# Patient Record
Sex: MALE | Race: WHITE | Employment: OTHER
[De-identification: names, ages, dates, MRNs, and addresses within clinical notes are randomized per-mention and may not be internally consistent; named-entity substitution may affect disease eponyms.]

---

## 2017-02-01 ENCOUNTER — OFFICE VISIT (OUTPATIENT)
Dept: ORTHOPEDIC SURGERY | Facility: CLINIC | Age: 77
End: 2017-02-01

## 2017-02-01 DIAGNOSIS — G89.29 CHRONIC PAIN OF BOTH KNEES: Primary | ICD-10-CM

## 2017-02-01 DIAGNOSIS — M25.562 CHRONIC PAIN OF BOTH KNEES: Primary | ICD-10-CM

## 2017-02-01 DIAGNOSIS — M25.561 CHRONIC PAIN OF BOTH KNEES: Primary | ICD-10-CM

## 2017-02-01 PROCEDURE — 99024 POSTOP FOLLOW-UP VISIT: CPT | Performed by: ORTHOPAEDIC SURGERY

## 2017-02-01 PROCEDURE — 20610 DRAIN/INJ JOINT/BURSA W/O US: CPT | Performed by: ORTHOPAEDIC SURGERY

## 2017-02-01 RX ORDER — BUPIVACAINE HYDROCHLORIDE 5 MG/ML
2 INJECTION, SOLUTION PERINEURAL ONCE
Status: COMPLETED | OUTPATIENT
Start: 2017-02-01 | End: 2017-02-01

## 2017-02-01 RX ORDER — LIDOCAINE HYDROCHLORIDE 10 MG/ML
2 INJECTION, SOLUTION EPIDURAL; INFILTRATION; INTRACAUDAL; PERINEURAL ONCE
Status: COMPLETED | OUTPATIENT
Start: 2017-02-01 | End: 2017-02-01

## 2017-02-01 RX ADMIN — BUPIVACAINE HYDROCHLORIDE 10 MG: 5 INJECTION, SOLUTION PERINEURAL at 11:53

## 2017-02-01 RX ADMIN — LIDOCAINE HYDROCHLORIDE 2 ML: 10 INJECTION, SOLUTION EPIDURAL; INFILTRATION; INTRACAUDAL; PERINEURAL at 11:54

## 2017-02-07 DIAGNOSIS — M17.12 ARTHRITIS OF LEFT KNEE: Primary | ICD-10-CM

## 2017-02-07 RX ORDER — OXYCODONE HYDROCHLORIDE AND ACETAMINOPHEN 5; 325 MG/1; MG/1
1-2 TABLET ORAL EVERY 4 HOURS PRN
Qty: 40 TABLET | Refills: 0 | Status: SHIPPED | OUTPATIENT
Start: 2017-02-07 | End: 2017-03-15 | Stop reason: SDUPTHER

## 2017-02-24 ENCOUNTER — OFFICE VISIT (OUTPATIENT)
Dept: FAMILY MEDICINE CLINIC | Facility: CLINIC | Age: 77
End: 2017-02-24

## 2017-02-24 VITALS
DIASTOLIC BLOOD PRESSURE: 70 MMHG | BODY MASS INDEX: 27.95 KG/M2 | SYSTOLIC BLOOD PRESSURE: 110 MMHG | HEART RATE: 60 BPM | WEIGHT: 194.8 LBS | RESPIRATION RATE: 18 BRPM

## 2017-02-24 DIAGNOSIS — M19.90 ARTHRITIS: ICD-10-CM

## 2017-02-24 DIAGNOSIS — I10 ESSENTIAL HYPERTENSION: Primary | ICD-10-CM

## 2017-02-24 DIAGNOSIS — E78.2 MIXED HYPERLIPIDEMIA: ICD-10-CM

## 2017-02-24 DIAGNOSIS — J44.9 CHRONIC OBSTRUCTIVE PULMONARY DISEASE, UNSPECIFIED COPD TYPE (HCC): ICD-10-CM

## 2017-02-24 DIAGNOSIS — R73.01 IMPAIRED FASTING GLUCOSE: ICD-10-CM

## 2017-02-24 PROCEDURE — G8427 DOCREV CUR MEDS BY ELIG CLIN: HCPCS | Performed by: FAMILY MEDICINE

## 2017-02-24 PROCEDURE — G8598 ASA/ANTIPLAT THER USED: HCPCS | Performed by: FAMILY MEDICINE

## 2017-02-24 PROCEDURE — 1123F ACP DISCUSS/DSCN MKR DOCD: CPT | Performed by: FAMILY MEDICINE

## 2017-02-24 PROCEDURE — G8484 FLU IMMUNIZE NO ADMIN: HCPCS | Performed by: FAMILY MEDICINE

## 2017-02-24 PROCEDURE — 3023F SPIROM DOC REV: CPT | Performed by: FAMILY MEDICINE

## 2017-02-24 PROCEDURE — 3017F COLORECTAL CA SCREEN DOC REV: CPT | Performed by: FAMILY MEDICINE

## 2017-02-24 PROCEDURE — 99214 OFFICE O/P EST MOD 30 MIN: CPT | Performed by: FAMILY MEDICINE

## 2017-02-24 PROCEDURE — 1036F TOBACCO NON-USER: CPT | Performed by: FAMILY MEDICINE

## 2017-02-24 PROCEDURE — G8926 SPIRO NO PERF OR DOC: HCPCS | Performed by: FAMILY MEDICINE

## 2017-02-24 PROCEDURE — 4040F PNEUMOC VAC/ADMIN/RCVD: CPT | Performed by: FAMILY MEDICINE

## 2017-02-24 PROCEDURE — G8420 CALC BMI NORM PARAMETERS: HCPCS | Performed by: FAMILY MEDICINE

## 2017-02-24 RX ORDER — NITROGLYCERIN 0.4 MG/1
0.4 TABLET SUBLINGUAL EVERY 5 MIN PRN
Qty: 25 TABLET | Refills: 1 | Status: SHIPPED | OUTPATIENT
Start: 2017-02-24 | End: 2018-01-26 | Stop reason: SDUPTHER

## 2017-02-24 ASSESSMENT — ENCOUNTER SYMPTOMS
BLOOD IN STOOL: 0
ABDOMINAL PAIN: 0
CHEST TIGHTNESS: 0
SHORTNESS OF BREATH: 0

## 2017-03-15 DIAGNOSIS — M17.12 PRIMARY OSTEOARTHRITIS OF LEFT KNEE: Primary | ICD-10-CM

## 2017-03-15 DIAGNOSIS — M17.12 ARTHRITIS OF LEFT KNEE: ICD-10-CM

## 2017-03-15 RX ORDER — OXYCODONE HYDROCHLORIDE AND ACETAMINOPHEN 5; 325 MG/1; MG/1
1-2 TABLET ORAL EVERY 4 HOURS PRN
Qty: 40 TABLET | Refills: 0 | Status: SHIPPED | OUTPATIENT
Start: 2017-03-15 | End: 2017-05-19

## 2017-03-15 ASSESSMENT — KOOS JR
STANDING UPRIGHT: 2
HOW SEVERE IS YOUR KNEE STIFFNESS AFTER FIRST WAKING IN MORNING: 2
BENDING TO THE FLOOR TO PICK UP OBJECT: 3
GOING UP OR DOWN STAIRS: 3
STRAIGHTENING KNEE FULLY: 3
TWISING OR PIVOTING ON KNEE: 2
RISING FROM SITTING: 3

## 2017-03-15 ASSESSMENT — PROMIS GLOBAL HEALTH SCALE
IN GENERAL, HOW WOULD YOU RATE YOUR SATISFACTION WITH YOUR SOCIAL ACTIVITIES AND RELATIONSHIPS [ON A SCALE OF 1 (POOR) TO 5 (EXCELLENT)]?: 4
IN GENERAL, WOULD YOU SAY YOUR HEALTH IS...[ON A SCALE OF 1 (POOR) TO 5 (EXCELLENT)]: 3
IN THE PAST 7 DAYS, HOW OFTEN HAVE YOU BEEN BOTHERED BY EMOTIONAL PROBLEMS, SUCH AS FEELING ANXIOUS, DEPRESSED, OR IRRITABLE [ON A SCALE FROM 1 (NEVER) TO 5 (ALWAYS)]?: 3
TO WHAT EXTENT ARE YOU ABLE TO CARRY OUT YOUR EVERYDAY PHYSICAL ACTIVITIES SUCH AS WALKING, CLIMBING STAIRS, CARRYING GROCERIES, OR MOVING A CHAIR [ON A SCALE OF 1 (NOT AT ALL) TO 5 (COMPLETELY)]?: 2
IN GENERAL, HOW WOULD YOU RATE YOUR MENTAL HEALTH, INCLUDING YOUR MOOD AND YOUR ABILITY TO THINK [ON A SCALE OF 1 (POOR) TO 5 (EXCELLENT)]?: 3
IN GENERAL, WOULD YOU SAY YOUR QUALITY OF LIFE IS...[ON A SCALE OF 1 (POOR) TO 5 (EXCELLENT)]: 3
SUM OF RESPONSES TO QUESTIONS 3, 6, 7, & 8: 15
IN THE PAST 7 DAYS, HOW WOULD YOU RATE YOUR FATIGUE ON AVERAGE [ON A SCALE FROM 1 (NONE) TO 5 (VERY SEVERE)]?: 3
WHO IS THE PERSON COMPLETING THE PROMIS V1.1 SURVEY?: 0
IN GENERAL, HOW WOULD YOU RATE YOUR PHYSICAL HEALTH [ON A SCALE OF 1 (POOR) TO 5 (EXCELLENT)]?: 3
SUM OF RESPONSES TO QUESTIONS 2, 4, 5, & 10: 13
HOW IS THE PROMIS V1.1 BEING ADMINISTERED?: 0
IN THE PAST 7 DAYS, HOW WOULD YOU RATE YOUR PAIN ON AVERAGE [ON A SCALE FROM 0 (NO PAIN) TO 10 (WORST IMAGINABLE PAIN)]?: 7
IN GENERAL, PLEASE RATE HOW WELL YOU CARRY OUT YOUR USUAL SOCIAL ACTIVITIES (INCLUDES ACTIVITIES AT HOME, AT WORK, AND IN YOUR COMMUNITY, AND RESPONSIBILITIES AS A PARENT, CHILD, SPOUSE, EMPLOYEE, FRIEND, ETC) [ON A SCALE OF 1 (POOR) TO 5 (EXCELLENT)]?: 4

## 2017-03-21 ENCOUNTER — HOSPITAL ENCOUNTER (OUTPATIENT)
Dept: PREADMISSION TESTING | Age: 77
Discharge: HOME | End: 2017-03-21
Payer: MEDICARE

## 2017-03-21 VITALS
WEIGHT: 194.2 LBS | HEIGHT: 70 IN | OXYGEN SATURATION: 96 % | SYSTOLIC BLOOD PRESSURE: 110 MMHG | HEART RATE: 66 BPM | TEMPERATURE: 97.2 F | DIASTOLIC BLOOD PRESSURE: 66 MMHG | BODY MASS INDEX: 27.8 KG/M2 | RESPIRATION RATE: 18 BRPM

## 2017-03-21 LAB
ABSOLUTE EOS #: 0.3 K/UL (ref 0–0.4)
ABSOLUTE LYMPH #: 2.2 K/UL (ref 1–4.8)
ABSOLUTE MONO #: 0.9 K/UL (ref 0.1–1.3)
ANION GAP SERPL CALCULATED.3IONS-SCNC: 13 MMOL/L (ref 9–17)
BASOPHILS # BLD: 0 % (ref 0–2)
BASOPHILS ABSOLUTE: 0 K/UL (ref 0–0.2)
BILIRUBIN URINE: NEGATIVE
BUN BLDV-MCNC: 18 MG/DL (ref 8–23)
BUN/CREAT BLD: ABNORMAL (ref 9–20)
CALCIUM SERPL-MCNC: 9.8 MG/DL (ref 8.6–10.4)
CHLORIDE BLD-SCNC: 103 MMOL/L (ref 98–107)
CO2: 27 MMOL/L (ref 20–31)
COLOR: YELLOW
COMMENT UA: NORMAL
CREAT SERPL-MCNC: 1.59 MG/DL (ref 0.7–1.2)
DIFFERENTIAL TYPE: ABNORMAL
DIRECT EXAM: NORMAL
EOSINOPHILS RELATIVE PERCENT: 3 % (ref 0–4)
GFR AFRICAN AMERICAN: 52 ML/MIN
GFR NON-AFRICAN AMERICAN: 43 ML/MIN
GFR SERPL CREATININE-BSD FRML MDRD: ABNORMAL ML/MIN/{1.73_M2}
GFR SERPL CREATININE-BSD FRML MDRD: ABNORMAL ML/MIN/{1.73_M2}
GLUCOSE BLD-MCNC: 116 MG/DL (ref 70–99)
GLUCOSE URINE: NEGATIVE
HCT VFR BLD CALC: 42.9 % (ref 41–53)
HEMOGLOBIN: 14.2 G/DL (ref 13.5–17.5)
KETONES, URINE: NEGATIVE
LEUKOCYTE ESTERASE, URINE: NEGATIVE
LYMPHOCYTES # BLD: 23 % (ref 24–44)
Lab: NORMAL
MCH RBC QN AUTO: 30.9 PG (ref 26–34)
MCHC RBC AUTO-ENTMCNC: 33.1 G/DL (ref 31–37)
MCV RBC AUTO: 93.5 FL (ref 80–100)
MONOCYTES # BLD: 9 % (ref 1–7)
NITRITE, URINE: NEGATIVE
PDW BLD-RTO: 14 % (ref 11.5–14.9)
PH UA: 5 (ref 5–8)
PLATELET # BLD: 197 K/UL (ref 150–450)
PLATELET ESTIMATE: ABNORMAL
PMV BLD AUTO: 11 FL (ref 6–12)
POTASSIUM SERPL-SCNC: 4.6 MMOL/L (ref 3.7–5.3)
PROTEIN UA: NEGATIVE
RBC # BLD: 4.59 M/UL (ref 4.5–5.9)
RBC # BLD: ABNORMAL 10*6/UL
SEG NEUTROPHILS: 65 % (ref 36–66)
SEGMENTED NEUTROPHILS ABSOLUTE COUNT: 6.4 K/UL (ref 1.3–9.1)
SODIUM BLD-SCNC: 143 MMOL/L (ref 135–144)
SPECIFIC GRAVITY UA: 1.02 (ref 1–1.03)
SPECIMEN DESCRIPTION: NORMAL
SPECIMEN DESCRIPTION: NORMAL
STATUS: NORMAL
TURBIDITY: CLEAR
URINE HGB: NEGATIVE
UROBILINOGEN, URINE: NORMAL
WBC # BLD: 9.9 K/UL (ref 3.5–11)
WBC # BLD: ABNORMAL 10*3/UL

## 2017-03-21 PROCEDURE — 85025 COMPLETE CBC W/AUTO DIFF WBC: CPT

## 2017-03-21 PROCEDURE — 80048 BASIC METABOLIC PNL TOTAL CA: CPT

## 2017-03-21 PROCEDURE — 81003 URINALYSIS AUTO W/O SCOPE: CPT

## 2017-03-21 PROCEDURE — 93005 ELECTROCARDIOGRAM TRACING: CPT

## 2017-03-21 PROCEDURE — 87641 MR-STAPH DNA AMP PROBE: CPT

## 2017-03-21 PROCEDURE — 36415 COLL VENOUS BLD VENIPUNCTURE: CPT

## 2017-03-22 ENCOUNTER — ANESTHESIA EVENT (OUTPATIENT)
Dept: OPERATING ROOM | Age: 77
DRG: 470 | End: 2017-03-22
Payer: MEDICARE

## 2017-03-22 RX ORDER — SODIUM CHLORIDE 0.9 % (FLUSH) 0.9 %
10 SYRINGE (ML) INJECTION PRN
Status: CANCELLED | OUTPATIENT
Start: 2017-03-22

## 2017-03-22 RX ORDER — LIDOCAINE HYDROCHLORIDE 10 MG/ML
1 INJECTION, SOLUTION EPIDURAL; INFILTRATION; INTRACAUDAL; PERINEURAL
Status: CANCELLED | OUTPATIENT
Start: 2017-03-22 | End: 2017-03-22

## 2017-03-22 RX ORDER — SODIUM CHLORIDE 0.9 % (FLUSH) 0.9 %
10 SYRINGE (ML) INJECTION EVERY 12 HOURS SCHEDULED
Status: CANCELLED | OUTPATIENT
Start: 2017-03-22

## 2017-03-22 RX ORDER — SODIUM CHLORIDE, SODIUM LACTATE, POTASSIUM CHLORIDE, CALCIUM CHLORIDE 600; 310; 30; 20 MG/100ML; MG/100ML; MG/100ML; MG/100ML
INJECTION, SOLUTION INTRAVENOUS CONTINUOUS
Status: CANCELLED | OUTPATIENT
Start: 2017-03-22

## 2017-03-26 LAB
EKG ATRIAL RATE: 59 BPM
EKG P AXIS: 44 DEGREES
EKG P-R INTERVAL: 220 MS
EKG Q-T INTERVAL: 412 MS
EKG QRS DURATION: 88 MS
EKG QTC CALCULATION (BAZETT): 407 MS
EKG R AXIS: -10 DEGREES
EKG T AXIS: 47 DEGREES
EKG VENTRICULAR RATE: 59 BPM

## 2017-04-04 ENCOUNTER — ANESTHESIA (OUTPATIENT)
Dept: OPERATING ROOM | Age: 77
DRG: 470 | End: 2017-04-04
Payer: MEDICARE

## 2017-04-04 ENCOUNTER — APPOINTMENT (OUTPATIENT)
Dept: GENERAL RADIOLOGY | Age: 77
DRG: 470 | End: 2017-04-04
Attending: ORTHOPAEDIC SURGERY
Payer: MEDICARE

## 2017-04-04 ENCOUNTER — HOSPITAL ENCOUNTER (INPATIENT)
Age: 77
LOS: 2 days | Discharge: HOME | DRG: 470 | End: 2017-04-06
Attending: ORTHOPAEDIC SURGERY | Admitting: ORTHOPAEDIC SURGERY
Payer: MEDICARE

## 2017-04-04 VITALS — TEMPERATURE: 96.8 F | OXYGEN SATURATION: 98 % | SYSTOLIC BLOOD PRESSURE: 102 MMHG | DIASTOLIC BLOOD PRESSURE: 58 MMHG

## 2017-04-04 PROCEDURE — 2580000003 HC RX 258: Performed by: ANESTHESIOLOGY

## 2017-04-04 PROCEDURE — 2500000003 HC RX 250 WO HCPCS: Performed by: ORTHOPAEDIC SURGERY

## 2017-04-04 PROCEDURE — 1200000000 HC SEMI PRIVATE

## 2017-04-04 PROCEDURE — A6197 ALGINATE DRSG >16 <=48 SQ IN: HCPCS | Performed by: ORTHOPAEDIC SURGERY

## 2017-04-04 PROCEDURE — 64448 NJX AA&/STRD FEM NRV NFS IMG: CPT | Performed by: ANESTHESIOLOGY

## 2017-04-04 PROCEDURE — C1713 ANCHOR/SCREW BN/BN,TIS/BN: HCPCS | Performed by: ORTHOPAEDIC SURGERY

## 2017-04-04 PROCEDURE — 2500000003 HC RX 250 WO HCPCS: Performed by: NURSE ANESTHETIST, CERTIFIED REGISTERED

## 2017-04-04 PROCEDURE — 6360000002 HC RX W HCPCS: Performed by: NURSE ANESTHETIST, CERTIFIED REGISTERED

## 2017-04-04 PROCEDURE — 7100000001 HC PACU RECOVERY - ADDTL 15 MIN: Performed by: ORTHOPAEDIC SURGERY

## 2017-04-04 PROCEDURE — C1776 JOINT DEVICE (IMPLANTABLE): HCPCS | Performed by: ORTHOPAEDIC SURGERY

## 2017-04-04 PROCEDURE — 0SRD0J9 REPLACEMENT OF LEFT KNEE JOINT WITH SYNTHETIC SUBSTITUTE, CEMENTED, OPEN APPROACH: ICD-10-PCS | Performed by: ORTHOPAEDIC SURGERY

## 2017-04-04 PROCEDURE — 2580000003 HC RX 258: Performed by: ORTHOPAEDIC SURGERY

## 2017-04-04 PROCEDURE — 2500000003 HC RX 250 WO HCPCS: Performed by: ANESTHESIOLOGY

## 2017-04-04 PROCEDURE — 3700000001 HC ADD 15 MINUTES (ANESTHESIA): Performed by: ORTHOPAEDIC SURGERY

## 2017-04-04 PROCEDURE — 6360000002 HC RX W HCPCS: Performed by: ORTHOPAEDIC SURGERY

## 2017-04-04 PROCEDURE — 2720000010 HC SURG SUPPLY STERILE: Performed by: ORTHOPAEDIC SURGERY

## 2017-04-04 PROCEDURE — 7100000000 HC PACU RECOVERY - FIRST 15 MIN: Performed by: ORTHOPAEDIC SURGERY

## 2017-04-04 PROCEDURE — 3600000015 HC SURGERY LEVEL 5 ADDTL 15MIN: Performed by: ORTHOPAEDIC SURGERY

## 2017-04-04 PROCEDURE — 6370000000 HC RX 637 (ALT 250 FOR IP): Performed by: ORTHOPAEDIC SURGERY

## 2017-04-04 PROCEDURE — 3700000000 HC ANESTHESIA ATTENDED CARE: Performed by: ORTHOPAEDIC SURGERY

## 2017-04-04 PROCEDURE — 2780000010 HC IMPLANT OTHER: Performed by: ORTHOPAEDIC SURGERY

## 2017-04-04 PROCEDURE — 73560 X-RAY EXAM OF KNEE 1 OR 2: CPT

## 2017-04-04 PROCEDURE — 3600000005 HC SURGERY LEVEL 5 BASE: Performed by: ORTHOPAEDIC SURGERY

## 2017-04-04 DEVICE — IMPLANTABLE DEVICE
Type: IMPLANTABLE DEVICE | Site: KNEE | Status: FUNCTIONAL
Brand: VANGUARD KNEE SYSTEM

## 2017-04-04 DEVICE — TRAY TIB L79MM KNEE CO CHROM FIN MOD INTLOK VANGUARD: Type: IMPLANTABLE DEVICE | Site: KNEE | Status: FUNCTIONAL

## 2017-04-04 DEVICE — IMPLANTABLE DEVICE: Type: IMPLANTABLE DEVICE | Site: KNEE | Status: FUNCTIONAL

## 2017-04-04 DEVICE — BEARING TIB L79MM THK12MM KNEE ARCM ANT STBL MOD COMPLT: Type: IMPLANTABLE DEVICE | Site: KNEE | Status: FUNCTIONAL

## 2017-04-04 DEVICE — DISCONTINUED USE 416978 CEMENT PALACOS R SING DOSE 40GR: Type: IMPLANTABLE DEVICE | Site: KNEE | Status: FUNCTIONAL

## 2017-04-04 RX ORDER — SODIUM CHLORIDE 0.9 % (FLUSH) 0.9 %
10 SYRINGE (ML) INJECTION PRN
Status: DISCONTINUED | OUTPATIENT
Start: 2017-04-04 | End: 2017-04-04 | Stop reason: HOSPADM

## 2017-04-04 RX ORDER — MIDAZOLAM HYDROCHLORIDE 1 MG/ML
INJECTION INTRAMUSCULAR; INTRAVENOUS
Status: DISPENSED
Start: 2017-04-04 | End: 2017-04-05

## 2017-04-04 RX ORDER — SODIUM CHLORIDE, SODIUM LACTATE, POTASSIUM CHLORIDE, CALCIUM CHLORIDE 600; 310; 30; 20 MG/100ML; MG/100ML; MG/100ML; MG/100ML
INJECTION, SOLUTION INTRAVENOUS CONTINUOUS
Status: DISCONTINUED | OUTPATIENT
Start: 2017-04-04 | End: 2017-04-04 | Stop reason: SDUPTHER

## 2017-04-04 RX ORDER — OXYCODONE HYDROCHLORIDE 5 MG/1
5 TABLET ORAL EVERY 4 HOURS PRN
Status: DISCONTINUED | OUTPATIENT
Start: 2017-04-04 | End: 2017-04-06 | Stop reason: HOSPADM

## 2017-04-04 RX ORDER — SODIUM CHLORIDE 0.9 % (FLUSH) 0.9 %
10 SYRINGE (ML) INJECTION PRN
Status: DISCONTINUED | OUTPATIENT
Start: 2017-04-04 | End: 2017-04-06 | Stop reason: HOSPADM

## 2017-04-04 RX ORDER — SODIUM CHLORIDE 0.9 % (FLUSH) 0.9 %
10 SYRINGE (ML) INJECTION EVERY 12 HOURS SCHEDULED
Status: DISCONTINUED | OUTPATIENT
Start: 2017-04-04 | End: 2017-04-06 | Stop reason: HOSPADM

## 2017-04-04 RX ORDER — LIDOCAINE HYDROCHLORIDE 10 MG/ML
1 INJECTION, SOLUTION EPIDURAL; INFILTRATION; INTRACAUDAL; PERINEURAL
Status: DISCONTINUED | OUTPATIENT
Start: 2017-04-04 | End: 2017-04-04 | Stop reason: HOSPADM

## 2017-04-04 RX ORDER — MORPHINE SULFATE 1 MG/ML
INJECTION, SOLUTION EPIDURAL; INTRATHECAL; INTRAVENOUS PRN
Status: DISCONTINUED | OUTPATIENT
Start: 2017-04-04 | End: 2017-04-04 | Stop reason: SDUPTHER

## 2017-04-04 RX ORDER — ONDANSETRON 2 MG/ML
4 INJECTION INTRAMUSCULAR; INTRAVENOUS EVERY 6 HOURS PRN
Status: DISCONTINUED | OUTPATIENT
Start: 2017-04-04 | End: 2017-04-06 | Stop reason: HOSPADM

## 2017-04-04 RX ORDER — ONDANSETRON 2 MG/ML
4 INJECTION INTRAMUSCULAR; INTRAVENOUS EVERY 6 HOURS PRN
Status: DISCONTINUED | OUTPATIENT
Start: 2017-04-04 | End: 2017-04-04 | Stop reason: SDUPTHER

## 2017-04-04 RX ORDER — NALOXONE HYDROCHLORIDE 0.4 MG/ML
0.4 INJECTION, SOLUTION INTRAMUSCULAR; INTRAVENOUS; SUBCUTANEOUS PRN
Status: DISCONTINUED | OUTPATIENT
Start: 2017-04-04 | End: 2017-04-06 | Stop reason: HOSPADM

## 2017-04-04 RX ORDER — ROPIVACAINE HYDROCHLORIDE 2 MG/ML
INJECTION, SOLUTION EPIDURAL; INFILTRATION; PERINEURAL PRN
Status: DISCONTINUED | OUTPATIENT
Start: 2017-04-04 | End: 2017-04-04 | Stop reason: HOSPADM

## 2017-04-04 RX ORDER — KETOROLAC TROMETHAMINE 30 MG/ML
15 INJECTION, SOLUTION INTRAMUSCULAR; INTRAVENOUS EVERY 8 HOURS SCHEDULED
Status: DISPENSED | OUTPATIENT
Start: 2017-04-04 | End: 2017-04-06

## 2017-04-04 RX ORDER — OXYCODONE HYDROCHLORIDE 5 MG/1
10 TABLET ORAL EVERY 4 HOURS PRN
Status: DISCONTINUED | OUTPATIENT
Start: 2017-04-04 | End: 2017-04-06 | Stop reason: HOSPADM

## 2017-04-04 RX ORDER — TRANEXAMIC ACID 100 MG/ML
INJECTION, SOLUTION INTRAVENOUS PRN
Status: DISCONTINUED | OUTPATIENT
Start: 2017-04-04 | End: 2017-04-04 | Stop reason: SDUPTHER

## 2017-04-04 RX ORDER — BUPIVACAINE HYDROCHLORIDE 7.5 MG/ML
INJECTION, SOLUTION INTRASPINAL PRN
Status: DISCONTINUED | OUTPATIENT
Start: 2017-04-04 | End: 2017-04-04 | Stop reason: SDUPTHER

## 2017-04-04 RX ORDER — KETOROLAC TROMETHAMINE 30 MG/ML
INJECTION, SOLUTION INTRAMUSCULAR; INTRAVENOUS PRN
Status: DISCONTINUED | OUTPATIENT
Start: 2017-04-04 | End: 2017-04-04 | Stop reason: HOSPADM

## 2017-04-04 RX ORDER — ACETAMINOPHEN 500 MG
1000 TABLET ORAL EVERY 8 HOURS SCHEDULED
Status: DISPENSED | OUTPATIENT
Start: 2017-04-04 | End: 2017-04-06

## 2017-04-04 RX ORDER — MORPHINE SULFATE 10 MG/ML
INJECTION, SOLUTION INTRAMUSCULAR; INTRAVENOUS PRN
Status: DISCONTINUED | OUTPATIENT
Start: 2017-04-04 | End: 2017-04-04 | Stop reason: HOSPADM

## 2017-04-04 RX ORDER — SODIUM CHLORIDE, SODIUM LACTATE, POTASSIUM CHLORIDE, CALCIUM CHLORIDE 600; 310; 30; 20 MG/100ML; MG/100ML; MG/100ML; MG/100ML
INJECTION, SOLUTION INTRAVENOUS CONTINUOUS
Status: DISCONTINUED | OUTPATIENT
Start: 2017-04-04 | End: 2017-04-06 | Stop reason: HOSPADM

## 2017-04-04 RX ORDER — DOCUSATE SODIUM 100 MG/1
100 CAPSULE, LIQUID FILLED ORAL 2 TIMES DAILY
Status: DISCONTINUED | OUTPATIENT
Start: 2017-04-04 | End: 2017-04-06 | Stop reason: HOSPADM

## 2017-04-04 RX ORDER — 0.9 % SODIUM CHLORIDE 0.9 %
VIAL (ML) INJECTION PRN
Status: DISCONTINUED | OUTPATIENT
Start: 2017-04-04 | End: 2017-04-04 | Stop reason: HOSPADM

## 2017-04-04 RX ORDER — SODIUM CHLORIDE 0.9 % (FLUSH) 0.9 %
10 SYRINGE (ML) INJECTION EVERY 12 HOURS SCHEDULED
Status: DISCONTINUED | OUTPATIENT
Start: 2017-04-04 | End: 2017-04-04 | Stop reason: HOSPADM

## 2017-04-04 RX ORDER — PROPOFOL 10 MG/ML
INJECTION, EMULSION INTRAVENOUS CONTINUOUS PRN
Status: DISCONTINUED | OUTPATIENT
Start: 2017-04-04 | End: 2017-04-04 | Stop reason: SDUPTHER

## 2017-04-04 RX ADMIN — SODIUM CHLORIDE, POTASSIUM CHLORIDE, SODIUM LACTATE AND CALCIUM CHLORIDE: 600; 310; 30; 20 INJECTION, SOLUTION INTRAVENOUS at 13:01

## 2017-04-04 RX ADMIN — DOCUSATE SODIUM 100 MG: 100 CAPSULE, LIQUID FILLED ORAL at 20:45

## 2017-04-04 RX ADMIN — TRANEXAMIC ACID 1000 MG: 100 INJECTION, SOLUTION INTRAVENOUS at 14:39

## 2017-04-04 RX ADMIN — ACETAMINOPHEN 1000 MG: 500 TABLET, FILM COATED ORAL at 20:45

## 2017-04-04 RX ADMIN — KETOROLAC TROMETHAMINE 15 MG: 30 INJECTION, SOLUTION INTRAMUSCULAR at 22:20

## 2017-04-04 RX ADMIN — DEXTROSE MONOHYDRATE 900 MG: 50 INJECTION, SOLUTION INTRAVENOUS at 20:54

## 2017-04-04 RX ADMIN — BUPIVACAINE HYDROCHLORIDE IN DEXTROSE 2 ML: 7.5 INJECTION, SOLUTION SUBARACHNOID at 13:09

## 2017-04-04 RX ADMIN — TRANEXAMIC ACID 1000 MG: 100 INJECTION, SOLUTION INTRAVENOUS at 13:17

## 2017-04-04 RX ADMIN — MORPHINE SULFATE 0.2 MG: 1 INJECTION, SOLUTION EPIDURAL; INTRATHECAL; INTRAVENOUS at 13:09

## 2017-04-04 RX ADMIN — SODIUM CHLORIDE, POTASSIUM CHLORIDE, SODIUM LACTATE AND CALCIUM CHLORIDE: 600; 310; 30; 20 INJECTION, SOLUTION INTRAVENOUS at 16:46

## 2017-04-04 RX ADMIN — PROPOFOL 100 MCG/KG/MIN: 10 INJECTION, EMULSION INTRAVENOUS at 13:15

## 2017-04-04 RX ADMIN — ASPIRIN 325 MG: 325 TABLET, DELAYED RELEASE ORAL at 20:45

## 2017-04-04 RX ADMIN — DEXTROSE 900 MG: 50 INJECTION, SOLUTION INTRAVENOUS at 13:01

## 2017-04-04 RX ADMIN — Medication 750 ML: at 15:10

## 2017-04-04 RX ADMIN — SODIUM CHLORIDE, POTASSIUM CHLORIDE, SODIUM LACTATE AND CALCIUM CHLORIDE: 600; 310; 30; 20 INJECTION, SOLUTION INTRAVENOUS at 10:42

## 2017-04-04 ASSESSMENT — PAIN SCALES - GENERAL
PAINLEVEL_OUTOF10: 2
PAINLEVEL_OUTOF10: 0
PAINLEVEL_OUTOF10: 2
PAINLEVEL_OUTOF10: 0

## 2017-04-04 ASSESSMENT — PAIN - FUNCTIONAL ASSESSMENT: PAIN_FUNCTIONAL_ASSESSMENT: 0-10

## 2017-04-04 ASSESSMENT — PAIN DESCRIPTION - DESCRIPTORS: DESCRIPTORS: ACHING

## 2017-04-04 ASSESSMENT — PAIN DESCRIPTION - LOCATION: LOCATION: KNEE

## 2017-04-04 ASSESSMENT — COPD QUESTIONNAIRES: CAT_SEVERITY: NO INTERVAL CHANGE

## 2017-04-04 ASSESSMENT — PAIN DESCRIPTION - PAIN TYPE: TYPE: SURGICAL PAIN

## 2017-04-04 ASSESSMENT — PAIN DESCRIPTION - ORIENTATION: ORIENTATION: LEFT

## 2017-04-05 LAB
ANION GAP SERPL CALCULATED.3IONS-SCNC: 13 MMOL/L (ref 9–17)
BUN BLDV-MCNC: 19 MG/DL (ref 8–23)
BUN/CREAT BLD: ABNORMAL (ref 9–20)
CALCIUM SERPL-MCNC: 8.5 MG/DL (ref 8.6–10.4)
CHLORIDE BLD-SCNC: 104 MMOL/L (ref 98–107)
CO2: 24 MMOL/L (ref 20–31)
CREAT SERPL-MCNC: 1.38 MG/DL (ref 0.7–1.2)
GFR AFRICAN AMERICAN: >60 ML/MIN
GFR NON-AFRICAN AMERICAN: 50 ML/MIN
GFR SERPL CREATININE-BSD FRML MDRD: ABNORMAL ML/MIN/{1.73_M2}
GFR SERPL CREATININE-BSD FRML MDRD: ABNORMAL ML/MIN/{1.73_M2}
GLUCOSE BLD-MCNC: 108 MG/DL (ref 70–99)
HCT VFR BLD CALC: 34.7 % (ref 41–53)
HEMOGLOBIN: 11.4 G/DL (ref 13.5–17.5)
POTASSIUM SERPL-SCNC: 4.3 MMOL/L (ref 3.7–5.3)
SODIUM BLD-SCNC: 141 MMOL/L (ref 135–144)

## 2017-04-05 PROCEDURE — 97162 PT EVAL MOD COMPLEX 30 MIN: CPT

## 2017-04-05 PROCEDURE — 2500000003 HC RX 250 WO HCPCS: Performed by: ORTHOPAEDIC SURGERY

## 2017-04-05 PROCEDURE — 94664 DEMO&/EVAL PT USE INHALER: CPT

## 2017-04-05 PROCEDURE — 97535 SELF CARE MNGMENT TRAINING: CPT

## 2017-04-05 PROCEDURE — 6370000000 HC RX 637 (ALT 250 FOR IP): Performed by: ORTHOPAEDIC SURGERY

## 2017-04-05 PROCEDURE — 90732 PPSV23 VACC 2 YRS+ SUBQ/IM: CPT | Performed by: ORTHOPAEDIC SURGERY

## 2017-04-05 PROCEDURE — 97530 THERAPEUTIC ACTIVITIES: CPT

## 2017-04-05 PROCEDURE — G8978 MOBILITY CURRENT STATUS: HCPCS

## 2017-04-05 PROCEDURE — 1200000000 HC SEMI PRIVATE

## 2017-04-05 PROCEDURE — 6370000000 HC RX 637 (ALT 250 FOR IP): Performed by: FAMILY MEDICINE

## 2017-04-05 PROCEDURE — 85014 HEMATOCRIT: CPT

## 2017-04-05 PROCEDURE — 97150 GROUP THERAPEUTIC PROCEDURES: CPT

## 2017-04-05 PROCEDURE — 97110 THERAPEUTIC EXERCISES: CPT

## 2017-04-05 PROCEDURE — 97116 GAIT TRAINING THERAPY: CPT

## 2017-04-05 PROCEDURE — 97165 OT EVAL LOW COMPLEX 30 MIN: CPT

## 2017-04-05 PROCEDURE — 80048 BASIC METABOLIC PNL TOTAL CA: CPT

## 2017-04-05 PROCEDURE — G8979 MOBILITY GOAL STATUS: HCPCS

## 2017-04-05 PROCEDURE — G0009 ADMIN PNEUMOCOCCAL VACCINE: HCPCS | Performed by: ORTHOPAEDIC SURGERY

## 2017-04-05 PROCEDURE — 85018 HEMOGLOBIN: CPT

## 2017-04-05 PROCEDURE — 36415 COLL VENOUS BLD VENIPUNCTURE: CPT

## 2017-04-05 PROCEDURE — 6360000002 HC RX W HCPCS: Performed by: ORTHOPAEDIC SURGERY

## 2017-04-05 PROCEDURE — 2580000003 HC RX 258: Performed by: ORTHOPAEDIC SURGERY

## 2017-04-05 RX ORDER — ATORVASTATIN CALCIUM 40 MG/1
40 TABLET, FILM COATED ORAL NIGHTLY
Status: DISCONTINUED | OUTPATIENT
Start: 2017-04-05 | End: 2017-04-06 | Stop reason: HOSPADM

## 2017-04-05 RX ORDER — M-VIT,TX,IRON,MINS/CALC/FOLIC 27MG-0.4MG
1 TABLET ORAL DAILY
Status: DISCONTINUED | OUTPATIENT
Start: 2017-04-05 | End: 2017-04-06 | Stop reason: HOSPADM

## 2017-04-05 RX ADMIN — Medication 2 PUFF: at 20:40

## 2017-04-05 RX ADMIN — DOCUSATE SODIUM 100 MG: 100 CAPSULE, LIQUID FILLED ORAL at 08:17

## 2017-04-05 RX ADMIN — Medication 10 ML: at 08:19

## 2017-04-05 RX ADMIN — OXYCODONE HYDROCHLORIDE 5 MG: 5 TABLET ORAL at 08:17

## 2017-04-05 RX ADMIN — DEXTROSE MONOHYDRATE 900 MG: 50 INJECTION, SOLUTION INTRAVENOUS at 05:26

## 2017-04-05 RX ADMIN — ASPIRIN 325 MG: 325 TABLET, DELAYED RELEASE ORAL at 20:40

## 2017-04-05 RX ADMIN — OXYCODONE HYDROCHLORIDE 10 MG: 5 TABLET ORAL at 13:39

## 2017-04-05 RX ADMIN — KETOROLAC TROMETHAMINE 15 MG: 30 INJECTION, SOLUTION INTRAMUSCULAR at 23:20

## 2017-04-05 RX ADMIN — ASPIRIN 325 MG: 325 TABLET, DELAYED RELEASE ORAL at 08:17

## 2017-04-05 RX ADMIN — ACETAMINOPHEN 1000 MG: 500 TABLET, FILM COATED ORAL at 20:40

## 2017-04-05 RX ADMIN — ACETAMINOPHEN 1000 MG: 500 TABLET, FILM COATED ORAL at 06:05

## 2017-04-05 RX ADMIN — MULTIPLE VITAMINS W/ MINERALS TAB 1 TABLET: TAB at 11:20

## 2017-04-05 RX ADMIN — Medication 2 PUFF: at 10:30

## 2017-04-05 RX ADMIN — ATORVASTATIN CALCIUM 40 MG: 40 TABLET, FILM COATED ORAL at 20:40

## 2017-04-05 RX ADMIN — Medication 10 ML: at 23:23

## 2017-04-05 RX ADMIN — KETOROLAC TROMETHAMINE 15 MG: 30 INJECTION, SOLUTION INTRAMUSCULAR at 15:53

## 2017-04-05 RX ADMIN — PNEUMOCOCCAL VACCINE POLYVALENT 0.5 ML
25; 25; 25; 25; 25; 25; 25; 25; 25; 25; 25; 25; 25; 25; 25; 25; 25; 25; 25; 25; 25; 25; 25 INJECTION, SOLUTION INTRAMUSCULAR; SUBCUTANEOUS at 11:20

## 2017-04-05 RX ADMIN — KETOROLAC TROMETHAMINE 15 MG: 30 INJECTION, SOLUTION INTRAMUSCULAR at 06:04

## 2017-04-05 RX ADMIN — OXYCODONE HYDROCHLORIDE 5 MG: 5 TABLET ORAL at 20:42

## 2017-04-05 RX ADMIN — ACETAMINOPHEN 1000 MG: 500 TABLET, FILM COATED ORAL at 15:53

## 2017-04-05 RX ADMIN — DOCUSATE SODIUM 100 MG: 100 CAPSULE, LIQUID FILLED ORAL at 20:40

## 2017-04-05 ASSESSMENT — PAIN SCALES - GENERAL
PAINLEVEL_OUTOF10: 6
PAINLEVEL_OUTOF10: 1
PAINLEVEL_OUTOF10: 1
PAINLEVEL_OUTOF10: 2
PAINLEVEL_OUTOF10: 5
PAINLEVEL_OUTOF10: 4
PAINLEVEL_OUTOF10: 6
PAINLEVEL_OUTOF10: 2
PAINLEVEL_OUTOF10: 6
PAINLEVEL_OUTOF10: 3
PAINLEVEL_OUTOF10: 8
PAINLEVEL_OUTOF10: 4
PAINLEVEL_OUTOF10: 5
PAINLEVEL_OUTOF10: 0
PAINLEVEL_OUTOF10: 4
PAINLEVEL_OUTOF10: 3

## 2017-04-05 ASSESSMENT — PAIN DESCRIPTION - LOCATION
LOCATION: KNEE

## 2017-04-05 ASSESSMENT — PAIN DESCRIPTION - DESCRIPTORS
DESCRIPTORS: ACHING
DESCRIPTORS: ACHING

## 2017-04-05 ASSESSMENT — PAIN DESCRIPTION - PAIN TYPE
TYPE: SURGICAL PAIN
TYPE: SURGICAL PAIN
TYPE: ACUTE PAIN;SURGICAL PAIN
TYPE: SURGICAL PAIN

## 2017-04-05 ASSESSMENT — PAIN DESCRIPTION - ORIENTATION
ORIENTATION: LEFT

## 2017-04-05 ASSESSMENT — PAIN DESCRIPTION - FREQUENCY
FREQUENCY: CONTINUOUS
FREQUENCY: CONTINUOUS

## 2017-04-05 ASSESSMENT — PAIN DESCRIPTION - PROGRESSION
CLINICAL_PROGRESSION: GRADUALLY IMPROVING

## 2017-04-05 ASSESSMENT — PAIN DESCRIPTION - ONSET: ONSET: ON-GOING

## 2017-04-06 ENCOUNTER — CARE COORDINATOR VISIT (OUTPATIENT)
Dept: CASE MANAGEMENT | Age: 77
End: 2017-04-06

## 2017-04-06 VITALS
BODY MASS INDEX: 31.28 KG/M2 | DIASTOLIC BLOOD PRESSURE: 81 MMHG | RESPIRATION RATE: 18 BRPM | TEMPERATURE: 97.2 F | HEIGHT: 70 IN | WEIGHT: 218.48 LBS | SYSTOLIC BLOOD PRESSURE: 133 MMHG | OXYGEN SATURATION: 96 % | HEART RATE: 91 BPM

## 2017-04-06 PROCEDURE — 97110 THERAPEUTIC EXERCISES: CPT

## 2017-04-06 PROCEDURE — 97150 GROUP THERAPEUTIC PROCEDURES: CPT

## 2017-04-06 PROCEDURE — 6370000000 HC RX 637 (ALT 250 FOR IP): Performed by: FAMILY MEDICINE

## 2017-04-06 PROCEDURE — 6370000000 HC RX 637 (ALT 250 FOR IP): Performed by: ORTHOPAEDIC SURGERY

## 2017-04-06 PROCEDURE — 97530 THERAPEUTIC ACTIVITIES: CPT

## 2017-04-06 PROCEDURE — 2580000003 HC RX 258: Performed by: ORTHOPAEDIC SURGERY

## 2017-04-06 PROCEDURE — 97116 GAIT TRAINING THERAPY: CPT

## 2017-04-06 PROCEDURE — 97535 SELF CARE MNGMENT TRAINING: CPT

## 2017-04-06 PROCEDURE — 6360000002 HC RX W HCPCS: Performed by: ORTHOPAEDIC SURGERY

## 2017-04-06 RX ORDER — OXYCODONE HYDROCHLORIDE 10 MG/1
10 TABLET ORAL EVERY 4 HOURS PRN
Qty: 40 TABLET | Refills: 0 | Status: SHIPPED | OUTPATIENT
Start: 2017-04-06 | End: 2017-04-13

## 2017-04-06 RX ADMIN — OXYCODONE HYDROCHLORIDE 10 MG: 5 TABLET ORAL at 14:23

## 2017-04-06 RX ADMIN — OXYCODONE HYDROCHLORIDE 10 MG: 5 TABLET ORAL at 10:00

## 2017-04-06 RX ADMIN — Medication 10 ML: at 08:47

## 2017-04-06 RX ADMIN — KETOROLAC TROMETHAMINE 15 MG: 30 INJECTION, SOLUTION INTRAMUSCULAR at 06:35

## 2017-04-06 RX ADMIN — MULTIPLE VITAMINS W/ MINERALS TAB 1 TABLET: TAB at 08:46

## 2017-04-06 RX ADMIN — DOCUSATE SODIUM 100 MG: 100 CAPSULE, LIQUID FILLED ORAL at 08:46

## 2017-04-06 RX ADMIN — Medication 2 PUFF: at 08:46

## 2017-04-06 RX ADMIN — OXYCODONE HYDROCHLORIDE 5 MG: 5 TABLET ORAL at 06:29

## 2017-04-06 RX ADMIN — ASPIRIN 325 MG: 325 TABLET, DELAYED RELEASE ORAL at 08:45

## 2017-04-06 ASSESSMENT — PAIN DESCRIPTION - ORIENTATION
ORIENTATION: LEFT

## 2017-04-06 ASSESSMENT — PAIN SCALES - GENERAL
PAINLEVEL_OUTOF10: 4
PAINLEVEL_OUTOF10: 5
PAINLEVEL_OUTOF10: 5
PAINLEVEL_OUTOF10: 3
PAINLEVEL_OUTOF10: 4
PAINLEVEL_OUTOF10: 2
PAINLEVEL_OUTOF10: 3
PAINLEVEL_OUTOF10: 5

## 2017-04-06 ASSESSMENT — PAIN DESCRIPTION - PROGRESSION
CLINICAL_PROGRESSION: GRADUALLY IMPROVING
CLINICAL_PROGRESSION: GRADUALLY IMPROVING

## 2017-04-06 ASSESSMENT — PAIN DESCRIPTION - LOCATION
LOCATION: KNEE

## 2017-04-06 ASSESSMENT — PAIN DESCRIPTION - DESCRIPTORS: DESCRIPTORS: ACHING

## 2017-04-06 ASSESSMENT — PAIN DESCRIPTION - PAIN TYPE
TYPE: SURGICAL PAIN
TYPE: SURGICAL PAIN
TYPE: SURGICAL PAIN;ACUTE PAIN

## 2017-04-07 ENCOUNTER — CARE COORDINATION (OUTPATIENT)
Dept: CASE MANAGEMENT | Age: 77
End: 2017-04-07

## 2017-04-14 ENCOUNTER — CARE COORDINATION (OUTPATIENT)
Dept: CASE MANAGEMENT | Age: 77
End: 2017-04-14

## 2017-04-19 ENCOUNTER — OFFICE VISIT (OUTPATIENT)
Dept: ORTHOPEDIC SURGERY | Age: 77
End: 2017-04-19

## 2017-04-19 DIAGNOSIS — Z96.652 STATUS POST TOTAL LEFT KNEE REPLACEMENT: Primary | ICD-10-CM

## 2017-04-19 PROCEDURE — 99024 POSTOP FOLLOW-UP VISIT: CPT | Performed by: ORTHOPAEDIC SURGERY

## 2017-04-19 RX ORDER — OXYCODONE HYDROCHLORIDE AND ACETAMINOPHEN 5; 325 MG/1; MG/1
1-2 TABLET ORAL EVERY 4 HOURS PRN
Qty: 40 TABLET | Refills: 0 | Status: SHIPPED | OUTPATIENT
Start: 2017-04-19 | End: 2017-05-01 | Stop reason: SDUPTHER

## 2017-04-20 ENCOUNTER — HOSPITAL ENCOUNTER (OUTPATIENT)
Dept: PHYSICAL THERAPY | Age: 77
Setting detail: THERAPIES SERIES
Discharge: HOME | End: 2017-04-20
Payer: MEDICARE

## 2017-04-20 PROCEDURE — G8978 MOBILITY CURRENT STATUS: HCPCS

## 2017-04-20 PROCEDURE — G8979 MOBILITY GOAL STATUS: HCPCS

## 2017-04-20 PROCEDURE — 97162 PT EVAL MOD COMPLEX 30 MIN: CPT

## 2017-04-20 PROCEDURE — 97110 THERAPEUTIC EXERCISES: CPT

## 2017-04-20 ASSESSMENT — PAIN DESCRIPTION - LOCATION: LOCATION: KNEE

## 2017-04-20 ASSESSMENT — PAIN DESCRIPTION - ORIENTATION: ORIENTATION: LEFT

## 2017-04-20 ASSESSMENT — PAIN DESCRIPTION - PAIN TYPE: TYPE: SURGICAL PAIN

## 2017-04-20 ASSESSMENT — PAIN DESCRIPTION - FREQUENCY: FREQUENCY: CONTINUOUS

## 2017-04-20 ASSESSMENT — PAIN SCALES - GENERAL: PAINLEVEL_OUTOF10: 5

## 2017-04-20 ASSESSMENT — PAIN DESCRIPTION - PROGRESSION: CLINICAL_PROGRESSION: GRADUALLY IMPROVING

## 2017-04-20 ASSESSMENT — PAIN DESCRIPTION - DESCRIPTORS: DESCRIPTORS: CONSTANT;ACHING;STABBING

## 2017-04-21 ENCOUNTER — CARE COORDINATION (OUTPATIENT)
Dept: CASE MANAGEMENT | Age: 77
End: 2017-04-21

## 2017-04-24 ENCOUNTER — TELEPHONE (OUTPATIENT)
Dept: FAMILY MEDICINE CLINIC | Age: 77
End: 2017-04-24

## 2017-04-24 DIAGNOSIS — I10 ESSENTIAL HYPERTENSION: Primary | ICD-10-CM

## 2017-04-24 DIAGNOSIS — R73.01 IMPAIRED FASTING GLUCOSE: ICD-10-CM

## 2017-04-24 DIAGNOSIS — M1A.09X0 IDIOPATHIC CHRONIC GOUT OF MULTIPLE SITES WITHOUT TOPHUS: ICD-10-CM

## 2017-04-24 DIAGNOSIS — E78.2 MIXED HYPERLIPIDEMIA: ICD-10-CM

## 2017-04-25 ENCOUNTER — HOSPITAL ENCOUNTER (OUTPATIENT)
Age: 77
Discharge: HOME | End: 2017-04-25
Payer: MEDICARE

## 2017-04-25 DIAGNOSIS — M1A.09X0 IDIOPATHIC CHRONIC GOUT OF MULTIPLE SITES WITHOUT TOPHUS: ICD-10-CM

## 2017-04-25 DIAGNOSIS — E78.2 MIXED HYPERLIPIDEMIA: ICD-10-CM

## 2017-04-25 DIAGNOSIS — I10 ESSENTIAL HYPERTENSION: ICD-10-CM

## 2017-04-25 DIAGNOSIS — R73.01 IMPAIRED FASTING GLUCOSE: ICD-10-CM

## 2017-04-25 LAB
ALT SERPL-CCNC: 20 U/L (ref 5–41)
ANION GAP SERPL CALCULATED.3IONS-SCNC: 13 MMOL/L (ref 9–17)
AST SERPL-CCNC: 25 U/L
BUN BLDV-MCNC: 20 MG/DL (ref 8–23)
BUN/CREAT BLD: ABNORMAL (ref 9–20)
CALCIUM SERPL-MCNC: 10 MG/DL (ref 8.6–10.4)
CHLORIDE BLD-SCNC: 104 MMOL/L (ref 98–107)
CHOLESTEROL/HDL RATIO: 3.4
CHOLESTEROL: 122 MG/DL
CO2: 25 MMOL/L (ref 20–31)
CREAT SERPL-MCNC: 1.43 MG/DL (ref 0.7–1.2)
GFR AFRICAN AMERICAN: 58 ML/MIN
GFR NON-AFRICAN AMERICAN: 48 ML/MIN
GFR SERPL CREATININE-BSD FRML MDRD: ABNORMAL ML/MIN/{1.73_M2}
GFR SERPL CREATININE-BSD FRML MDRD: ABNORMAL ML/MIN/{1.73_M2}
GLUCOSE BLD-MCNC: 100 MG/DL (ref 70–99)
HDLC SERPL-MCNC: 36 MG/DL
LDL CHOLESTEROL: 56 MG/DL (ref 0–130)
MAGNESIUM: 2.1 MG/DL (ref 1.6–2.6)
POTASSIUM SERPL-SCNC: 4.7 MMOL/L (ref 3.7–5.3)
SODIUM BLD-SCNC: 142 MMOL/L (ref 135–144)
TRIGL SERPL-MCNC: 149 MG/DL
URIC ACID: 6.8 MG/DL (ref 3.4–7)
VLDLC SERPL CALC-MCNC: ABNORMAL MG/DL (ref 1–30)

## 2017-04-25 PROCEDURE — 80061 LIPID PANEL: CPT

## 2017-04-25 PROCEDURE — 84450 TRANSFERASE (AST) (SGOT): CPT

## 2017-04-25 PROCEDURE — 36415 COLL VENOUS BLD VENIPUNCTURE: CPT

## 2017-04-25 PROCEDURE — 83735 ASSAY OF MAGNESIUM: CPT

## 2017-04-25 PROCEDURE — 84460 ALANINE AMINO (ALT) (SGPT): CPT

## 2017-04-25 PROCEDURE — 84550 ASSAY OF BLOOD/URIC ACID: CPT

## 2017-04-25 PROCEDURE — 80048 BASIC METABOLIC PNL TOTAL CA: CPT

## 2017-04-28 ENCOUNTER — HOSPITAL ENCOUNTER (OUTPATIENT)
Dept: PHYSICAL THERAPY | Age: 77
Setting detail: THERAPIES SERIES
Discharge: HOME | End: 2017-04-28
Payer: MEDICARE

## 2017-04-28 PROCEDURE — 97110 THERAPEUTIC EXERCISES: CPT

## 2017-04-28 ASSESSMENT — PAIN DESCRIPTION - PAIN TYPE: TYPE: SURGICAL PAIN

## 2017-04-28 ASSESSMENT — PAIN SCALES - GENERAL: PAINLEVEL_OUTOF10: 5

## 2017-04-28 ASSESSMENT — PAIN DESCRIPTION - ORIENTATION: ORIENTATION: LEFT

## 2017-04-28 ASSESSMENT — PAIN DESCRIPTION - LOCATION: LOCATION: KNEE

## 2017-05-01 ENCOUNTER — HOSPITAL ENCOUNTER (OUTPATIENT)
Dept: PHYSICAL THERAPY | Age: 77
Setting detail: THERAPIES SERIES
Discharge: HOME | End: 2017-05-01
Payer: MEDICARE

## 2017-05-01 DIAGNOSIS — Z96.652 STATUS POST TOTAL LEFT KNEE REPLACEMENT: ICD-10-CM

## 2017-05-01 PROCEDURE — 97110 THERAPEUTIC EXERCISES: CPT

## 2017-05-01 RX ORDER — OXYCODONE HYDROCHLORIDE AND ACETAMINOPHEN 5; 325 MG/1; MG/1
1-2 TABLET ORAL EVERY 4 HOURS PRN
Qty: 40 TABLET | Refills: 0 | Status: SHIPPED | OUTPATIENT
Start: 2017-05-01 | End: 2017-05-19

## 2017-05-01 ASSESSMENT — PAIN DESCRIPTION - PAIN TYPE: TYPE: SURGICAL PAIN

## 2017-05-01 ASSESSMENT — PAIN DESCRIPTION - ORIENTATION: ORIENTATION: LEFT

## 2017-05-01 ASSESSMENT — PAIN DESCRIPTION - LOCATION: LOCATION: KNEE

## 2017-05-01 ASSESSMENT — PAIN DESCRIPTION - PROGRESSION: CLINICAL_PROGRESSION: GRADUALLY IMPROVING

## 2017-05-01 ASSESSMENT — PAIN SCALES - GENERAL: PAINLEVEL_OUTOF10: 5

## 2017-05-03 ENCOUNTER — HOSPITAL ENCOUNTER (OUTPATIENT)
Dept: PHYSICAL THERAPY | Age: 77
Setting detail: THERAPIES SERIES
Discharge: HOME | End: 2017-05-03
Payer: MEDICARE

## 2017-05-03 PROCEDURE — 97110 THERAPEUTIC EXERCISES: CPT

## 2017-05-03 ASSESSMENT — PAIN SCALES - GENERAL: PAINLEVEL_OUTOF10: 5

## 2017-05-03 ASSESSMENT — PAIN DESCRIPTION - LOCATION: LOCATION: KNEE

## 2017-05-03 ASSESSMENT — PAIN DESCRIPTION - ORIENTATION: ORIENTATION: LEFT

## 2017-05-03 ASSESSMENT — PAIN DESCRIPTION - PROGRESSION: CLINICAL_PROGRESSION: GRADUALLY IMPROVING

## 2017-05-04 ENCOUNTER — CARE COORDINATION (OUTPATIENT)
Dept: CASE MANAGEMENT | Age: 77
End: 2017-05-04

## 2017-05-05 ENCOUNTER — HOSPITAL ENCOUNTER (OUTPATIENT)
Dept: PHYSICAL THERAPY | Age: 77
Setting detail: THERAPIES SERIES
Discharge: HOME | End: 2017-05-05
Payer: MEDICARE

## 2017-05-05 PROCEDURE — G8978 MOBILITY CURRENT STATUS: HCPCS

## 2017-05-05 PROCEDURE — 97164 PT RE-EVAL EST PLAN CARE: CPT

## 2017-05-05 PROCEDURE — G8979 MOBILITY GOAL STATUS: HCPCS

## 2017-05-05 PROCEDURE — 97110 THERAPEUTIC EXERCISES: CPT

## 2017-05-05 ASSESSMENT — PAIN DESCRIPTION - ORIENTATION: ORIENTATION: LEFT

## 2017-05-05 ASSESSMENT — PAIN DESCRIPTION - PROGRESSION: CLINICAL_PROGRESSION: GRADUALLY IMPROVING

## 2017-05-05 ASSESSMENT — PAIN SCALES - GENERAL: PAINLEVEL_OUTOF10: 4

## 2017-05-05 ASSESSMENT — PAIN DESCRIPTION - LOCATION: LOCATION: KNEE

## 2017-05-10 ENCOUNTER — OFFICE VISIT (OUTPATIENT)
Dept: ORTHOPEDIC SURGERY | Age: 77
End: 2017-05-10
Payer: MEDICARE

## 2017-05-10 DIAGNOSIS — Z96.641 H/O TOTAL HIP ARTHROPLASTY, RIGHT: Primary | ICD-10-CM

## 2017-05-10 PROCEDURE — 4040F PNEUMOC VAC/ADMIN/RCVD: CPT | Performed by: ORTHOPAEDIC SURGERY

## 2017-05-10 PROCEDURE — G8420 CALC BMI NORM PARAMETERS: HCPCS | Performed by: ORTHOPAEDIC SURGERY

## 2017-05-10 PROCEDURE — 1036F TOBACCO NON-USER: CPT | Performed by: ORTHOPAEDIC SURGERY

## 2017-05-10 PROCEDURE — G8427 DOCREV CUR MEDS BY ELIG CLIN: HCPCS | Performed by: ORTHOPAEDIC SURGERY

## 2017-05-10 PROCEDURE — 1123F ACP DISCUSS/DSCN MKR DOCD: CPT | Performed by: ORTHOPAEDIC SURGERY

## 2017-05-10 PROCEDURE — G8598 ASA/ANTIPLAT THER USED: HCPCS | Performed by: ORTHOPAEDIC SURGERY

## 2017-05-10 PROCEDURE — 99213 OFFICE O/P EST LOW 20 MIN: CPT | Performed by: ORTHOPAEDIC SURGERY

## 2017-05-11 ENCOUNTER — HOSPITAL ENCOUNTER (OUTPATIENT)
Dept: PHYSICAL THERAPY | Age: 77
Setting detail: THERAPIES SERIES
Discharge: HOME | End: 2017-05-11
Payer: MEDICARE

## 2017-05-11 PROCEDURE — 97110 THERAPEUTIC EXERCISES: CPT

## 2017-05-11 ASSESSMENT — PAIN DESCRIPTION - ORIENTATION: ORIENTATION: LEFT

## 2017-05-11 ASSESSMENT — PAIN DESCRIPTION - LOCATION: LOCATION: KNEE

## 2017-05-11 ASSESSMENT — PAIN DESCRIPTION - PROGRESSION: CLINICAL_PROGRESSION: GRADUALLY IMPROVING

## 2017-05-11 ASSESSMENT — PAIN SCALES - GENERAL: PAINLEVEL_OUTOF10: 4

## 2017-05-12 ENCOUNTER — CARE COORDINATION (OUTPATIENT)
Dept: CASE MANAGEMENT | Age: 77
End: 2017-05-12

## 2017-05-12 ENCOUNTER — HOSPITAL ENCOUNTER (OUTPATIENT)
Dept: PHYSICAL THERAPY | Age: 77
Setting detail: THERAPIES SERIES
Discharge: HOME | End: 2017-05-12
Payer: MEDICARE

## 2017-05-12 PROCEDURE — 97110 THERAPEUTIC EXERCISES: CPT

## 2017-05-12 ASSESSMENT — PAIN DESCRIPTION - LOCATION: LOCATION: KNEE

## 2017-05-12 ASSESSMENT — PAIN SCALES - GENERAL: PAINLEVEL_OUTOF10: 5

## 2017-05-12 ASSESSMENT — PAIN DESCRIPTION - ORIENTATION: ORIENTATION: LEFT

## 2017-05-12 ASSESSMENT — PAIN DESCRIPTION - PROGRESSION: CLINICAL_PROGRESSION: GRADUALLY IMPROVING

## 2017-05-15 ENCOUNTER — HOSPITAL ENCOUNTER (OUTPATIENT)
Dept: PHYSICAL THERAPY | Age: 77
Setting detail: THERAPIES SERIES
Discharge: HOME | End: 2017-05-15
Payer: MEDICARE

## 2017-05-15 PROCEDURE — 97110 THERAPEUTIC EXERCISES: CPT

## 2017-05-15 ASSESSMENT — PAIN DESCRIPTION - PROGRESSION: CLINICAL_PROGRESSION: GRADUALLY IMPROVING

## 2017-05-15 ASSESSMENT — PAIN SCALES - GENERAL: PAINLEVEL_OUTOF10: 4

## 2017-05-19 ENCOUNTER — HOSPITAL ENCOUNTER (OUTPATIENT)
Dept: GENERAL RADIOLOGY | Age: 77
Discharge: HOME | End: 2017-05-19
Payer: MEDICARE

## 2017-05-19 ENCOUNTER — OFFICE VISIT (OUTPATIENT)
Dept: FAMILY MEDICINE CLINIC | Age: 77
End: 2017-05-19
Payer: MEDICARE

## 2017-05-19 ENCOUNTER — HOSPITAL ENCOUNTER (OUTPATIENT)
Age: 77
Discharge: HOME | End: 2017-05-19
Payer: MEDICARE

## 2017-05-19 VITALS
WEIGHT: 186 LBS | SYSTOLIC BLOOD PRESSURE: 120 MMHG | BODY MASS INDEX: 26.69 KG/M2 | DIASTOLIC BLOOD PRESSURE: 80 MMHG | RESPIRATION RATE: 17 BRPM | HEART RATE: 64 BPM

## 2017-05-19 DIAGNOSIS — I10 ESSENTIAL HYPERTENSION: Primary | ICD-10-CM

## 2017-05-19 DIAGNOSIS — D64.9 ANEMIA, UNSPECIFIED TYPE: ICD-10-CM

## 2017-05-19 DIAGNOSIS — R06.09 DYSPNEA ON EXERTION: ICD-10-CM

## 2017-05-19 DIAGNOSIS — R63.4 WEIGHT LOSS: ICD-10-CM

## 2017-05-19 DIAGNOSIS — J44.9 CHRONIC OBSTRUCTIVE PULMONARY DISEASE, UNSPECIFIED COPD TYPE (HCC): ICD-10-CM

## 2017-05-19 DIAGNOSIS — E78.2 MIXED HYPERLIPIDEMIA: ICD-10-CM

## 2017-05-19 DIAGNOSIS — R73.01 IMPAIRED FASTING GLUCOSE: ICD-10-CM

## 2017-05-19 DIAGNOSIS — I10 ESSENTIAL HYPERTENSION: ICD-10-CM

## 2017-05-19 LAB
ABSOLUTE EOS #: 0.3 K/UL (ref 0–0.4)
ABSOLUTE LYMPH #: 2.1 K/UL (ref 1–4.8)
ABSOLUTE MONO #: 0.8 K/UL (ref 0.1–1.3)
BASOPHILS # BLD: 0 %
BASOPHILS ABSOLUTE: 0 K/UL (ref 0–0.2)
DIFFERENTIAL TYPE: ABNORMAL
EOSINOPHILS RELATIVE PERCENT: 2 %
HCT VFR BLD CALC: 37.7 % (ref 41–53)
HEMOGLOBIN: 12.2 G/DL (ref 13.5–17.5)
LYMPHOCYTES # BLD: 19 %
MCH RBC QN AUTO: 30.1 PG (ref 26–34)
MCHC RBC AUTO-ENTMCNC: 32.4 G/DL (ref 31–37)
MCV RBC AUTO: 93 FL (ref 80–100)
MONOCYTES # BLD: 7 %
PDW BLD-RTO: 13.5 % (ref 11.5–14.9)
PLATELET # BLD: 209 K/UL (ref 150–450)
PLATELET ESTIMATE: ABNORMAL
PMV BLD AUTO: 10.9 FL (ref 6–12)
RBC # BLD: 4.05 M/UL (ref 4.5–5.9)
RBC # BLD: ABNORMAL 10*6/UL
SEG NEUTROPHILS: 72 %
SEGMENTED NEUTROPHILS ABSOLUTE COUNT: 8.1 K/UL (ref 1.3–9.1)
TSH SERPL DL<=0.05 MIU/L-ACNC: 1.3 MIU/L (ref 0.3–5)
WBC # BLD: 11.3 K/UL (ref 3.5–11)
WBC # BLD: ABNORMAL 10*3/UL

## 2017-05-19 PROCEDURE — G8427 DOCREV CUR MEDS BY ELIG CLIN: HCPCS | Performed by: FAMILY MEDICINE

## 2017-05-19 PROCEDURE — 3023F SPIROM DOC REV: CPT | Performed by: FAMILY MEDICINE

## 2017-05-19 PROCEDURE — 1036F TOBACCO NON-USER: CPT | Performed by: FAMILY MEDICINE

## 2017-05-19 PROCEDURE — 36415 COLL VENOUS BLD VENIPUNCTURE: CPT

## 2017-05-19 PROCEDURE — 4040F PNEUMOC VAC/ADMIN/RCVD: CPT | Performed by: FAMILY MEDICINE

## 2017-05-19 PROCEDURE — G8598 ASA/ANTIPLAT THER USED: HCPCS | Performed by: FAMILY MEDICINE

## 2017-05-19 PROCEDURE — 1123F ACP DISCUSS/DSCN MKR DOCD: CPT | Performed by: FAMILY MEDICINE

## 2017-05-19 PROCEDURE — G8420 CALC BMI NORM PARAMETERS: HCPCS | Performed by: FAMILY MEDICINE

## 2017-05-19 PROCEDURE — 99214 OFFICE O/P EST MOD 30 MIN: CPT | Performed by: FAMILY MEDICINE

## 2017-05-19 PROCEDURE — 71020 XR CHEST STANDARD TWO VW: CPT

## 2017-05-19 PROCEDURE — G8926 SPIRO NO PERF OR DOC: HCPCS | Performed by: FAMILY MEDICINE

## 2017-05-19 PROCEDURE — 85025 COMPLETE CBC W/AUTO DIFF WBC: CPT

## 2017-05-19 PROCEDURE — 84443 ASSAY THYROID STIM HORMONE: CPT

## 2017-05-19 ASSESSMENT — PATIENT HEALTH QUESTIONNAIRE - PHQ9
1. LITTLE INTEREST OR PLEASURE IN DOING THINGS: 0
SUM OF ALL RESPONSES TO PHQ QUESTIONS 1-9: 0
2. FEELING DOWN, DEPRESSED OR HOPELESS: 0
SUM OF ALL RESPONSES TO PHQ9 QUESTIONS 1 & 2: 0

## 2017-05-19 ASSESSMENT — ENCOUNTER SYMPTOMS
CHEST TIGHTNESS: 0
ABDOMINAL PAIN: 0
BLOOD IN STOOL: 0
SHORTNESS OF BREATH: 1

## 2017-05-22 ENCOUNTER — HOSPITAL ENCOUNTER (OUTPATIENT)
Dept: PHYSICAL THERAPY | Age: 77
Setting detail: THERAPIES SERIES
Discharge: HOME | End: 2017-05-22
Payer: MEDICARE

## 2017-05-22 PROCEDURE — 97110 THERAPEUTIC EXERCISES: CPT

## 2017-05-22 ASSESSMENT — PAIN DESCRIPTION - ORIENTATION: ORIENTATION: LEFT

## 2017-05-22 ASSESSMENT — PAIN SCALES - GENERAL: PAINLEVEL_OUTOF10: 3

## 2017-05-22 ASSESSMENT — PAIN DESCRIPTION - PROGRESSION: CLINICAL_PROGRESSION: GRADUALLY IMPROVING

## 2017-05-22 ASSESSMENT — PAIN DESCRIPTION - LOCATION: LOCATION: KNEE

## 2017-05-24 ENCOUNTER — HOSPITAL ENCOUNTER (OUTPATIENT)
Dept: PHYSICAL THERAPY | Age: 77
Setting detail: THERAPIES SERIES
Discharge: HOME | End: 2017-05-24
Payer: MEDICARE

## 2017-05-24 PROCEDURE — 97110 THERAPEUTIC EXERCISES: CPT

## 2017-05-24 ASSESSMENT — PAIN SCALES - GENERAL: PAINLEVEL_OUTOF10: 3

## 2017-05-24 ASSESSMENT — PAIN DESCRIPTION - LOCATION: LOCATION: KNEE

## 2017-05-24 ASSESSMENT — PAIN DESCRIPTION - ORIENTATION: ORIENTATION: LEFT

## 2017-05-24 ASSESSMENT — PAIN DESCRIPTION - PROGRESSION: CLINICAL_PROGRESSION: GRADUALLY IMPROVING

## 2017-05-30 ENCOUNTER — HOSPITAL ENCOUNTER (OUTPATIENT)
Dept: PHYSICAL THERAPY | Age: 77
Setting detail: THERAPIES SERIES
Discharge: HOME | End: 2017-05-30
Payer: MEDICARE

## 2017-05-30 PROCEDURE — 97110 THERAPEUTIC EXERCISES: CPT

## 2017-05-30 ASSESSMENT — PAIN DESCRIPTION - PROGRESSION: CLINICAL_PROGRESSION: GRADUALLY IMPROVING

## 2017-05-30 ASSESSMENT — PAIN DESCRIPTION - LOCATION: LOCATION: KNEE

## 2017-05-30 ASSESSMENT — PAIN SCALES - GENERAL: PAINLEVEL_OUTOF10: 2

## 2017-05-30 ASSESSMENT — PAIN DESCRIPTION - ORIENTATION: ORIENTATION: LEFT

## 2017-06-01 ENCOUNTER — HOSPITAL ENCOUNTER (OUTPATIENT)
Dept: PHYSICAL THERAPY | Age: 77
Setting detail: THERAPIES SERIES
Discharge: HOME | End: 2017-06-01
Payer: MEDICARE

## 2017-06-01 PROCEDURE — 97110 THERAPEUTIC EXERCISES: CPT

## 2017-06-01 PROCEDURE — G8979 MOBILITY GOAL STATUS: HCPCS

## 2017-06-01 PROCEDURE — G8980 MOBILITY D/C STATUS: HCPCS

## 2017-06-01 ASSESSMENT — PAIN DESCRIPTION - ORIENTATION: ORIENTATION: LEFT

## 2017-06-01 ASSESSMENT — PAIN SCALES - GENERAL: PAINLEVEL_OUTOF10: 2

## 2017-06-01 ASSESSMENT — PAIN DESCRIPTION - PROGRESSION: CLINICAL_PROGRESSION: GRADUALLY IMPROVING

## 2017-06-01 ASSESSMENT — PAIN DESCRIPTION - PAIN TYPE: TYPE: SURGICAL PAIN

## 2017-06-01 ASSESSMENT — PAIN DESCRIPTION - LOCATION: LOCATION: KNEE

## 2017-06-04 ENCOUNTER — CARE COORDINATION (OUTPATIENT)
Dept: CASE MANAGEMENT | Age: 77
End: 2017-06-04

## 2017-06-05 ENCOUNTER — HOSPITAL ENCOUNTER (OUTPATIENT)
Dept: CT IMAGING | Age: 77
Discharge: HOME | End: 2017-06-05
Payer: MEDICARE

## 2017-06-05 DIAGNOSIS — R06.00 DYSPNEA, UNSPECIFIED TYPE: ICD-10-CM

## 2017-06-05 LAB
BUN BLDV-MCNC: 24 MG/DL (ref 8–23)
CREAT SERPL-MCNC: 1.51 MG/DL (ref 0.7–1.2)
GFR AFRICAN AMERICAN: 55 ML/MIN
GFR NON-AFRICAN AMERICAN: 45 ML/MIN
GFR SERPL CREATININE-BSD FRML MDRD: ABNORMAL ML/MIN/{1.73_M2}
GFR SERPL CREATININE-BSD FRML MDRD: ABNORMAL ML/MIN/{1.73_M2}

## 2017-06-05 PROCEDURE — 84520 ASSAY OF UREA NITROGEN: CPT

## 2017-06-05 PROCEDURE — 36415 COLL VENOUS BLD VENIPUNCTURE: CPT

## 2017-06-05 PROCEDURE — 6360000004 HC RX CONTRAST MEDICATION: Performed by: INTERNAL MEDICINE

## 2017-06-05 PROCEDURE — 2580000003 HC RX 258: Performed by: INTERNAL MEDICINE

## 2017-06-05 PROCEDURE — 82565 ASSAY OF CREATININE: CPT

## 2017-06-05 PROCEDURE — 71260 CT THORAX DX C+: CPT

## 2017-06-05 RX ORDER — 0.9 % SODIUM CHLORIDE 0.9 %
100 INTRAVENOUS SOLUTION INTRAVENOUS ONCE
Status: COMPLETED | OUTPATIENT
Start: 2017-06-05 | End: 2017-06-05

## 2017-06-05 RX ORDER — SODIUM CHLORIDE 0.9 % (FLUSH) 0.9 %
10 SYRINGE (ML) INJECTION PRN
Status: DISCONTINUED | OUTPATIENT
Start: 2017-06-05 | End: 2017-06-08 | Stop reason: HOSPADM

## 2017-06-05 RX ADMIN — SODIUM CHLORIDE 100 ML: 9 INJECTION, SOLUTION INTRAVENOUS at 10:09

## 2017-06-05 RX ADMIN — Medication 10 ML: at 10:09

## 2017-06-05 RX ADMIN — IOVERSOL 100 ML: 741 INJECTION INTRA-ARTERIAL; INTRAVENOUS at 10:09

## 2017-06-09 ENCOUNTER — HOSPITAL ENCOUNTER (OUTPATIENT)
Facility: MEDICAL CENTER | Age: 77
Discharge: HOME | End: 2017-06-09
Payer: MEDICARE

## 2017-06-09 ENCOUNTER — INITIAL CONSULT (OUTPATIENT)
Dept: ONCOLOGY | Age: 77
End: 2017-06-09
Payer: MEDICARE

## 2017-06-09 VITALS
DIASTOLIC BLOOD PRESSURE: 84 MMHG | HEART RATE: 56 BPM | HEIGHT: 70 IN | BODY MASS INDEX: 25.91 KG/M2 | TEMPERATURE: 97.5 F | RESPIRATION RATE: 16 BRPM | SYSTOLIC BLOOD PRESSURE: 131 MMHG | WEIGHT: 181 LBS

## 2017-06-09 DIAGNOSIS — D64.9 ANEMIA, UNSPECIFIED TYPE: Primary | ICD-10-CM

## 2017-06-09 LAB
ABSOLUTE EOS #: 0.2 K/UL (ref 0–0.4)
ABSOLUTE LYMPH #: 2.3 K/UL (ref 1–4.8)
ABSOLUTE MONO #: 0.8 K/UL (ref 0.1–1.3)
ABSOLUTE RETIC #: 0.09 M/UL (ref 0.02–0.1)
BASOPHILS # BLD: 0 %
BASOPHILS ABSOLUTE: 0 K/UL (ref 0–0.2)
DIFFERENTIAL TYPE: NORMAL
EOSINOPHILS RELATIVE PERCENT: 2 %
FERRITIN: 145 UG/L (ref 30–400)
FOLATE: >20 NG/ML
HCT VFR BLD CALC: 42.3 % (ref 41–53)
HEMOGLOBIN: 13.7 G/DL (ref 13.5–17.5)
IRON SATURATION: 34 % (ref 20–55)
IRON: 107 UG/DL (ref 59–158)
LACTATE DEHYDROGENASE: 214 U/L (ref 135–225)
LYMPHOCYTES # BLD: 25 %
MCH RBC QN AUTO: 29.8 PG (ref 26–34)
MCHC RBC AUTO-ENTMCNC: 32.5 G/DL (ref 31–37)
MCV RBC AUTO: 91.8 FL (ref 80–100)
MONOCYTES # BLD: 8 %
PDW BLD-RTO: 14.2 % (ref 11.5–14.9)
PLATELET # BLD: 207 K/UL (ref 150–450)
PLATELET ESTIMATE: NORMAL
PMV BLD AUTO: 10.2 FL (ref 6–12)
RBC # BLD: 4.61 M/UL (ref 4.5–5.9)
RBC # BLD: NORMAL 10*6/UL
RETIC %: 1.8 % (ref 0.5–2)
SEG NEUTROPHILS: 65 %
SEGMENTED NEUTROPHILS ABSOLUTE COUNT: 6 K/UL (ref 1.3–9.1)
TOTAL IRON BINDING CAPACITY: 317 UG/DL (ref 250–450)
UNSATURATED IRON BINDING CAPACITY: 210 UG/DL (ref 112–347)
VITAMIN B-12: 288 PG/ML (ref 211–946)
WBC # BLD: 9.3 K/UL (ref 3.5–11)
WBC # BLD: NORMAL 10*3/UL

## 2017-06-09 PROCEDURE — 36415 COLL VENOUS BLD VENIPUNCTURE: CPT

## 2017-06-09 PROCEDURE — 83550 IRON BINDING TEST: CPT

## 2017-06-09 PROCEDURE — 85045 AUTOMATED RETICULOCYTE COUNT: CPT

## 2017-06-09 PROCEDURE — 82668 ASSAY OF ERYTHROPOIETIN: CPT

## 2017-06-09 PROCEDURE — 85025 COMPLETE CBC W/AUTO DIFF WBC: CPT

## 2017-06-09 PROCEDURE — 1036F TOBACCO NON-USER: CPT | Performed by: INTERNAL MEDICINE

## 2017-06-09 PROCEDURE — 83540 ASSAY OF IRON: CPT

## 2017-06-09 PROCEDURE — 4040F PNEUMOC VAC/ADMIN/RCVD: CPT | Performed by: INTERNAL MEDICINE

## 2017-06-09 PROCEDURE — 99204 OFFICE O/P NEW MOD 45 MIN: CPT | Performed by: INTERNAL MEDICINE

## 2017-06-09 PROCEDURE — 82607 VITAMIN B-12: CPT

## 2017-06-09 PROCEDURE — 84165 PROTEIN E-PHORESIS SERUM: CPT

## 2017-06-09 PROCEDURE — 82728 ASSAY OF FERRITIN: CPT

## 2017-06-09 PROCEDURE — 84155 ASSAY OF PROTEIN SERUM: CPT

## 2017-06-09 PROCEDURE — G8598 ASA/ANTIPLAT THER USED: HCPCS | Performed by: INTERNAL MEDICINE

## 2017-06-09 PROCEDURE — G8419 CALC BMI OUT NRM PARAM NOF/U: HCPCS | Performed by: INTERNAL MEDICINE

## 2017-06-09 PROCEDURE — 83615 LACTATE (LD) (LDH) ENZYME: CPT

## 2017-06-09 PROCEDURE — G8427 DOCREV CUR MEDS BY ELIG CLIN: HCPCS | Performed by: INTERNAL MEDICINE

## 2017-06-09 PROCEDURE — 99201 HC NEW PT, E/M LEVEL 1: CPT | Performed by: NURSE PRACTITIONER

## 2017-06-09 PROCEDURE — 82746 ASSAY OF FOLIC ACID SERUM: CPT

## 2017-06-10 LAB — ERYTHROPOIETIN: 10 MU/ML (ref 4–27)

## 2017-06-12 LAB
ALBUMIN (CALCULATED): 4.9 G/DL (ref 3.2–5.2)
ALBUMIN PERCENT: 70 % (ref 45–65)
ALPHA 1 PERCENT: 2 % (ref 3–6)
ALPHA 2 PERCENT: 11 % (ref 6–13)
ALPHA-1-GLOBULIN: 0.1 G/DL (ref 0.1–0.4)
ALPHA-2-GLOBULIN: 0.7 G/DL (ref 0.5–0.9)
BETA GLOBULIN: 0.7 G/DL (ref 0.5–1.1)
BETA PERCENT: 10 % (ref 11–19)
GAMMA GLOBULIN %: 8 % (ref 9–20)
GAMMA GLOBULIN: 0.5 G/DL (ref 0.5–1.5)
PATHOLOGIST: ABNORMAL
PROTEIN ELECTROPHORESIS, SERUM: ABNORMAL
TOTAL PROT. SUM,%: 101 % (ref 98–102)
TOTAL PROT. SUM: 6.9 G/DL (ref 6.3–8.2)
TOTAL PROTEIN: 7 G/DL (ref 6.4–8.3)

## 2017-06-21 ENCOUNTER — CARE COORDINATION (OUTPATIENT)
Dept: CASE MANAGEMENT | Age: 77
End: 2017-06-21

## 2017-06-27 ENCOUNTER — OFFICE VISIT (OUTPATIENT)
Dept: FAMILY MEDICINE CLINIC | Age: 77
End: 2017-06-27
Payer: MEDICARE

## 2017-06-27 VITALS
SYSTOLIC BLOOD PRESSURE: 120 MMHG | WEIGHT: 184 LBS | RESPIRATION RATE: 16 BRPM | DIASTOLIC BLOOD PRESSURE: 80 MMHG | HEART RATE: 70 BPM | BODY MASS INDEX: 26.4 KG/M2

## 2017-06-27 DIAGNOSIS — R73.01 IMPAIRED FASTING GLUCOSE: ICD-10-CM

## 2017-06-27 DIAGNOSIS — E78.2 MIXED HYPERLIPIDEMIA: ICD-10-CM

## 2017-06-27 DIAGNOSIS — M1A.09X0 IDIOPATHIC CHRONIC GOUT OF MULTIPLE SITES WITHOUT TOPHUS: ICD-10-CM

## 2017-06-27 DIAGNOSIS — I10 ESSENTIAL HYPERTENSION: Primary | ICD-10-CM

## 2017-06-27 PROCEDURE — G8427 DOCREV CUR MEDS BY ELIG CLIN: HCPCS | Performed by: FAMILY MEDICINE

## 2017-06-27 PROCEDURE — 99214 OFFICE O/P EST MOD 30 MIN: CPT | Performed by: FAMILY MEDICINE

## 2017-06-27 PROCEDURE — 1123F ACP DISCUSS/DSCN MKR DOCD: CPT | Performed by: FAMILY MEDICINE

## 2017-06-27 PROCEDURE — 1036F TOBACCO NON-USER: CPT | Performed by: FAMILY MEDICINE

## 2017-06-27 PROCEDURE — G8419 CALC BMI OUT NRM PARAM NOF/U: HCPCS | Performed by: FAMILY MEDICINE

## 2017-06-27 PROCEDURE — G8598 ASA/ANTIPLAT THER USED: HCPCS | Performed by: FAMILY MEDICINE

## 2017-06-27 PROCEDURE — 4040F PNEUMOC VAC/ADMIN/RCVD: CPT | Performed by: FAMILY MEDICINE

## 2017-06-27 ASSESSMENT — ENCOUNTER SYMPTOMS
ABDOMINAL PAIN: 0
CHEST TIGHTNESS: 0
SHORTNESS OF BREATH: 0
BLOOD IN STOOL: 0

## 2017-07-03 ENCOUNTER — HOSPITAL ENCOUNTER (OUTPATIENT)
Age: 77
Discharge: HOME | End: 2017-07-03
Payer: MEDICARE

## 2017-07-03 DIAGNOSIS — N18.30 BENIGN HYPERTENSION WITH CKD (CHRONIC KIDNEY DISEASE) STAGE III (HCC): ICD-10-CM

## 2017-07-03 DIAGNOSIS — N18.30 CKD (CHRONIC KIDNEY DISEASE) STAGE 3, GFR 30-59 ML/MIN (HCC): ICD-10-CM

## 2017-07-03 DIAGNOSIS — I12.9 BENIGN HYPERTENSION WITH CKD (CHRONIC KIDNEY DISEASE) STAGE III (HCC): ICD-10-CM

## 2017-07-03 DIAGNOSIS — N28.1 RENAL CYST: ICD-10-CM

## 2017-07-03 LAB
ABSOLUTE EOS #: 0.2 K/UL (ref 0–0.4)
ABSOLUTE LYMPH #: 2 K/UL (ref 1–4.8)
ABSOLUTE MONO #: 0.7 K/UL (ref 0.1–1.3)
ANION GAP SERPL CALCULATED.3IONS-SCNC: 14 MMOL/L (ref 9–17)
BASOPHILS # BLD: 1 %
BASOPHILS ABSOLUTE: 0.1 K/UL (ref 0–0.2)
BUN BLDV-MCNC: 17 MG/DL (ref 8–23)
BUN/CREAT BLD: ABNORMAL (ref 9–20)
CALCIUM SERPL-MCNC: 9.2 MG/DL (ref 8.6–10.4)
CHLORIDE BLD-SCNC: 105 MMOL/L (ref 98–107)
CO2: 24 MMOL/L (ref 20–31)
CREAT SERPL-MCNC: 1.35 MG/DL (ref 0.7–1.2)
DIFFERENTIAL TYPE: ABNORMAL
EOSINOPHILS RELATIVE PERCENT: 2 %
GFR AFRICAN AMERICAN: >60 ML/MIN
GFR NON-AFRICAN AMERICAN: 51 ML/MIN
GFR SERPL CREATININE-BSD FRML MDRD: ABNORMAL ML/MIN/{1.73_M2}
GFR SERPL CREATININE-BSD FRML MDRD: ABNORMAL ML/MIN/{1.73_M2}
GLUCOSE BLD-MCNC: 143 MG/DL (ref 70–99)
HCT VFR BLD CALC: 39.3 % (ref 41–53)
HEMOGLOBIN: 12.7 G/DL (ref 13.5–17.5)
LYMPHOCYTES # BLD: 23 %
MAGNESIUM: 2.2 MG/DL (ref 1.6–2.6)
MCH RBC QN AUTO: 30.3 PG (ref 26–34)
MCHC RBC AUTO-ENTMCNC: 32.3 G/DL (ref 31–37)
MCV RBC AUTO: 93.8 FL (ref 80–100)
MONOCYTES # BLD: 7 %
PDW BLD-RTO: 14.1 % (ref 11.5–14.9)
PHOSPHORUS: 3.6 MG/DL (ref 2.5–4.5)
PLATELET # BLD: 212 K/UL (ref 150–450)
PLATELET ESTIMATE: ABNORMAL
PMV BLD AUTO: 10.1 FL (ref 6–12)
POTASSIUM SERPL-SCNC: 4.1 MMOL/L (ref 3.7–5.3)
RBC # BLD: 4.19 M/UL (ref 4.5–5.9)
RBC # BLD: ABNORMAL 10*6/UL
SEG NEUTROPHILS: 67 %
SEGMENTED NEUTROPHILS ABSOLUTE COUNT: 6 K/UL (ref 1.3–9.1)
SODIUM BLD-SCNC: 143 MMOL/L (ref 135–144)
WBC # BLD: 9 K/UL (ref 3.5–11)
WBC # BLD: ABNORMAL 10*3/UL

## 2017-07-03 PROCEDURE — 84100 ASSAY OF PHOSPHORUS: CPT

## 2017-07-03 PROCEDURE — 85025 COMPLETE CBC W/AUTO DIFF WBC: CPT

## 2017-07-03 PROCEDURE — 36415 COLL VENOUS BLD VENIPUNCTURE: CPT

## 2017-07-03 PROCEDURE — 83735 ASSAY OF MAGNESIUM: CPT

## 2017-07-03 PROCEDURE — 80048 BASIC METABOLIC PNL TOTAL CA: CPT

## 2017-07-10 ENCOUNTER — OFFICE VISIT (OUTPATIENT)
Dept: ORTHOPEDIC SURGERY | Age: 77
End: 2017-07-10

## 2017-07-10 DIAGNOSIS — Z96.652 STATUS POST TOTAL LEFT KNEE REPLACEMENT: Primary | ICD-10-CM

## 2017-07-10 PROCEDURE — 99024 POSTOP FOLLOW-UP VISIT: CPT | Performed by: ORTHOPAEDIC SURGERY

## 2017-07-14 ENCOUNTER — OFFICE VISIT (OUTPATIENT)
Dept: ONCOLOGY | Age: 77
End: 2017-07-14
Payer: MEDICARE

## 2017-07-14 VITALS
RESPIRATION RATE: 20 BRPM | SYSTOLIC BLOOD PRESSURE: 129 MMHG | TEMPERATURE: 97.9 F | HEART RATE: 56 BPM | DIASTOLIC BLOOD PRESSURE: 78 MMHG

## 2017-07-14 DIAGNOSIS — D63.1 ANEMIA OF CHRONIC RENAL FAILURE, STAGE 2 (MILD): Primary | ICD-10-CM

## 2017-07-14 DIAGNOSIS — N18.2 ANEMIA OF CHRONIC RENAL FAILURE, STAGE 2 (MILD): Primary | ICD-10-CM

## 2017-07-14 DIAGNOSIS — N18.30 CKD (CHRONIC KIDNEY DISEASE) STAGE 3, GFR 30-59 ML/MIN (HCC): ICD-10-CM

## 2017-07-14 PROCEDURE — G8427 DOCREV CUR MEDS BY ELIG CLIN: HCPCS | Performed by: INTERNAL MEDICINE

## 2017-07-14 PROCEDURE — 1123F ACP DISCUSS/DSCN MKR DOCD: CPT | Performed by: INTERNAL MEDICINE

## 2017-07-14 PROCEDURE — 99211 OFF/OP EST MAY X REQ PHY/QHP: CPT

## 2017-07-14 PROCEDURE — 99214 OFFICE O/P EST MOD 30 MIN: CPT | Performed by: INTERNAL MEDICINE

## 2017-07-14 PROCEDURE — G8598 ASA/ANTIPLAT THER USED: HCPCS | Performed by: INTERNAL MEDICINE

## 2017-07-14 PROCEDURE — G8419 CALC BMI OUT NRM PARAM NOF/U: HCPCS | Performed by: INTERNAL MEDICINE

## 2017-07-14 PROCEDURE — 1036F TOBACCO NON-USER: CPT | Performed by: INTERNAL MEDICINE

## 2017-07-14 PROCEDURE — 4040F PNEUMOC VAC/ADMIN/RCVD: CPT | Performed by: INTERNAL MEDICINE

## 2017-09-06 ENCOUNTER — OFFICE VISIT (OUTPATIENT)
Dept: ORTHOPEDIC SURGERY | Age: 77
End: 2017-09-06
Payer: MEDICARE

## 2017-09-06 DIAGNOSIS — M25.552 PAIN OF LEFT HIP JOINT: Primary | ICD-10-CM

## 2017-09-06 PROCEDURE — G8427 DOCREV CUR MEDS BY ELIG CLIN: HCPCS | Performed by: ORTHOPAEDIC SURGERY

## 2017-09-06 PROCEDURE — 1036F TOBACCO NON-USER: CPT | Performed by: ORTHOPAEDIC SURGERY

## 2017-09-06 PROCEDURE — 4040F PNEUMOC VAC/ADMIN/RCVD: CPT | Performed by: ORTHOPAEDIC SURGERY

## 2017-09-06 PROCEDURE — 1123F ACP DISCUSS/DSCN MKR DOCD: CPT | Performed by: ORTHOPAEDIC SURGERY

## 2017-09-06 PROCEDURE — G8419 CALC BMI OUT NRM PARAM NOF/U: HCPCS | Performed by: ORTHOPAEDIC SURGERY

## 2017-09-06 PROCEDURE — G8598 ASA/ANTIPLAT THER USED: HCPCS | Performed by: ORTHOPAEDIC SURGERY

## 2017-09-06 PROCEDURE — 99213 OFFICE O/P EST LOW 20 MIN: CPT | Performed by: ORTHOPAEDIC SURGERY

## 2017-09-12 ENCOUNTER — HOSPITAL ENCOUNTER (OUTPATIENT)
Dept: INTERVENTIONAL RADIOLOGY/VASCULAR | Age: 77
Discharge: HOME | End: 2017-09-12
Payer: MEDICARE

## 2017-09-12 DIAGNOSIS — M25.552 LEFT HIP PAIN: ICD-10-CM

## 2017-09-12 PROCEDURE — 2500000003 HC RX 250 WO HCPCS: Performed by: RADIOLOGY

## 2017-09-12 PROCEDURE — 77002 NEEDLE LOCALIZATION BY XRAY: CPT | Performed by: RADIOLOGY

## 2017-09-12 PROCEDURE — 6360000002 HC RX W HCPCS: Performed by: ORTHOPAEDIC SURGERY

## 2017-09-12 PROCEDURE — 20610 DRAIN/INJ JOINT/BURSA W/O US: CPT | Performed by: RADIOLOGY

## 2017-09-12 PROCEDURE — 6360000004 HC RX CONTRAST MEDICATION: Performed by: ORTHOPAEDIC SURGERY

## 2017-09-12 PROCEDURE — 2500000003 HC RX 250 WO HCPCS: Performed by: ORTHOPAEDIC SURGERY

## 2017-09-12 RX ORDER — METHYLPREDNISOLONE ACETATE 80 MG/ML
80 INJECTION, SUSPENSION INTRA-ARTICULAR; INTRALESIONAL; INTRAMUSCULAR; SOFT TISSUE ONCE
Status: COMPLETED | OUTPATIENT
Start: 2017-09-12 | End: 2017-09-12

## 2017-09-12 RX ORDER — LIDOCAINE HYDROCHLORIDE 20 MG/ML
INJECTION, SOLUTION INFILTRATION; PERINEURAL
Status: COMPLETED | OUTPATIENT
Start: 2017-09-12 | End: 2017-09-12

## 2017-09-12 RX ORDER — BUPIVACAINE HYDROCHLORIDE 5 MG/ML
2 INJECTION, SOLUTION EPIDURAL; INTRACAUDAL ONCE
Status: COMPLETED | OUTPATIENT
Start: 2017-09-12 | End: 2017-09-12

## 2017-09-12 RX ORDER — LIDOCAINE HYDROCHLORIDE 10 MG/ML
2 INJECTION, SOLUTION EPIDURAL; INFILTRATION; INTRACAUDAL; PERINEURAL ONCE
Status: COMPLETED | OUTPATIENT
Start: 2017-09-12 | End: 2017-09-12

## 2017-09-12 ASSESSMENT — PAIN SCALES - GENERAL: PAINLEVEL_OUTOF10: 7

## 2017-09-13 PROCEDURE — 6360000004 HC RX CONTRAST MEDICATION: Performed by: ORTHOPAEDIC SURGERY

## 2017-09-13 RX ADMIN — IOTHALAMATE MEGLUMINE 50 ML: 600 INJECTION INTRAVASCULAR at 09:15

## 2017-10-19 ENCOUNTER — IMMUNIZATION (OUTPATIENT)
Dept: FAMILY MEDICINE CLINIC | Age: 77
End: 2017-10-19
Payer: MEDICARE

## 2017-10-19 VITALS — TEMPERATURE: 97.6 F

## 2017-10-19 DIAGNOSIS — Z23 NEED FOR INFLUENZA VACCINATION: Primary | ICD-10-CM

## 2017-10-19 PROCEDURE — G0008 ADMIN INFLUENZA VIRUS VAC: HCPCS | Performed by: FAMILY MEDICINE

## 2017-10-19 PROCEDURE — 90686 IIV4 VACC NO PRSV 0.5 ML IM: CPT | Performed by: FAMILY MEDICINE

## 2017-10-27 ENCOUNTER — OFFICE VISIT (OUTPATIENT)
Dept: FAMILY MEDICINE CLINIC | Age: 77
End: 2017-10-27
Payer: MEDICARE

## 2017-10-27 VITALS — HEART RATE: 68 BPM | SYSTOLIC BLOOD PRESSURE: 102 MMHG | RESPIRATION RATE: 14 BRPM | DIASTOLIC BLOOD PRESSURE: 60 MMHG

## 2017-10-27 DIAGNOSIS — E78.2 MIXED HYPERLIPIDEMIA: ICD-10-CM

## 2017-10-27 DIAGNOSIS — R73.01 IMPAIRED FASTING GLUCOSE: ICD-10-CM

## 2017-10-27 DIAGNOSIS — I10 ESSENTIAL HYPERTENSION: Primary | ICD-10-CM

## 2017-10-27 DIAGNOSIS — M1A.09X0 IDIOPATHIC CHRONIC GOUT OF MULTIPLE SITES WITHOUT TOPHUS: ICD-10-CM

## 2017-10-27 PROCEDURE — 4040F PNEUMOC VAC/ADMIN/RCVD: CPT | Performed by: FAMILY MEDICINE

## 2017-10-27 PROCEDURE — G8427 DOCREV CUR MEDS BY ELIG CLIN: HCPCS | Performed by: FAMILY MEDICINE

## 2017-10-27 PROCEDURE — 99214 OFFICE O/P EST MOD 30 MIN: CPT | Performed by: FAMILY MEDICINE

## 2017-10-27 PROCEDURE — G8417 CALC BMI ABV UP PARAM F/U: HCPCS | Performed by: FAMILY MEDICINE

## 2017-10-27 PROCEDURE — 1036F TOBACCO NON-USER: CPT | Performed by: FAMILY MEDICINE

## 2017-10-27 PROCEDURE — G8484 FLU IMMUNIZE NO ADMIN: HCPCS | Performed by: FAMILY MEDICINE

## 2017-10-27 PROCEDURE — 1123F ACP DISCUSS/DSCN MKR DOCD: CPT | Performed by: FAMILY MEDICINE

## 2017-10-27 PROCEDURE — G8598 ASA/ANTIPLAT THER USED: HCPCS | Performed by: FAMILY MEDICINE

## 2017-10-27 ASSESSMENT — ENCOUNTER SYMPTOMS
CHEST TIGHTNESS: 0
ABDOMINAL PAIN: 0
SHORTNESS OF BREATH: 0
BLOOD IN STOOL: 0

## 2017-10-27 NOTE — PROGRESS NOTES
Subjective:      Patient ID: Giovanni Yoo is a 69 y.o. male. Chronic Disease Visit Information    BP Readings from Last 3 Encounters:   08/31/17 114/62   07/14/17 129/78   06/27/17 120/80          LDL Cholesterol (mg/dL)   Date Value   04/25/2017 56     HDL (mg/dL)   Date Value   04/25/2017 36 (L)     BUN (mg/dL)   Date Value   07/03/2017 17     CREATININE (mg/dL)   Date Value   07/03/2017 1.35 (H)     Glucose (mg/dL)   Date Value   07/03/2017 143 (H)   02/03/2012 143 (H)            Have you changed or started any medications since your last visit including any over-the-counter medicines, vitamins, or herbal medicines? no   Are you having any side effects from any of your medications? -  no  Have you stopped taking any of your medications? Is so, why? -  no    Have you seen any other physician or provider since your last visit? No  Have you had any other diagnostic tests since your last visit? No  Have you been seen in the emergency room and/or had an admission to a hospital since we last saw you? No  Have you had your annual diabetic retinal (eye) exam? No  Have you had your routine dental cleaning in the past 6 months? no    Have you activated your Get Me Listed account? If not, what are your barriers?  Yes     Patient Care Team:  Gerardo Watson MD as PCP - General (Family Medicine)  Gerardo Watson MD as PCP - S Attributed Provider  Argentina Broderick MD as Consulting Physician (Nephrology)  Hiram Vasquez MD as Consulting Physician (Ophthalmology)  Melany Major MD (Pulmonology)  Yasemin Brandt MD as Consulting Physician (Orthopedic Surgery)  Ángela Parker MD as Consulting Physician (Cardiology)  Vitor Berman MD as Consulting Physician (Gastroenterology)         Medical History Review  Past Medical, Family, and Social History reviewed and does contribute to the patient presenting condition    Health Maintenance   Topic Date Due    DTaP/Tdap/Td vaccine (1 - Tdap) 05/23/2014    Lipid screen 04/25/2022    Zostavax vaccine  Completed    Flu vaccine  Completed    Pneumococcal low/med risk  Completed     HPI  Patient is a 31-year-old white male who presents for hypertension, hyperlipidemia, impaired fasting glucose, gout. Patient states that he is taking and tolerating his medication. He denies any chest pain, abdominal pain, shortness of breath, fever or chills. He states he has a good appetite and remains active. Review of Systems   Constitutional: Negative for chills and fever. HENT: Negative for congestion. Respiratory: Negative for chest tightness and shortness of breath. Cardiovascular: Negative for chest pain. Gastrointestinal: Negative for abdominal pain and blood in stool. Genitourinary: Negative for dysuria and hematuria. Skin: Negative for rash. Neurological: Negative for dizziness. Psychiatric/Behavioral: Negative for confusion. Objective:   Physical Exam   Constitutional: He is oriented to person, place, and time. He appears well-developed and well-nourished. No distress. HENT:   Head: Normocephalic and atraumatic. Right Ear: Tympanic membrane, external ear and ear canal normal.   Left Ear: Tympanic membrane, external ear and ear canal normal.   Nose: Nose normal.   Mouth/Throat: Oropharynx is clear and moist.   Eyes: Conjunctivae are normal. Right eye exhibits no discharge. Left eye exhibits no discharge. No scleral icterus. Neck: Neck supple. Cardiovascular: Normal rate, regular rhythm, normal heart sounds and intact distal pulses. Pulmonary/Chest: Effort normal and breath sounds normal. No respiratory distress. He has no wheezes. Abdominal: Soft. He exhibits no distension. There is no tenderness. Musculoskeletal: He exhibits no edema. Neurological: He is alert and oriented to person, place, and time. Skin: Skin is warm and dry. No rash noted. Psychiatric: He has a normal mood and affect.  His behavior is normal.   Nursing note and vitals reviewed. Assessment:      1. Essential hypertension  ALT    AST    Basic Metabolic Panel    Lipid Panel   2. Mixed hyperlipidemia  ALT    AST    Basic Metabolic Panel    Lipid Panel   3. Impaired fasting glucose  Basic Metabolic Panel   4. Idiopathic chronic gout of multiple sites without tophus  Uric Acid           Plan:      Daleen Nissen received counseling on the following healthy behaviors: nutrition and medication adherence  Reviewed prior labs and health maintenance  Continue current medications, diet and exercise. Discussed use, benefit, and side effects of prescribed medications. Barriers to medication compliance addressed. Patient given educational materials - see patient instructions  Was a self-tracking handout given in paper form or via Stampt? No:     Requested Prescriptions      No prescriptions requested or ordered in this encounter       All patient questions answered. Patient voiced understanding. Quality Measures    There is no height or weight on file to calculate BMI. Elevated. Weight control planned discussed Healthy diet and regular exercise. . Blood pressure is normal. Treatment plan consists of No treatment change needed. Fall Risk 7/28/2016 6/2/2015 5/22/2014   2 or more falls in past year? no no no   Fall with injury in past year? no no no     The patient does not have a history of falls. I did not - not indicated , complete a risk assessment for falls.  A plan of care for falls No Treatment plan indicated    Lab Results   Component Value Date    LDLCHOLESTEROL 56 04/25/2017    (goal LDL reduction with dx if diabetes is 50% LDL reduction)    PHQ Scores 5/19/2017 2/16/2016 1/30/2015 1/21/2014   PHQ2 Score 0 0 1 0   PHQ9 Score 0 0 1 0     Interpretation of Total Score Depression Severity: 1-4 = Minimal depression, 5-9 = Mild depression, 10-14 = Moderate depression, 15-19 = Moderately severe depression, 20-27 = Severe depression        Orders Placed This Encounter   Procedures  ALT     Standing Status:   Future     Standing Expiration Date:   10/27/2018    AST     Standing Status:   Future     Standing Expiration Date:   10/27/2018    Basic Metabolic Panel     Fasting     Standing Status:   Future     Standing Expiration Date:   10/27/2018    Lipid Panel     Standing Status:   Future     Standing Expiration Date:   10/27/2018     Order Specific Question:   Is Patient Fasting?/# of Hours     Answer:    Fast 8-10 hours    Uric Acid     Standing Status:   Future     Standing Expiration Date:   10/27/2018     Continue routine medications  Follow-up in 5-6 months or sooner if needed

## 2017-10-31 ENCOUNTER — HOSPITAL ENCOUNTER (OUTPATIENT)
Age: 77
Discharge: HOME | End: 2017-10-31
Payer: MEDICARE

## 2017-10-31 DIAGNOSIS — R73.01 IMPAIRED FASTING GLUCOSE: ICD-10-CM

## 2017-10-31 DIAGNOSIS — I10 ESSENTIAL HYPERTENSION: ICD-10-CM

## 2017-10-31 DIAGNOSIS — E78.2 MIXED HYPERLIPIDEMIA: ICD-10-CM

## 2017-10-31 DIAGNOSIS — M1A.09X0 IDIOPATHIC CHRONIC GOUT OF MULTIPLE SITES WITHOUT TOPHUS: ICD-10-CM

## 2017-10-31 LAB
ALT SERPL-CCNC: 18 U/L (ref 5–41)
ANION GAP SERPL CALCULATED.3IONS-SCNC: 12 MMOL/L (ref 9–17)
AST SERPL-CCNC: 21 U/L
BUN BLDV-MCNC: 19 MG/DL (ref 8–23)
BUN/CREAT BLD: ABNORMAL (ref 9–20)
CALCIUM SERPL-MCNC: 9.4 MG/DL (ref 8.6–10.4)
CHLORIDE BLD-SCNC: 103 MMOL/L (ref 98–107)
CHOLESTEROL/HDL RATIO: 3
CHOLESTEROL: 141 MG/DL
CO2: 26 MMOL/L (ref 20–31)
CREAT SERPL-MCNC: 1.26 MG/DL (ref 0.7–1.2)
GFR AFRICAN AMERICAN: >60 ML/MIN
GFR NON-AFRICAN AMERICAN: 56 ML/MIN
GFR SERPL CREATININE-BSD FRML MDRD: ABNORMAL ML/MIN/{1.73_M2}
GFR SERPL CREATININE-BSD FRML MDRD: ABNORMAL ML/MIN/{1.73_M2}
GLUCOSE BLD-MCNC: 102 MG/DL (ref 70–99)
HDLC SERPL-MCNC: 47 MG/DL
LDL CHOLESTEROL: 62 MG/DL (ref 0–130)
POTASSIUM SERPL-SCNC: 4.4 MMOL/L (ref 3.7–5.3)
SODIUM BLD-SCNC: 141 MMOL/L (ref 135–144)
TRIGL SERPL-MCNC: 160 MG/DL
URIC ACID: 5.9 MG/DL (ref 3.4–7)
VLDLC SERPL CALC-MCNC: ABNORMAL MG/DL (ref 1–30)

## 2017-10-31 PROCEDURE — 80061 LIPID PANEL: CPT

## 2017-10-31 PROCEDURE — 80048 BASIC METABOLIC PNL TOTAL CA: CPT

## 2017-10-31 PROCEDURE — 84460 ALANINE AMINO (ALT) (SGPT): CPT

## 2017-10-31 PROCEDURE — 84550 ASSAY OF BLOOD/URIC ACID: CPT

## 2017-10-31 PROCEDURE — 84450 TRANSFERASE (AST) (SGOT): CPT

## 2017-11-08 ENCOUNTER — OFFICE VISIT (OUTPATIENT)
Dept: ORTHOPEDIC SURGERY | Age: 77
End: 2017-11-08

## 2017-11-08 DIAGNOSIS — M25.552 PAIN OF LEFT HIP JOINT: Primary | ICD-10-CM

## 2017-11-08 PROCEDURE — 99024 POSTOP FOLLOW-UP VISIT: CPT | Performed by: ORTHOPAEDIC SURGERY

## 2017-11-08 NOTE — PROGRESS NOTES
Savannah Steel M.D.            [address] [facility] [address] [facility],[address], [address] [facility]             Dept Phone: [phone]             Dept Fax:  [phone]  Eugene Childress returns today to discuss his left hip. He had a IR injection to his left hip approximately 7-8 weeks ago. He states that his pain is 75-80% better. No examination was carried out today. 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 He's he'll let us know. Review of Systems   Constitutional: Negative. HENT: Negative. Respiratory: Negative. Cardiovascular: Negative. Musculoskeletal: Negative. Neurological: Negative. Past Medical History:   Diagnosis Date    Anemia     Anesthesia     woke up eary during surgery x1 , heard saw & felt the pressure.  Arthritis 7/15/2013    ASHD (arteriosclerotic heart disease) 7/15/2013    CAD (coronary artery disease)     has cardiac stent x 4.    Chronic kidney disease     stage 3    Claudication (HCC) 7/15/2013    COPD (chronic obstructive pulmonary disease) ([address]     Degenerative joint disease 7/15/2013    ED (erectile dysfunction) 7/15/2013    Full dentures     History of carpal tunnel syndrome 7/15/2013    History of colon polyps 7/15/2013    History of hemorrhoids 7/15/2013    History of TIA (transient ischemic attack) 7/15/2013    Hypercholesteremia 7/15/2013    Hypertension     Hyperuricemia 7/15/2013    Leukocytosis     Renal cyst     Sleep apnea 07/15/2013    on cpap at .     Wears glasses     for reading     Past Surgical History:   Procedure Laterality Date    BLEPHAROPLASTY Bilateral 04/02/2012    CARPAL TUNNEL RELEASE Right 11/2002    CATARACT REMOVAL WITH IMPLANT Left 03/02/2012    CATARACT REMOVAL WITH IMPLANT Right 03/05/2012    COLONOSCOPY  05/21/08    POLYPECTOMY   [facility] [address] x1 stent,1998 x2 [id number] x1,    EYE SURGERY      cataracts, eyelids.  JOINT REPLACEMENT  07/12/11     RT TOTAL SHOULDER REPLACEMENT    JOINT REPLACEMENT  1995    rt. hip replacement    KNEE ARTHROPLASTY Left 04/04/2017    SHOULDER ARTHROSCOPY Right 05/2002 repair    7-12-11 right shoulder reverse replacement.  SHOULDER SURGERY Left 02/24/2002    shoulder resurfacing    TOTAL KNEE ARTHROPLASTY Left 4/4/2017    KNEE TOTAL ARTHROPLASTY performed by Harris Livingston MD at [address] [place] [address] History   Problem Relation Age of Onset    Alzheimer's Disease Mother     Heart Disease Mother     Emphysema Father          No orders of the defined types were placed in this encounter. 1. Pain of left hip joint        Harris Livingston MD    Please note that this chart was generated using voice recognition Dragon dictation software. Although every effort was made to ensure the accuracy of this automated transcription, some errors in transcription may have occurred.

## 2017-12-15 ENCOUNTER — TELEPHONE (OUTPATIENT)
Dept: ONCOLOGY | Age: 77
End: 2017-12-15

## 2017-12-22 ENCOUNTER — OFFICE VISIT (OUTPATIENT)
Dept: ORTHOPEDIC SURGERY | Age: 77
End: 2017-12-22
Payer: MEDICARE

## 2017-12-22 VITALS
SYSTOLIC BLOOD PRESSURE: 145 MMHG | DIASTOLIC BLOOD PRESSURE: 89 MMHG | BODY MASS INDEX: 27.92 KG/M2 | HEIGHT: 70 IN | WEIGHT: 195 LBS

## 2017-12-22 DIAGNOSIS — M16.12 ARTHRITIS OF LEFT HIP: Primary | ICD-10-CM

## 2017-12-22 PROCEDURE — G8598 ASA/ANTIPLAT THER USED: HCPCS | Performed by: ORTHOPAEDIC SURGERY

## 2017-12-22 PROCEDURE — G8427 DOCREV CUR MEDS BY ELIG CLIN: HCPCS | Performed by: ORTHOPAEDIC SURGERY

## 2017-12-22 PROCEDURE — 99212 OFFICE O/P EST SF 10 MIN: CPT | Performed by: ORTHOPAEDIC SURGERY

## 2017-12-22 PROCEDURE — G8484 FLU IMMUNIZE NO ADMIN: HCPCS | Performed by: ORTHOPAEDIC SURGERY

## 2017-12-22 PROCEDURE — 4040F PNEUMOC VAC/ADMIN/RCVD: CPT | Performed by: ORTHOPAEDIC SURGERY

## 2017-12-22 PROCEDURE — 1036F TOBACCO NON-USER: CPT | Performed by: ORTHOPAEDIC SURGERY

## 2017-12-22 PROCEDURE — 1123F ACP DISCUSS/DSCN MKR DOCD: CPT | Performed by: ORTHOPAEDIC SURGERY

## 2017-12-22 PROCEDURE — G8417 CALC BMI ABV UP PARAM F/U: HCPCS | Performed by: ORTHOPAEDIC SURGERY

## 2017-12-22 ASSESSMENT — PROMIS GLOBAL HEALTH SCALE
IN THE PAST 7 DAYS, HOW OFTEN HAVE YOU BEEN BOTHERED BY EMOTIONAL PROBLEMS, SUCH AS FEELING ANXIOUS, DEPRESSED, OR IRRITABLE [ON A SCALE FROM 1 (NEVER) TO 5 (ALWAYS)]?: 2
HOW IS THE PROMIS V1.1 BEING ADMINISTERED?: 0
IN GENERAL, PLEASE RATE HOW WELL YOU CARRY OUT YOUR USUAL SOCIAL ACTIVITIES (INCLUDES ACTIVITIES AT HOME, AT WORK, AND IN YOUR COMMUNITY, AND RESPONSIBILITIES AS A PARENT, CHILD, SPOUSE, EMPLOYEE, FRIEND, ETC) [ON A SCALE OF 1 (POOR) TO 5 (EXCELLENT)]?: 4
IN GENERAL, WOULD YOU SAY YOUR HEALTH IS...[ON A SCALE OF 1 (POOR) TO 5 (EXCELLENT)]: 3
WHO IS THE PERSON COMPLETING THE PROMIS V1.1 SURVEY?: 0
IN GENERAL, HOW WOULD YOU RATE YOUR MENTAL HEALTH, INCLUDING YOUR MOOD AND YOUR ABILITY TO THINK [ON A SCALE OF 1 (POOR) TO 5 (EXCELLENT)]?: 4
IN THE PAST 7 DAYS, HOW WOULD YOU RATE YOUR PAIN ON AVERAGE [ON A SCALE FROM 0 (NO PAIN) TO 10 (WORST IMAGINABLE PAIN)]?: 6
IN GENERAL, WOULD YOU SAY YOUR QUALITY OF LIFE IS...[ON A SCALE OF 1 (POOR) TO 5 (EXCELLENT)]: 4
SUM OF RESPONSES TO QUESTIONS 2, 4, 5, & 10: 14
IN GENERAL, HOW WOULD YOU RATE YOUR SATISFACTION WITH YOUR SOCIAL ACTIVITIES AND RELATIONSHIPS [ON A SCALE OF 1 (POOR) TO 5 (EXCELLENT)]?: 4
IN THE PAST 7 DAYS, HOW WOULD YOU RATE YOUR FATIGUE ON AVERAGE [ON A SCALE FROM 1 (NONE) TO 5 (VERY SEVERE)]?: 3
IN GENERAL, HOW WOULD YOU RATE YOUR PHYSICAL HEALTH [ON A SCALE OF 1 (POOR) TO 5 (EXCELLENT)]?: 3
SUM OF RESPONSES TO QUESTIONS 3, 6, 7, & 8: 15
TO WHAT EXTENT ARE YOU ABLE TO CARRY OUT YOUR EVERYDAY PHYSICAL ACTIVITIES SUCH AS WALKING, CLIMBING STAIRS, CARRYING GROCERIES, OR MOVING A CHAIR [ON A SCALE OF 1 (NOT AT ALL) TO 5 (COMPLETELY)]?: 3

## 2017-12-22 ASSESSMENT — HOOS JR
HOOS JR RAW SCORE: 15
RISING FROM SITTING: 3
LYING IN BED (TURNING OVER, MAINTAINING HIP POSITION): 2
BENDING TO THE FLOOR TO PICK UP OBJECT: 3
WALKING ON UNEVEN SURFACE: 3
SITTING: 1
GOING UP OR DOWN STAIRS: 3

## 2017-12-22 NOTE — PROGRESS NOTES
Date    BLEPHAROPLASTY Bilateral 04/02/2012    CARPAL TUNNEL RELEASE Right 11/2002    CATARACT REMOVAL WITH IMPLANT Left 03/02/2012    CATARACT REMOVAL WITH IMPLANT Right 03/05/2012    COLONOSCOPY  05/21/08    POLYPECTOMY   [address] x1 stent,1998 x2 stents,2009 x1,    EYE SURGERY      cataracts, eyelids.  JOINT REPLACEMENT  07/12/11     RT TOTAL SHOULDER REPLACEMENT    JOINT REPLACEMENT  1995    rt. hip replacement    KNEE ARTHROPLASTY Left 04/04/2017    SHOULDER ARTHROSCOPY Right 05/2002 repair    7-12-11 right shoulder reverse replacement.  SHOULDER SURGERY Left 02/24/2002    shoulder resurfacing    TOTAL KNEE ARTHROPLASTY Left 4/4/2017    KNEE TOTAL ARTHROPLASTY performed by Chava Dominguez MD at [address] [place] [address] History   Problem Relation Age of Onset    Alzheimer's Disease Mother     Heart Disease Mother     Emphysema Father          No orders of the defined types were placed in this encounter. 1. Arthritis of left hip        Chava Dominguez MD    Please note that this chart was generated using voice recognition Dragon dictation software. Although every effort was made to ensure the accuracy of this automated transcription, some errors in transcription may have occurred.

## 2018-01-02 ENCOUNTER — OFFICE VISIT (OUTPATIENT)
Dept: FAMILY MEDICINE CLINIC | Age: 78
End: 2018-01-02
Payer: MEDICARE

## 2018-01-02 ENCOUNTER — TELEPHONE (OUTPATIENT)
Dept: ONCOLOGY | Age: 78
End: 2018-01-02

## 2018-01-02 VITALS
DIASTOLIC BLOOD PRESSURE: 70 MMHG | RESPIRATION RATE: 14 BRPM | TEMPERATURE: 97 F | SYSTOLIC BLOOD PRESSURE: 102 MMHG | HEART RATE: 62 BPM

## 2018-01-02 DIAGNOSIS — M25.552 LEFT HIP PAIN: ICD-10-CM

## 2018-01-02 DIAGNOSIS — J20.9 ACUTE BRONCHITIS, UNSPECIFIED ORGANISM: Primary | ICD-10-CM

## 2018-01-02 DIAGNOSIS — J44.9 CHRONIC OBSTRUCTIVE PULMONARY DISEASE, UNSPECIFIED COPD TYPE (HCC): ICD-10-CM

## 2018-01-02 PROCEDURE — 99214 OFFICE O/P EST MOD 30 MIN: CPT | Performed by: NURSE PRACTITIONER

## 2018-01-02 RX ORDER — METHYLPREDNISOLONE 4 MG/1
TABLET ORAL
Qty: 1 KIT | Refills: 0 | Status: SHIPPED | OUTPATIENT
Start: 2018-01-02 | End: 2018-01-08

## 2018-01-02 RX ORDER — AZITHROMYCIN 250 MG/1
TABLET, FILM COATED ORAL
Qty: 1 PACKET | Refills: 0 | Status: SHIPPED | OUTPATIENT
Start: 2018-01-02 | End: 2018-01-09 | Stop reason: ALTCHOICE

## 2018-01-02 RX ORDER — GUAIFENESIN AND DEXTROMETHORPHAN HYDROBROMIDE 600; 30 MG/1; MG/1
1 TABLET, EXTENDED RELEASE ORAL 2 TIMES DAILY
Qty: 28 TABLET | Refills: 0 | Status: ON HOLD | OUTPATIENT
Start: 2018-01-02 | End: 2018-01-23

## 2018-01-02 ASSESSMENT — ENCOUNTER SYMPTOMS
SHORTNESS OF BREATH: 1
COUGH: 1
VOMITING: 0
NAUSEA: 0
RHINORRHEA: 1
SINUS PRESSURE: 0
WHEEZING: 1
ABDOMINAL PAIN: 0

## 2018-01-02 ASSESSMENT — PROMIS GLOBAL HEALTH SCALE
IN THE PAST 7 DAYS, HOW OFTEN HAVE YOU BEEN BOTHERED BY EMOTIONAL PROBLEMS, SUCH AS FEELING ANXIOUS, DEPRESSED, OR IRRITABLE [ON A SCALE FROM 1 (NEVER) TO 5 (ALWAYS)]?: 3
IN GENERAL, PLEASE RATE HOW WELL YOU CARRY OUT YOUR USUAL SOCIAL ACTIVITIES (INCLUDES ACTIVITIES AT HOME, AT WORK, AND IN YOUR COMMUNITY, AND RESPONSIBILITIES AS A PARENT, CHILD, SPOUSE, EMPLOYEE, FRIEND, ETC) [ON A SCALE OF 1 (POOR) TO 5 (EXCELLENT)]?: 3
IN THE PAST 7 DAYS, HOW WOULD YOU RATE YOUR PAIN ON AVERAGE [ON A SCALE FROM 0 (NO PAIN) TO 10 (WORST IMAGINABLE PAIN)]?: 6
SUM OF RESPONSES TO QUESTIONS 3, 6, 7, & 8: 14
IN THE PAST 7 DAYS, HOW WOULD YOU RATE YOUR FATIGUE ON AVERAGE [ON A SCALE FROM 1 (NONE) TO 5 (VERY SEVERE)]?: 3
IN GENERAL, HOW WOULD YOU RATE YOUR SATISFACTION WITH YOUR SOCIAL ACTIVITIES AND RELATIONSHIPS [ON A SCALE OF 1 (POOR) TO 5 (EXCELLENT)]?: 2
IN GENERAL, WOULD YOU SAY YOUR QUALITY OF LIFE IS...[ON A SCALE OF 1 (POOR) TO 5 (EXCELLENT)]: 2
WHO IS THE PERSON COMPLETING THE PROMIS V1.1 SURVEY?: 0
HOW IS THE PROMIS V1.1 BEING ADMINISTERED?: 2
TO WHAT EXTENT ARE YOU ABLE TO CARRY OUT YOUR EVERYDAY PHYSICAL ACTIVITIES SUCH AS WALKING, CLIMBING STAIRS, CARRYING GROCERIES, OR MOVING A CHAIR [ON A SCALE OF 1 (NOT AT ALL) TO 5 (COMPLETELY)]?: 3
IN GENERAL, HOW WOULD YOU RATE YOUR MENTAL HEALTH, INCLUDING YOUR MOOD AND YOUR ABILITY TO THINK [ON A SCALE OF 1 (POOR) TO 5 (EXCELLENT)]?: 3
SUM OF RESPONSES TO QUESTIONS 2, 4, 5, & 10: 10
IN GENERAL, WOULD YOU SAY YOUR HEALTH IS...[ON A SCALE OF 1 (POOR) TO 5 (EXCELLENT)]: 2
IN GENERAL, HOW WOULD YOU RATE YOUR PHYSICAL HEALTH [ON A SCALE OF 1 (POOR) TO 5 (EXCELLENT)]?: 2

## 2018-01-02 ASSESSMENT — HOOS JR
BENDING TO THE FLOOR TO PICK UP OBJECT: 3
RISING FROM SITTING: 2
HOOS JR RAW SCORE: 13
LYING IN BED (TURNING OVER, MAINTAINING HIP POSITION): 2
GOING UP OR DOWN STAIRS: 3
WALKING ON UNEVEN SURFACE: 2
SITTING: 1

## 2018-01-09 ENCOUNTER — HOSPITAL ENCOUNTER (OUTPATIENT)
Dept: PREADMISSION TESTING | Age: 78
Discharge: HOME | End: 2018-01-09
Payer: MEDICARE

## 2018-01-09 VITALS
TEMPERATURE: 97.7 F | BODY MASS INDEX: 27.14 KG/M2 | WEIGHT: 189.6 LBS | HEIGHT: 70 IN | SYSTOLIC BLOOD PRESSURE: 126 MMHG | DIASTOLIC BLOOD PRESSURE: 72 MMHG | RESPIRATION RATE: 16 BRPM | OXYGEN SATURATION: 99 % | HEART RATE: 62 BPM

## 2018-01-09 LAB
ABO/RH: NORMAL
ABSOLUTE EOS #: 0.27 K/UL (ref 0–0.4)
ABSOLUTE IMMATURE GRANULOCYTE: ABNORMAL K/UL (ref 0–0.3)
ABSOLUTE LYMPH #: 4.05 K/UL (ref 1–4.8)
ABSOLUTE MONO #: 0.81 K/UL (ref 0.1–1.3)
ANION GAP SERPL CALCULATED.3IONS-SCNC: 12 MMOL/L (ref 9–17)
ANTIBODY SCREEN: NEGATIVE
ARM BAND NUMBER: NORMAL
BASOPHILS # BLD: 0 % (ref 0–2)
BASOPHILS ABSOLUTE: 0 K/UL (ref 0–0.2)
BUN BLDV-MCNC: 21 MG/DL (ref 8–23)
BUN/CREAT BLD: ABNORMAL (ref 9–20)
CALCIUM SERPL-MCNC: 9.8 MG/DL (ref 8.6–10.4)
CHLORIDE BLD-SCNC: 102 MMOL/L (ref 98–107)
CO2: 30 MMOL/L (ref 20–31)
CREAT SERPL-MCNC: 1.33 MG/DL (ref 0.7–1.2)
DIFFERENTIAL TYPE: ABNORMAL
EKG ATRIAL RATE: 60 BPM
EKG P AXIS: -5 DEGREES
EKG P-R INTERVAL: 190 MS
EKG Q-T INTERVAL: 402 MS
EKG QRS DURATION: 100 MS
EKG QTC CALCULATION (BAZETT): 402 MS
EKG R AXIS: 4 DEGREES
EKG T AXIS: 61 DEGREES
EKG VENTRICULAR RATE: 60 BPM
EOSINOPHILS RELATIVE PERCENT: 2 % (ref 0–4)
EXPIRATION DATE: NORMAL
GFR AFRICAN AMERICAN: >60 ML/MIN
GFR NON-AFRICAN AMERICAN: 52 ML/MIN
GFR SERPL CREATININE-BSD FRML MDRD: ABNORMAL ML/MIN/{1.73_M2}
GFR SERPL CREATININE-BSD FRML MDRD: ABNORMAL ML/MIN/{1.73_M2}
GLUCOSE BLD-MCNC: 105 MG/DL (ref 70–99)
HCT VFR BLD CALC: 43.8 % (ref 41–53)
HEMOGLOBIN: 14.5 G/DL (ref 13.5–17.5)
IMMATURE GRANULOCYTES: ABNORMAL %
LYMPHOCYTES # BLD: 30 % (ref 24–44)
MCH RBC QN AUTO: 30.9 PG (ref 26–34)
MCHC RBC AUTO-ENTMCNC: 33 G/DL (ref 31–37)
MCV RBC AUTO: 93.6 FL (ref 80–100)
MONOCYTES # BLD: 6 % (ref 1–7)
MORPHOLOGY: ABNORMAL
MRSA, DNA, NASAL: NORMAL
PDW BLD-RTO: 13.7 % (ref 11.5–14.9)
PLATELET # BLD: 279 K/UL (ref 150–450)
PLATELET ESTIMATE: ABNORMAL
PMV BLD AUTO: 10.7 FL (ref 6–12)
POTASSIUM SERPL-SCNC: 4.9 MMOL/L (ref 3.7–5.3)
RBC # BLD: 4.68 M/UL (ref 4.5–5.9)
RBC # BLD: ABNORMAL 10*6/UL
SEG NEUTROPHILS: 62 % (ref 36–66)
SEGMENTED NEUTROPHILS ABSOLUTE COUNT: 8.37 K/UL (ref 1.3–9.1)
SODIUM BLD-SCNC: 144 MMOL/L (ref 135–144)
SPECIMEN DESCRIPTION: NORMAL
WBC # BLD: 13.5 K/UL (ref 3.5–11)
WBC # BLD: ABNORMAL 10*3/UL

## 2018-01-09 PROCEDURE — 85025 COMPLETE CBC W/AUTO DIFF WBC: CPT

## 2018-01-09 PROCEDURE — 93005 ELECTROCARDIOGRAM TRACING: CPT

## 2018-01-09 PROCEDURE — 36415 COLL VENOUS BLD VENIPUNCTURE: CPT

## 2018-01-09 PROCEDURE — 86901 BLOOD TYPING SEROLOGIC RH(D): CPT

## 2018-01-09 PROCEDURE — 87086 URINE CULTURE/COLONY COUNT: CPT

## 2018-01-09 PROCEDURE — 86900 BLOOD TYPING SEROLOGIC ABO: CPT

## 2018-01-09 PROCEDURE — 86850 RBC ANTIBODY SCREEN: CPT

## 2018-01-09 PROCEDURE — 87641 MR-STAPH DNA AMP PROBE: CPT

## 2018-01-09 PROCEDURE — 80048 BASIC METABOLIC PNL TOTAL CA: CPT

## 2018-01-09 NOTE — H&P
HISTORY and [facility] [address]         NAME:  Douglas Hernandez  MRN: [id number]   YOB: 1940   Date: 1/9/2018   Age: 69 y.o. Gender: male       COMPLAINT AND PRESENT HISTORY:    69 y o male with left hip pain and arthritic changes. No known injury. Increased pain for 5-6 months. Pain is off/on up to 7 on scale, worse with walking \"too far\" or bending over. Patient presents for PAT, has had several previous joint replacements with good results. He denies chills/fever, chest pain or SOB. DIAGNOSTIC RESULTS   Radiology:         Labs:  CBC: No results for input(s): WBC, HGB, PLT in the last 72 hours. BMP:  No results for input(s): NA, K, CL, CO2, BUN, CREATININE, GLUCOSE in the last 72 hours. Hepatic: No results for input(s): AST, ALT, ALB, BILITOT, ALKPHOS in the last 72 hours. CARDIAC ENZY: No results for input(s): CKTOTAL, CKMB, CKMBINDEX, TROPONINT, MYOGLOBIN in the last 72 hours. INR: No results for input(s): INR in the last 72 hours. BNP: No results for input(s): BNP in the last 72 hours. ABGs: No results found for: PHART, PO2ART, ZGW4DQY  Lipids: No results for input(s): CHOL, HDL in the last 72 hours. Invalid input(s): LDLCALCU  U/A:  Lab Results   Component Value Date    COLORU Yellow 08/31/2017    CLARITYU Clear 08/31/2017    SPECGRAV 1.020 08/31/2017    LEUKOCYTESUR neg 08/31/2017    LEUKOCYTESUR NEGATIVE 03/21/2017    BLOODU Negative 08/31/2017    GLUCOSEU neg 08/31/2017       PAST MEDICAL HISTORY     Past Medical History:   Diagnosis Date    Anemia     Anesthesia     woke up eary during surgery x1 , heard saw & felt the pressure.     Arthritis 7/15/2013    ASHD (arteriosclerotic heart disease) 7/15/2013    CAD (coronary artery disease)     has cardiac stent x 4.    Chronic kidney disease     stage 3    Claudication (HCC) 7/15/2013    COPD (chronic obstructive pulmonary disease) (HCC)     Degenerative joint disease 7/15/2013    ED (erectile dysfunction) 7/15/2013    Full dentures     Gout     History of carpal tunnel syndrome 7/15/2013    History of colon polyps 7/15/2013    History of hemorrhoids 7/15/2013    History of TIA (transient ischemic attack) 7/15/2013    Hypercholesteremia 7/15/2013    Hypertension     Hyperuricemia 7/15/2013    Leukocytosis     Renal cyst     Sleep apnea 07/15/2013    on cpap at .  Wears glasses     for reading       Pt denies any history of Diabetes mellitus type 2,  Asthma, GERD,  Cancer, Seizures,Thyroid disease,  Hepatitis, TB, Psychiatric Disorders or Substance abuse. SURGICAL HISTORY       Past Surgical History:   Procedure Laterality Date    BLEPHAROPLASTY Bilateral 04/02/2012    CARPAL TUNNEL RELEASE Right 11/2002    CATARACT REMOVAL WITH IMPLANT Left 03/02/2012    CATARACT REMOVAL WITH IMPLANT Right 03/05/2012    COLONOSCOPY  05/21/08    POLYPECTOMY   [address] x1 stent,1998 x2 stents,2009 x1,    EYE SURGERY      cataracts, eyelids.  JOINT REPLACEMENT  07/12/11     RT TOTAL SHOULDER REPLACEMENT    JOINT REPLACEMENT  1995    rt. hip replacement    KNEE ARTHROPLASTY Left 04/04/2017    SHOULDER ARTHROSCOPY Right 05/2002 repair    7-12-11 right shoulder reverse replacement.     SHOULDER SURGERY Left 02/24/2002    shoulder resurfacing    TOTAL KNEE ARTHROPLASTY Left 4/4/2017    KNEE TOTAL ARTHROPLASTY performed by Gibran Rios MD at [address] [place] [address]       Family History   Problem Relation Age of Onset    Alzheimer's Disease Mother     Heart Disease Mother     Emphysema Father        SOCIAL HISTORY       Social History     Social History    Marital status:      Spouse name: N/A    Number of children: N/A    Years of education: N/A     Social History Main Topics    Smoking status: Former Smoker     Quit date: 2002    Smokeless tobacco: Never Used    Alcohol use No      Comment: rarely    Drug use: No    Sexual activity: Not Asked     Other Topics Concern    None     Social History Narrative    None           REVIEW OF SYSTEMS      Allergies   Allergen Reactions    Ceclor [Cefaclor]     Pcn [Penicillins]        Current Outpatient Prescriptions on File Prior to Encounter   Medication Sig Dispense Refill    Dextromethorphan-Guaifenesin (MUCINEX DM)  MG TB12 Take 1 tablet by mouth 2 times daily 28 tablet 0    lisinopril (PRINIVIL;ZESTRIL) 2.5 MG tablet TAKE 1 TABLET DAILY 90 tablet 1    allopurinol (ZYLOPRIM) 100 MG tablet TAKE 1 TABLET DAILY 90 tablet 1    atenolol (TENORMIN) 25 MG tablet TAKE 1 TABLET DAILY 90 tablet 3    rosuvastatin (CRESTOR) 10 MG tablet TAKE 1 TABLET DAILY 90 tablet 1    aspirin 325 MG EC tablet Take 1 tablet by mouth 2 times daily (Patient taking differently: Take 325 mg by mouth daily ) 30 tablet 3    nitroGLYCERIN (NITROSTAT) 0.4 MG SL tablet Place 1 tablet under the tongue every 5 minutes as needed for Chest pain 25 tablet 1    FISH OIL Take  by mouth 3 times daily.  therapeutic multivitamin-minerals (THERAGRAN-M) tablet Take 1 tablet by mouth daily. OTC      fluticasone-salmeterol (ADVAIR HFA) 230-21 MCG/ACT inhaler Inhale 2 puffs into the lungs daily. No current facility-administered medications on file prior to encounter. General health:  Fairly good. No fever or chills. Skin:  No itching, redness or rash. Head, eyes, ears, nose, throat:  No headache, epistaxis, rhinorrhea hearing loss or sore throat. Upper/lower denture. Neck:  No pain, stiffness or masses. Cardiovascular/Respiratory system:  No chest pain, palpitation, shortness of breath, coughing or expectoration. CAD, COPD          Gastrointestinal tract: No abdominal pain, nausea, vomiting, diarrhea or constipation. Genitourinary:  No burning on micturition. No hesitancy, urgency, frequency or discoloration of urine.      Locomotor:  No bone or

## 2018-01-10 ENCOUNTER — ANESTHESIA EVENT (OUTPATIENT)
Dept: OPERATING ROOM | Age: 78
DRG: 470 | End: 2018-01-10
Payer: MEDICARE

## 2018-01-10 LAB
CULTURE: NO GROWTH
CULTURE: NORMAL
Lab: NORMAL
SPECIMEN DESCRIPTION: NORMAL
SPECIMEN DESCRIPTION: NORMAL
STATUS: NORMAL

## 2018-01-10 RX ORDER — SODIUM CHLORIDE 0.9 % (FLUSH) 0.9 %
10 SYRINGE (ML) INJECTION EVERY 12 HOURS SCHEDULED
Status: CANCELLED | OUTPATIENT
Start: 2018-01-10

## 2018-01-10 RX ORDER — SODIUM CHLORIDE, SODIUM LACTATE, POTASSIUM CHLORIDE, CALCIUM CHLORIDE 600; 310; 30; 20 MG/100ML; MG/100ML; MG/100ML; MG/100ML
INJECTION, SOLUTION INTRAVENOUS CONTINUOUS
Status: CANCELLED | OUTPATIENT
Start: 2018-01-10

## 2018-01-10 RX ORDER — LIDOCAINE HYDROCHLORIDE 10 MG/ML
1 INJECTION, SOLUTION EPIDURAL; INFILTRATION; INTRACAUDAL; PERINEURAL
Status: CANCELLED | OUTPATIENT
Start: 2018-01-10 | End: 2018-01-10

## 2018-01-10 RX ORDER — SODIUM CHLORIDE 0.9 % (FLUSH) 0.9 %
10 SYRINGE (ML) INJECTION PRN
Status: CANCELLED | OUTPATIENT
Start: 2018-01-10

## 2018-01-23 ENCOUNTER — APPOINTMENT (OUTPATIENT)
Dept: GENERAL RADIOLOGY | Age: 78
DRG: 470 | End: 2018-01-23
Attending: ORTHOPAEDIC SURGERY
Payer: MEDICARE

## 2018-01-23 ENCOUNTER — ANESTHESIA (OUTPATIENT)
Dept: OPERATING ROOM | Age: 78
DRG: 470 | End: 2018-01-23
Payer: MEDICARE

## 2018-01-23 ENCOUNTER — HOSPITAL ENCOUNTER (INPATIENT)
Age: 78
LOS: 1 days | Discharge: HOME HEALTH | DRG: 470 | End: 2018-01-24
Attending: ORTHOPAEDIC SURGERY | Admitting: ORTHOPAEDIC SURGERY
Payer: MEDICARE

## 2018-01-23 VITALS — OXYGEN SATURATION: 99 % | SYSTOLIC BLOOD PRESSURE: 92 MMHG | DIASTOLIC BLOOD PRESSURE: 48 MMHG | TEMPERATURE: 98.2 F

## 2018-01-23 DIAGNOSIS — J20.9 ACUTE BRONCHITIS, UNSPECIFIED ORGANISM: ICD-10-CM

## 2018-01-23 DIAGNOSIS — M16.12 PRIMARY OSTEOARTHRITIS OF LEFT HIP: Primary | ICD-10-CM

## 2018-01-23 PROCEDURE — 6360000002 HC RX W HCPCS: Performed by: ORTHOPAEDIC SURGERY

## 2018-01-23 PROCEDURE — 6370000000 HC RX 637 (ALT 250 FOR IP): Performed by: ORTHOPAEDIC SURGERY

## 2018-01-23 PROCEDURE — 3700000000 HC ANESTHESIA ATTENDED CARE: Performed by: ORTHOPAEDIC SURGERY

## 2018-01-23 PROCEDURE — 73501 X-RAY EXAM HIP UNI 1 VIEW: CPT

## 2018-01-23 PROCEDURE — 7100000001 HC PACU RECOVERY - ADDTL 15 MIN: Performed by: ORTHOPAEDIC SURGERY

## 2018-01-23 PROCEDURE — 27130 TOTAL HIP ARTHROPLASTY: CPT | Performed by: ORTHOPAEDIC SURGERY

## 2018-01-23 PROCEDURE — 6370000000 HC RX 637 (ALT 250 FOR IP): Performed by: FAMILY MEDICINE

## 2018-01-23 PROCEDURE — 6360000002 HC RX W HCPCS: Performed by: NURSE ANESTHETIST, CERTIFIED REGISTERED

## 2018-01-23 PROCEDURE — 2580000003 HC RX 258: Performed by: ORTHOPAEDIC SURGERY

## 2018-01-23 PROCEDURE — 94664 DEMO&/EVAL PT USE INHALER: CPT

## 2018-01-23 PROCEDURE — 3700000001 HC ADD 15 MINUTES (ANESTHESIA): Performed by: ORTHOPAEDIC SURGERY

## 2018-01-23 PROCEDURE — 2720000010 HC SURG SUPPLY STERILE: Performed by: ORTHOPAEDIC SURGERY

## 2018-01-23 PROCEDURE — 2500000003 HC RX 250 WO HCPCS: Performed by: NURSE ANESTHETIST, CERTIFIED REGISTERED

## 2018-01-23 PROCEDURE — 0SRB02A REPLACEMENT OF LEFT HIP JOINT WITH METAL ON POLYETHYLENE SYNTHETIC SUBSTITUTE, UNCEMENTED, OPEN APPROACH: ICD-10-PCS | Performed by: ORTHOPAEDIC SURGERY

## 2018-01-23 PROCEDURE — C1776 JOINT DEVICE (IMPLANTABLE): HCPCS | Performed by: ORTHOPAEDIC SURGERY

## 2018-01-23 PROCEDURE — 3209999900 FLUORO FOR SURGICAL PROCEDURES

## 2018-01-23 PROCEDURE — 2500000003 HC RX 250 WO HCPCS: Performed by: ORTHOPAEDIC SURGERY

## 2018-01-23 PROCEDURE — 1200000000 HC SEMI PRIVATE

## 2018-01-23 PROCEDURE — 2580000003 HC RX 258: Performed by: ANESTHESIOLOGY

## 2018-01-23 PROCEDURE — 3600000005 HC SURGERY LEVEL 5 BASE: Performed by: ORTHOPAEDIC SURGERY

## 2018-01-23 PROCEDURE — 3600000015 HC SURGERY LEVEL 5 ADDTL 15MIN: Performed by: ORTHOPAEDIC SURGERY

## 2018-01-23 PROCEDURE — 7100000000 HC PACU RECOVERY - FIRST 15 MIN: Performed by: ORTHOPAEDIC SURGERY

## 2018-01-23 DEVICE — COCR FEM HD 40MM TYPE 1 STD: Type: IMPLANTABLE DEVICE | Site: HIP | Status: FUNCTIONAL

## 2018-01-23 DEVICE — G7 FINNED 4 HOLE SHELL 56F: Type: IMPLANTABLE DEVICE | Site: HIP | Status: FUNCTIONAL

## 2018-01-23 DEVICE — LINER ACET SZ F DIA40MM NEUT HIP XLPE ARCOMXL G7: Type: IMPLANTABLE DEVICE | Site: HIP | Status: FUNCTIONAL

## 2018-01-23 DEVICE — IMPLANTABLE DEVICE: Type: IMPLANTABLE DEVICE | Site: HIP | Status: FUNCTIONAL

## 2018-01-23 RX ORDER — SODIUM CHLORIDE, SODIUM LACTATE, POTASSIUM CHLORIDE, CALCIUM CHLORIDE 600; 310; 30; 20 MG/100ML; MG/100ML; MG/100ML; MG/100ML
INJECTION, SOLUTION INTRAVENOUS CONTINUOUS
Status: DISCONTINUED | OUTPATIENT
Start: 2018-01-23 | End: 2018-01-24 | Stop reason: HOSPADM

## 2018-01-23 RX ORDER — ATENOLOL 25 MG/1
25 TABLET ORAL DAILY
Status: DISCONTINUED | OUTPATIENT
Start: 2018-01-24 | End: 2018-01-24 | Stop reason: HOSPADM

## 2018-01-23 RX ORDER — LIDOCAINE HYDROCHLORIDE 10 MG/ML
INJECTION, SOLUTION EPIDURAL; INFILTRATION; INTRACAUDAL; PERINEURAL PRN
Status: DISCONTINUED | OUTPATIENT
Start: 2018-01-23 | End: 2018-01-23 | Stop reason: SDUPTHER

## 2018-01-23 RX ORDER — SODIUM CHLORIDE 0.9 % (FLUSH) 0.9 %
10 SYRINGE (ML) INJECTION PRN
Status: DISCONTINUED | OUTPATIENT
Start: 2018-01-23 | End: 2018-01-23 | Stop reason: HOSPADM

## 2018-01-23 RX ORDER — HYDROMORPHONE HCL 110MG/55ML
0.5 PATIENT CONTROLLED ANALGESIA SYRINGE INTRAVENOUS EVERY 5 MIN PRN
Status: DISCONTINUED | OUTPATIENT
Start: 2018-01-23 | End: 2018-01-23 | Stop reason: HOSPADM

## 2018-01-23 RX ORDER — ACETAMINOPHEN 500 MG
1000 TABLET ORAL EVERY 8 HOURS SCHEDULED
Status: DISCONTINUED | OUTPATIENT
Start: 2018-01-23 | End: 2018-01-24 | Stop reason: HOSPADM

## 2018-01-23 RX ORDER — CALCIUM CHLORIDE 100 MG/ML
INJECTION INTRAVENOUS; INTRAVENTRICULAR PRN
Status: DISCONTINUED | OUTPATIENT
Start: 2018-01-23 | End: 2018-01-23 | Stop reason: HOSPADM

## 2018-01-23 RX ORDER — OXYCODONE HYDROCHLORIDE AND ACETAMINOPHEN 5; 325 MG/1; MG/1
1 TABLET ORAL PRN
Status: DISCONTINUED | OUTPATIENT
Start: 2018-01-23 | End: 2018-01-23 | Stop reason: HOSPADM

## 2018-01-23 RX ORDER — MORPHINE SULFATE 2 MG/ML
1 INJECTION, SOLUTION INTRAMUSCULAR; INTRAVENOUS EVERY 5 MIN PRN
Status: DISCONTINUED | OUTPATIENT
Start: 2018-01-23 | End: 2018-01-23 | Stop reason: HOSPADM

## 2018-01-23 RX ORDER — LISINOPRIL 5 MG/1
2.5 TABLET ORAL DAILY
Status: DISCONTINUED | OUTPATIENT
Start: 2018-01-24 | End: 2018-01-24 | Stop reason: HOSPADM

## 2018-01-23 RX ORDER — LISINOPRIL 5 MG/1
2.5 TABLET ORAL DAILY
Status: DISCONTINUED | OUTPATIENT
Start: 2018-01-23 | End: 2018-01-23

## 2018-01-23 RX ORDER — DOCUSATE SODIUM 100 MG/1
100 CAPSULE, LIQUID FILLED ORAL 2 TIMES DAILY
Status: DISCONTINUED | OUTPATIENT
Start: 2018-01-23 | End: 2018-01-24 | Stop reason: HOSPADM

## 2018-01-23 RX ORDER — ONDANSETRON 2 MG/ML
4 INJECTION INTRAMUSCULAR; INTRAVENOUS EVERY 6 HOURS PRN
Status: DISCONTINUED | OUTPATIENT
Start: 2018-01-23 | End: 2018-01-24 | Stop reason: HOSPADM

## 2018-01-23 RX ORDER — MEPERIDINE HYDROCHLORIDE 50 MG/ML
12.5 INJECTION INTRAMUSCULAR; INTRAVENOUS; SUBCUTANEOUS EVERY 5 MIN PRN
Status: DISCONTINUED | OUTPATIENT
Start: 2018-01-23 | End: 2018-01-23 | Stop reason: HOSPADM

## 2018-01-23 RX ORDER — FENTANYL CITRATE 50 UG/ML
INJECTION, SOLUTION INTRAMUSCULAR; INTRAVENOUS PRN
Status: DISCONTINUED | OUTPATIENT
Start: 2018-01-23 | End: 2018-01-23 | Stop reason: SDUPTHER

## 2018-01-23 RX ORDER — SODIUM CHLORIDE, SODIUM LACTATE, POTASSIUM CHLORIDE, CALCIUM CHLORIDE 600; 310; 30; 20 MG/100ML; MG/100ML; MG/100ML; MG/100ML
INJECTION, SOLUTION INTRAVENOUS CONTINUOUS
Status: DISCONTINUED | OUTPATIENT
Start: 2018-01-23 | End: 2018-01-23 | Stop reason: SDUPTHER

## 2018-01-23 RX ORDER — TRANEXAMIC ACID 100 MG/ML
INJECTION, SOLUTION INTRAVENOUS PRN
Status: DISCONTINUED | OUTPATIENT
Start: 2018-01-23 | End: 2018-01-23 | Stop reason: SDUPTHER

## 2018-01-23 RX ORDER — LIDOCAINE HYDROCHLORIDE 10 MG/ML
1 INJECTION, SOLUTION EPIDURAL; INFILTRATION; INTRACAUDAL; PERINEURAL
Status: DISCONTINUED | OUTPATIENT
Start: 2018-01-23 | End: 2018-01-23 | Stop reason: HOSPADM

## 2018-01-23 RX ORDER — SODIUM CHLORIDE 0.9 % (FLUSH) 0.9 %
10 SYRINGE (ML) INJECTION EVERY 12 HOURS SCHEDULED
Status: DISCONTINUED | OUTPATIENT
Start: 2018-01-23 | End: 2018-01-24 | Stop reason: HOSPADM

## 2018-01-23 RX ORDER — DIPHENHYDRAMINE HYDROCHLORIDE 50 MG/ML
12.5 INJECTION INTRAMUSCULAR; INTRAVENOUS
Status: DISCONTINUED | OUTPATIENT
Start: 2018-01-23 | End: 2018-01-23 | Stop reason: HOSPADM

## 2018-01-23 RX ORDER — ATORVASTATIN CALCIUM 40 MG/1
40 TABLET, FILM COATED ORAL NIGHTLY
Status: DISCONTINUED | OUTPATIENT
Start: 2018-01-23 | End: 2018-01-24 | Stop reason: HOSPADM

## 2018-01-23 RX ORDER — ACETAMINOPHEN 500 MG
1000 TABLET ORAL ONCE
Status: COMPLETED | OUTPATIENT
Start: 2018-01-23 | End: 2018-01-23

## 2018-01-23 RX ORDER — DEXAMETHASONE SODIUM PHOSPHATE 10 MG/ML
10 INJECTION INTRAMUSCULAR; INTRAVENOUS ONCE
Status: COMPLETED | OUTPATIENT
Start: 2018-01-23 | End: 2018-01-23

## 2018-01-23 RX ORDER — OXYCODONE HYDROCHLORIDE 5 MG/1
10 TABLET ORAL EVERY 4 HOURS PRN
Status: DISCONTINUED | OUTPATIENT
Start: 2018-01-23 | End: 2018-01-24 | Stop reason: HOSPADM

## 2018-01-23 RX ORDER — GABAPENTIN 300 MG/1
300 CAPSULE ORAL ONCE
Status: COMPLETED | OUTPATIENT
Start: 2018-01-23 | End: 2018-01-23

## 2018-01-23 RX ORDER — PROPOFOL 10 MG/ML
INJECTION, EMULSION INTRAVENOUS CONTINUOUS PRN
Status: DISCONTINUED | OUTPATIENT
Start: 2018-01-23 | End: 2018-01-23 | Stop reason: SDUPTHER

## 2018-01-23 RX ORDER — ATENOLOL 25 MG/1
25 TABLET ORAL DAILY
Status: DISCONTINUED | OUTPATIENT
Start: 2018-01-23 | End: 2018-01-23

## 2018-01-23 RX ORDER — SODIUM CHLORIDE 0.9 % (FLUSH) 0.9 %
10 SYRINGE (ML) INJECTION EVERY 12 HOURS SCHEDULED
Status: DISCONTINUED | OUTPATIENT
Start: 2018-01-23 | End: 2018-01-23 | Stop reason: HOSPADM

## 2018-01-23 RX ORDER — SODIUM CHLORIDE 0.9 % (FLUSH) 0.9 %
10 SYRINGE (ML) INJECTION PRN
Status: DISCONTINUED | OUTPATIENT
Start: 2018-01-23 | End: 2018-01-24 | Stop reason: HOSPADM

## 2018-01-23 RX ORDER — FENTANYL CITRATE 50 UG/ML
25 INJECTION, SOLUTION INTRAMUSCULAR; INTRAVENOUS EVERY 5 MIN PRN
Status: DISCONTINUED | OUTPATIENT
Start: 2018-01-23 | End: 2018-01-23 | Stop reason: HOSPADM

## 2018-01-23 RX ORDER — ALLOPURINOL 100 MG/1
1 TABLET ORAL DAILY
Status: DISCONTINUED | OUTPATIENT
Start: 2018-01-23 | End: 2018-01-23

## 2018-01-23 RX ORDER — OXYCODONE HYDROCHLORIDE 5 MG/1
5 TABLET ORAL EVERY 4 HOURS PRN
Status: DISCONTINUED | OUTPATIENT
Start: 2018-01-23 | End: 2018-01-24 | Stop reason: HOSPADM

## 2018-01-23 RX ORDER — ONDANSETRON 2 MG/ML
INJECTION INTRAMUSCULAR; INTRAVENOUS PRN
Status: DISCONTINUED | OUTPATIENT
Start: 2018-01-23 | End: 2018-01-23 | Stop reason: SDUPTHER

## 2018-01-23 RX ORDER — DEXAMETHASONE SODIUM PHOSPHATE 4 MG/ML
INJECTION, SOLUTION INTRA-ARTICULAR; INTRALESIONAL; INTRAMUSCULAR; INTRAVENOUS; SOFT TISSUE PRN
Status: DISCONTINUED | OUTPATIENT
Start: 2018-01-23 | End: 2018-01-23 | Stop reason: SDUPTHER

## 2018-01-23 RX ORDER — OXYCODONE HYDROCHLORIDE AND ACETAMINOPHEN 5; 325 MG/1; MG/1
2 TABLET ORAL PRN
Status: DISCONTINUED | OUTPATIENT
Start: 2018-01-23 | End: 2018-01-23 | Stop reason: HOSPADM

## 2018-01-23 RX ORDER — ALLOPURINOL 100 MG/1
100 TABLET ORAL DAILY
Status: DISCONTINUED | OUTPATIENT
Start: 2018-01-24 | End: 2018-01-24 | Stop reason: HOSPADM

## 2018-01-23 RX ORDER — KETOROLAC TROMETHAMINE 30 MG/ML
15 INJECTION, SOLUTION INTRAMUSCULAR; INTRAVENOUS EVERY 8 HOURS SCHEDULED
Status: DISCONTINUED | OUTPATIENT
Start: 2018-01-23 | End: 2018-01-24 | Stop reason: HOSPADM

## 2018-01-23 RX ORDER — SCOLOPAMINE TRANSDERMAL SYSTEM 1 MG/1
1 PATCH, EXTENDED RELEASE TRANSDERMAL ONCE
Status: DISCONTINUED | OUTPATIENT
Start: 2018-01-23 | End: 2018-01-24 | Stop reason: HOSPADM

## 2018-01-23 RX ORDER — MIDAZOLAM HYDROCHLORIDE 1 MG/ML
INJECTION INTRAMUSCULAR; INTRAVENOUS PRN
Status: DISCONTINUED | OUTPATIENT
Start: 2018-01-23 | End: 2018-01-23 | Stop reason: SDUPTHER

## 2018-01-23 RX ORDER — PROMETHAZINE HYDROCHLORIDE 25 MG/ML
6.25 INJECTION, SOLUTION INTRAMUSCULAR; INTRAVENOUS
Status: DISCONTINUED | OUTPATIENT
Start: 2018-01-23 | End: 2018-01-23 | Stop reason: HOSPADM

## 2018-01-23 RX ADMIN — Medication 10 ML: at 21:19

## 2018-01-23 RX ADMIN — PHENYLEPHRINE HYDROCHLORIDE 100 MCG: 10 INJECTION INTRAVENOUS at 12:59

## 2018-01-23 RX ADMIN — TRANEXAMIC ACID 1000 MG: 100 INJECTION, SOLUTION INTRAVENOUS at 12:18

## 2018-01-23 RX ADMIN — MOMETASONE FUROATE AND FORMOTEROL FUMARATE DIHYDRATE 2 PUFF: 200; 5 AEROSOL RESPIRATORY (INHALATION) at 21:00

## 2018-01-23 RX ADMIN — RIVAROXABAN 10 MG: 10 TABLET, FILM COATED ORAL at 21:01

## 2018-01-23 RX ADMIN — PHENYLEPHRINE HYDROCHLORIDE 100 MCG: 10 INJECTION INTRAVENOUS at 13:08

## 2018-01-23 RX ADMIN — DOCUSATE SODIUM 100 MG: 100 CAPSULE, LIQUID FILLED ORAL at 20:59

## 2018-01-23 RX ADMIN — PHENYLEPHRINE HYDROCHLORIDE 100 MCG: 10 INJECTION INTRAVENOUS at 12:42

## 2018-01-23 RX ADMIN — GABAPENTIN 300 MG: 300 CAPSULE ORAL at 10:53

## 2018-01-23 RX ADMIN — FENTANYL CITRATE 25 MCG: 50 INJECTION, SOLUTION INTRAMUSCULAR; INTRAVENOUS at 11:52

## 2018-01-23 RX ADMIN — TRANEXAMIC ACID 1000 MG: 100 INJECTION, SOLUTION INTRAVENOUS at 13:09

## 2018-01-23 RX ADMIN — ACETAMINOPHEN 1000 MG: 500 TABLET ORAL at 17:18

## 2018-01-23 RX ADMIN — DEXAMETHASONE SODIUM PHOSPHATE 10 MG: 10 INJECTION INTRAMUSCULAR; INTRAVENOUS at 10:53

## 2018-01-23 RX ADMIN — LIDOCAINE HYDROCHLORIDE 40 MG: 10 INJECTION, SOLUTION EPIDURAL; INFILTRATION; INTRACAUDAL; PERINEURAL at 12:08

## 2018-01-23 RX ADMIN — KETOROLAC TROMETHAMINE 15 MG: 30 INJECTION, SOLUTION INTRAMUSCULAR at 20:59

## 2018-01-23 RX ADMIN — PHENYLEPHRINE HYDROCHLORIDE 100 MCG: 10 INJECTION INTRAVENOUS at 12:51

## 2018-01-23 RX ADMIN — OXYCODONE HYDROCHLORIDE 10 MG: 5 TABLET ORAL at 20:58

## 2018-01-23 RX ADMIN — CEFAZOLIN 2 G: 1 INJECTION, POWDER, FOR SOLUTION INTRAMUSCULAR; INTRAVENOUS at 11:48

## 2018-01-23 RX ADMIN — ONDANSETRON 4 MG: 2 INJECTION INTRAMUSCULAR; INTRAVENOUS at 13:15

## 2018-01-23 RX ADMIN — SODIUM CHLORIDE, POTASSIUM CHLORIDE, SODIUM LACTATE AND CALCIUM CHLORIDE: 600; 310; 30; 20 INJECTION, SOLUTION INTRAVENOUS at 10:48

## 2018-01-23 RX ADMIN — PHENYLEPHRINE HYDROCHLORIDE 200 MCG: 10 INJECTION INTRAVENOUS at 13:04

## 2018-01-23 RX ADMIN — ACETAMINOPHEN 1000 MG: 500 TABLET ORAL at 10:53

## 2018-01-23 RX ADMIN — WATER 2 G: 1 INJECTION INTRAMUSCULAR; INTRAVENOUS; SUBCUTANEOUS at 20:59

## 2018-01-23 RX ADMIN — SODIUM CHLORIDE, POTASSIUM CHLORIDE, SODIUM LACTATE AND CALCIUM CHLORIDE: 600; 310; 30; 20 INJECTION, SOLUTION INTRAVENOUS at 16:04

## 2018-01-23 RX ADMIN — FENTANYL CITRATE 25 MCG: 50 INJECTION, SOLUTION INTRAMUSCULAR; INTRAVENOUS at 11:58

## 2018-01-23 RX ADMIN — ATORVASTATIN CALCIUM 40 MG: 40 TABLET, FILM COATED ORAL at 20:58

## 2018-01-23 RX ADMIN — DEXAMETHASONE SODIUM PHOSPHATE 4 MG: 4 INJECTION, SOLUTION INTRAMUSCULAR; INTRAVENOUS at 13:15

## 2018-01-23 RX ADMIN — MIDAZOLAM 1 MG: 1 INJECTION INTRAMUSCULAR; INTRAVENOUS at 11:48

## 2018-01-23 RX ADMIN — KETOROLAC TROMETHAMINE 15 MG: 30 INJECTION, SOLUTION INTRAMUSCULAR at 17:18

## 2018-01-23 RX ADMIN — PROPOFOL 75 MCG/KG/MIN: 10 INJECTION, EMULSION INTRAVENOUS at 12:08

## 2018-01-23 RX ADMIN — ACETAMINOPHEN 1000 MG: 500 TABLET ORAL at 20:58

## 2018-01-23 RX ADMIN — MIDAZOLAM 1 MG: 1 INJECTION INTRAMUSCULAR; INTRAVENOUS at 11:51

## 2018-01-23 RX ADMIN — SODIUM CHLORIDE, POTASSIUM CHLORIDE, SODIUM LACTATE AND CALCIUM CHLORIDE: 600; 310; 30; 20 INJECTION, SOLUTION INTRAVENOUS at 12:50

## 2018-01-23 RX ADMIN — PHENYLEPHRINE HYDROCHLORIDE 100 MCG: 10 INJECTION INTRAVENOUS at 12:37

## 2018-01-23 ASSESSMENT — PULMONARY FUNCTION TESTS
PIF_VALUE: 0

## 2018-01-23 ASSESSMENT — PAIN SCALES - GENERAL
PAINLEVEL_OUTOF10: 0
PAINLEVEL_OUTOF10: 2
PAINLEVEL_OUTOF10: 5

## 2018-01-23 ASSESSMENT — PAIN - FUNCTIONAL ASSESSMENT: PAIN_FUNCTIONAL_ASSESSMENT: 0-10

## 2018-01-23 NOTE — ANESTHESIA POSTPROCEDURE EVALUATION
POST- ANESTHESIA EVALUATION       Pt Name: Lucrecia Gold  MRN: [id number]  YOB: 1940  Date of evaluation: 1/23/2018  Time:  2:58 PM      /73   Pulse 62   Temp 97.3 °F (36.3 °C)   Resp 18   Ht 5' 10\" (1.778 m)   Wt 189 lb (85.7 kg)   SpO2 98%   BMI 27.12 kg/m²      Consciousness Level  Awake  Cardiopulmonary Status  Stable  Pain Adequately Treated YES  Nausea / Vomiting  NO  Adequate Hydration  YES  Anesthesia Related Complications NONE      Electronically signed by Royal Missael MD on 1/23/2018 at 2:58 PM       Department of Anesthesiology  Postprocedure Note    Patient: Lucrecia Gold  MRN: [id number]  YOB: 1940  Date of evaluation: 1/23/2018  Time:  2:58 PM     Procedure Summary     Date:  01/23/18 Room / Location:  [address] [place] [address] Mindi Gottlieb 02 / [address]    Anesthesia Start:  1148 Anesthesia Stop:  7858    Procedure:  LEFT TOTAL HIP ARTHROPLASTY MINIMALLY INVASIVE ASI WITH BIOMET SYSTEM & GPS SPRAY APPLICATION (Left Hip) Diagnosis:  (DJD LEFT HIP  )    Surgeon:  Nilam Alegre MD Responsible Provider:  Royal Missael MD    Anesthesia Type:  spinal ASA Status:  3          Anesthesia Type: spinal    Mayur Phase I: Mayur Score: 7    Mayur Phase II:      Last vitals: Reviewed and per EMR flowsheets.        Anesthesia Post Evaluation

## 2018-01-23 NOTE — ANESTHESIA PRE PROCEDURE
01/02/18 102/70   12/22/17 (!) 145/89       NPO Status:                                                                                 BMI:   Wt Readings from Last 3 Encounters:   01/09/18 189 lb 9.6 oz (86 kg)   12/22/17 195 lb (88.5 kg)   08/31/17 185 lb 9.6 oz (84.2 kg)     There is no height or weight on file to calculate BMI.    CBC:   Lab Results   Component Value Date    WBC 13.5 01/09/2018    RBC 4.68 01/09/2018    RBC 4.43 04/19/2012    HGB 14.5 01/09/2018    HCT 43.8 01/09/2018    MCV 93.6 01/09/2018    RDW 13.7 01/09/2018     01/09/2018     04/19/2012       CMP:   Lab Results   Component Value Date     01/09/2018    K 4.9 01/09/2018     01/09/2018    CO2 30 01/09/2018    BUN 21 01/09/2018    CREATININE 1.33 01/09/2018    GFRAA >60 01/09/2018    LABGLOM 52 01/09/2018    GLUCOSE 105 01/09/2018    GLUCOSE 143 02/03/2012    PROT 7.0 06/09/2017    CALCIUM 9.8 01/09/2018    BILITOT 0.82 11/24/2014    ALKPHOS 57 11/24/2014    AST 21 10/31/2017    ALT 18 10/31/2017       POC Tests: No results for input(s): POCGLU, POCNA, POCK, POCCL, POCBUN, POCHEMO, POCHCT in the last 72 hours.     Coags: No results found for: PROTIME, INR, APTT    HCG (If Applicable): No results found for: PREGTESTUR, PREGSERUM, HCG, HCGQUANT     ABGs: No results found for: PHART, PO2ART, WQX8KFB, SLA8USE, BEART, [id number]     Type & Screen (If Applicable):  No results found for: YANIV [facility]    Anesthesia Evaluation  Patient summary reviewed and Nursing notes reviewed no history of anesthetic complications:   Airway:   TM distance: >3 FB   Neck ROM: full  Mouth opening: > = 3 FB Dental:    (+) edentulous      Pulmonary:normal exam  breath sounds clear to auscultation  (+) COPD:  sleep apnea: on CPAP,                             Cardiovascular:    (+) hypertension:, CAD:, CABG/stent: no interval change, hyperlipidemia      ECG reviewed  Rhythm: regular  Rate: normal                    Neuro/Psych:   (+) TIA, GI/Hepatic/Renal:   (+) renal disease: CRI and no interval change,           Endo/Other:    (+) : arthritis: OA., .                 Abdominal:           Vascular:                                      Anesthesia Plan      spinal     ASA 3     (Benefits, risks and complications of spinal vs GA d/w patient, he prefers to have spinal.  OK for \" woke up eary during surgery x1 , heard saw & felt the pressure\" from anesthesia or spinal.)      MIPS: Postoperative opioids intended. Anesthetic plan and risks discussed with patient. Plan discussed with CRNA.                 Og Ospina MD   1/23/2018

## 2018-01-23 NOTE — PROGRESS NOTES
Dr Pamela Spear notified of WBC- 13.5 on 1-9-18. Dr Pamela Spear states that it is okay for surgery for today.

## 2018-01-23 NOTE — H&P
HISTORY and [facility]       NAME:  Syeda Luna  MRN: [id number]   YOB: 1940   Date: 1/23/2018   Age: 69 y.o. Gender: male     H&P Update Note    H&P from 01/09/2018  reviewed and updated, Patient examined. Vitals: BP (!) 153/88   Pulse 61   Temp 97 °F (36.1 °C) (Oral)   Resp 18   Ht 5' 10\" (1.778 m)   Wt 189 lb (85.7 kg)   SpO2 98%   BMI 27.12 kg/m²  Body mass index is 27.12 kg/m². No interval changes. Alert, oriented, heart RRR, chest symmetrical, clear. I concur with the findings. Raymundo Crenshaw NP on 1/23/2018 at 10:41 AM    Kathy Faria NP Nurse Practitioner Signed General Surgery  H&P Date of Service: 1/9/2018  8:17 AM   Related encounter: Pre-Admission Testing Visit from 1/9/2018 in [address] [place] [address] Collapse All    []Hide copied text  []Hover for attribution information        [facility]         NAME:  Syeda Luna  MRN: [id number]   YOB: 1940   Date: 1/9/2018   Age: 69 y.o. Gender: male         COMPLAINT AND PRESENT HISTORY:    69 y o male with left hip pain and arthritic changes. No known injury. Increased pain for 5-6 months. Pain is off/on up to 7 on scale, worse with walking \"too far\" or bending over. Patient presents for PAT, has had several previous joint replacements with good results. He denies chills/fever, chest pain or SOB.     DIAGNOSTIC RESULTS   Radiology:            Labs:  CBC: No results for input(s): WBC, HGB, PLT in the last 72 hours. BMP:  No results for input(s): NA, K, CL, CO2, BUN, CREATININE, GLUCOSE in the last 72 hours. Hepatic: No results for input(s): AST, ALT, ALB, BILITOT, ALKPHOS in the last 72 hours. CARDIAC ENZY: No results for input(s): CKTOTAL, CKMB, CKMBINDEX, TROPONINT, MYOGLOBIN in the last 72 hours. INR: No results for input(s): INR in the last 72 hours.   BNP: No results for input(s): BNP in the last 72

## 2018-01-24 VITALS
TEMPERATURE: 97.7 F | RESPIRATION RATE: 16 BRPM | OXYGEN SATURATION: 95 % | DIASTOLIC BLOOD PRESSURE: 84 MMHG | BODY MASS INDEX: 28.66 KG/M2 | HEIGHT: 70 IN | HEART RATE: 71 BPM | WEIGHT: 200.18 LBS | SYSTOLIC BLOOD PRESSURE: 110 MMHG

## 2018-01-24 LAB
HCT VFR BLD CALC: 34.4 % (ref 41–53)
HEMOGLOBIN: 11.4 G/DL (ref 13.5–17.5)

## 2018-01-24 PROCEDURE — 97162 PT EVAL MOD COMPLEX 30 MIN: CPT

## 2018-01-24 PROCEDURE — 2580000003 HC RX 258: Performed by: ORTHOPAEDIC SURGERY

## 2018-01-24 PROCEDURE — 85018 HEMOGLOBIN: CPT

## 2018-01-24 PROCEDURE — 97110 THERAPEUTIC EXERCISES: CPT

## 2018-01-24 PROCEDURE — 97116 GAIT TRAINING THERAPY: CPT

## 2018-01-24 PROCEDURE — 6360000002 HC RX W HCPCS: Performed by: ORTHOPAEDIC SURGERY

## 2018-01-24 PROCEDURE — 97535 SELF CARE MNGMENT TRAINING: CPT

## 2018-01-24 PROCEDURE — 97530 THERAPEUTIC ACTIVITIES: CPT

## 2018-01-24 PROCEDURE — 6370000000 HC RX 637 (ALT 250 FOR IP): Performed by: ORTHOPAEDIC SURGERY

## 2018-01-24 PROCEDURE — 6370000000 HC RX 637 (ALT 250 FOR IP): Performed by: FAMILY MEDICINE

## 2018-01-24 PROCEDURE — G8978 MOBILITY CURRENT STATUS: HCPCS

## 2018-01-24 PROCEDURE — 99024 POSTOP FOLLOW-UP VISIT: CPT | Performed by: ORTHOPAEDIC SURGERY

## 2018-01-24 PROCEDURE — G8979 MOBILITY GOAL STATUS: HCPCS

## 2018-01-24 PROCEDURE — 36415 COLL VENOUS BLD VENIPUNCTURE: CPT

## 2018-01-24 PROCEDURE — 85014 HEMATOCRIT: CPT

## 2018-01-24 PROCEDURE — 97165 OT EVAL LOW COMPLEX 30 MIN: CPT

## 2018-01-24 RX ORDER — GUAIFENESIN AND DEXTROMETHORPHAN HYDROBROMIDE 600; 30 MG/1; MG/1
1 TABLET, EXTENDED RELEASE ORAL 2 TIMES DAILY
Qty: 28 TABLET | Refills: 0 | Status: SHIPPED | OUTPATIENT
Start: 2018-01-24 | End: 2018-04-27

## 2018-01-24 RX ORDER — OXYCODONE HYDROCHLORIDE 10 MG/1
10 TABLET ORAL EVERY 4 HOURS PRN
Qty: 40 TABLET | Refills: 0 | Status: SHIPPED | OUTPATIENT
Start: 2018-01-24 | End: 2018-01-31

## 2018-01-24 RX ADMIN — SODIUM CHLORIDE, POTASSIUM CHLORIDE, SODIUM LACTATE AND CALCIUM CHLORIDE: 600; 310; 30; 20 INJECTION, SOLUTION INTRAVENOUS at 04:05

## 2018-01-24 RX ADMIN — WATER 2 G: 1 INJECTION INTRAMUSCULAR; INTRAVENOUS; SUBCUTANEOUS at 04:05

## 2018-01-24 RX ADMIN — OXYCODONE HYDROCHLORIDE 10 MG: 5 TABLET ORAL at 14:16

## 2018-01-24 RX ADMIN — KETOROLAC TROMETHAMINE 15 MG: 30 INJECTION, SOLUTION INTRAMUSCULAR at 06:21

## 2018-01-24 RX ADMIN — DOCUSATE SODIUM 100 MG: 100 CAPSULE, LIQUID FILLED ORAL at 08:42

## 2018-01-24 RX ADMIN — ACETAMINOPHEN 1000 MG: 500 TABLET ORAL at 06:21

## 2018-01-24 RX ADMIN — MOMETASONE FUROATE AND FORMOTEROL FUMARATE DIHYDRATE 2 PUFF: 200; 5 AEROSOL RESPIRATORY (INHALATION) at 08:42

## 2018-01-24 RX ADMIN — ALLOPURINOL 100 MG: 100 TABLET ORAL at 08:42

## 2018-01-24 RX ADMIN — Medication 10 ML: at 08:46

## 2018-01-24 RX ADMIN — OXYCODONE HYDROCHLORIDE 10 MG: 5 TABLET ORAL at 08:43

## 2018-01-24 RX ADMIN — LISINOPRIL 2.5 MG: 5 TABLET ORAL at 08:42

## 2018-01-24 ASSESSMENT — PAIN DESCRIPTION - DESCRIPTORS
DESCRIPTORS: ACHING;THROBBING
DESCRIPTORS: SORE
DESCRIPTORS: ACHING
DESCRIPTORS: ACHING
DESCRIPTORS: ACHING;THROBBING

## 2018-01-24 ASSESSMENT — PAIN SCALES - GENERAL
PAINLEVEL_OUTOF10: 5
PAINLEVEL_OUTOF10: 4
PAINLEVEL_OUTOF10: 5
PAINLEVEL_OUTOF10: 4
PAINLEVEL_OUTOF10: 4
PAINLEVEL_OUTOF10: 3

## 2018-01-24 ASSESSMENT — PAIN DESCRIPTION - FREQUENCY
FREQUENCY: INTERMITTENT
FREQUENCY: INTERMITTENT
FREQUENCY: CONTINUOUS

## 2018-01-24 ASSESSMENT — PAIN DESCRIPTION - PROGRESSION
CLINICAL_PROGRESSION: NOT CHANGED
CLINICAL_PROGRESSION: GRADUALLY IMPROVING
CLINICAL_PROGRESSION: NOT CHANGED
CLINICAL_PROGRESSION: NOT CHANGED
CLINICAL_PROGRESSION: GRADUALLY IMPROVING

## 2018-01-24 ASSESSMENT — PAIN DESCRIPTION - PAIN TYPE
TYPE: SURGICAL PAIN
TYPE: ACUTE PAIN;SURGICAL PAIN
TYPE: ACUTE PAIN;SURGICAL PAIN
TYPE: SURGICAL PAIN

## 2018-01-24 ASSESSMENT — PAIN DESCRIPTION - ORIENTATION
ORIENTATION: LEFT

## 2018-01-24 ASSESSMENT — PAIN DESCRIPTION - LOCATION
LOCATION: HIP

## 2018-01-24 ASSESSMENT — PAIN DESCRIPTION - ONSET
ONSET: ON-GOING
ONSET: GRADUAL

## 2018-01-24 NOTE — PROGRESS NOTES
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 I through stage IV, or unspecified(403.90) 03/10/2015    Renal cyst     CKD (chronic kidney disease) stage 3, GFR 30-59 ml/min 2014    ASHD (arteriosclerotic heart disease) 07/15/2013    History of TIA (transient ischemic attack) 07/15/2013    History of hemorrhoids 07/15/2013    Arthritis 07/15/2013    History of carpal tunnel syndrome 07/15/2013    Degenerative joint disease 07/15/2013    Sleep apnea 07/15/2013    ED (erectile dysfunction) 07/15/2013    History of colon polyps 07/15/2013    COPD (chronic obstructive pulmonary disease) ([address]     CAD (coronary artery disease)     Osteoarthritis     Leukocytosis     Anemia        Past Medical History:   Diagnosis Date    Anemia     Anesthesia     woke up eary during surgery x1 , heard saw & felt the pressure.     Arthritis 7/15/2013    ASHD (arteriosclerotic heart disease) 7/15/2013    CAD (coronary artery disease)     has cardiac stent x 4.    Chronic kidney disease     stage 3    Claudication (HCC) 7/15/2013    COPD (chronic obstructive pulmonary disease) ([address]     Degenerative joint disease 7/15/2013    ED (erectile dysfunction) 7/15/2013    Full dentures     Gout     History of carpal tunnel syndrome 7/15/2013    History of colon polyps 7/15/2013    History of hemorrhoids 7/15/2013    History of TIA therapy prn.)       Orientation     Objective      Transfers  Sit to Stand: (P) Contact guard assistance  Stand to sit: (P) Contact guard assistance  Bed to Chair: (P) Contact guard assistance  Stand Pivot Transfers: (P) Contact guard assistance  Ambulation  Ambulation?: (P) Yes  More Ambulation?: (P) No  Ambulation 1  Surface: (P) level tile  Device: (P) Rolling Walker  Assistance: (P) Contact guard assistance  Quality of Gait: (P) Worked on heel strike  Distance: (P) 150'  Comments: (P) v.c to remain inside RW  Stairs/Curb  Stairs?: (P) Yes  Stairs  # Steps : (P) 5 (4)  Stairs Height: (P) 4\" (6\")  Rails: (P) Bilateral;Left ascending  Curbs: (P) 6\"  Device: (P) No Device;Small Base [facility]  Assistance: (P) Contact guard assistance  Comment: (P) Pt performed stair both HRand Left HR/ SBQC w/O difficulty. Balance  Sitting - Static: (P) Good  Sitting - Dynamic: (P) Good  Standing - Static: (P) Good  Standing - Dynamic: (P) Good;-  Other exercises  Other exercises?: (P) Yes  Other exercises 1: (P) Skate x 15  Other exercises 2: (P) Sit>Stand x 3  Other exercises 3: (P) PRE's Right 2 lb wts x 15  Other exercises 4: (P) THR Protocol seated x 15  Other exercises 5: (P) THR Protocol standing x 10                        Assessment   Body structures, Functions, Activity limitations: (P) Decreased functional mobility ; Decreased strength;Decreased balance  Assessment: (P) continue per POC to maximize potential for safe D/C home w/ spouse  Treatment Diagnosis: (P) impaired mobility S/P left THR   Prognosis: (P) Excellent  REQUIRES PT FOLLOW UP: (P) Yes  Activity Tolerance  Activity Tolerance: (P) Patient Tolerated treatment well       Discharge Recommendations:  (P) Home with assist PRN, Outpatient PT    G-Code     OutComes Score                                                    AM-PAC Score             Goals  Short term goals  Time Frame for Short term goals: (P) 1-2 days  Short term goal 1: (P) Independent bed mobility  Short term goal 2: (P) MOD I for transfers using w walker  Short term goal 3: (P) MOD I for ambulation 200' w/ w walker  Short term goal 4: (P) pt to demonstrate good balance using w walker  Short term goal 5: (P) pt to demonstrate good technique w/ left THR exercises  Long term goals  Long term goal 1: (P) pt to ascend/ descend 2-3 steps w/ CGA x 1 and AD  Patient Goals   Patient goals : (P) return home w/ spouse    Plan    Plan  Times per week: (P) BID x 1-2 days  Times per day: (P) Twice a day  Specific instructions for Next Treatment: 1-24-18 gait w/ w walker 90' x 2 w/ CGA x 1, needs steps prior to D/C, no SLR  Current Treatment Recommendations: Strengthening, Home Exercise Program, ROM, Safety Education & Training, Balance Training, Patient/Caregiver Education & Training, Functional Mobility Training, Transfer Training, Gait Training, Stair training  Safety Devices  Type of devices: (P) Call light within reach, Gait belt, Patient at risk for falls, Left in chair, Nurse notified     Therapy Time   Individual Concurrent Group Co-treatment   Time In [facility] 141         Time Out 550 Karla Hector, PTA   Electronically signed by Criselda Hill PTA on 1/24/2018 at 3:59 PM

## 2018-01-24 NOTE — CARE COORDINATION
Joint Replacement Navigator Daily Rounding Note    Admission Date:   1/23/2018 Elmer Gregory is a 69 y.o.  male    Post Op Day # 1    Labs:         Recent Labs      01/24/18   0700   HGB  11.4*     No results for input(s): NA, K, CL, CO2, BUN, CREATININE, GLUCOSE in the last 72 hours. Met with:     Patient and Family  Patient's LOC:   alert and oriented X3  Patient progress   Doing well  Patient's Pain:   Patient rates pain tolerable  Oral Intake:    good  Output:    adequate   Dhillon in:   no  ON-Q:    no    Walker/DME:  Walker/DME needed:  No  DME needed:    n/a   DME Agency:    n/a    Anticoagulation:  Anticoagulation:  ASA  Prescription in chart:  no     Continuity of Care: The [address] form is on the chart. The [address] form is not signed by the physician. Discharge Planning:  Confirmed with patient that discharge plan is Home with [address] [place] [address]. Agency/Facility chosen: [address] [place] [address] pre-certification no  Anticipated discharge date: 1/24 or 25    Patient's questions and concerns were answered. Actively listened and provided reassurance.      Electronically signed by: Bianca Packer RN on 1/24/2018 at 11:05 AM

## 2018-01-24 NOTE — PROGRESS NOTES
[address]   INPATIENT OCCUPATIONAL THERAPY  PROGRESS NOTE  Date: 2018  Patient Name: Jossie Pyle      Room: 8723/6817-64  MRN: [id number]    : 1940  (69 y.o.) Gender: male   Referring Practitioner: Dr. Kaylan Turpin  Diagnosis: L ELLE 18  General  Chart Reviewed: Yes  Patient assessed for rehabilitation services?: Yes  Family / Caregiver Present: No  Referring Practitioner: Dr. Kaylan Turpin  Diagnosis: L ELLE 18    Restrictions  Restrictions/Precautions: Fall Risk, Surgical Protocols  Implants present? : Metal implants (right total shoulder, left shoulder resurfacing,  left TKR, wesley THRs)  Other position/activity restrictions: NO SLR  Position Activity Restriction  Other position/activity restrictions: NO SLR       Subjective  Subjective: pt states that he has trouble remembering to do things safely  Comments: pt alert and impulsive at times  Patient Currently in Pain: Yes  Pain Level: 4  Pain Location: Hip  Pain Orientation: Left  Overall Orientation Status: Within Functional Limits  Patient Observation  Observations:  (SPINAL SITE D/I)    Objective  Cognition  Overall Cognitive Status: WFL  Bed mobility  Supine to Sit: Modified independent  Scooting: Independent  Balance  Sitting Balance: Independent  Standing Balance: Contact guard assistance (improve to SBA, multple VCs for safety c P return, c 0-1 UE support on RW, PM: pt improved to F return c RW safety )    Standing Balance  Time: AM: 2-3 min x 8 c RW (PM: 3-4 minutes x 2 c RW)  Activity: ADL's, room mobility (PM: functional mobility,  item retrieval bonnie peterson and handing items to writer at [facility] level to increase safety and endurance for ADLs and functional mobility   Sit to stand: Contact guard assistance  Stand to sit: Contact guard assistance  Comment: improve to SBA, multple VCs for safety c P return, PM: pt improved to F return c RW safety     Functional Mobility  Functional - Mobility Device: Rolling Walker  Activity:

## 2018-01-24 NOTE — FLOWSHEET NOTE
01/24/18 1437   Encounter Summary   Services provided to: Patient   Referral/Consult From: Miles Roberto Visiting (1/24/18)   Volunteer Visit Yes   Complexity of Encounter Low   Length of Encounter 15 minutes   Spiritual/Congregational   Type Spiritual support   Intervention Communion   Sacraments   Communion Patient received communion

## 2018-01-24 NOTE — DISCHARGE INSTR - ACTIVITY
_____________________________________________________________________     CONTACT:     Reed Noel RN - Orthopedic Nurse Navigator          ELECTRONIC SIGNATURE:                          Loura Bert, RN on 1/24/2018 at 2:26 PM                           Orthopedic Nurse Navigator                          [facility]                             Office:              [phone]                           Cell:                 [phone]

## 2018-01-24 NOTE — CARE COORDINATION
This patient is being followed by Care Transition Coordinator for [address]  Phone:  [phone]  Dates of Follow up:  1/23/18 to 1/23/18  ECF/SNF -    Phone:    [address]   Phone:

## 2018-01-24 NOTE — PROGRESS NOTES
(177.8 cm)  Weight: 200 lb 2.8 oz (90.8 kg)  BMI (Calculated): 28.8  Oxygen Therapy  SpO2: 91 %  Pulse Oximeter Device Mode: Continuous  Pulse Oximeter Device Location: Left, Finger  O2 Device: None (Room air)  O2 Flow Rate (L/min): 2 L/min  Level of Consciousness: Alert    Subjective  Subjective: \"My wife helped me after the other 4 surgeries so I imagine she knows what she is doing by now\"  Overall Orientation Status: Within Functional Limits  Vision  Vision: Impaired  Vision Exceptions: Wears glasses for reading  Hearing  Hearing: Within functional limits  Social/Functional History  Lives With: Spouse  Type of Home: House  Home Layout: One level  Home Access: Stairs to enter without rails (through garage; 3 steps in front w/B rails)  Entrance Stairs - Number of Steps: 2-3  Bathroom Shower/Tub: Tub/Shower unit  Bathroom Toilet: Standard  Bathroom Equipment: Grab bars in shower, Shower chair, Toilet raiser  Bathroom Accessibility: Walker accessible  Home Equipment: Rolling walker, Crutches, Reacher  ADL Assistance: Independent (\"barely towards the end\")  Homemaking Responsibilities: No (Wife is primary for IADL's - pt assists as able)  Ambulation Assistance: Independent (Using RW intermittently)  Transfer Assistance: Independent  Active : Yes  Mode of Transportation: Car  Occupation: Retired  Additional Comments: Pt's spouse is also retired and able to A as needed upon discharge. Objective  Vision - Basic Assessment  Patient Visual Report: No visual complaint reported.    Cognition  Overall Cognitive Status: WFL   Perception  Overall Perceptual Status: WFL  Sensation  Overall Sensation Status: WFL   ADL  Feeding: Independent  Grooming: Setup  UE Bathing: Setup  LE Bathing:  (TBA; CGA in standing)  UE Dressing: Setup  LE Dressing:  (Donned slippers w/set-up; CGA in standing)  Toileting:  (Has been using urinal only)    UE Function  LUE Strength  Gross LUE Strength: WFL  L Hand Grasp: 5/5     LUE Tone:

## 2018-01-24 NOTE — PROGRESS NOTES
Physical Therapy    Facility/Department: [facility] MED SURG  Initial Assessment    NAME: Syeda Luna  : 1940  MRN: [id number]    Date of Service: 2018    Patient Diagnosis(es): There were no encounter diagnoses. has a past medical history of Anemia; Anesthesia; Arthritis; ASHD (arteriosclerotic heart disease); CAD (coronary artery disease); Chronic kidney disease; Claudication [facility]); COPD (chronic obstructive pulmonary disease) ([facility] Degenerative joint disease; ED (erectile dysfunction); Full dentures; Gout; History of carpal tunnel syndrome; History of colon polyps; History of hemorrhoids; History of TIA (transient ischemic attack); Hypercholesteremia; Hypertension; Hyperuricemia; Leukocytosis; Renal cyst; Sleep apnea; and Wears glasses. has a past surgical history that includes Coronary angioplasty with stent (1994 x1 stent,1998 x2 stents,2009 x1,); Colonoscopy (08); Shoulder arthroscopy (Right, 2002 repair); Carpal tunnel release (Right, 2002); shoulder surgery (Left, 2002); Cataract removal with implant (Left, 2012); Cataract removal with implant (Right, 2012); eye surgery; Blepharoplasty (Bilateral, 2012); Knee Arthroplasty (Left, 2017); Total knee arthroplasty (Left, 2017); joint replacement (11 ); joint replacement (); and total hip arthroplasty (Left, 2018).     Restrictions  Restrictions/Precautions  Restrictions/Precautions: Fall Risk, Surgical Protocols  Required Braces or Orthoses?: No  Implants present? : Metal implants (right total shoulder, left shoulder resurfacing,  left TKR, wesley THRs)  Position Activity Restriction  Other position/activity restrictions: NO SLR  Vision/Hearing  Vision: Impaired  Vision Exceptions: Wears glasses for reading  Hearing: Within functional limits     Subjective  General  Patient assessed for rehabilitation services?: Yes  Response To Previous Treatment: Not applicable  Family / Caregiver Present: hand    AROM RLE (degrees)  RLE AROM: WFL  AROM LLE (degrees)  LLE AROM : Exceptions  L Hip Flexion 0-125: 0-95  L Hip ABduction 0-45: 0-30  L Hip ADduction 0-10: neutral  L Knee Flexion 0-145: 0-95  L Knee Extension 0: 0  L Ankle Dorsiflexion 0-20: WFL  L Ankle Plantar Flexion 0-45: WFL  AROM RUE (degrees)  RUE General AROM: see OT for UE assessment  AROM LUE (degrees)  LUE General AROM: see OT for UE assessment  Strength RLE  Strength RLE: WFL  Comment: 5/5  Strength LLE  Strength LLE: Exception  L Hip Flexion: 4-/5  L Hip ABduction: 4+/5  L Hip ADduction: 4+/5  L Knee Flexion: 5/5  L Knee Extension: 5/5  L Ankle Dorsiflexion: 5/5  L Ankle Plantar Flexion: 5/5  Strength RUE  Comment: see OT for UE assessment  Strength LUE  Comment: see OT for UE assessment     Sensation  Overall Sensation Status: WFL  Bed mobility  Supine to Sit: Modified independent  Scooting: Independent  Transfers  Sit to Stand: Contact guard assistance (verbal cues for hand placement)  Stand to sit: Contact guard assistance (verbal cues for hand placement)  Stand Pivot Transfers: Contact guard assistance (used w walker)  Ambulation  Ambulation?: Yes  Ambulation 1  Surface: level tile  Device: Rolling Walker  Assistance: Contact guard assistance  Quality of Gait: tremors- slight unsteadiness  Distance: 90' x 2  Stairs/Curb  Stairs?: No     Balance  Sitting - Static: Good  Sitting - Dynamic: Good  Standing - Static: Good (used w walker)  Standing - Dynamic: Good;- (used w walker)        Assessment   Body structures, Functions, Activity limitations: Decreased functional mobility ; Decreased strength;Decreased balance  Assessment: continue per POC to maximize potential for safe D/C home w/ spouse  Treatment Diagnosis: impaired mobility S/P left THR   Specific instructions for Next Treatment: 1-24-18 gait w/ w walker 90' x 2 w/ CGA x 1, needs steps prior to D/C, no SLR  Prognosis: Excellent  Decision Making: Medium Complexity  History: admitted for

## 2018-01-24 NOTE — CARE COORDINATION
Joint Replacement Nurse Navigator Discharge Planning Note:    Admission Date:  1/23/2018 William Hirsch is a 69 y.o.  male    Admitted for : Primary osteoarthritis of left hip [M16.12]    Met with:  Patient and Family    PCP:  Emil Barron MD              Insurance:  Medicare    Current Residence/ Living Arrangements:  independently at home             Current Services PTA:  No    Is patient agreeable to [employer]: Yes    Is patient agreeable to outpatient physical therapy:  Yes    Freedom of choice provided: Yes         [employer] Agency/Outpatient Therapy chosen: [address] needed: No    Current home DME:  walker    Pharmacy:  [facility]    Does Patient want to use MEDS to BEDS?([facility] only) Yes    Transportation Provider:  Patient and Family                       Discharge Plan:   Patient intends to discharge to Home with [employer]    Patient does not need a wheeled walker.      Anticipated discharge date 1/24 or 25      Readmission Risk              Readmission Risk:        22.25       Age 72 or Greater:  1    Admitted from SNF or Requires Paid or Family Care:  0    Currently has CHF,COPD,ARF,CRI,or is on dialysis:  4    Takes more than 5 Prescription Medications:  4    Takes Digoxin,Insulin,Anticoagulants,Narcotics or ASA/Plavix:  [address] in Past 12 Months:  10    On Disability:  0    Patient Considers own Health:  1.25          Electronically signed by: Sepideh Rivers RN on 1/24/2018 at 10:56 AM

## 2018-01-24 NOTE — CARE COORDINATION
[facility] Replacement Ortho Bundle Transition Interview    Patient Interviewed: Gonsalo Isabel  Informed patient of [facility] Ortho Bundle Program and role of CTS/CTC. CMS Letter Given:  yes    Living Situation:   Living arrangements: with family:  spouse                   Home: 1-story house/ trailer and number of outside stairs: 3   Social support:a good social support network    Risk Analysis:  Has the following:  RARS 22.25    Chronic Illness: None, Hyperlipidemia, Coronary Artery Disease, HTN, Cerebral Vascular Accident, COPD and Renal Failure    Fall Risk & DME:   Any previous falls or injuries in the home? No   Visual Impairment:  Glasses   Difficulty hearing: none    Able to express needs/desires? Yes   Home Equipment: walker, cane, shower chair, grab bars, raised toilet     Financial Assessment   Afford medications? Yes   Connected with the following services? none    Mode of transportation? car    Health Literacy Assessment   Does anyone help with medications at home? No  Anything preventing from taking medications at home?   No    Anticipated Needs Post Evaluation: Plan is home with [address]    Follow up appointments:    Future Appointments  Date Time Provider Kaye Mendoza   2/7/2018 8:40 AM MD VANESSA Rojas Ortho [facility]   3/5/2018 10:15 AM Janette Sheriff MD Renal Serv None   4/11/2018 9:00 AM MD VANESSA Rojas Ortho [id number]   4/27/2018 1:30 PM Charl Epley, [facility]

## 2018-01-25 ENCOUNTER — CARE COORDINATION (OUTPATIENT)
Dept: CASE MANAGEMENT | Age: 78
End: 2018-01-25

## 2018-01-25 NOTE — DISCHARGE SUMMARY
keep wound clean and dry    Follow-up with Dr Ulices Pedroza in 2 weeks.     Electronically signed by Pastor Baldomero MD on 1/24/2018 at 7:56 PM

## 2018-01-26 RX ORDER — NITROGLYCERIN 0.4 MG/1
0.4 TABLET SUBLINGUAL EVERY 5 MIN PRN
Qty: 25 TABLET | Refills: 1 | Status: SHIPPED | OUTPATIENT
Start: 2018-01-26 | End: 2020-03-16 | Stop reason: SDUPTHER

## 2018-01-27 NOTE — PROGRESS NOTES
.Date Patient Discharged:  01/24/18    Today's Date:01/27/18      Spoke with:pt      Do you understand the purpose of your medications?:  Yes    Do you understand the side effects of your new medications?:  Yes    Would you like to speak with a pharmacist about any of your medications or the side effects?:  No    Were you able to get your prescriptions filled?:  Yes    Did you understand your discharge instructions and what you are responsible for in managing your health after discharge?:  Yes    While you were here, was your call light answered promptly?:  Never had to use it    Was the area around your room kept quiet at night?:  Yes    Were your room and bathroom kept clean?:yes

## 2018-02-01 ENCOUNTER — CARE COORDINATION (OUTPATIENT)
Dept: CASE MANAGEMENT | Age: 78
End: 2018-02-01

## 2018-02-02 ENCOUNTER — TELEPHONE (OUTPATIENT)
Dept: FAMILY MEDICINE CLINIC | Age: 78
End: 2018-02-02
Payer: MEDICARE

## 2018-02-02 DIAGNOSIS — J44.9 OBSTRUCTIVE CHRONIC BRONCHITIS WITHOUT EXACERBATION (HCC): ICD-10-CM

## 2018-02-02 DIAGNOSIS — Z96.642 AFTERCARE FOLLOWING LEFT HIP JOINT REPLACEMENT SURGERY: Primary | ICD-10-CM

## 2018-02-02 DIAGNOSIS — Z47.1 AFTERCARE FOLLOWING LEFT HIP JOINT REPLACEMENT SURGERY: Primary | ICD-10-CM

## 2018-02-02 PROCEDURE — G0180 MD CERTIFICATION HHA PATIENT: HCPCS | Performed by: FAMILY MEDICINE

## 2018-02-07 ENCOUNTER — OFFICE VISIT (OUTPATIENT)
Dept: ORTHOPEDIC SURGERY | Age: 78
End: 2018-02-07

## 2018-02-07 DIAGNOSIS — Z96.642 STATUS POST TOTAL REPLACEMENT OF LEFT HIP: Primary | ICD-10-CM

## 2018-02-07 PROCEDURE — 99024 POSTOP FOLLOW-UP VISIT: CPT | Performed by: ORTHOPAEDIC SURGERY

## 2018-02-23 ENCOUNTER — HOSPITAL ENCOUNTER (OUTPATIENT)
Age: 78
Discharge: HOME | End: 2018-02-23
Payer: MEDICARE

## 2018-02-23 DIAGNOSIS — N28.1 RENAL CYST: ICD-10-CM

## 2018-02-23 DIAGNOSIS — N18.30 CKD (CHRONIC KIDNEY DISEASE) STAGE 3, GFR 30-59 ML/MIN (HCC): ICD-10-CM

## 2018-02-23 DIAGNOSIS — N18.30 BENIGN HYPERTENSION WITH CKD (CHRONIC KIDNEY DISEASE) STAGE III (HCC): ICD-10-CM

## 2018-02-23 DIAGNOSIS — I12.9 BENIGN HYPERTENSION WITH CKD (CHRONIC KIDNEY DISEASE) STAGE III (HCC): ICD-10-CM

## 2018-02-23 LAB
ABSOLUTE EOS #: 0.2 K/UL (ref 0–0.4)
ABSOLUTE IMMATURE GRANULOCYTE: ABNORMAL K/UL (ref 0–0.3)
ABSOLUTE LYMPH #: 2.3 K/UL (ref 1–4.8)
ABSOLUTE MONO #: 0.8 K/UL (ref 0.1–1.3)
ANION GAP SERPL CALCULATED.3IONS-SCNC: 11 MMOL/L (ref 9–17)
BASOPHILS # BLD: 0 % (ref 0–2)
BASOPHILS ABSOLUTE: 0 K/UL (ref 0–0.2)
BUN BLDV-MCNC: 16 MG/DL (ref 8–23)
BUN/CREAT BLD: ABNORMAL (ref 9–20)
CALCIUM SERPL-MCNC: 9.5 MG/DL (ref 8.6–10.4)
CHLORIDE BLD-SCNC: 107 MMOL/L (ref 98–107)
CO2: 25 MMOL/L (ref 20–31)
CREAT SERPL-MCNC: 1.52 MG/DL (ref 0.7–1.2)
DIFFERENTIAL TYPE: ABNORMAL
EOSINOPHILS RELATIVE PERCENT: 2 % (ref 0–4)
GFR AFRICAN AMERICAN: 54 ML/MIN
GFR NON-AFRICAN AMERICAN: 45 ML/MIN
GFR SERPL CREATININE-BSD FRML MDRD: ABNORMAL ML/MIN/{1.73_M2}
GFR SERPL CREATININE-BSD FRML MDRD: ABNORMAL ML/MIN/{1.73_M2}
GLUCOSE BLD-MCNC: 112 MG/DL (ref 70–99)
HCT VFR BLD CALC: 37.7 % (ref 41–53)
HEMOGLOBIN: 12.5 G/DL (ref 13.5–17.5)
IMMATURE GRANULOCYTES: ABNORMAL %
LYMPHOCYTES # BLD: 22 % (ref 24–44)
MAGNESIUM: 2.3 MG/DL (ref 1.6–2.6)
MCH RBC QN AUTO: 31.1 PG (ref 26–34)
MCHC RBC AUTO-ENTMCNC: 33.2 G/DL (ref 31–37)
MCV RBC AUTO: 93.8 FL (ref 80–100)
MONOCYTES # BLD: 8 % (ref 1–7)
NRBC AUTOMATED: ABNORMAL PER 100 WBC
PDW BLD-RTO: 14 % (ref 11.5–14.9)
PHOSPHORUS: 3.3 MG/DL (ref 2.5–4.5)
PLATELET # BLD: 271 K/UL (ref 150–450)
PLATELET ESTIMATE: ABNORMAL
PMV BLD AUTO: 9.9 FL (ref 6–12)
POTASSIUM SERPL-SCNC: 4.7 MMOL/L (ref 3.7–5.3)
RBC # BLD: 4.02 M/UL (ref 4.5–5.9)
RBC # BLD: ABNORMAL 10*6/UL
SEG NEUTROPHILS: 68 % (ref 36–66)
SEGMENTED NEUTROPHILS ABSOLUTE COUNT: 7.3 K/UL (ref 1.3–9.1)
SODIUM BLD-SCNC: 143 MMOL/L (ref 135–144)
WBC # BLD: 10.6 K/UL (ref 3.5–11)
WBC # BLD: ABNORMAL 10*3/UL

## 2018-02-23 PROCEDURE — 80048 BASIC METABOLIC PNL TOTAL CA: CPT

## 2018-02-23 PROCEDURE — 36415 COLL VENOUS BLD VENIPUNCTURE: CPT

## 2018-02-23 PROCEDURE — 83735 ASSAY OF MAGNESIUM: CPT

## 2018-02-23 PROCEDURE — 84100 ASSAY OF PHOSPHORUS: CPT

## 2018-02-23 PROCEDURE — 85025 COMPLETE CBC W/AUTO DIFF WBC: CPT

## 2018-03-05 RX ORDER — ROSUVASTATIN CALCIUM 10 MG/1
TABLET, COATED ORAL
Qty: 90 TABLET | Refills: 1 | Status: SHIPPED | OUTPATIENT
Start: 2018-03-05 | End: 2018-09-01 | Stop reason: SDUPTHER

## 2018-03-08 ENCOUNTER — HOSPITAL ENCOUNTER (OUTPATIENT)
Age: 78
Discharge: HOME | End: 2018-03-08
Payer: MEDICARE

## 2018-03-08 LAB
ALT SERPL-CCNC: 16 U/L (ref 5–41)
AST SERPL-CCNC: 19 U/L
CHOLESTEROL/HDL RATIO: 2.9
CHOLESTEROL: 139 MG/DL
HDLC SERPL-MCNC: 48 MG/DL
LDL CHOLESTEROL: 60 MG/DL (ref 0–130)
TRIGL SERPL-MCNC: 156 MG/DL
VLDLC SERPL CALC-MCNC: ABNORMAL MG/DL (ref 1–30)

## 2018-03-08 PROCEDURE — 84450 TRANSFERASE (AST) (SGOT): CPT

## 2018-03-08 PROCEDURE — 36415 COLL VENOUS BLD VENIPUNCTURE: CPT

## 2018-03-08 PROCEDURE — 84460 ALANINE AMINO (ALT) (SGPT): CPT

## 2018-03-08 PROCEDURE — 80061 LIPID PANEL: CPT

## 2018-03-21 ENCOUNTER — OFFICE VISIT (OUTPATIENT)
Dept: ORTHOPEDIC SURGERY | Age: 78
End: 2018-03-21

## 2018-03-21 DIAGNOSIS — Z96.642 STATUS POST TOTAL REPLACEMENT OF LEFT HIP: Primary | ICD-10-CM

## 2018-03-21 DIAGNOSIS — Z96.652 HISTORY OF TOTAL LEFT KNEE REPLACEMENT: ICD-10-CM

## 2018-03-21 PROCEDURE — 99024 POSTOP FOLLOW-UP VISIT: CPT | Performed by: ORTHOPAEDIC SURGERY

## 2018-03-21 NOTE — PROGRESS NOTES
 Degenerative joint disease 7/15/2013    ED (erectile dysfunction) 7/15/2013    Full dentures     Gout     History of carpal tunnel syndrome 7/15/2013    History of colon polyps 7/15/2013    History of hemorrhoids 7/15/2013    History of TIA (transient ischemic attack) 7/15/2013    Hypercholesteremia 7/15/2013    Hypertension     Hyperuricemia 7/15/2013    Leukocytosis     Renal cyst     Sleep apnea 07/15/2013    on cpap at .  Wears glasses     for reading     Past Surgical History:   Procedure Laterality Date    BLEPHAROPLASTY Bilateral 04/02/2012    CARPAL TUNNEL RELEASE Right 11/2002    CATARACT REMOVAL WITH IMPLANT Left 03/02/2012    CATARACT REMOVAL WITH IMPLANT Right 03/05/2012    COLONOSCOPY  05/21/08    POLYPECTOMY   [address] x1 stent,1998 x2 stents,2009 x1,    EYE SURGERY      cataracts, eyelids.  JOINT REPLACEMENT Right 07/12/11     RT TOTAL SHOULDER REPLACEMENT    JOINT REPLACEMENT Right 1995    rt. hip replacement    JOINT REPLACEMENT Left 01/23/2018    ELLE    KNEE ARTHROPLASTY Left 04/04/2017    HI TOTAL HIP ARTHROPLASTY Left 1/23/2018    LEFT TOTAL HIP ARTHROPLASTY MINIMALLY INVASIVE ASI WITH BIOMET SYSTEM & GPS SPRAY APPLICATION performed by Remy Coombs MD at [address] [place] [address] ARTHROSCOPY Right 05/2002 repair    7-12-11 right shoulder reverse replacement.  SHOULDER SURGERY Left 02/24/2002    shoulder resurfacing    TOTAL KNEE ARTHROPLASTY Left 4/4/2017    KNEE TOTAL ARTHROPLASTY performed by Remy Coombs MD at [facility] History   Problem Relation Age of Onset    Alzheimer's Disease Mother     Heart Disease Mother     Emphysema Father            Orders Placed This Encounter   Procedures    XR Knee Bilateral Standing     Standing Status:   Future     Number of Occurrences:   1     Standing Expiration Date:   3/21/2019       1. Status post total replacement of left hip    2.  History of total left knee

## 2018-04-02 RX ORDER — ALLOPURINOL 100 MG/1
TABLET ORAL
Qty: 90 TABLET | Refills: 1 | Status: SHIPPED | OUTPATIENT
Start: 2018-04-02 | End: 2018-09-29 | Stop reason: SDUPTHER

## 2018-04-03 ENCOUNTER — TELEPHONE (OUTPATIENT)
Dept: CASE MANAGEMENT | Age: 78
End: 2018-04-03

## 2018-04-03 ASSESSMENT — KOOS JR
TWISING OR PIVOTING ON KNEE: 0
RISING FROM SITTING: 0
GOING UP OR DOWN STAIRS: 0
BENDING TO THE FLOOR TO PICK UP OBJECT: 0
HOW SEVERE IS YOUR KNEE STIFFNESS AFTER FIRST WAKING IN MORNING: 1
STANDING UPRIGHT: 0
STRAIGHTENING KNEE FULLY: 0

## 2018-04-03 ASSESSMENT — PROMIS GLOBAL HEALTH SCALE
TO WHAT EXTENT ARE YOU ABLE TO CARRY OUT YOUR EVERYDAY PHYSICAL ACTIVITIES SUCH AS WALKING, CLIMBING STAIRS, CARRYING GROCERIES, OR MOVING A CHAIR [ON A SCALE OF 1 (NOT AT ALL) TO 5 (COMPLETELY)]?: 4
IN GENERAL, HOW WOULD YOU RATE YOUR SATISFACTION WITH YOUR SOCIAL ACTIVITIES AND RELATIONSHIPS [ON A SCALE OF 1 (POOR) TO 5 (EXCELLENT)]?: 4
IN GENERAL, WOULD YOU SAY YOUR HEALTH IS...[ON A SCALE OF 1 (POOR) TO 5 (EXCELLENT)]: 3
IN THE PAST 7 DAYS, HOW WOULD YOU RATE YOUR FATIGUE ON AVERAGE [ON A SCALE FROM 1 (NONE) TO 5 (VERY SEVERE)]?: 4
SUM OF RESPONSES TO QUESTIONS 2, 4, 5, & 10: 14
SUM OF RESPONSES TO QUESTIONS 3, 6, 7, & 8: 14
IN GENERAL, HOW WOULD YOU RATE YOUR PHYSICAL HEALTH [ON A SCALE OF 1 (POOR) TO 5 (EXCELLENT)]?: 4
WHO IS THE PERSON COMPLETING THE PROMIS V1.1 SURVEY?: 0
IN THE PAST 7 DAYS, HOW OFTEN HAVE YOU BEEN BOTHERED BY EMOTIONAL PROBLEMS, SUCH AS FEELING ANXIOUS, DEPRESSED, OR IRRITABLE [ON A SCALE FROM 1 (NEVER) TO 5 (ALWAYS)]?: 2
IN GENERAL, HOW WOULD YOU RATE YOUR MENTAL HEALTH, INCLUDING YOUR MOOD AND YOUR ABILITY TO THINK [ON A SCALE OF 1 (POOR) TO 5 (EXCELLENT)]?: 4
IN GENERAL, WOULD YOU SAY YOUR QUALITY OF LIFE IS...[ON A SCALE OF 1 (POOR) TO 5 (EXCELLENT)]: 4
IN THE PAST 7 DAYS, HOW WOULD YOU RATE YOUR PAIN ON AVERAGE [ON A SCALE FROM 0 (NO PAIN) TO 10 (WORST IMAGINABLE PAIN)]?: 2
IN GENERAL, PLEASE RATE HOW WELL YOU CARRY OUT YOUR USUAL SOCIAL ACTIVITIES (INCLUDES ACTIVITIES AT HOME, AT WORK, AND IN YOUR COMMUNITY, AND RESPONSIBILITIES AS A PARENT, CHILD, SPOUSE, EMPLOYEE, FRIEND, ETC) [ON A SCALE OF 1 (POOR) TO 5 (EXCELLENT)]?: 4
HOW IS THE PROMIS V1.1 BEING ADMINISTERED?: 1

## 2018-04-27 ENCOUNTER — OFFICE VISIT (OUTPATIENT)
Dept: FAMILY MEDICINE CLINIC | Age: 78
End: 2018-04-27
Payer: MEDICARE

## 2018-04-27 VITALS
HEART RATE: 68 BPM | SYSTOLIC BLOOD PRESSURE: 120 MMHG | WEIGHT: 192.2 LBS | BODY MASS INDEX: 27.58 KG/M2 | DIASTOLIC BLOOD PRESSURE: 80 MMHG | RESPIRATION RATE: 14 BRPM

## 2018-04-27 DIAGNOSIS — R73.01 IMPAIRED FASTING GLUCOSE: ICD-10-CM

## 2018-04-27 DIAGNOSIS — M1A.09X0 IDIOPATHIC CHRONIC GOUT OF MULTIPLE SITES WITHOUT TOPHUS: ICD-10-CM

## 2018-04-27 DIAGNOSIS — I10 ESSENTIAL HYPERTENSION: Primary | ICD-10-CM

## 2018-04-27 DIAGNOSIS — E78.2 MIXED HYPERLIPIDEMIA: ICD-10-CM

## 2018-04-27 PROCEDURE — 99214 OFFICE O/P EST MOD 30 MIN: CPT | Performed by: FAMILY MEDICINE

## 2018-04-27 PROCEDURE — 4040F PNEUMOC VAC/ADMIN/RCVD: CPT | Performed by: FAMILY MEDICINE

## 2018-04-27 PROCEDURE — 1123F ACP DISCUSS/DSCN MKR DOCD: CPT | Performed by: FAMILY MEDICINE

## 2018-04-27 PROCEDURE — G8598 ASA/ANTIPLAT THER USED: HCPCS | Performed by: FAMILY MEDICINE

## 2018-04-27 PROCEDURE — 1036F TOBACCO NON-USER: CPT | Performed by: FAMILY MEDICINE

## 2018-04-27 PROCEDURE — G8417 CALC BMI ABV UP PARAM F/U: HCPCS | Performed by: FAMILY MEDICINE

## 2018-04-27 PROCEDURE — G8427 DOCREV CUR MEDS BY ELIG CLIN: HCPCS | Performed by: FAMILY MEDICINE

## 2018-04-27 ASSESSMENT — ENCOUNTER SYMPTOMS
CHEST TIGHTNESS: 0
SHORTNESS OF BREATH: 0
BLOOD IN STOOL: 0
ABDOMINAL PAIN: 0

## 2018-04-30 RX ORDER — LISINOPRIL 2.5 MG/1
TABLET ORAL
Qty: 90 TABLET | Refills: 1 | Status: SHIPPED | OUTPATIENT
Start: 2018-04-30 | End: 2018-10-25 | Stop reason: SDUPTHER

## 2018-08-28 ENCOUNTER — OFFICE VISIT (OUTPATIENT)
Dept: FAMILY MEDICINE CLINIC | Age: 78
End: 2018-08-28
Payer: MEDICARE

## 2018-08-28 VITALS
WEIGHT: 197 LBS | RESPIRATION RATE: 16 BRPM | DIASTOLIC BLOOD PRESSURE: 60 MMHG | SYSTOLIC BLOOD PRESSURE: 110 MMHG | HEART RATE: 60 BPM | BODY MASS INDEX: 28.27 KG/M2

## 2018-08-28 DIAGNOSIS — I10 ESSENTIAL HYPERTENSION: Primary | ICD-10-CM

## 2018-08-28 DIAGNOSIS — B07.9 VIRAL WARTS, UNSPECIFIED TYPE: ICD-10-CM

## 2018-08-28 DIAGNOSIS — R73.01 IMPAIRED FASTING GLUCOSE: ICD-10-CM

## 2018-08-28 DIAGNOSIS — M1A.09X0 IDIOPATHIC CHRONIC GOUT OF MULTIPLE SITES WITHOUT TOPHUS: ICD-10-CM

## 2018-08-28 DIAGNOSIS — E78.2 MIXED HYPERLIPIDEMIA: ICD-10-CM

## 2018-08-28 PROCEDURE — G8427 DOCREV CUR MEDS BY ELIG CLIN: HCPCS | Performed by: FAMILY MEDICINE

## 2018-08-28 PROCEDURE — G8598 ASA/ANTIPLAT THER USED: HCPCS | Performed by: FAMILY MEDICINE

## 2018-08-28 PROCEDURE — 1123F ACP DISCUSS/DSCN MKR DOCD: CPT | Performed by: FAMILY MEDICINE

## 2018-08-28 PROCEDURE — 4040F PNEUMOC VAC/ADMIN/RCVD: CPT | Performed by: FAMILY MEDICINE

## 2018-08-28 PROCEDURE — 1036F TOBACCO NON-USER: CPT | Performed by: FAMILY MEDICINE

## 2018-08-28 PROCEDURE — G8417 CALC BMI ABV UP PARAM F/U: HCPCS | Performed by: FAMILY MEDICINE

## 2018-08-28 PROCEDURE — 99214 OFFICE O/P EST MOD 30 MIN: CPT | Performed by: FAMILY MEDICINE

## 2018-08-28 PROCEDURE — 1101F PT FALLS ASSESS-DOCD LE1/YR: CPT | Performed by: FAMILY MEDICINE

## 2018-08-28 ASSESSMENT — PATIENT HEALTH QUESTIONNAIRE - PHQ9
2. FEELING DOWN, DEPRESSED OR HOPELESS: 0
SUM OF ALL RESPONSES TO PHQ QUESTIONS 1-9: 0
SUM OF ALL RESPONSES TO PHQ QUESTIONS 1-9: 0
1. LITTLE INTEREST OR PLEASURE IN DOING THINGS: 0
SUM OF ALL RESPONSES TO PHQ9 QUESTIONS 1 & 2: 0

## 2018-08-28 ASSESSMENT — ENCOUNTER SYMPTOMS
SHORTNESS OF BREATH: 0
CHEST TIGHTNESS: 0
ABDOMINAL PAIN: 0
BLOOD IN STOOL: 0

## 2018-08-28 NOTE — PROGRESS NOTES
lower leg: He exhibits no edema. Left lower leg: He exhibits no edema. Neurological: He is alert and oriented to person, place, and time. Skin: Skin is warm and dry. Lesion (large wart noted on scalp) noted. No rash noted. Psychiatric: He has a normal mood and affect. His behavior is normal.   Nursing note and vitals reviewed. Assessment:       Diagnosis Orders   1. Essential hypertension  ALT    AST    Basic Metabolic Panel    Lipid Panel    Magnesium   2. Mixed hyperlipidemia  ALT    AST    Basic Metabolic Panel    Lipid Panel   3. Impaired fasting glucose  Basic Metabolic Panel    Hemoglobin A1C   4. Idiopathic chronic gout of multiple sites without tophus  Uric Acid   5. Viral warts, unspecified type             Plan:       Edman Boas received counseling on the following healthy behaviors: nutrition and medication adherence  Reviewed prior labs and health maintenance  Continue current medications, diet and exercise. Discussed use, benefit, and side effects of prescribed medications. Barriers to medication compliance addressed. Patient given educational materials - see patient instructions  Was a self-tracking handout given in paper form or via [employer]? No:     Requested Prescriptions      No prescriptions requested or ordered in this encounter       All patient questions answered. Patient voiced understanding. Quality Measures    Body mass index is 28.27 kg/m². Elevated. Weight control planned discussed Healthy diet and regular exercise. BP: 110/60. Blood pressure is normal. Treatment plan consists of Weight Reduction. Fall Risk 10/27/2017 7/28/2016 6/2/2015 5/22/2014   2 or more falls in past year? no no no no   Fall with injury in past year? no no no no     The patient does not have a history of falls. I did not - not indicated , complete a risk assessment for falls.  A plan of care for falls No Treatment plan indicated    Lab Results   Component Value Date    LDLCHOLESTEROL 61

## 2018-08-29 ENCOUNTER — HOSPITAL ENCOUNTER (OUTPATIENT)
Age: 78
Discharge: HOME | End: 2018-08-29
Payer: MEDICARE

## 2018-08-29 DIAGNOSIS — E78.2 MIXED HYPERLIPIDEMIA: ICD-10-CM

## 2018-08-29 DIAGNOSIS — I10 ESSENTIAL HYPERTENSION: ICD-10-CM

## 2018-08-29 DIAGNOSIS — M1A.09X0 IDIOPATHIC CHRONIC GOUT OF MULTIPLE SITES WITHOUT TOPHUS: ICD-10-CM

## 2018-08-29 DIAGNOSIS — R73.01 IMPAIRED FASTING GLUCOSE: ICD-10-CM

## 2018-08-29 LAB
ALT SERPL-CCNC: 24 U/L (ref 5–41)
ANION GAP SERPL CALCULATED.3IONS-SCNC: 13 MMOL/L (ref 9–17)
AST SERPL-CCNC: 27 U/L
BUN BLDV-MCNC: 24 MG/DL (ref 8–23)
BUN/CREAT BLD: ABNORMAL (ref 9–20)
CALCIUM SERPL-MCNC: 9.7 MG/DL (ref 8.6–10.4)
CHLORIDE BLD-SCNC: 103 MMOL/L (ref 98–107)
CHOLESTEROL/HDL RATIO: 3
CHOLESTEROL: 134 MG/DL
CO2: 25 MMOL/L (ref 20–31)
CREAT SERPL-MCNC: 1.42 MG/DL (ref 0.7–1.2)
ESTIMATED AVERAGE GLUCOSE: 137 MG/DL
GFR AFRICAN AMERICAN: 59 ML/MIN
GFR NON-AFRICAN AMERICAN: 48 ML/MIN
GFR SERPL CREATININE-BSD FRML MDRD: ABNORMAL ML/MIN/{1.73_M2}
GFR SERPL CREATININE-BSD FRML MDRD: ABNORMAL ML/MIN/{1.73_M2}
GLUCOSE BLD-MCNC: 120 MG/DL (ref 70–99)
HBA1C MFR BLD: 6.4 % (ref 4–6)
HDLC SERPL-MCNC: 44 MG/DL
LDL CHOLESTEROL: 58 MG/DL (ref 0–130)
MAGNESIUM: 2.2 MG/DL (ref 1.6–2.6)
POTASSIUM SERPL-SCNC: 4.2 MMOL/L (ref 3.7–5.3)
SODIUM BLD-SCNC: 141 MMOL/L (ref 135–144)
TRIGL SERPL-MCNC: 161 MG/DL
URIC ACID: 6.6 MG/DL (ref 3.4–7)
VLDLC SERPL CALC-MCNC: ABNORMAL MG/DL (ref 1–30)

## 2018-08-29 PROCEDURE — 80048 BASIC METABOLIC PNL TOTAL CA: CPT

## 2018-08-29 PROCEDURE — 84450 TRANSFERASE (AST) (SGOT): CPT

## 2018-08-29 PROCEDURE — 84550 ASSAY OF BLOOD/URIC ACID: CPT

## 2018-08-29 PROCEDURE — 36415 COLL VENOUS BLD VENIPUNCTURE: CPT

## 2018-08-29 PROCEDURE — 84460 ALANINE AMINO (ALT) (SGPT): CPT

## 2018-08-29 PROCEDURE — 83735 ASSAY OF MAGNESIUM: CPT

## 2018-08-29 PROCEDURE — 80061 LIPID PANEL: CPT

## 2018-08-29 PROCEDURE — 83036 HEMOGLOBIN GLYCOSYLATED A1C: CPT

## 2018-09-04 RX ORDER — ROSUVASTATIN CALCIUM 10 MG/1
TABLET, COATED ORAL
Qty: 90 TABLET | Refills: 1 | Status: SHIPPED | OUTPATIENT
Start: 2018-09-04 | End: 2019-03-03 | Stop reason: SDUPTHER

## 2018-09-17 ENCOUNTER — HOSPITAL ENCOUNTER (OUTPATIENT)
Age: 78
Discharge: HOME | End: 2018-09-17
Payer: MEDICARE

## 2018-09-17 DIAGNOSIS — N28.1 RENAL CYST: ICD-10-CM

## 2018-09-17 DIAGNOSIS — N18.30 CKD (CHRONIC KIDNEY DISEASE), STAGE III (HCC): ICD-10-CM

## 2018-09-17 DIAGNOSIS — N18.2 ANEMIA OF CHRONIC RENAL FAILURE, STAGE 2 (MILD): ICD-10-CM

## 2018-09-17 DIAGNOSIS — D63.1 ANEMIA OF CHRONIC RENAL FAILURE, STAGE 2 (MILD): ICD-10-CM

## 2018-09-17 DIAGNOSIS — I10 ESSENTIAL HYPERTENSION: ICD-10-CM

## 2018-09-17 LAB
ABSOLUTE EOS #: 0.3 K/UL (ref 0–0.4)
ABSOLUTE IMMATURE GRANULOCYTE: NORMAL K/UL (ref 0–0.3)
ABSOLUTE LYMPH #: 2.2 K/UL (ref 1–4.8)
ABSOLUTE MONO #: 0.7 K/UL (ref 0.1–1.3)
ANION GAP SERPL CALCULATED.3IONS-SCNC: 13 MMOL/L (ref 9–17)
BASOPHILS # BLD: 0 % (ref 0–2)
BASOPHILS ABSOLUTE: 0 K/UL (ref 0–0.2)
BUN BLDV-MCNC: 18 MG/DL (ref 8–23)
BUN/CREAT BLD: ABNORMAL (ref 9–20)
CALCIUM SERPL-MCNC: 9.2 MG/DL (ref 8.6–10.4)
CHLORIDE BLD-SCNC: 105 MMOL/L (ref 98–107)
CO2: 25 MMOL/L (ref 20–31)
CREAT SERPL-MCNC: 1.37 MG/DL (ref 0.7–1.2)
DIFFERENTIAL TYPE: NORMAL
EOSINOPHILS RELATIVE PERCENT: 4 % (ref 0–4)
GFR AFRICAN AMERICAN: >60 ML/MIN
GFR NON-AFRICAN AMERICAN: 50 ML/MIN
GFR SERPL CREATININE-BSD FRML MDRD: ABNORMAL ML/MIN/{1.73_M2}
GFR SERPL CREATININE-BSD FRML MDRD: ABNORMAL ML/MIN/{1.73_M2}
GLUCOSE BLD-MCNC: 157 MG/DL (ref 70–99)
HCT VFR BLD CALC: 42.9 % (ref 41–53)
HEMOGLOBIN: 14 G/DL (ref 13.5–17.5)
IMMATURE GRANULOCYTES: NORMAL %
LYMPHOCYTES # BLD: 24 % (ref 24–44)
MAGNESIUM: 2 MG/DL (ref 1.6–2.6)
MCH RBC QN AUTO: 30.8 PG (ref 26–34)
MCHC RBC AUTO-ENTMCNC: 32.6 G/DL (ref 31–37)
MCV RBC AUTO: 94.4 FL (ref 80–100)
MONOCYTES # BLD: 7 % (ref 1–7)
NRBC AUTOMATED: NORMAL PER 100 WBC
PDW BLD-RTO: 13.8 % (ref 11.5–14.9)
PHOSPHORUS: 3.5 MG/DL (ref 2.5–4.5)
PLATELET # BLD: 207 K/UL (ref 150–450)
PLATELET ESTIMATE: NORMAL
PMV BLD AUTO: 11.3 FL (ref 6–12)
POTASSIUM SERPL-SCNC: 4.1 MMOL/L (ref 3.7–5.3)
RBC # BLD: 4.55 M/UL (ref 4.5–5.9)
RBC # BLD: NORMAL 10*6/UL
SEG NEUTROPHILS: 65 % (ref 36–66)
SEGMENTED NEUTROPHILS ABSOLUTE COUNT: 5.9 K/UL (ref 1.3–9.1)
SODIUM BLD-SCNC: 143 MMOL/L (ref 135–144)
WBC # BLD: 9.1 K/UL (ref 3.5–11)
WBC # BLD: NORMAL 10*3/UL

## 2018-09-17 PROCEDURE — 84100 ASSAY OF PHOSPHORUS: CPT

## 2018-09-17 PROCEDURE — 36415 COLL VENOUS BLD VENIPUNCTURE: CPT

## 2018-09-17 PROCEDURE — 80048 BASIC METABOLIC PNL TOTAL CA: CPT

## 2018-09-17 PROCEDURE — 83735 ASSAY OF MAGNESIUM: CPT

## 2018-09-17 PROCEDURE — 85025 COMPLETE CBC W/AUTO DIFF WBC: CPT

## 2018-09-27 PROBLEM — D63.1 ANEMIA OF CHRONIC RENAL FAILURE, STAGE 2 (MILD): Status: RESOLVED | Noted: 2017-07-14 | Resolved: 2018-09-27

## 2018-09-27 PROBLEM — N18.2 ANEMIA OF CHRONIC RENAL FAILURE, STAGE 2 (MILD): Status: RESOLVED | Noted: 2017-07-14 | Resolved: 2018-09-27

## 2018-10-01 RX ORDER — ALLOPURINOL 100 MG/1
TABLET ORAL
Qty: 90 TABLET | Refills: 1 | Status: SHIPPED | OUTPATIENT
Start: 2018-10-01 | End: 2019-03-29 | Stop reason: SDUPTHER

## 2018-10-03 ENCOUNTER — NURSE ONLY (OUTPATIENT)
Dept: FAMILY MEDICINE CLINIC | Age: 78
End: 2018-10-03
Payer: MEDICARE

## 2018-10-03 VITALS — TEMPERATURE: 98.1 F

## 2018-10-03 DIAGNOSIS — Z23 NEED FOR INFLUENZA VACCINATION: Primary | ICD-10-CM

## 2018-10-03 PROCEDURE — 90662 IIV NO PRSV INCREASED AG IM: CPT | Performed by: NURSE PRACTITIONER

## 2018-10-03 PROCEDURE — G0008 ADMIN INFLUENZA VIRUS VAC: HCPCS | Performed by: NURSE PRACTITIONER

## 2018-10-03 NOTE — PROGRESS NOTES
Vaccine Information Sheet, \"Influenza - Inactivated\"  given to Live Wright, or parent/legal guardian of  Live Wright and verbalized understanding. Patient responses:    Have you ever had a reaction to a flu vaccine? No  Are you able to eat eggs without adverse effects? No  Do you have any current illness? No  Have you ever had Guillian Kinsey Syndrome? No    Flu vaccine given per order. Please see immunization tab.

## 2018-10-20 ASSESSMENT — PROMIS GLOBAL HEALTH SCALE
IN GENERAL, HOW WOULD YOU RATE YOUR MENTAL HEALTH, INCLUDING YOUR MOOD AND YOUR ABILITY TO THINK [ON A SCALE OF 1 (POOR) TO 5 (EXCELLENT)]?: 3
TO WHAT EXTENT ARE YOU ABLE TO CARRY OUT YOUR EVERYDAY PHYSICAL ACTIVITIES SUCH AS WALKING, CLIMBING STAIRS, CARRYING GROCERIES, OR MOVING A CHAIR [ON A SCALE OF 1 (NOT AT ALL) TO 5 (COMPLETELY)]?: 3
WHO IS THE PERSON COMPLETING THE PROMIS V1.1 SURVEY?: 0
IN GENERAL, WOULD YOU SAY YOUR HEALTH IS...[ON A SCALE OF 1 (POOR) TO 5 (EXCELLENT)]: 3
IN GENERAL, WOULD YOU SAY YOUR QUALITY OF LIFE IS...[ON A SCALE OF 1 (POOR) TO 5 (EXCELLENT)]: 3
IN THE PAST 7 DAYS, HOW WOULD YOU RATE YOUR PAIN ON AVERAGE [ON A SCALE FROM 0 (NO PAIN) TO 10 (WORST IMAGINABLE PAIN)]?: 2
IN GENERAL, PLEASE RATE HOW WELL YOU CARRY OUT YOUR USUAL SOCIAL ACTIVITIES (INCLUDES ACTIVITIES AT HOME, AT WORK, AND IN YOUR COMMUNITY, AND RESPONSIBILITIES AS A PARENT, CHILD, SPOUSE, EMPLOYEE, FRIEND, ETC) [ON A SCALE OF 1 (POOR) TO 5 (EXCELLENT)]?: 3
IN GENERAL, HOW WOULD YOU RATE YOUR SATISFACTION WITH YOUR SOCIAL ACTIVITIES AND RELATIONSHIPS [ON A SCALE OF 1 (POOR) TO 5 (EXCELLENT)]?: 3
HOW IS THE PROMIS V1.1 BEING ADMINISTERED?: 2
IN THE PAST 7 DAYS, HOW WOULD YOU RATE YOUR FATIGUE ON AVERAGE [ON A SCALE FROM 1 (NONE) TO 5 (VERY SEVERE)]?: 3
IN THE PAST 7 DAYS, HOW OFTEN HAVE YOU BEEN BOTHERED BY EMOTIONAL PROBLEMS, SUCH AS FEELING ANXIOUS, DEPRESSED, OR IRRITABLE [ON A SCALE FROM 1 (NEVER) TO 5 (ALWAYS)]?: 2
IN GENERAL, HOW WOULD YOU RATE YOUR PHYSICAL HEALTH [ON A SCALE OF 1 (POOR) TO 5 (EXCELLENT)]?: 3

## 2018-10-20 ASSESSMENT — HOOS JR
WALKING ON UNEVEN SURFACE: 0
HOOS JR RAW SCORE: 2
RISING FROM SITTING: 0
LYING IN BED (TURNING OVER, MAINTAINING HIP POSITION): 1
BENDING TO THE FLOOR TO PICK UP OBJECT: 1
SITTING: 0
GOING UP OR DOWN STAIRS: 0

## 2018-10-25 RX ORDER — LISINOPRIL 2.5 MG/1
TABLET ORAL
Qty: 90 TABLET | Refills: 1 | Status: SHIPPED | OUTPATIENT
Start: 2018-10-25 | End: 2019-04-23 | Stop reason: SDUPTHER

## 2019-01-28 ENCOUNTER — OFFICE VISIT (OUTPATIENT)
Dept: ORTHOPEDIC SURGERY | Age: 79
End: 2019-01-28
Payer: MEDICARE

## 2019-01-28 VITALS — OXYGEN SATURATION: 98 % | WEIGHT: 194 LBS | HEART RATE: 88 BPM | BODY MASS INDEX: 27.77 KG/M2 | HEIGHT: 70 IN

## 2019-01-28 DIAGNOSIS — Z96.642 HISTORY OF TOTAL LEFT HIP REPLACEMENT: Primary | ICD-10-CM

## 2019-01-28 PROCEDURE — 1036F TOBACCO NON-USER: CPT | Performed by: ORTHOPAEDIC SURGERY

## 2019-01-28 PROCEDURE — G8417 CALC BMI ABV UP PARAM F/U: HCPCS | Performed by: ORTHOPAEDIC SURGERY

## 2019-01-28 PROCEDURE — 99213 OFFICE O/P EST LOW 20 MIN: CPT | Performed by: ORTHOPAEDIC SURGERY

## 2019-01-28 PROCEDURE — G8482 FLU IMMUNIZE ORDER/ADMIN: HCPCS | Performed by: ORTHOPAEDIC SURGERY

## 2019-01-28 PROCEDURE — 1101F PT FALLS ASSESS-DOCD LE1/YR: CPT | Performed by: ORTHOPAEDIC SURGERY

## 2019-01-28 PROCEDURE — G8599 NO ASA/ANTIPLAT THER USE RNG: HCPCS | Performed by: ORTHOPAEDIC SURGERY

## 2019-01-28 PROCEDURE — 4040F PNEUMOC VAC/ADMIN/RCVD: CPT | Performed by: ORTHOPAEDIC SURGERY

## 2019-01-28 PROCEDURE — G8427 DOCREV CUR MEDS BY ELIG CLIN: HCPCS | Performed by: ORTHOPAEDIC SURGERY

## 2019-01-28 PROCEDURE — 1123F ACP DISCUSS/DSCN MKR DOCD: CPT | Performed by: ORTHOPAEDIC SURGERY

## 2019-02-25 ENCOUNTER — OFFICE VISIT (OUTPATIENT)
Dept: FAMILY MEDICINE CLINIC | Age: 79
End: 2019-02-25
Payer: MEDICARE

## 2019-02-25 VITALS
DIASTOLIC BLOOD PRESSURE: 80 MMHG | HEART RATE: 60 BPM | SYSTOLIC BLOOD PRESSURE: 120 MMHG | WEIGHT: 197.6 LBS | BODY MASS INDEX: 28.35 KG/M2 | RESPIRATION RATE: 14 BRPM

## 2019-02-25 DIAGNOSIS — J44.9 CHRONIC OBSTRUCTIVE PULMONARY DISEASE, UNSPECIFIED COPD TYPE (HCC): ICD-10-CM

## 2019-02-25 DIAGNOSIS — R73.01 IMPAIRED FASTING GLUCOSE: ICD-10-CM

## 2019-02-25 DIAGNOSIS — I10 ESSENTIAL HYPERTENSION: Primary | ICD-10-CM

## 2019-02-25 DIAGNOSIS — E78.2 MIXED HYPERLIPIDEMIA: ICD-10-CM

## 2019-02-25 DIAGNOSIS — M1A.09X0 IDIOPATHIC CHRONIC GOUT OF MULTIPLE SITES WITHOUT TOPHUS: ICD-10-CM

## 2019-02-25 PROCEDURE — 1101F PT FALLS ASSESS-DOCD LE1/YR: CPT | Performed by: FAMILY MEDICINE

## 2019-02-25 PROCEDURE — 99214 OFFICE O/P EST MOD 30 MIN: CPT | Performed by: FAMILY MEDICINE

## 2019-02-25 PROCEDURE — G8482 FLU IMMUNIZE ORDER/ADMIN: HCPCS | Performed by: FAMILY MEDICINE

## 2019-02-25 PROCEDURE — G8926 SPIRO NO PERF OR DOC: HCPCS | Performed by: FAMILY MEDICINE

## 2019-02-25 PROCEDURE — G8417 CALC BMI ABV UP PARAM F/U: HCPCS | Performed by: FAMILY MEDICINE

## 2019-02-25 PROCEDURE — 1123F ACP DISCUSS/DSCN MKR DOCD: CPT | Performed by: FAMILY MEDICINE

## 2019-02-25 PROCEDURE — G8599 NO ASA/ANTIPLAT THER USE RNG: HCPCS | Performed by: FAMILY MEDICINE

## 2019-02-25 PROCEDURE — 4040F PNEUMOC VAC/ADMIN/RCVD: CPT | Performed by: FAMILY MEDICINE

## 2019-02-25 PROCEDURE — G8427 DOCREV CUR MEDS BY ELIG CLIN: HCPCS | Performed by: FAMILY MEDICINE

## 2019-02-25 PROCEDURE — 3023F SPIROM DOC REV: CPT | Performed by: FAMILY MEDICINE

## 2019-02-25 PROCEDURE — 1036F TOBACCO NON-USER: CPT | Performed by: FAMILY MEDICINE

## 2019-02-25 ASSESSMENT — ENCOUNTER SYMPTOMS
ABDOMINAL PAIN: 0
SHORTNESS OF BREATH: 0
CHEST TIGHTNESS: 0
BLOOD IN STOOL: 0

## 2019-02-25 ASSESSMENT — PATIENT HEALTH QUESTIONNAIRE - PHQ9
SUM OF ALL RESPONSES TO PHQ9 QUESTIONS 1 & 2: 0
SUM OF ALL RESPONSES TO PHQ QUESTIONS 1-9: 0
SUM OF ALL RESPONSES TO PHQ QUESTIONS 1-9: 0
2. FEELING DOWN, DEPRESSED OR HOPELESS: 0
1. LITTLE INTEREST OR PLEASURE IN DOING THINGS: 0

## 2019-02-27 ENCOUNTER — HOSPITAL ENCOUNTER (OUTPATIENT)
Age: 79
Discharge: HOME | End: 2019-02-27
Payer: MEDICARE

## 2019-02-27 DIAGNOSIS — E78.2 MIXED HYPERLIPIDEMIA: ICD-10-CM

## 2019-02-27 DIAGNOSIS — M1A.09X0 IDIOPATHIC CHRONIC GOUT OF MULTIPLE SITES WITHOUT TOPHUS: ICD-10-CM

## 2019-02-27 DIAGNOSIS — R73.01 IMPAIRED FASTING GLUCOSE: ICD-10-CM

## 2019-02-27 DIAGNOSIS — I10 ESSENTIAL HYPERTENSION: ICD-10-CM

## 2019-02-27 LAB
ALT SERPL-CCNC: 24 U/L (ref 5–41)
ANION GAP SERPL CALCULATED.3IONS-SCNC: 10 MMOL/L (ref 9–17)
AST SERPL-CCNC: 29 U/L
BUN BLDV-MCNC: 22 MG/DL (ref 8–23)
BUN/CREAT BLD: ABNORMAL (ref 9–20)
CALCIUM SERPL-MCNC: 9.8 MG/DL (ref 8.6–10.4)
CHLORIDE BLD-SCNC: 105 MMOL/L (ref 98–107)
CHOLESTEROL/HDL RATIO: 3
CHOLESTEROL: 133 MG/DL
CO2: 27 MMOL/L (ref 20–31)
CREAT SERPL-MCNC: 1.39 MG/DL (ref 0.7–1.2)
ESTIMATED AVERAGE GLUCOSE: 120 MG/DL
GFR AFRICAN AMERICAN: >60 ML/MIN
GFR NON-AFRICAN AMERICAN: 50 ML/MIN
GFR SERPL CREATININE-BSD FRML MDRD: ABNORMAL ML/MIN/{1.73_M2}
GFR SERPL CREATININE-BSD FRML MDRD: ABNORMAL ML/MIN/{1.73_M2}
GLUCOSE BLD-MCNC: 91 MG/DL (ref 70–99)
HBA1C MFR BLD: 5.8 % (ref 4–6)
HDLC SERPL-MCNC: 45 MG/DL
LDL CHOLESTEROL: 63 MG/DL (ref 0–130)
MAGNESIUM: 2.1 MG/DL (ref 1.6–2.6)
POTASSIUM SERPL-SCNC: 4.3 MMOL/L (ref 3.7–5.3)
SODIUM BLD-SCNC: 142 MMOL/L (ref 135–144)
TRIGL SERPL-MCNC: 123 MG/DL
URIC ACID: 5.4 MG/DL (ref 3.4–7)
VLDLC SERPL CALC-MCNC: NORMAL MG/DL (ref 1–30)

## 2019-02-27 PROCEDURE — 80048 BASIC METABOLIC PNL TOTAL CA: CPT

## 2019-02-27 PROCEDURE — 80061 LIPID PANEL: CPT

## 2019-02-27 PROCEDURE — 83036 HEMOGLOBIN GLYCOSYLATED A1C: CPT

## 2019-02-27 PROCEDURE — 84450 TRANSFERASE (AST) (SGOT): CPT

## 2019-02-27 PROCEDURE — 83735 ASSAY OF MAGNESIUM: CPT

## 2019-02-27 PROCEDURE — 36415 COLL VENOUS BLD VENIPUNCTURE: CPT

## 2019-02-27 PROCEDURE — 84550 ASSAY OF BLOOD/URIC ACID: CPT

## 2019-02-27 PROCEDURE — 84460 ALANINE AMINO (ALT) (SGPT): CPT

## 2019-03-29 RX ORDER — ALLOPURINOL 100 MG/1
TABLET ORAL
Qty: 90 TABLET | Refills: 1 | Status: SHIPPED | OUTPATIENT
Start: 2019-03-29 | End: 2019-09-25 | Stop reason: SDUPTHER

## 2019-04-16 ENCOUNTER — OFFICE VISIT (OUTPATIENT)
Dept: FAMILY MEDICINE CLINIC | Age: 79
End: 2019-04-16
Payer: MEDICARE

## 2019-04-16 VITALS
BODY MASS INDEX: 28.27 KG/M2 | WEIGHT: 197 LBS | HEART RATE: 60 BPM | SYSTOLIC BLOOD PRESSURE: 122 MMHG | DIASTOLIC BLOOD PRESSURE: 80 MMHG | TEMPERATURE: 97.6 F

## 2019-04-16 DIAGNOSIS — R05.3 PERSISTENT COUGH: ICD-10-CM

## 2019-04-16 DIAGNOSIS — J44.1 COPD WITH ACUTE EXACERBATION (HCC): Primary | ICD-10-CM

## 2019-04-16 PROCEDURE — 99214 OFFICE O/P EST MOD 30 MIN: CPT | Performed by: NURSE PRACTITIONER

## 2019-04-16 PROCEDURE — G8427 DOCREV CUR MEDS BY ELIG CLIN: HCPCS | Performed by: NURSE PRACTITIONER

## 2019-04-16 PROCEDURE — 1123F ACP DISCUSS/DSCN MKR DOCD: CPT | Performed by: NURSE PRACTITIONER

## 2019-04-16 PROCEDURE — G8599 NO ASA/ANTIPLAT THER USE RNG: HCPCS | Performed by: NURSE PRACTITIONER

## 2019-04-16 PROCEDURE — 1036F TOBACCO NON-USER: CPT | Performed by: NURSE PRACTITIONER

## 2019-04-16 PROCEDURE — 3023F SPIROM DOC REV: CPT | Performed by: NURSE PRACTITIONER

## 2019-04-16 PROCEDURE — G8417 CALC BMI ABV UP PARAM F/U: HCPCS | Performed by: NURSE PRACTITIONER

## 2019-04-16 PROCEDURE — G8926 SPIRO NO PERF OR DOC: HCPCS | Performed by: NURSE PRACTITIONER

## 2019-04-16 PROCEDURE — 4040F PNEUMOC VAC/ADMIN/RCVD: CPT | Performed by: NURSE PRACTITIONER

## 2019-04-16 RX ORDER — GUAIFENESIN AND DEXTROMETHORPHAN HYDROBROMIDE 600; 30 MG/1; MG/1
1 TABLET, EXTENDED RELEASE ORAL 2 TIMES DAILY
Qty: 28 TABLET | Refills: 0 | Status: SHIPPED | OUTPATIENT
Start: 2019-04-16 | End: 2020-08-04

## 2019-04-16 RX ORDER — PREDNISONE 10 MG/1
TABLET ORAL
Qty: 18 TABLET | Refills: 0 | Status: SHIPPED | OUTPATIENT
Start: 2019-04-16 | End: 2019-04-26

## 2019-04-16 RX ORDER — AZITHROMYCIN 250 MG/1
TABLET, FILM COATED ORAL
Qty: 1 PACKET | Refills: 0 | Status: SHIPPED | OUTPATIENT
Start: 2019-04-16 | End: 2019-04-26

## 2019-04-16 ASSESSMENT — ENCOUNTER SYMPTOMS
RHINORRHEA: 1
SHORTNESS OF BREATH: 0
VOMITING: 0
ABDOMINAL PAIN: 0
SINUS PRESSURE: 1
COUGH: 1
NAUSEA: 0

## 2019-04-16 NOTE — PROGRESS NOTES
Subjective:      Patient ID: Chris Drake is a 67 y.o. male. Visit Information    Have you changed or started any medications since your last visit including any over-the-counter medicines, vitamins, or herbal medicines? no   Are you having any side effects from any of your medications? -  no  Have you stopped taking any of your medications? Is so, why? -  no    Have you seen any other physician or provider since your last visit? No  Have you had any other diagnostic tests since your last visit? Yes - Records Obtained  Have you been seen in the emergency room and/or had an admission to a hospital since we last saw you? No  Have you had your routine dental cleaning in the past 6 months? yes -     Have you activated your [employer] account? If not, what are your barriers? Yes     Patient Care Team:  Irving Mayorga MD as PCP - General (Family Medicine)  Irving Mayorga MD as PCP - S Attributed Provider  Piero Morales MD as Consulting Physician (Nephrology)  Thomas Romero MD as Consulting Physician (Ophthalmology)  Payal Beaver MD (Pulmonology)  Nahum Sanchez MD as Consulting Physician (Orthopedic Surgery)  Benjamin Sepulveda MD as Consulting Physician (Cardiology)  Margarita Berry MD as Consulting Physician (Gastroenterology)    Medical History Review  Past Medical, Family, and Social History reviewed and does not contribute to the patient presenting condition    Health Maintenance   Topic Date Due    DTaP/Tdap/Td vaccine (1 - Tdap) 03/19/1960    Shingles Vaccine (2 of 3) 11/04/2013    Potassium monitoring  02/27/2020    Creatinine monitoring  02/27/2020    Flu vaccine  Completed    Pneumococcal 65+ years Vaccine  Completed     HPI  67year old presents with management of COPD exacerbation. Has had sore throat headache nasal congestion/pressure post nasal drainage cough and sob for 2 days. he says cough is productive. Has taken his inhalers without relief.  States he was a smoker and quit it 14 years ago. Currently is on advair as maintenance therapy. he denies fever chills body ache malaise or nv abd pain. Hx of HTN stable with current therapy      Review of Systems   Constitutional: Negative for chills and fever. HENT: Positive for congestion, postnasal drip, rhinorrhea and sinus pressure. Respiratory: Positive for cough. Negative for shortness of breath. Cardiovascular: Negative for chest pain and palpitations. Gastrointestinal: Negative for abdominal pain, nausea and vomiting. Musculoskeletal: Negative for arthralgias and myalgias. Neurological: Negative for dizziness and headaches. Objective:   Physical Exam   Constitutional: He appears well-nourished. No distress. HENT:   Nose: Nose normal.   Mouth/Throat: Oropharynx is clear and moist.   Eyes: Conjunctivae are normal.   Neck: Neck supple. Cardiovascular: Normal rate, regular rhythm and normal heart sounds. Pulmonary/Chest: Effort normal. No respiratory distress. He has rales (Bilateral bases ). Abdominal: Soft. There is no tenderness. Musculoskeletal: He exhibits no edema or tenderness. Lymphadenopathy:     He has no cervical adenopathy. Neurological: He is alert. No cranial nerve deficit. Skin: Skin is warm and dry. Psychiatric: He has a normal mood and affect. His behavior is normal.   Nursing note and vitals reviewed. Assessment:      1.  COPD with acute exacerbation (HCC)    2. Persistent cough            Plan:      BP Readings from Last 3 Encounters:   04/16/19 122/80   02/25/19 120/80   09/27/18 117/80     /80 (Site: Right Upper Arm, Position: Sitting, Cuff Size: Medium Adult)   Pulse 60   Temp 97.6 °F (36.4 °C) (Oral)   Wt 197 lb (89.4 kg)   BMI 28.27 kg/m²   Lab Results   Component Value Date    WBC 9.1 09/17/2018    HGB 14.0 09/17/2018    HCT 42.9 09/17/2018     09/17/2018    CHOL 133 02/27/2019    TRIG 123 02/27/2019    HDL 45 02/27/2019    ALT 24 02/27/2019    AST 29 02/27/2019  02/27/2019    K 4.3 02/27/2019     02/27/2019    CREATININE 1.39 (H) 02/27/2019    BUN 22 02/27/2019    CO2 27 02/27/2019    TSH 1.30 05/19/2017    PSA 1.10 06/24/2016    LABA1C 5.8 02/27/2019     Lab Results   Component Value Date    CALCIUM 9.8 02/27/2019    PHOS 3.5 09/17/2018     Lab Results   Component Value Date    LDLCHOLESTEROL 63 02/27/2019         1. COPD with acute exacerbation (HCC)  - azithromycin (ZITHROMAX) 250 MG tablet; Take 2 tabs on day 1, then 1 tab on days 2-5  Dispense: 1 packet; Refill: 0  - predniSONE (DELTASONE) 10 MG tablet; Take 3 tablets together daily for 3 days, then 2 tablets daily for 3 days, then 1 tablet daily for 3 days. Dispense: 18 tablet; Refill: 0  - increase fluids and rest    2. Persistent cough  - Dextromethorphan-guaiFENesin (MUCINEX DM)  MG TB12; Take 1 tablet by mouth 2 times daily  Dispense: 28 tablet; Refill: 0  - XR CHEST STANDARD (2 VW); Future  - enc to call the office if symptoms are not improving       Requested Prescriptions     Signed Prescriptions Disp Refills    azithromycin (ZITHROMAX) 250 MG tablet 1 packet 0     Sig: Take 2 tabs on day 1, then 1 tab on days 2-5    predniSONE (DELTASONE) 10 MG tablet 18 tablet 0     Sig: Take 3 tablets together daily for 3 days, then 2 tablets daily for 3 days, then 1 tablet daily for 3 days.  Dextromethorphan-guaiFENesin (MUCINEX DM)  MG TB12 28 tablet 0     Sig: Take 1 tablet by mouth 2 times daily       There are no discontinued medications. Discussed use, benefit, and side effects of prescribed medications. Barriers to medication compliance addressed. All patient questions answered. Pt voiced understanding. No follow-ups on file.

## 2019-04-23 ENCOUNTER — HOSPITAL ENCOUNTER (OUTPATIENT)
Dept: GENERAL RADIOLOGY | Age: 79
Discharge: HOME | End: 2019-04-25
Payer: MEDICARE

## 2019-04-23 ENCOUNTER — HOSPITAL ENCOUNTER (OUTPATIENT)
Age: 79
Discharge: HOME | End: 2019-04-25
Payer: MEDICARE

## 2019-04-23 DIAGNOSIS — R05.3 PERSISTENT COUGH: ICD-10-CM

## 2019-04-23 PROCEDURE — 71046 X-RAY EXAM CHEST 2 VIEWS: CPT

## 2019-04-23 RX ORDER — LISINOPRIL 2.5 MG/1
TABLET ORAL
Qty: 90 TABLET | Refills: 1 | Status: SHIPPED | OUTPATIENT
Start: 2019-04-23 | End: 2019-10-20 | Stop reason: SDUPTHER

## 2019-07-22 ENCOUNTER — HOSPITAL ENCOUNTER (OUTPATIENT)
Age: 79
Discharge: HOME | End: 2019-07-22
Payer: MEDICARE

## 2019-07-22 DIAGNOSIS — N18.30 BENIGN HYPERTENSION WITH CKD (CHRONIC KIDNEY DISEASE) STAGE III (HCC): ICD-10-CM

## 2019-07-22 DIAGNOSIS — I12.9 BENIGN HYPERTENSION WITH CKD (CHRONIC KIDNEY DISEASE) STAGE III (HCC): ICD-10-CM

## 2019-07-22 DIAGNOSIS — N18.30 ANEMIA IN STAGE 3 CHRONIC KIDNEY DISEASE (HCC): ICD-10-CM

## 2019-07-22 DIAGNOSIS — N18.30 CKD (CHRONIC KIDNEY DISEASE) STAGE 3, GFR 30-59 ML/MIN (HCC): ICD-10-CM

## 2019-07-22 DIAGNOSIS — N28.1 RENAL CYST: ICD-10-CM

## 2019-07-22 DIAGNOSIS — D63.1 ANEMIA IN STAGE 3 CHRONIC KIDNEY DISEASE (HCC): ICD-10-CM

## 2019-07-22 LAB
ABSOLUTE EOS #: 0.3 K/UL (ref 0–0.4)
ABSOLUTE IMMATURE GRANULOCYTE: NORMAL K/UL (ref 0–0.3)
ABSOLUTE LYMPH #: 2.1 K/UL (ref 1–4.8)
ABSOLUTE MONO #: 0.7 K/UL (ref 0.1–1.3)
ANION GAP SERPL CALCULATED.3IONS-SCNC: 10 MMOL/L (ref 9–17)
BASOPHILS # BLD: 0 % (ref 0–2)
BASOPHILS ABSOLUTE: 0 K/UL (ref 0–0.2)
BUN BLDV-MCNC: 17 MG/DL (ref 8–23)
BUN/CREAT BLD: ABNORMAL (ref 9–20)
CALCIUM SERPL-MCNC: 9.7 MG/DL (ref 8.6–10.4)
CHLORIDE BLD-SCNC: 109 MMOL/L (ref 98–107)
CO2: 24 MMOL/L (ref 20–31)
CREAT SERPL-MCNC: 1.48 MG/DL (ref 0.7–1.2)
DIFFERENTIAL TYPE: NORMAL
EOSINOPHILS RELATIVE PERCENT: 3 % (ref 0–4)
GFR AFRICAN AMERICAN: 56 ML/MIN
GFR NON-AFRICAN AMERICAN: 46 ML/MIN
GFR SERPL CREATININE-BSD FRML MDRD: ABNORMAL ML/MIN/{1.73_M2}
GFR SERPL CREATININE-BSD FRML MDRD: ABNORMAL ML/MIN/{1.73_M2}
GLUCOSE BLD-MCNC: 150 MG/DL (ref 70–99)
HCT VFR BLD CALC: 44.3 % (ref 41–53)
HEMOGLOBIN: 14.4 G/DL (ref 13.5–17.5)
IMMATURE GRANULOCYTES: NORMAL %
LYMPHOCYTES # BLD: 24 % (ref 24–44)
MAGNESIUM: 2.2 MG/DL (ref 1.6–2.6)
MCH RBC QN AUTO: 30.4 PG (ref 26–34)
MCHC RBC AUTO-ENTMCNC: 32.6 G/DL (ref 31–37)
MCV RBC AUTO: 93.5 FL (ref 80–100)
MONOCYTES # BLD: 7 % (ref 1–7)
NRBC AUTOMATED: NORMAL PER 100 WBC
PDW BLD-RTO: 13.8 % (ref 11.5–14.9)
PHOSPHORUS: 3 MG/DL (ref 2.5–4.5)
PLATELET # BLD: 199 K/UL (ref 150–450)
PLATELET ESTIMATE: NORMAL
PMV BLD AUTO: 11.1 FL (ref 6–12)
POTASSIUM SERPL-SCNC: 4.1 MMOL/L (ref 3.7–5.3)
RBC # BLD: 4.74 M/UL (ref 4.5–5.9)
RBC # BLD: NORMAL 10*6/UL
SEG NEUTROPHILS: 66 % (ref 36–66)
SEGMENTED NEUTROPHILS ABSOLUTE COUNT: 6 K/UL (ref 1.3–9.1)
SODIUM BLD-SCNC: 143 MMOL/L (ref 135–144)
WBC # BLD: 9.1 K/UL (ref 3.5–11)
WBC # BLD: NORMAL 10*3/UL

## 2019-07-22 PROCEDURE — 85025 COMPLETE CBC W/AUTO DIFF WBC: CPT

## 2019-07-22 PROCEDURE — 84100 ASSAY OF PHOSPHORUS: CPT

## 2019-07-22 PROCEDURE — 36415 COLL VENOUS BLD VENIPUNCTURE: CPT

## 2019-07-22 PROCEDURE — 80048 BASIC METABOLIC PNL TOTAL CA: CPT

## 2019-07-22 PROCEDURE — 83735 ASSAY OF MAGNESIUM: CPT

## 2019-09-24 ENCOUNTER — OFFICE VISIT (OUTPATIENT)
Dept: FAMILY MEDICINE CLINIC | Age: 79
End: 2019-09-24
Payer: MEDICARE

## 2019-09-24 VITALS
BODY MASS INDEX: 27.92 KG/M2 | WEIGHT: 195 LBS | DIASTOLIC BLOOD PRESSURE: 80 MMHG | TEMPERATURE: 98 F | HEART RATE: 60 BPM | HEIGHT: 70 IN | SYSTOLIC BLOOD PRESSURE: 120 MMHG | RESPIRATION RATE: 14 BRPM

## 2019-09-24 DIAGNOSIS — R73.01 IMPAIRED FASTING GLUCOSE: ICD-10-CM

## 2019-09-24 DIAGNOSIS — I10 ESSENTIAL HYPERTENSION: Primary | ICD-10-CM

## 2019-09-24 DIAGNOSIS — E78.2 MIXED HYPERLIPIDEMIA: ICD-10-CM

## 2019-09-24 DIAGNOSIS — H61.23 BILATERAL IMPACTED CERUMEN: ICD-10-CM

## 2019-09-24 DIAGNOSIS — M1A.09X0 IDIOPATHIC CHRONIC GOUT OF MULTIPLE SITES WITHOUT TOPHUS: ICD-10-CM

## 2019-09-24 PROCEDURE — 69210 REMOVE IMPACTED EAR WAX UNI: CPT | Performed by: FAMILY MEDICINE

## 2019-09-24 PROCEDURE — 1036F TOBACCO NON-USER: CPT | Performed by: FAMILY MEDICINE

## 2019-09-24 PROCEDURE — 4040F PNEUMOC VAC/ADMIN/RCVD: CPT | Performed by: FAMILY MEDICINE

## 2019-09-24 PROCEDURE — G8599 NO ASA/ANTIPLAT THER USE RNG: HCPCS | Performed by: FAMILY MEDICINE

## 2019-09-24 PROCEDURE — 1123F ACP DISCUSS/DSCN MKR DOCD: CPT | Performed by: FAMILY MEDICINE

## 2019-09-24 PROCEDURE — G8427 DOCREV CUR MEDS BY ELIG CLIN: HCPCS | Performed by: FAMILY MEDICINE

## 2019-09-24 PROCEDURE — 99214 OFFICE O/P EST MOD 30 MIN: CPT | Performed by: FAMILY MEDICINE

## 2019-09-24 PROCEDURE — G8417 CALC BMI ABV UP PARAM F/U: HCPCS | Performed by: FAMILY MEDICINE

## 2019-09-24 ASSESSMENT — ENCOUNTER SYMPTOMS
SHORTNESS OF BREATH: 0
CHEST TIGHTNESS: 0
ABDOMINAL PAIN: 0
BLOOD IN STOOL: 0

## 2019-09-24 NOTE — PROGRESS NOTES
regular rhythm and normal heart sounds. Pulmonary/Chest: Effort normal and breath sounds normal. No respiratory distress. He has no wheezes. Abdominal: Soft. He exhibits no distension. There is no tenderness. Musculoskeletal: He exhibits no edema. Neurological: He is alert and oriented to person, place, and time. Skin: Skin is warm and dry. No rash noted. Psychiatric: He has a normal mood and affect. His behavior is normal.   Nursing note and vitals reviewed. Assessment:       Diagnosis Orders   1. Essential hypertension  ALT    AST    Lipid Panel   2. Mixed hyperlipidemia  ALT    AST    Lipid Panel   3. Impaired fasting glucose  Hemoglobin A1C   4. 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 Discussed use, benefit, and side effects of prescribed medications. Barriers to medication compliance addressed. Patient given educational materials - see patient instructions  Was a self-tracking handout given in paper form or via [employer]? No:     Requested Prescriptions      No prescriptions requested or ordered in this encounter       All patient questions answered. Patient voiced understanding. Quality Measures    There is no height or weight on file to calculate BMI. Elevated. Weight control planned discussed Healthy diet and regular exercise. . Blood pressure is normal. Treatment plan consists of Weight Reduction. Fall Risk 2/25/2019 10/27/2017 7/28/2016 6/2/2015 5/22/2014   2 or more falls in past year? no no no no no   Fall with injury in past year? no no no no no     The patient does not have a history of falls. I did not - not indicated , complete a risk assessment for falls.  A plan of care for falls No Treatment plan indicated    Lab Results   Component Value Date    LDLCHOLESTEROL 63 02/27/2019    (goal LDL reduction with dx if diabetes is 50% LDL reduction)    PHQ Scores 2/25/2019 8/28/2018 5/19/2017 2/16/2016 1/30/2015 1/21/2014   PHQ2 Score 0 0 0 0 1 0   PHQ9 Score 0 0 0 0 1 0     Interpretation of Total Score Depression Severity: 1-4 = Minimal depression, 5-9 = Mild depression, 10-14 = Moderate depression, 15-19 = Moderately severe depression, 20-27 = Severe depression          Orders Placed This Encounter   Procedures    ALT     Standing Status:   Future     Standing Expiration Date:   9/23/2020    AST     Standing Status:   Future     Standing Expiration Date:   9/23/2020    Lipid Panel     Standing Status:   Future     Standing Expiration Date:   9/23/2020     Order Specific Question:   Is Patient Fasting?/# of Hours     Answer: Fast 8-10 hours    Hemoglobin A1C     Standing Status:   Future     Standing Expiration Date:   9/23/2020    80580 - [place] REMOVE IMPACTED EAR WAX     Bilateral impacted cerumen removed with ear curette and irrigation. Patient stated that he could hear much better bilaterally after procedure.    Continue routine medication  Follow-up in 6 months or sooner if needed

## 2019-09-25 ENCOUNTER — HOSPITAL ENCOUNTER (OUTPATIENT)
Age: 79
Discharge: HOME | End: 2019-09-25
Payer: MEDICARE

## 2019-09-25 DIAGNOSIS — I10 ESSENTIAL HYPERTENSION: ICD-10-CM

## 2019-09-25 DIAGNOSIS — E78.2 MIXED HYPERLIPIDEMIA: ICD-10-CM

## 2019-09-25 DIAGNOSIS — R73.01 IMPAIRED FASTING GLUCOSE: ICD-10-CM

## 2019-09-25 LAB
ALT SERPL-CCNC: 28 U/L (ref 5–41)
AST SERPL-CCNC: 31 U/L
CHOLESTEROL/HDL RATIO: 3.3
CHOLESTEROL: 138 MG/DL
ESTIMATED AVERAGE GLUCOSE: 134 MG/DL
HBA1C MFR BLD: 6.3 % (ref 4–6)
HDLC SERPL-MCNC: 42 MG/DL
LDL CHOLESTEROL: 65 MG/DL (ref 0–130)
TRIGL SERPL-MCNC: 154 MG/DL
VLDLC SERPL CALC-MCNC: ABNORMAL MG/DL (ref 1–30)

## 2019-09-25 PROCEDURE — 84450 TRANSFERASE (AST) (SGOT): CPT

## 2019-09-25 PROCEDURE — 80061 LIPID PANEL: CPT

## 2019-09-25 PROCEDURE — 83036 HEMOGLOBIN GLYCOSYLATED A1C: CPT

## 2019-09-25 PROCEDURE — 36415 COLL VENOUS BLD VENIPUNCTURE: CPT

## 2019-09-25 PROCEDURE — 84460 ALANINE AMINO (ALT) (SGPT): CPT

## 2019-09-25 RX ORDER — ALLOPURINOL 100 MG/1
TABLET ORAL
Qty: 90 TABLET | Refills: 1 | Status: SHIPPED | OUTPATIENT
Start: 2019-09-25 | End: 2020-03-23

## 2019-10-21 RX ORDER — LISINOPRIL 2.5 MG/1
TABLET ORAL
Qty: 90 TABLET | Refills: 1 | Status: SHIPPED | OUTPATIENT
Start: 2019-10-21 | End: 2020-04-17

## 2019-10-22 ENCOUNTER — NURSE ONLY (OUTPATIENT)
Dept: FAMILY MEDICINE CLINIC | Age: 79
End: 2019-10-22
Payer: MEDICARE

## 2019-10-22 VITALS — TEMPERATURE: 97.6 F

## 2019-10-22 DIAGNOSIS — Z23 NEED FOR INFLUENZA VACCINATION: Primary | ICD-10-CM

## 2019-10-22 PROCEDURE — G0008 ADMIN INFLUENZA VIRUS VAC: HCPCS | Performed by: FAMILY MEDICINE

## 2019-10-22 PROCEDURE — 90653 IIV ADJUVANT VACCINE IM: CPT | Performed by: FAMILY MEDICINE

## 2020-03-02 RX ORDER — ROSUVASTATIN CALCIUM 10 MG/1
TABLET, COATED ORAL
Qty: 90 TABLET | Refills: 1 | Status: SHIPPED | OUTPATIENT
Start: 2020-03-02 | End: 2020-08-27

## 2020-03-16 RX ORDER — NITROGLYCERIN 0.4 MG/1
0.4 TABLET SUBLINGUAL EVERY 5 MIN PRN
Qty: 25 TABLET | Refills: 1 | Status: SHIPPED | OUTPATIENT
Start: 2020-03-16 | End: 2021-04-02 | Stop reason: SDUPTHER

## 2020-03-23 RX ORDER — ALLOPURINOL 100 MG/1
TABLET ORAL
Qty: 90 TABLET | Refills: 1 | Status: SHIPPED | OUTPATIENT
Start: 2020-03-23 | End: 2020-09-21

## 2020-04-17 RX ORDER — LISINOPRIL 2.5 MG/1
TABLET ORAL
Qty: 90 TABLET | Refills: 3 | Status: SHIPPED | OUTPATIENT
Start: 2020-04-17 | End: 2021-04-12

## 2020-06-02 ENCOUNTER — OFFICE VISIT (OUTPATIENT)
Dept: FAMILY MEDICINE CLINIC | Age: 80
End: 2020-06-02
Payer: MEDICARE

## 2020-06-02 VITALS
SYSTOLIC BLOOD PRESSURE: 132 MMHG | OXYGEN SATURATION: 98 % | TEMPERATURE: 97.9 F | HEART RATE: 62 BPM | WEIGHT: 200 LBS | BODY MASS INDEX: 28.63 KG/M2 | DIASTOLIC BLOOD PRESSURE: 82 MMHG | HEIGHT: 70 IN

## 2020-06-02 PROCEDURE — 99214 OFFICE O/P EST MOD 30 MIN: CPT | Performed by: NURSE PRACTITIONER

## 2020-06-02 PROCEDURE — 1123F ACP DISCUSS/DSCN MKR DOCD: CPT | Performed by: NURSE PRACTITIONER

## 2020-06-02 PROCEDURE — 1036F TOBACCO NON-USER: CPT | Performed by: NURSE PRACTITIONER

## 2020-06-02 PROCEDURE — 3023F SPIROM DOC REV: CPT | Performed by: NURSE PRACTITIONER

## 2020-06-02 PROCEDURE — 4040F PNEUMOC VAC/ADMIN/RCVD: CPT | Performed by: NURSE PRACTITIONER

## 2020-06-02 PROCEDURE — G8427 DOCREV CUR MEDS BY ELIG CLIN: HCPCS | Performed by: NURSE PRACTITIONER

## 2020-06-02 PROCEDURE — G8926 SPIRO NO PERF OR DOC: HCPCS | Performed by: NURSE PRACTITIONER

## 2020-06-02 PROCEDURE — G8417 CALC BMI ABV UP PARAM F/U: HCPCS | Performed by: NURSE PRACTITIONER

## 2020-06-02 PROCEDURE — 69210 REMOVE IMPACTED EAR WAX UNI: CPT | Performed by: NURSE PRACTITIONER

## 2020-06-02 ASSESSMENT — ENCOUNTER SYMPTOMS
SHORTNESS OF BREATH: 0
COUGH: 0
ABDOMINAL PAIN: 0
NAUSEA: 0

## 2020-06-02 ASSESSMENT — PATIENT HEALTH QUESTIONNAIRE - PHQ9
SUM OF ALL RESPONSES TO PHQ QUESTIONS 1-9: 0
2. FEELING DOWN, DEPRESSED OR HOPELESS: 0
SUM OF ALL RESPONSES TO PHQ9 QUESTIONS 1 & 2: 0
SUM OF ALL RESPONSES TO PHQ QUESTIONS 1-9: 0
1. LITTLE INTEREST OR PLEASURE IN DOING THINGS: 0

## 2020-06-02 NOTE — PROGRESS NOTES
Subjective:      Patient ID: Brayan Vanegas is a 79 y.o. male. Visit Information    Have you changed or started any medications since your last visit including any over-the-counter medicines, vitamins, or herbal medicines? no   Are you having any side effects from any of your medications? -  no  Have you stopped taking any of your medications? Is so, why? -  no    Have you seen any other physician or provider since your last visit? Yes - Records Requested, , Pulmonary Dr   Have you had any other diagnostic tests since your last visit? No  Have you been seen in the emergency room and/or had an admission to a hospital since we last saw you? No  Have you had your routine dental cleaning in the past 6 months? no    Have you activated your [employer] account? If not, what are your barriers? yes    Patient Care Team:  Willy Calderón MD as PCP - General (Family Medicine)  Willy Calderón MD as PCP - [facility]  Saadia Russell MD as Consulting Physician (Nephrology)  Lucio Reddy MD as Consulting Physician (Ophthalmology)  Brady Hays MD (Pulmonology)  Shaggy Damico MD as Consulting Physician (Orthopedic Surgery)  Chanel Saha MD as Consulting Physician (Cardiology)  Betzaida Lewis MD as Consulting Physician (Gastroenterology)    Medical History Review  Past Medical, Family, and Social History reviewed and does contribute to the patient presenting condition    Health Maintenance   Topic Date Due    DTaP/Tdap/Td vaccine (1 - Tdap) 03/19/1960    Shingles Vaccine (2 of 3) 11/14/2013    Annual Wellness Visit (AWV)  05/29/2019    Potassium monitoring  07/22/2020    Creatinine monitoring  07/22/2020    Lipid screen  09/25/2020    Flu vaccine  Completed    Pneumococcal 65+ years Vaccine  Completed    Hepatitis A vaccine  Aged Out    Hepatitis B vaccine  Aged Out    Hib vaccine  Aged Out    Meningococcal (ACWY) vaccine  Aged Out     HPI  79year old male presents with management of IFG

## 2020-06-10 ENCOUNTER — HOSPITAL ENCOUNTER (OUTPATIENT)
Age: 80
Discharge: HOME | End: 2020-06-10
Payer: MEDICARE

## 2020-06-10 LAB
ALT SERPL-CCNC: 18 U/L (ref 5–41)
ANION GAP SERPL CALCULATED.3IONS-SCNC: 14 MMOL/L (ref 9–17)
BUN BLDV-MCNC: 17 MG/DL (ref 8–23)
BUN/CREAT BLD: ABNORMAL (ref 9–20)
CALCIUM SERPL-MCNC: 9.5 MG/DL (ref 8.6–10.4)
CHLORIDE BLD-SCNC: 105 MMOL/L (ref 98–107)
CHOLESTEROL/HDL RATIO: 3
CHOLESTEROL: 117 MG/DL
CO2: 22 MMOL/L (ref 20–31)
CREAT SERPL-MCNC: 1.38 MG/DL (ref 0.7–1.2)
ESTIMATED AVERAGE GLUCOSE: 140 MG/DL
GFR AFRICAN AMERICAN: >60 ML/MIN
GFR NON-AFRICAN AMERICAN: 50 ML/MIN
GFR SERPL CREATININE-BSD FRML MDRD: ABNORMAL ML/MIN/{1.73_M2}
GFR SERPL CREATININE-BSD FRML MDRD: ABNORMAL ML/MIN/{1.73_M2}
GLUCOSE BLD-MCNC: 131 MG/DL (ref 70–99)
HBA1C MFR BLD: 6.5 % (ref 4–6)
HDLC SERPL-MCNC: 39 MG/DL
LDL CHOLESTEROL: 53 MG/DL (ref 0–130)
POTASSIUM SERPL-SCNC: 4 MMOL/L (ref 3.7–5.3)
SODIUM BLD-SCNC: 141 MMOL/L (ref 135–144)
TRIGL SERPL-MCNC: 125 MG/DL
VLDLC SERPL CALC-MCNC: ABNORMAL MG/DL (ref 1–30)

## 2020-06-10 PROCEDURE — 36415 COLL VENOUS BLD VENIPUNCTURE: CPT

## 2020-06-10 PROCEDURE — 83036 HEMOGLOBIN GLYCOSYLATED A1C: CPT

## 2020-06-10 PROCEDURE — 80048 BASIC METABOLIC PNL TOTAL CA: CPT

## 2020-06-10 PROCEDURE — 84460 ALANINE AMINO (ALT) (SGPT): CPT

## 2020-06-10 PROCEDURE — 80061 LIPID PANEL: CPT

## 2020-06-10 RX ORDER — METFORMIN HYDROCHLORIDE 500 MG/1
500 TABLET, EXTENDED RELEASE ORAL
Qty: 90 TABLET | Refills: 1 | Status: SHIPPED | OUTPATIENT
Start: 2020-06-10 | End: 2020-09-14 | Stop reason: SDUPTHER

## 2020-06-24 ENCOUNTER — OFFICE VISIT (OUTPATIENT)
Dept: ORTHOPEDIC SURGERY | Age: 80
End: 2020-06-24
Payer: MEDICARE

## 2020-06-24 PROCEDURE — G8428 CUR MEDS NOT DOCUMENT: HCPCS | Performed by: ORTHOPAEDIC SURGERY

## 2020-06-24 PROCEDURE — 99213 OFFICE O/P EST LOW 20 MIN: CPT | Performed by: ORTHOPAEDIC SURGERY

## 2020-06-24 PROCEDURE — 1036F TOBACCO NON-USER: CPT | Performed by: ORTHOPAEDIC SURGERY

## 2020-06-24 PROCEDURE — G8417 CALC BMI ABV UP PARAM F/U: HCPCS | Performed by: ORTHOPAEDIC SURGERY

## 2020-06-24 PROCEDURE — 1123F ACP DISCUSS/DSCN MKR DOCD: CPT | Performed by: ORTHOPAEDIC SURGERY

## 2020-06-24 PROCEDURE — 4040F PNEUMOC VAC/ADMIN/RCVD: CPT | Performed by: ORTHOPAEDIC SURGERY

## 2020-06-24 NOTE — PROGRESS NOTES
Disha Bae M.D.            [address] [place] [address]., [address] [facility],[address], [address] [facility]           Dept Phone: [phone]           Dept Fax:  [address]           Branden Blake          Dept Phone: [phone]           Dept Fax:  [phone]      Chief Compliant:  Chief Complaint   Patient presents with    Pain     Rt ELLE 25 yrs        History of Present Illness:  Sarah Mccray returns today. This is a 79 y.o. male who presents to the clinic today for follow up of right hip pain. He has trouble lifting his right leg when he stands and it causes pain, however as he continues walking the pain subsides. Patient has some right knee pain, however he believes it might be a gout flare-up. History of right ELLE 25 years ago and left ELLE more recently    Review of Systems   Constitutional: Negative for fever, chills, sweats, recent illness, or recent injury. Neurological: Negative for headaches, numbness, or weakness. Integumentary: Negative for rash, itching, ecchymosis, abrasions, or laceration. Musculoskeletal: Positive for Pain (Rt ELLE 25 yrs)       Physical Exam:  Constitutional: Patient is oriented to person, place, and time. Patient appears well-developed and well nourished. HENT: Negative otherwise noted  Head: Normocephalic and Atraumatic  Nose: Normal  Eyes: Conjunctivae and EOM are normal  Neck: Normal range of motion Neck supple. Respiratory/Cardio: Effort normal. No respiratory distress. Musculoskeletal:  Right hip: no pain with passive range of motion  Left hip: no pain in passive range of motion  Neurological: Patient is alert and oriented to person, place, and time. Normal strenght. No sensory deficit. Skin: Skin is warm and dry  Psychiatric: Behavior is normal. Thought content normal.  Nursing note and vitals reviewed.      Labs and Imaging: 0    aspirin 325 MG EC tablet, Take 1 tablet by mouth 2 times daily (Patient taking differently: Take 325 mg by mouth daily ), Disp: 30 tablet, Rfl: 3    FISH OIL, Take by mouth 2 times daily , Disp: , Rfl:     therapeutic multivitamin-minerals (THERAGRAN-M) tablet, Take 1 tablet by mouth daily. OTC, Disp: , Rfl:     fluticasone-salmeterol (ADVAIR HFA) 230-21 MCG/ACT inhaler, Inhale 2 puffs into the lungs daily.   , Disp: , Rfl:   Allergies   Allergen Reactions    Ceclor [Cefaclor] Itching    Pcn [Penicillins] Itching     Social History     Socioeconomic History    Marital status:      Spouse name: Not on file    Number of children: Not on file    Years of education: Not on file    Highest education level: Not on file   Occupational History    Not on file   Social Needs    Financial resource strain: Not on file    Food insecurity     Worry: Not on file     Inability: Not on file    Transportation needs     Medical: Not on file     Non-medical: Not on file   Tobacco Use    Smoking status: Former Smoker     Last attempt to quit:      Years since quittin.4    Smokeless tobacco: Never Used   Substance and Sexual Activity    Alcohol use: No     Alcohol/week: 0.0 standard drinks     Comment: rarely    Drug use: No    Sexual activity: Not on file   Lifestyle    Physical activity     Days per week: Not on file     Minutes per session: Not on file    Stress: Not on file   Relationships    Social connections     Talks on phone: Not on file     Gets together: Not on file     Attends [employer] service: Not on file     Active member of club or organization: Not on file     Attends meetings of clubs or organizations: Not on file     Relationship status: Not on file    Intimate partner violence     Fear of current or ex partner: Not on file     Emotionally abused: Not on file     Physically abused: Not on file     Forced sexual activity: Not on file   Other Topics Concern    Not on file

## 2020-08-04 ENCOUNTER — OFFICE VISIT (OUTPATIENT)
Dept: FAMILY MEDICINE CLINIC | Age: 80
End: 2020-08-04
Payer: MEDICARE

## 2020-08-04 VITALS
HEART RATE: 54 BPM | OXYGEN SATURATION: 100 % | WEIGHT: 188 LBS | TEMPERATURE: 97.8 F | DIASTOLIC BLOOD PRESSURE: 68 MMHG | SYSTOLIC BLOOD PRESSURE: 104 MMHG | BODY MASS INDEX: 26.98 KG/M2

## 2020-08-04 PROBLEM — E11.9 NEWLY DIAGNOSED DIABETES (HCC): Status: ACTIVE | Noted: 2020-08-04

## 2020-08-04 PROCEDURE — G8417 CALC BMI ABV UP PARAM F/U: HCPCS | Performed by: FAMILY MEDICINE

## 2020-08-04 PROCEDURE — 1123F ACP DISCUSS/DSCN MKR DOCD: CPT | Performed by: FAMILY MEDICINE

## 2020-08-04 PROCEDURE — G8427 DOCREV CUR MEDS BY ELIG CLIN: HCPCS | Performed by: FAMILY MEDICINE

## 2020-08-04 PROCEDURE — 1036F TOBACCO NON-USER: CPT | Performed by: FAMILY MEDICINE

## 2020-08-04 PROCEDURE — 4040F PNEUMOC VAC/ADMIN/RCVD: CPT | Performed by: FAMILY MEDICINE

## 2020-08-04 PROCEDURE — 99214 OFFICE O/P EST MOD 30 MIN: CPT | Performed by: FAMILY MEDICINE

## 2020-08-04 RX ORDER — FLUTICASONE PROPIONATE AND SALMETEROL 232; 14 UG/1; UG/1
1 POWDER, METERED RESPIRATORY (INHALATION) 2 TIMES DAILY
COMMUNITY
Start: 2020-06-25

## 2020-08-04 RX ORDER — ALBUTEROL SULFATE 90 UG/1
AEROSOL, METERED RESPIRATORY (INHALATION)
COMMUNITY

## 2020-08-04 RX ORDER — ASPIRIN 81 MG/1
81 TABLET, CHEWABLE ORAL DAILY
COMMUNITY

## 2020-08-04 ASSESSMENT — ENCOUNTER SYMPTOMS
CHEST TIGHTNESS: 0
ABDOMINAL PAIN: 0
BLOOD IN STOOL: 0
SHORTNESS OF BREATH: 0

## 2020-08-04 NOTE — PROGRESS NOTES
Subjective:      Patient ID: Rosemary Fisher is a 79 y.o. male. Chronic Disease Visit Information    BP Readings from Last 3 Encounters:   06/02/20 132/82   09/24/19 120/80   07/30/19 128/78          Hemoglobin A1C (%)   Date Value   06/10/2020 6.5 (H)   09/25/2019 6.3 (H)   02/27/2019 5.8     LDL Cholesterol (mg/dL)   Date Value   06/10/2020 53     HDL (mg/dL)   Date Value   06/10/2020 39 (L)     BUN (mg/dL)   Date Value   06/10/2020 17     CREATININE (mg/dL)   Date Value   06/10/2020 1.38 (H)     Glucose (mg/dL)   Date Value   06/10/2020 131 (H)   02/03/2012 143 (H)            Have you changed or started any medications since your last visit including any over-the-counter medicines, vitamins, or herbal medicines? yes - metformin   Are you having any side effects from any of your medications? -  no  Have you stopped taking any of your medications? Is so, why? -  no    Have you seen any other physician or provider since your last visit? Yes - Records Obtained  Have you had any other diagnostic tests since your last visit? No  Have you been seen in the emergency room and/or had an admission to a hospital since we last saw you? No  Have you had your annual diabetic retinal (eye) exam? No  Have you had your routine dental cleaning in the past 6 months? no    Have you activated your [employer] account? If not, what are your barriers?  Yes     Patient Care Team:  Vanessa Pierre MD as PCP - General (Family Medicine)  Vanessa Pierre MD as PCP - [facility]  Hoda Smith MD as Consulting Physician (Nephrology)  Annie Olvera MD as Consulting Physician (Ophthalmology)  Blanca Esquivel MD (Pulmonology)  April Tuttle MD as Consulting Physician (Orthopedic Surgery)  Uvaldo Willis MD as Consulting Physician (Cardiology)  Tana Mcclellan MD as Consulting Physician (Gastroenterology)         Medical History Review  Past Medical, Family, and Social History reviewed and does contribute to the patient presenting condition    Health Maintenance   Topic Date Due    DTaP/Tdap/Td vaccine (1 - Tdap) 03/19/1960    Shingles Vaccine (2 of 3) 11/14/2013    Annual Wellness Visit (AWV)  05/29/2019    Flu vaccine (1) 09/01/2020    Lipid screen  06/10/2021    Potassium monitoring  06/10/2021    Creatinine monitoring  06/10/2021    Pneumococcal 65+ years Vaccine  Completed    Hepatitis A vaccine  Aged Out    Hepatitis B vaccine  Aged Out    Hib vaccine  Aged Out    Meningococcal (ACWY) vaccine  Aged Out     HPI  Patient is a 76-year-old white male who presents for newly diagnosed diabetes, hypertension, hyperlipidemia. He states that he is taking and tolerating his routine medication. He states that he has been dieting and has lost about 17 pounds. He denies any chest pain, abdominal pain, shortness of breath, fever or chills. He states he has a good appetite and remains active. Review of Systems   Constitutional: Negative for chills and fever. HENT: Negative for congestion. Respiratory: Negative for chest tightness and shortness of breath. Cardiovascular: Negative for chest pain. Gastrointestinal: Negative for abdominal pain and blood in stool. Genitourinary: Negative for dysuria and hematuria. Skin: Negative for rash. Neurological: Negative for dizziness. Psychiatric/Behavioral: Negative for confusion and dysphoric mood. Objective:   Physical Exam  Vitals signs and nursing note reviewed. Constitutional:       General: He is not in acute distress. Appearance: He is well-developed. HENT:      Head: Normocephalic and atraumatic. Right Ear: Tympanic membrane, ear canal and external ear normal.      Left Ear: Tympanic membrane, ear canal and external ear normal.      Nose: Nose normal.      Mouth/Throat:      Mouth: Mucous membranes are moist.      Pharynx: Oropharynx is clear. Eyes:      General: No scleral icterus. Right eye: No discharge.          Left eye: No discharge. Conjunctiva/sclera: Conjunctivae normal.   Neck:      Musculoskeletal: Neck supple. Cardiovascular:      Rate and Rhythm: Normal rate and regular rhythm. Heart sounds: Normal heart sounds. Pulmonary:      Effort: Pulmonary effort is normal. No respiratory distress. Breath sounds: Normal breath sounds. No wheezing. Abdominal:      General: There is no distension. Palpations: Abdomen is soft. Tenderness: There is no abdominal tenderness. Skin:     General: Skin is warm and dry. Findings: No rash. Neurological:      Mental Status: He is alert and oriented to person, place, and time. Psychiatric:         Mood and Affect: Mood normal.         Behavior: Behavior normal.         Assessment:       Diagnosis Orders   1. Newly diagnosed diabetes [facility])  [address] [facility]   2. Essential hypertension  [facility]   3. Mixed hyperlipidemia  [facility] Diabetes [facility]           Plan:       Lucia Caller received counseling on the following healthy behaviors: nutrition, exercise and medication adherence  Reviewed prior labs and health maintenance  Continue current medications, diet and exercise. Discussed use, benefit, and side effects of prescribed medications. Barriers to medication compliance addressed. Patient given educational materials - see patient instructions  Was a self-tracking handout given in paper form or via [employer]? No:     Requested Prescriptions      No prescriptions requested or ordered in this encounter       All patient questions answered. Patient voiced understanding. Quality Measures    Body mass index is 26.98 kg/m². Elevated. Weight control planned discussed Healthy diet and regular exercise. BP: 104/68. Blood pressure is normal. Treatment plan consists of Weight Reduction.     Fall Risk 6/2/2020 2/25/2019 10/27/2017 7/28/2016 6/2/2015 5/22/2014   2 or more falls in past year? no no no no no no Fall with injury in past year? no no no no no no     The patient does not have a history of falls. I did not - not indicated , complete a risk assessment for falls.  A plan of care for falls No Treatment plan indicated    Lab Results   Component Value Date    LDLCHOLESTEROL 53 06/10/2020    (goal LDL reduction with dx if diabetes is 50% LDL reduction)    PHQ Scores 6/2/2020 2/25/2019 8/28/2018 5/19/2017 2/16/2016 1/30/2015 1/21/2014   PHQ2 Score 0 0 0 0 0 1 0   PHQ9 Score 0 0 0 0 0 1 0     Interpretation of Total Score Depression Severity: 1-4 = Minimal depression, 5-9 = Mild depression, 10-14 = Moderate depression, 15-19 = Moderately severe depression, 20-27 = Severe depression    Orders Placed This Encounter   Procedures   [facility] Diabetes Education [facility]     Referral Priority:   Routine     Referral Type:   Eval and Treat     Referral Reason:   Specialty Services Required     Number of Visits Requested:   1      Patient referred for diabetic education   Continue routine medication  Patient referred to ophthalmologist for dilated eye exam  Follow-up in 3 months

## 2020-08-17 ENCOUNTER — HOSPITAL ENCOUNTER (OUTPATIENT)
Dept: DIABETES SERVICES | Age: 80
Setting detail: THERAPIES SERIES
Discharge: HOME | End: 2020-08-17
Payer: MEDICARE

## 2020-08-17 PROCEDURE — G0108 DIAB MANAGE TRN  PER INDIV: HCPCS

## 2020-08-17 SDOH — ECONOMIC STABILITY: FOOD INSECURITY: ADDITIONAL INFORMATION: NO

## 2020-08-17 ASSESSMENT — PROBLEM AREAS IN DIABETES QUESTIONNAIRE (PAID)
FEELING THAT DIABETES IS TAKING UP TOO MUCH OF YOUR MENTAL AND PHYSICAL ENERGY EVERY DAY: 1
FEELING SCARED WHEN YOU THINK ABOUT LIVING WITH DIABETES: 1
FEELING DEPRESSED WHEN YOU THINK ABOUT LIVING WITH DIABETES: 0
WORRYING ABOUT THE FUTURE AND THE POSSIBILITY OF SERIOUS COMPLICATIONS: 1
PAID-5 TOTAL SCORE: 3
COPING WITH COMPLICATIONS OF DIABETES: 0

## 2020-08-17 NOTE — PROGRESS NOTES
Diabetes Self- Management Education Program Assessment - PHONE  This visit was done on the telephone  (During KCWSQ-26 public health emergency). Pursuant to the emergency declaration under the [facility], [address] [place] [address] authority and the [employer] Act, this visit was conducted with patient's (and/or legal guardian's) consent, to reduce the patient's risk of exposure to COVID-19 and provide necessary medical care. The patient (and/or legal guardian) has also been advised to contact this office for worsening conditions or problems, and seek emergency medical treatment and/or call 911 if deemed necessary. Patient identification was verified at the start of the visit: Yes    Total time spent for this encounter: 1 hour    Services were provided through a phone discussion to substitute for in-person clinic visit. Patient and provider were located at their individual home/office. Also see Diabetic Screening  Patient, Nancy Brown, contacted via phone call encounters for diabetes self-management education  visit/ assessment on 8/17/20     ( update if when return to face to face encounter)     MEDICAL HISTORY:  Past Medical History:   Diagnosis Date    Anemia     Anesthesia     woke up eary during surgery x1 , heard saw & felt the pressure.     Arthritis 7/15/2013    ASHD (arteriosclerotic heart disease) 7/15/2013    CAD (coronary artery disease)     has cardiac stent x 4.    Chronic kidney disease     stage 3    Claudication (HCC) 7/15/2013    COPD (chronic obstructive pulmonary disease) ([facility] [address]     Degenerative joint disease 7/15/2013    ED (erectile dysfunction) 7/15/2013    Full dentures     Gout     History of carpal tunnel syndrome 7/15/2013    History of colon polyps 7/15/2013    History of hemorrhoids 7/15/2013    History of TIA (transient ischemic attack) 7/15/2013    Hypercholesteremia 7/15/2013    Hypertension     Hyperuricemia 7/15/2013    Leukocytosis     Renal cyst     Sleep apnea 07/15/2013    on cpap at hs.     Wears glasses     for reading     Family History   Problem Relation Age of Onset    Alzheimer's Disease Mother     Heart Disease Mother     Emphysema Father      Ceclor [cefaclor] and Pcn [penicillins]   Immunization History   Administered Date(s) Administered    Influenza 10/25/2013    Influenza Virus Vaccine 10/05/2010, 09/20/2011, 09/14/2012, 09/30/2014, 10/23/2015    Influenza, High Dose (Fluzone 65 yrs and older) 10/03/2018    Influenza, [place] Generous, IM, (6 mo and older Fluzone, Flulaval, Fluarix and 3 yrs and older Afluria) 10/25/2016    Influenza, Quadv, IM, PF (6 mo and older Fluzone, Flulaval, Fluarix, and 3 yrs and older Afluria) 10/19/2017    Influenza, Triv, inactivated, subunit, adjuvanted, IM (Fluad 65 yrs and older) 10/22/2019    Pneumococcal Conjugate 13-valent ([id number]) 03/11/2016    Pneumococcal Polysaccharide ([id number]) 04/12/2010, 04/05/2017    Tetanus 05/22/2014    Zoster Live (Zostavax) 09/19/2013     Current Medications  Current Outpatient Medications   Medication Sig Dispense Refill    Fluticasone-Salmeterol 232-14 MCG/ACT AEPB       aspirin (ASPIRIN 81) 81 MG chewable tablet       CPAP Machine MISC --- ---      albuterol sulfate HFA (VENTOLIN HFA) 108 (90 Base) MCG/ACT inhaler 2 puffs as needed      metFORMIN (GLUCOPHAGE-XR) 500 MG extended release tablet Take 1 tablet by mouth daily (with breakfast) 90 tablet 1    lisinopril (PRINIVIL;ZESTRIL) 2.5 MG tablet TAKE 1 TABLET DAILY 90 tablet 3    allopurinol (ZYLOPRIM) 100 MG tablet TAKE 1 TABLET DAILY 90 tablet 1    nitroGLYCERIN (NITROSTAT) 0.4 MG SL tablet Place 1 tablet under the tongue every 5 minutes as needed for Chest pain 25 tablet 1    rosuvastatin (CRESTOR) 10 MG tablet TAKE 1 TABLET DAILY 90 tablet 1    atenolol (TENORMIN) 25 MG tablet TAKE 1 TABLET DAILY 90 tablet 4    FISH OIL Take by mouth 2 times daily       therapeutic multivitamin-minerals (THERAGRAN-M) tablet Take 1 tablet by mouth daily. OTC       No current facility-administered medications for this encounter.    :     Comments:  Allergies: Allergies   Allergen Reactions    Ceclor [Cefaclor] Itching    Pcn [Penicillins] Itching       A1C blood level   Lab Results   Component Value Date    LABA1C 6.5 (H) 06/10/2020    LABA1C 6.3 (H) 09/25/2019    LABA1C 5.8 02/27/2019     Lab Results   Component Value Date    CREATININE 1.38 (H) 06/10/2020       Blood pressure   BP Readings from Last 3 Encounters:   08/04/20 104/68   06/02/20 132/82   09/24/19 120/80        Cholesterol  Lab Results   Component Value Date    LDLCHOLESTEROL 53 06/10/2020        Diabetes Self- Management Education Record    Participant Name: Lubna Estes  Referring Provider: Victoriano Emanuel MD   Assessment/Evaluation Ratings:  1=Needs Instruction   4=Demonstrates Understanding/Competency  2=Needs Review   NC=Not Covered    3=Comprehends Key Points  N/A=Not Applicable  Topics/Learning Objectives Pre-session Assess Date:  8/17/20CB Instr. Date Reinforce Date Post- session Eval Comments   Diabetes disease process & Treatment process: Define diabetes & pre-diabetes; Identify own type of diabetes; role of the pancreas; signs/symptoms; diagnostic criteria; prevention & treatment options; contributing factors. 1    New Dx , family Hx with paternal Valentina Flowers, uncle. Also has Gout and kidney disease. 8/17/20CB   Incorporating nutritional management into lifestyle: Describe effect of type, amount & timing of food on blood glucose;  Describe basic meal planning techniques & current nutrition guideline   1recently made changes, cut out pop( used to drink4 cans/d) and sweets, but drinks 2 (20oz) bottles gatorade a day, only 16oz water    Used to skip breakfast but trying to eat frozen pancake with coffee    12n whatever leftovers or sandwich    Snack occ fruit    5-6p no sick day guidelines & indications for  physician notification. Identify short term consequences of poor control. Disaster preparedness strategies    1       Prevention, detection & treatment of chronic complications:  Define the natural course of diabetes & describe the relationship of blood glucose levels to long term complications of diabetes. Identify preventative measures & standards of care. 1    Already has kidney disease unrelated to DM. Has COPD,Gout   Developing strategies to address psychosocial issues:  Describe feelings about living with diabetes; Describe how stress, depression & anxiety affect blood glucose; Identify coping strategies; Identify support needed & support network available. 1    Motivated to keep BG in control and have healthy weight. Does not want to have complications or go on insulin like Grandfather. Lives with wife who is supportive and \"takes good care of him\"  8/17/20CB   Developing strategies to promote health/change behavior: Identify 7 self-care behaviors; Personal health risk factors; Benefits, challenges & strategies for behavioral change;    1         Individualized goal selection. My goal , to help me improve my health, I will:   1.learn to read labels, CHO      2.portion size       Plan  Follow-up Appointments planned August 24 @ 11:30am     Instruction Method: [x]Lecture/Discussion  []Power Point Presentation  [x]Handouts  []Return Demonstration    Education Materials/Equipment Provided (VIA Mail for phone visits)  :    [x]Self-Management - Initial assessment - Enrolment in to ADA  Where do I Begin, Living with Type 2 diabetes ADA home support program and  handout on diabetes education classes. 8/17/20CB  Patient did not receive packet in mail yet.     []Self-Management  Class 1 -Self-Management  Class 1 - \"Diabetes - your take control guide\" - ADA booklet -  o  \"ready, set, start counting\" teaching sheet in diet section   o  GLP-1 understanding 8 core deficits puzzle sheet in the medication section  o one day food diary and envelope for return of diet HX   o  You tube and website resource sheet-Understanding Type 2 Diabetes from Animated Diabetes Patient [email or web address] be/[email or web address]      [] Self-Management  Class 2 - Meal Plan and handout for serving sizes, smarter snacking, Ready Set Carb Counting / Plate Method, Nutrient Conversion and International Diabetes [address] Eating for People with Diabetes and Nutrition in the [employer] - fast facts about fast food    [] Self-Management  Class 3 -  Diabetes your take control guide pages 20 - 30,  type 2 diabetes and the role of GLP- 1,  Individualized Diabetes report card     [] Self-Management Class 4 - BD Booklet  Sick Day Rules and  Dinning Out Guide , recipe hand outs and tips, diabetes Cookbooks  ( when available)     []Self-Management - 3 month follow -  AADE7 Self care behaviors work sheets, 2020 [employer],  Online resource list - March 2020      []Self-Management  Gestational - RN class -Resource materials sent out : care booklet - \" Gestational Diabetes Mellitus ( GDM) toolkit form [place] gestational diabetes postpartum care learning collaborative 2018. \"Simple Guidelines for meal planning with gestational diabetes. SMBG sheets to fax back to M weekly. BD  healthy injection site selection and rotation with 6 mm insulin syringe and 4 mm pen needle. Gestational diabetes handout from [facility] 2016. Did you have gestational diabetes when you were pregnant? Handout from [facility] [place]  April 2014    []Self-Management Gestational - RD class - My Food Plan for Gestational diabetes    []Glucose Meter     []Insulin Kit     []Other      Encounter Type Date Start Time End Time Comments No Show Dates   Assessment 8/17/20CB   11:30 12:45   []In Person  [x]Telephone    Class 1 - Understanding diabetes     []TelephoneAmerican Diabetes Association  www. diabetes. org    Class 2- Nutrition and diabetes      []Telephone  Healthy Eating with Diabetes- Automatic Data of Diabetes and Digestive and Kidney   https://youtu. be/[email or web address]    Class 3 - Preventing Complications     []Telephone    Class 4 -  In depth Nutrition and sick day care    []Telephone  Diabetes Food hub  www. diabetesfoodhub.org     Class 5 - 3 month follow up / goal reassessment        Gestational - RN         Gestational - RD        Individual MNT         Shared Med Appt         Yearly Follow-up        Meter Instrx      How to Measure Your Blood Sugar - [facility] Patient Education  https://[email or web address] be/[email or web address]    Insulin Instrx      []Pen  []Vial & Syringe   BD Diabetes Care: How to Inject Insulin with a Pen Needle  https://[email or web address] be/[email or web address]    Diabetes Care: How to Inject Insulin with a Syringe  https://[email or web address]. be/[email or web address]       DSMS Support :   [] MNT      [] Annual update     [] Starting Fresh  adults living with diabetes or pre diabetes. [address] [place], [address] [facility] [address]    [id number]-3:30pm- on 6 week rotation  Free -  REGISTRATION IS REQUIRED - [facility] [phone] call for dates    []  Diabetes Group at  [address] - Free 6 week diabetes education support   classes - contact : Susan Yusuf [phone]  for dates and locations      []ADA  Where do I Begin, Living with Type 2 diabetes ADA home support program    Web site: diabetes. org/living    Call: 1800 DIABETES  e-mail: Paulo@[employer]. org     []  Internet web sites - ADAWeb site: www.diabetes. org       [] [facility] opens at 8 30 am daily for walkers, shops open at 10 am   [address] [facility], [address] [place] [address]   [phone] Daily - 8:30am - 10am    3rd Tuesday of every month Aleida Calvert expert speakers visit from 9 - 10am        [] [facility], [facility], [facility], [address] [place] [address] (site rotate)  Call [phone] or visit   website: [email or web address] for more details   Check web site for updated times/ dates       [] [address]  [address] [facility], [address] [facility] Tuesday and Thursday -   9 :30 am- 10:30am - ongoing   [] [address]  [address] [facility], [address] [facility] [address] [address] [facility] Thursday 11:00am - 11:45am     [] Third Wednesday Cooking  Class  Free  Registration is required     [address] [facility]. 1100 [facility], 125 [facility] [phone] or   Email [email or web address]   Wednesdays-5:00- 6:00 pm       [] Eat Smart  Be Active & Learn How - Free   Families & grandparents with children in home 0- 25 & pregnant moms          Various sites in community - call to find next session near you. [facility] extension office for future sessions - Andria Bolden  Email : [email or web address] [facility]  Phone: [phone]     [] (SNAP-Ed)   - free nutrition education   - adults and youth, who are eligible for the Supplemental Nutrition Assistance Program    Various sites in community - call to find next session near you. [facility] JULIUS-CRANBERRY-ER SNAP-Ed  contact Keerthi Kelley, Email:[phone]           Post Education Referrals:      [] [place] Tobacco Quit information sheet and [address] , [address]       [] Dental care - Dental care of [facility]     [] [facility]) link  phone number - for information and referral to [facility] - [facility]  Clinically  [address]ran Mitchell, WEIGHT MANAGEMENT        []Other  Vaughn Alicea RN

## 2020-08-17 NOTE — LETTER
[facility]  [address] [facility]  Phone: [phone]         August 17, 2020    To Jenny [address]  [address] [facility], [address] [place] [address]    From: Vaughn Alicea RN    Patient: Catia Murphy   YOB: 1940   Date of Visit: 8/17/20     An initial assessment to determine diabetes education needs was completed on 8/17/20. Education plan includes :referred to Diabetes Educator, interpretation of lab results, blood sugar goals, complications of diabetes mellitus, hypoglycemia prevention and treatment, exercise, illness management, self-monitoring of blood glucose skills, nutrition and carbohydrate counting. An ongoing plan was created to include: follow up per phone class on 8/24/20 @ 11:30a    Thank you for the opportunity to provide Diabetes Self Management Education to your patient.

## 2020-08-24 ENCOUNTER — HOSPITAL ENCOUNTER (OUTPATIENT)
Dept: DIABETES SERVICES | Age: 80
Setting detail: THERAPIES SERIES
Discharge: HOME | End: 2020-08-24
Payer: MEDICARE

## 2020-08-24 PROCEDURE — G0108 DIAB MANAGE TRN  PER INDIV: HCPCS

## 2020-08-24 NOTE — PROGRESS NOTES
90 tablet 4    FISH OIL Take by mouth 2 times daily       therapeutic multivitamin-minerals (THERAGRAN-M) tablet Take 1 tablet by mouth daily. OTC       No current facility-administered medications for this encounter.    :     Comments:  Allergies: Allergies   Allergen Reactions    Ceclor [Cefaclor] Itching    Pcn [Penicillins] Itching       A1C blood level   Lab Results   Component Value Date    LABA1C 6.5 (H) 06/10/2020    LABA1C 6.3 (H) 09/25/2019    LABA1C 5.8 02/27/2019     Lab Results   Component Value Date    CREATININE 1.38 (H) 06/10/2020       Blood pressure   BP Readings from Last 3 Encounters:   08/04/20 104/68   06/02/20 132/82   09/24/19 120/80        Cholesterol  Lab Results   Component Value Date    LDLCHOLESTEROL 53 06/10/2020        Diabetes Self- Management Education Record    Participant Name: Brady Anne  Referring Provider: Enma Sadler MD   Assessment/Evaluation Ratings:  1=Needs Instruction   4=Demonstrates Understanding/Competency  2=Needs Review   NC=Not Covered    3=Comprehends Key Points  N/A=Not Applicable  Topics/Learning Objectives Pre-session Assess Date:  8/17/20CB Instr. Date Reinforce Date Post- session Eval Comments   Diabetes disease process & Treatment process: Define diabetes & pre-diabetes; Identify own type of diabetes; role of the pancreas; signs/symptoms; diagnostic criteria; prevention & treatment options; contributing factors. 1 8/24/20CB   New Dx , family Hx with paternal Jenniffer Choi, uncle. Also has Gout and kidney disease. 8/17/20CB  Pt did receive packet in mail and referred to book by ADA Diabetes Your take control Guide. Encouraged to watch Youtube resource included in packet. 8/24/20CB   Incorporating nutritional management into lifestyle: Describe effect of type, amount & timing of food on blood glucose;  Describe basic meal planning techniques & current nutrition guideline   1recently made changes, cut out pop( used to drink4 cans/d) and sweets, but drinks 2 (20oz) bottles gatorade a day, only 16oz water    Used to skip breakfast but trying to eat frozen pancake with coffee    12n whatever leftovers or sandwich    Snack occ fruit    5-6p no schedule Frozen dinner \"complete\" or single serving mac and cheese. Lacking vegetables    HS eating more fruit there or pretzels, no protein  Has lost 20# past month since omitting pop and eating fast food  8/17/20CB    What to eat - Food groups, When to eat - timing of meals and snacks, and How much to eat - portions control. calories/ day   CHO choices/ meal   CHO choices/  day   grams of protein /day   gram of fat /day     Correctly read food labels & demonstrate CHO counting & portion control with personalized meal plan. Identify dining out strategies, & dietary sick day guidelines. 1       Incorporating physical activity into lifestyle:   Verbalize effect of exercise on blood glucose levels; benefits of regular exercise; safety considerations; contraindications; maintenance of activity. 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 He and wife limited due to lower back pain. Pt also has gout and COPD  8/17/20CB  He and wife used to do a lot of walking a year ago but limited now8/24/20CB   Using medications safely:  Identify effects of diabetes medicines on blood glucose levels; List diabetes medication taken, action & side effects;    1 8/24/20CB   Metformin  mg daily    Discussed action of Metformin as pt wanted to know. 8/24/20CB   Insulin / Injectable - Appropriate injection sites; proper storage; supplies needed; proper technique; safe needle disposal guidelines. 1    Grandfather on insulin8/17/20CB   Monitoring blood glucose, interpreting and using results:  Identify recommended & personal blood glucose targets; importance of testing; testing supplies; HgbA1C target levels; Factors affecting blood glucose;  Importance of logging blood glucose levels for pattern recognition; ketone testing; safe lancet disposal.   1 8/24/20CB   A1C 6.5% on 6/10/20. Not checking BG, no meter    Wondering if he should get meter discussed benefits. 8/24/20CB   Prevention, detection & treatment of acute complications:  Identify symptoms of hyper & hypoglycemia, and prevention & treatment strategies. 1 8/24/20CB   Sometimes feels light headed or fatigue but not sure if due to BG or BP? Can see how meter would be useful. 8/17/20CB    Did initially feel more tired and some blurred vision but assumed part of aging process  8/24/20CB   Describe sick day guidelines & indications for  physician notification. Identify short term consequences of poor control. Disaster preparedness strategies    1       Prevention, detection & treatment of chronic complications:  Define the natural course of diabetes & describe the relationship of blood glucose levels to long term complications of diabetes. Identify preventative measures & standards of care. 1    Already has kidney disease unrelated to DM. Has COPD,Gout    Does upcoming appt 9/22 for eyes as PCP made referral.   8/24/20CB   Developing strategies to address psychosocial issues:  Describe feelings about living with diabetes; Describe how stress, depression & anxiety affect blood glucose; Identify coping strategies; Identify support needed & support network available. 1 8/24/20CB   Motivated to keep BG in control and have healthy weight. Does not want to have complications or go on insulin like Grandfather. Lives with wife who is supportive and \"takes good care of him\"  8/17/20CB  Very motivated as started reading info in packet and making changes in diet. 8/24/20CB   Developing strategies to promote health/change behavior: Identify 7 self-care behaviors; Personal health risk factors; Benefits, challenges & strategies for behavioral change;    1         Individualized goal selection.               My goal , to help me improve my health, I will:   1.learn to read labels, CHO      2.portion size       Plan  Follow-up Appointments planned August 24 @ 11:30am     Instruction Method: [x]Lecture/Discussion  []Power Point Presentation  [x]Handouts  []Return Demonstration    Education Materials/Equipment Provided (VIA Mail for phone visits)  :    [x]Self-Management - Initial assessment - Enrolment in to ADA  Where do I Begin, Living with Type 2 diabetes ADA home support program and  handout on diabetes education classes. 8/17/20CB  Patient did not receive packet in mail yet. [x]Self-Management  Class 1 -Self-Management  Class 1 - \"Diabetes - your take control guide\" - ADA booklet -8/24/20CB  o  \"ready, set, start counting\" teaching sheet in diet section   o  GLP-1 understanding 8 core deficits puzzle sheet in the medication section  o one day food diary and envelope for return of diet HX   o  You tube and website resource sheet-Understanding Type 2 Diabetes from Animated Diabetes Patient [email or web address]      [] Self-Management  Class 2 - Meal Plan and handout for serving sizes, smarter snacking, Ready Set Carb Counting / Plate Method, Nutrient Conversion and International Diabetes [address] Eating for People with Diabetes and Nutrition in the [employer] - fast facts about fast food    [] Self-Management  Class 3 -  Diabetes your take control guide pages 20 - 30,  type 2 diabetes and the role of GLP- 1,  Individualized Diabetes report card     [] Self-Management Class 4 - BD Booklet  Sick Day Rules and  Dinning Out Guide , recipe hand outs and tips, diabetes Cookbooks  ( when available)     []Self-Management - 3 month follow -  AADE7 Self care behaviors work sheets, 2020 [employer],  Online resource list - March 2020      []Self-Management  Gestational - RN class -Resource materials sent out : care booklet - \" Gestational Diabetes Mellitus ( GDM) toolkit form [place] gestational diabetes postpartum care learning collaborative 2018. \"Simple Guidelines for meal planning with gestational diabetes. SMBG sheets to fax back to MFM weekly. BD  healthy injection site selection and rotation with 6 mm insulin syringe and 4 mm pen needle. Gestational diabetes handout from [facility] 2016. Did you have gestational diabetes when you were pregnant? Handout from [facility]  April 2014    []Self-Management Gestational - RD class - My Food Plan for Gestational diabetes    []Glucose Meter     []Insulin Kit     []Other      Encounter Type Date Start Time End Time Comments No Show Dates   Assessment 8/17/20CB   11:30 12:45   []In Person  [x]Telephone    Class 1 - Understanding diabetes 8/24/20CB 11:30 12:30  [x]TelephoneAmerican Diabetes Association  www. diabetes. org    Class 2- Nutrition and diabetes      []Telephone  Healthy Eating with Diabetes- Automatic Data of Diabetes and Digestive and Kidney   [email or web address] [email or web address]    Class 3 - Preventing Complications     []Telephone    Class 4 -  In depth Nutrition and sick day care    []Telephone  Diabetes Food hub  www. diabetesfoodhub.org     Class 5 - 3 month follow up / goal reassessment        Gestational - RN         Gestational - RD        Individual MNT         Shared Med Appt         Yearly Follow-up        Meter Instrx      How to Measure Your Blood Sugar - [facility] Patient Education  [email or web address]    Insulin Instrx      []Pen  []Vial & Syringe   BD Diabetes Care: How to Inject Insulin with a Pen Needle  [email or web address] [email or web address]    Diabetes Care: How to Inject Insulin with a Syringe  [email or web address]       DSMS Support :   [] MNT      [] Annual update     [] Starting Fresh  adults living with diabetes or pre diabetes.  [address] [place], [place]    [id number]-0:30pm- on 6 week rotation  Free -  [address] [facility] [phone] call for dates    []  Diabetes Group at  [address] Free 6 week diabetes education support   classes - contact : [address] [facility] [phone]  for dates and locations      []ADA  Where do I Begin, Living with Type 2 diabetes ADA home support program    Web site: diabetes. org/living    Call: 1800 DIABETES  e-mail: [email or web address]     []  Internet web sites - ADAWeb site: www.diabetes. org       [] [facility] opens at 8 30 am daily for walkers, shops open at 10 am   [address] [facility], [address] [facility]   [phone] Daily - 8:30am - 10am    3rd Tuesday of every month [facility] expert speakers visit from 9 - 10am        [] [facility], [facility], [facility], [place], [facility] (site rotate)  Call [phone] or visit   website: [email or web address] for more details   Check web site for updated times/ dates       [] [address]  [address] [facility], [address] [address] Tuesday and Thursday -   9 :30 am- 10:30am - ongoing   [] [address]  [address] [facility], [address] [address] [facility] Thursday 11:00am - 11:45am     [] Third Wednesday Cooking  Class  Free  Registration is required     [address] [facility]. [address] [facility], [address] [facility] [phone] or   Email [email or web address]   Wednesdays-5:00- 6:00 pm       [] Eat Smart  Be Active & Learn How - Free   Families & grandparents with children in home 0- 25 & pregnant moms          Various sites in community - call to find next session near you. [facility] extension office for future sessions - Kushal Arzola  Email : [email or web address]  Phone: [phone]     [] (SNAP-Ed)   - free nutrition education   - adults and youth, who are eligible for the Supplemental Nutrition Assistance

## 2020-08-27 RX ORDER — ROSUVASTATIN CALCIUM 10 MG/1
TABLET, COATED ORAL
Qty: 90 TABLET | Refills: 1 | Status: SHIPPED | OUTPATIENT
Start: 2020-08-27 | End: 2021-02-23

## 2020-08-31 ENCOUNTER — HOSPITAL ENCOUNTER (OUTPATIENT)
Dept: DIABETES SERVICES | Age: 80
Setting detail: THERAPIES SERIES
Discharge: HOME | End: 2020-08-31
Payer: MEDICARE

## 2020-08-31 PROCEDURE — G0108 DIAB MANAGE TRN  PER INDIV: HCPCS

## 2020-09-02 NOTE — PROGRESS NOTES
Diabetes Self- Management Education Program Assessment - PHONE  This visit was done on the telephone  (During ETOYT-96 public health emergency). Pursuant to the emergency declaration under the [facility], [address] [place] [address] authority and the [employer] Act, this visit was conducted with patient's (and/or legal guardian's) consent, to reduce the patient's risk of exposure to COVID-19 and provide necessary medical care. The patient (and/or legal guardian) has also been advised to contact this office for worsening conditions or problems, and seek emergency medical treatment and/or call 911 if deemed necessary. Patient identification was verified at the start of the visit: Yes    Total time spent for this encounter: 1 hour   8/24/20CB 1 hour per phone  8/31/20 JW 1 hour, 11:30-12:30    Services were provided through a phone discussion to substitute for in-person clinic visit. Patient and provider were located at their individual home/office. Also see Diabetic Screening  Patient, Rosemary Fisher, contacted via phone call encounters for diabetes self-management education  visit/ assessment on 8/17/20     ( update if when return to face to face encounter)     MEDICAL HISTORY:  Past Medical History:   Diagnosis Date    Anemia     Anesthesia     woke up eary during surgery x1 , heard saw & felt the pressure.     Arthritis 7/15/2013    ASHD (arteriosclerotic heart disease) 7/15/2013    CAD (coronary artery disease)     has cardiac stent x 4.    Chronic kidney disease     stage 3    Claudication (HCC) 7/15/2013    COPD (chronic obstructive pulmonary disease) ([facility] [address]     Degenerative joint disease 7/15/2013    ED (erectile dysfunction) 7/15/2013    Full dentures     Gout     History of carpal tunnel syndrome 7/15/2013    History of colon polyps 7/15/2013    History of hemorrhoids 7/15/2013    History of TIA (transient ischemic attack) 7/15/2013    Hypercholesteremia 7/15/2013    Hypertension     Hyperuricemia 7/15/2013    Leukocytosis     Renal cyst     Sleep apnea 07/15/2013    on cpap at hs.     Wears glasses     for reading     Family History   Problem Relation Age of Onset    Alzheimer's Disease Mother     Heart Disease Mother     Emphysema Father      Ceclor [cefaclor] and Pcn [penicillins]   Immunization History   Administered Date(s) Administered    Influenza 10/25/2013    Influenza Virus Vaccine 10/05/2010, 09/20/2011, 09/14/2012, 09/30/2014, 10/23/2015    Influenza, High Dose (Fluzone 65 yrs and older) 10/03/2018    Influenza, Joycie Newcomer, IM, (6 mo and older Fluzone, Flulaval, Fluarix and 3 yrs and older Afluria) 10/25/2016    Influenza, Quadv, IM, PF (6 mo and older Fluzone, Flulaval, Fluarix, and 3 yrs and older Afluria) 10/19/2017    Influenza, Triv, inactivated, subunit, adjuvanted, IM (Fluad 65 yrs and older) 10/22/2019    Pneumococcal Conjugate 13-valent (Ezlexbr33) 03/11/2016    Pneumococcal Polysaccharide (Zzykbafif96) 04/12/2010, 04/05/2017    Tetanus 05/22/2014    Zoster Live (Zostavax) 09/19/2013     Current Medications  Current Outpatient Medications   Medication Sig Dispense Refill    rosuvastatin (CRESTOR) 10 MG tablet TAKE 1 TABLET DAILY 90 tablet 1    Fluticasone-Salmeterol 232-14 MCG/ACT AEPB       aspirin (ASPIRIN 81) 81 MG chewable tablet       CPAP Machine MISC --- ---      albuterol sulfate HFA (VENTOLIN HFA) 108 (90 Base) MCG/ACT inhaler 2 puffs as needed      metFORMIN (GLUCOPHAGE-XR) 500 MG extended release tablet Take 1 tablet by mouth daily (with breakfast) 90 tablet 1    lisinopril (PRINIVIL;ZESTRIL) 2.5 MG tablet TAKE 1 TABLET DAILY 90 tablet 3    allopurinol (ZYLOPRIM) 100 MG tablet TAKE 1 TABLET DAILY 90 tablet 1    nitroGLYCERIN (NITROSTAT) 0.4 MG SL tablet Place 1 tablet under the tongue every 5 minutes as needed for Chest pain 25 tablet 1    atenolol (TENORMIN) 25 MG tablet TAKE 1 TABLET DAILY 90 tablet 4    FISH OIL Take by mouth 2 times daily       therapeutic multivitamin-minerals (THERAGRAN-M) tablet Take 1 tablet by mouth daily. OTC       No current facility-administered medications for this encounter.    :     Comments:  Allergies: Allergies   Allergen Reactions    Ceclor [Cefaclor] Itching    Pcn [Penicillins] Itching       A1C blood level   Lab Results   Component Value Date    LABA1C 6.5 (H) 06/10/2020    LABA1C 6.3 (H) 09/25/2019    LABA1C 5.8 02/27/2019     Lab Results   Component Value Date    CREATININE 1.38 (H) 06/10/2020       Blood pressure   BP Readings from Last 3 Encounters:   08/04/20 104/68   06/02/20 132/82   09/24/19 120/80        Cholesterol  Lab Results   Component Value Date    LDLCHOLESTEROL 53 06/10/2020        Diabetes Self- Management Education Record    Participant Name: Frank Mabel  Referring Provider: Tommy Wilder MD   Assessment/Evaluation Ratings:  1=Needs Instruction   4=Demonstrates Understanding/Competency  2=Needs Review   NC=Not Covered    3=Comprehends Key Points  N/A=Not Applicable  Topics/Learning Objectives Pre-session Assess Date:  8/17/20CB Instr. Date Reinforce Date Post- session Eval Comments   Diabetes disease process & Treatment process: Define diabetes & pre-diabetes; Identify own type of diabetes; role of the pancreas; signs/symptoms; diagnostic criteria; prevention & treatment options; contributing factors. 1 8/24/20CB   New Dx , family Hx with paternal Gallo Spence, uncle. Also has Gout and kidney disease. 8/17/20CB  Pt did receive packet in mail and referred to book by ADA Diabetes Your take control Guide. Encouraged to watch Youtube resource included in packet. 8/24/20CB   Incorporating nutritional management into lifestyle: Describe effect of type, amount & timing of food on blood glucose;  Describe basic meal planning techniques & current nutrition guideline   1recently made changes, cut out pop( used to drink4 cans/d) and sweets, but drinks 2 (20oz) bottles gatorade a day, only 16oz water    Used to skip breakfast but trying to eat frozen pancake with coffee    12n whatever leftovers or sandwich    Snack occ fruit    5-6p no schedule Frozen dinner \"complete\" or single serving mac and cheese. Lacking vegetables    HS eating more fruit there or pretzels, no protein  Has lost 20# past month since omitting pop and eating fast food  8/17/20CB 8/31/20 JW- pt has been doing very well. Praised progress/efforts. Has lost weight and making healthy food choices/eating on a regular schedule. Used plate method. What to eat - Food groups, When to eat - timing of meals and snacks, and How much to eat - portions control. Suggest 60-75 grams cho/meal and 15 grams cho/snack   calories/ day   CHO choices/ meal   CHO choices/  day   grams of protein /day   gram of fat /day     Correctly read food labels & demonstrate CHO counting & portion control with personalized meal plan. Identify dining out strategies, & dietary sick day guidelines. 1 8/31/20 ERNESTO      Incorporating physical activity into lifestyle:   Verbalize effect of exercise on blood glucose levels; benefits of regular exercise; safety considerations; contraindications; maintenance of activity. 1 8/24/20CB   Works around [place], maintains [employer]. He and wife limited due to lower back pain. Pt also has gout and COPD  8/17/20CB  He and wife used to do a lot of walking a year ago but limited now8/24/20CB   Using medications safely:  Identify effects of diabetes medicines on blood glucose levels; List diabetes medication taken, action & side effects;    1 8/24/20CB   Metformin  mg daily    Discussed action of Metformin as pt wanted to know. 8/24/20CB   Insulin / Injectable - Appropriate injection sites; proper storage; supplies needed; proper technique; safe needle disposal guidelines.    1    Grandfather on insulin8/17/20CB   Monitoring blood glucose, interpreting and using results:  Identify recommended & personal blood glucose targets; importance of testing; testing supplies; HgbA1C target levels; Factors affecting blood glucose; Importance of logging blood glucose levels for pattern recognition; ketone testing; safe lancet disposal.   1 8/24/20CB   A1C 6.5% on 6/10/20. Not checking BG, no meter    Wondering if he should get meter discussed benefits. 8/24/20CB   Prevention, detection & treatment of acute complications:  Identify symptoms of hyper & hypoglycemia, and prevention & treatment strategies. 1 8/24/20CB   Sometimes feels light headed or fatigue but not sure if due to BG or BP? Can see how meter would be useful. 8/17/20CB    Did initially feel more tired and some blurred vision but assumed part of aging process  8/24/20CB   Describe sick day guidelines & indications for  physician notification. Identify short term consequences of poor control. Disaster preparedness strategies    1       Prevention, detection & treatment of chronic complications:  Define the natural course of diabetes & describe the relationship of blood glucose levels to long term complications of diabetes. Identify preventative measures & standards of care. 1    Already has kidney disease unrelated to DM. Has COPD,Gout    Does upcoming appt 9/22 for eyes as PCP made referral.   8/24/20CB   Developing strategies to address psychosocial issues:  Describe feelings about living with diabetes; Describe how stress, depression & anxiety affect blood glucose; Identify coping strategies; Identify support needed & support network available. 1 8/24/20CB   Motivated to keep BG in control and have healthy weight. Does not want to have complications or go on insulin like Grandfather. Lives with wife who is supportive and \"takes good care of him\"  8/17/20CB  Very motivated as started reading info in packet and making changes in diet. 8/24/20CB   Developing strategies to promote health/change behavior: Identify 7 self-care behaviors; Personal health risk factors; Benefits, challenges & strategies for behavioral change;    1         Individualized goal selection. My goal , to help me improve my health, I will:   1.learn to read labels, CHO      2.portion size       Plan  Follow-up Appointments planned August 24 @ 11:30am     Instruction Method: [x]Lecture/Discussion  []Power Point Presentation  [x]Handouts  []Return Demonstration    Education Materials/Equipment Provided (VIA Mail for phone visits)  :    [x]Self-Management - Initial assessment - Enrolment in to ADA  Where do I Begin, Living with Type 2 diabetes ADA home support program and  handout on diabetes education classes. 8/17/20CB  Patient did not receive packet in mail yet. [x]Self-Management  Class 1 -Self-Management  Class 1 - \"Diabetes - your take control guide\" - ADA booklet -8/24/20CB  o  \"ready, set, start counting\" teaching sheet in diet section   o  GLP-1 understanding 8 core deficits puzzle sheet in the medication section  o one day food diary and envelope for return of diet HX   o  You tube and website resource sheet-Understanding Type 2 Diabetes from Animated Diabetes Patient [email or web address] [email or web address]      [x] Self-Management  Class 2 - Meal Plan and handout for serving sizes, smarter snacking, Ready Set Carb Counting / Plate Method, Nutrient Conversion and International Diabetes [address] Eating for People with Diabetes and Nutrition in the [employer] - fast facts about fast food8/31/20 JW    [] Self-Management  Class 3 -  Diabetes your take control guide pages 20 - 30,  type 2 diabetes and the role of GLP- 1,  Individualized Diabetes report card     [] Self-Management Class 4 - BD Booklet  Sick Day Rules and  Dinning Out Guide , recipe hand outs and tips, diabetes Cookbooks  ( when available)     []Self-Management - 3 month follow -  AADE7 Self care behaviors work sheets, 2020 [employer],  Online resource list - March 2020      []Self-Management  Gestational - RN class -Resource materials sent out : care booklet - \" Gestational Diabetes Mellitus ( GDM) toolkit form [place] gestational diabetes postpartum care learning collaborative 2018. \"Simple Guidelines for meal planning with gestational diabetes. SMBG sheets to fax back to M weekly. BD  healthy injection site selection and rotation with 6 mm insulin syringe and 4 mm pen needle. Gestational diabetes handout from [facility] 2016. Did you have gestational diabetes when you were pregnant? Handout from [facility]  April 2014    []Self-Management Gestational - RD class - My Food Plan for Gestational diabetes    []Glucose Meter     []Insulin Kit     []Other      Encounter Type Date Start Time End Time Comments No Show Dates   Assessment 8/17/20CB   11:30 12:45   []In Person  [x]Telephone    Class 1 - Understanding diabetes 8/24/20CB 11:30 12:30  [x]TelephoneAmerican Diabetes Association  www. diabetes. org    Class 2- Nutrition and diabetes   8/31/20 JW 11:30 12:30 [x]Telephone  Healthy Eating with Diabetes- Automatic Data of Diabetes and Digestive and Kidney   [email or web address] [email or web address]    Class 3 - Preventing Complications     []Telephone    Class 4 -  In depth Nutrition and sick day care    []Telephone  Diabetes Food hub  www. diabetesfoodhub.org     Class 5 - 3 month follow up / goal reassessment        Gestational - RN         Gestational - RD        Individual MNT         Shared Med Appt         Yearly Follow-up        Meter Instrx      How to Measure Your Blood Sugar - [facility] Patient Education  [email or web address] [email or web address]    Insulin Instrx      []Pen  []Vial & Syringe   BD Diabetes Care: How to Inject Insulin with a Pen Needle  [email or web address] [email or web address]    Diabetes Care: How to Inject Insulin with a Syringe  [email or web address] [email or web address]       DSMS Support :   [] MNT      [] Annual update in community - call to find next session near you. [facility] extension office for future sessions - Renzo Anderson  Email : [email or web address]  Phone: [phone]     [] (SNAP-Ed)   - free nutrition education   - adults and youth, who are eligible for the Supplemental Nutrition Assistance Program    Various sites in community - call to find next session near you. [facility] SNAP-Ed  contact Demetrius Ramírez, Email:[email or web address]           Post Education Referrals:      [] [place] Tobacco Quit information sheet and [address] [facility] , [address]       [] Dental care - [facility]     [] [facility] link  phone number - for information and [facility]        []Other  Joselyn Lorenzana RD, LD

## 2020-09-14 ENCOUNTER — HOSPITAL ENCOUNTER (OUTPATIENT)
Dept: DIABETES SERVICES | Age: 80
Setting detail: THERAPIES SERIES
Discharge: HOME | End: 2020-09-14
Payer: MEDICARE

## 2020-09-14 PROCEDURE — G0108 DIAB MANAGE TRN  PER INDIV: HCPCS

## 2020-09-14 RX ORDER — METFORMIN HYDROCHLORIDE 500 MG/1
500 TABLET, EXTENDED RELEASE ORAL
Qty: 14 TABLET | Refills: 0 | Status: SHIPPED | OUTPATIENT
Start: 2020-09-14 | End: 2020-11-23

## 2020-09-14 NOTE — PROGRESS NOTES
Diabetes Self- Management Education Program Assessment - PHONE  This visit was done on the telephone  (During QYDTG-05 public health emergency). Pursuant to the emergency declaration under the [facility], [address] [place] [address] authority and the [employer] Act, this visit was conducted with patient's (and/or legal guardian's) consent, to reduce the patient's risk of exposure to COVID-19 and provide necessary medical care. The patient (and/or legal guardian) has also been advised to contact this office for worsening conditions or problems, and seek emergency medical treatment and/or call 911 if deemed necessary. Patient identification was verified at the start of the visit: Yes    Total time spent for this encounter: 1 hour   8/24/20CB 1 hour per phone  8/31/20 JW 1 hour, 11:30-12:30  9/14/20CB  1 hour per phone    Services were provided through a phone discussion to substitute for in-person clinic visit. Patient and provider were located at their individual home/office. Also see Diabetic Screening  Patient, Rosalind Hall, contacted via phone call encounters for diabetes self-management education  visit/ assessment on 8/17/20     ( update if when return to face to face encounter)     MEDICAL HISTORY:  Past Medical History:   Diagnosis Date    Anemia     Anesthesia     woke up eary during surgery x1 , heard saw & felt the pressure.     Arthritis 7/15/2013    ASHD (arteriosclerotic heart disease) 7/15/2013    CAD (coronary artery disease)     has cardiac stent x 4.    Chronic kidney disease     stage 3    Claudication (HCC) 7/15/2013    COPD (chronic obstructive pulmonary disease) ([facility] [address]     Degenerative joint disease 7/15/2013    ED (erectile dysfunction) 7/15/2013    Full dentures     Gout     History of carpal tunnel syndrome 7/15/2013    History of colon polyps 7/15/2013    History of hemorrhoids 7/15/2013    History of TIA (transient ischemic attack) 7/15/2013    Hypercholesteremia 7/15/2013    Hypertension     Hyperuricemia 7/15/2013    Leukocytosis     Renal cyst     Sleep apnea 07/15/2013    on cpap at .     Wears glasses     for reading     Family History   Problem Relation Age of Onset    Alzheimer's Disease Mother     Heart Disease Mother     Emphysema Father      Ceclor [cefaclor] and Pcn [penicillins]   Immunization History   Administered Date(s) Administered    Influenza 10/25/2013    Influenza Virus Vaccine 10/05/2010, 09/20/2011, 09/14/2012, 09/30/2014, 10/23/2015    Influenza, High Dose (Fluzone 65 yrs and older) 10/03/2018    Influenza, Dilan , IM, (6 mo and older Fluzone, Flulaval, Fluarix and 3 yrs and older Afluria) 10/25/2016    Influenza, Quadv, IM, PF (6 mo and older Fluzone, Flulaval, Fluarix, and 3 yrs and older Afluria) 10/19/2017    Influenza, Triv, inactivated, subunit, adjuvanted, IM (Fluad 65 yrs and older) 10/22/2019    Pneumococcal Conjugate 13-valent ([id number]) 03/11/2016    Pneumococcal Polysaccharide ([id number]) 04/12/2010, 04/05/2017    Tetanus 05/22/2014    Zoster Live (Zostavax) 09/19/2013     Current Medications  Current Outpatient Medications   Medication Sig Dispense Refill    rosuvastatin (CRESTOR) 10 MG tablet TAKE 1 TABLET DAILY 90 tablet 1    Fluticasone-Salmeterol 232-14 MCG/ACT AEPB       aspirin (ASPIRIN 81) 81 MG chewable tablet       CPAP Machine MISC --- ---      albuterol sulfate HFA (VENTOLIN HFA) 108 (90 Base) MCG/ACT inhaler 2 puffs as needed      metFORMIN (GLUCOPHAGE-XR) 500 MG extended release tablet Take 1 tablet by mouth daily (with breakfast) 90 tablet 1    lisinopril (PRINIVIL;ZESTRIL) 2.5 MG tablet TAKE 1 TABLET DAILY 90 tablet 3    allopurinol (ZYLOPRIM) 100 MG tablet TAKE 1 TABLET DAILY 90 tablet 1    nitroGLYCERIN (NITROSTAT) 0.4 MG SL tablet Place 1 tablet under the tongue every 5 minutes as needed for Chest pain 25 tablet 1  atenolol (TENORMIN) 25 MG tablet TAKE 1 TABLET DAILY 90 tablet 4    FISH OIL Take by mouth 2 times daily       therapeutic multivitamin-minerals (THERAGRAN-M) tablet Take 1 tablet by mouth daily. OTC       No current facility-administered medications for this encounter.    :     Comments:  Allergies: Allergies   Allergen Reactions    Ceclor [Cefaclor] Itching    Pcn [Penicillins] Itching       A1C blood level   Lab Results   Component Value Date    LABA1C 6.5 (H) 06/10/2020    LABA1C 6.3 (H) 09/25/2019    LABA1C 5.8 02/27/2019     Lab Results   Component Value Date    CREATININE 1.38 (H) 06/10/2020       Blood pressure   BP Readings from Last 3 Encounters:   08/04/20 104/68   06/02/20 132/82   09/24/19 120/80        Cholesterol  Lab Results   Component Value Date    LDLCHOLESTEROL 53 06/10/2020        Diabetes Self- Management Education Record    Participant Name: Amy Ghotra  Referring Provider: Choco Gallardo MD   Assessment/Evaluation Ratings:  1=Needs Instruction   4=Demonstrates Understanding/Competency  2=Needs Review   NC=Not Covered    3=Comprehends Key Points  N/A=Not Applicable  Topics/Learning Objectives Pre-session Assess Date:  8/17/20CB Instr. Date Reinforce Date Post- session Eval Comments   Diabetes disease process & Treatment process: Define diabetes & pre-diabetes; Identify own type of diabetes; role of the pancreas; signs/symptoms; diagnostic criteria; prevention & treatment options; contributing factors. 1 8/24/20CB 9/14/20CB  New Dx , family Hx with paternal Grandfather, uncle. Also has Gout and kidney disease. 8/17/20CB  Pt did receive packet in mail and referred to book by ADA Diabetes Your take control Guide. Encouraged to watch Youtube resource included in packet. 8/24/20CB    Pt still has not watched Youtube resource and encouraged to do so.  9/14/20CB   Incorporating nutritional management into lifestyle: Describe effect of type, amount & timing of food on blood glucose; Describe basic meal planning techniques & current nutrition guideline   1recently made changes, cut out pop( used to drink4 cans/d) and sweets, but drinks 2 (20oz) bottles gatorade a day, only 16oz water    Used to skip breakfast but trying to eat frozen pancake with coffee    12n whatever leftovers or sandwich    Snack occ fruit    5-6p no schedule Frozen dinner \"complete\" or single serving mac and cheese. Lacking vegetables    HS eating more fruit there or pretzels, no protein  Has lost 20# past month since omitting pop and eating fast food  8/17/20CB 8/31/20 JW- pt has been doing very well. Praised progress/efforts. Has lost weight and making healthy food choices/eating on a regular schedule. Used plate method. What to eat - Food groups, When to eat - timing of meals and snacks, and How much to eat - portions control. Suggest 60-75 grams cho/meal and 15 grams cho/snack   calories/ day   CHO choices/ meal   CHO choices/  day   grams of protein /day   gram of fat /day     Correctly read food labels & demonstrate CHO counting & portion control with personalized meal plan. Identify dining out strategies, & dietary sick day guidelines. 1 8/31/20 ERNESTO      Incorporating physical activity into lifestyle:   Verbalize effect of exercise on blood glucose levels; benefits of regular exercise; safety considerations; contraindications; maintenance of activity. 1 8/24/20CB   Works around [employer], maintains [employer]. He and wife limited due to lower back pain. Pt also has gout and COPD  8/17/20CB  He and wife used to do a lot of walking a year ago but limited now8/24/20CB   Using medications safely:  Identify effects of diabetes medicines on blood glucose levels; List diabetes medication taken, action & side effects;    1 8/24/20CB 9/14/20CB  Metformin  mg daily    Discussed action of Metformin as pt wanted to know. 8/24/20CB    Pt did not get mailorder of Metformin from [facility] and ha been out of Metformin. Pt called office who told him to call [facility] which he did and they won't ship for another 3 days. Pt to call back PCP to get a 1x RX to local Pharmacy and hopefully they can do override9/14/20CB       Insulin / Injectable - Appropriate injection sites; proper storage; supplies needed; proper technique; safe needle disposal guidelines. 1 9/14/20CB   Grandfather on insulin8/17/20CB    Briefly discussed. NA at this time9/14/20CB   Monitoring blood glucose, interpreting and using results:  Identify recommended & personal blood glucose targets; importance of testing; testing supplies; HgbA1C target levels; Factors affecting blood glucose; Importance of logging blood glucose levels for pattern recognition; ketone testing; safe lancet disposal.   1 8/24/20CB   A1C 6.5% on 6/10/20. Not checking BG, no meter    Wondering if he should get meter discussed benefits. 8/24/20CB   Prevention, detection & treatment of acute complications:  Identify symptoms of hyper & hypoglycemia, and prevention & treatment strategies. 1 8/24/20CB   Sometimes feels light headed or fatigue but not sure if due to BG or BP? Can see how meter would be useful. 8/17/20CB    Did initially feel more tired and some blurred vision but assumed part of aging process  8/24/20CB   Describe sick day guidelines & indications for  physician notification. Identify short term consequences of poor control. Disaster preparedness strategies    1       Prevention, detection & treatment of chronic complications:  Define the natural course of diabetes & describe the relationship of blood glucose levels to long term complications of diabetes. Identify preventative measures & standards of care. 1 9/14/20CB   Already has kidney disease unrelated to DM. Has COPD,Gout    Does upcoming appt 9/22 for eyes as PCP made referral. 8/24/20CB    Follows with kidney Dr, has dentures so no dentist, has gout and osteoarthritis.  appt with eye Dr next week, had cataract Class 2 - Meal Plan and handout for serving sizes, smarter snacking, Ready Set Carb Counting / Plate Method, Nutrient Conversion and International Diabetes [address] Eating for People with Diabetes and Nutrition in the [employer] - fast facts about fast food8/31/20 JW    [x] Self-Management  Class 3 -  Diabetes your take control guide pages 21 - 30,  type 2 diabetes and the role of GLP- 1,  Individualized Diabetes report card 9/14/20CB    [] Self-Management Class 4 - BD Booklet  Sick Day [place] and  [address] , recipe hand outs and tips, diabetes Cookbooks  ( when available)     []Self-Management - 3 month follow -  AADE7 Self care behaviors work sheets, 2020 [employer],  Online resource list - March 2020      []Self-Management  Gestational - RN class -Resource materials sent out : care booklet - \" Gestational Diabetes Mellitus ( GDM) toolkit form [place] gestational diabetes postpartum care learning collaborative 2018. \"Simple Guidelines for meal planning with gestational diabetes. SMBG sheets to fax back to [facility] weekly. BD  healthy injection site selection and rotation with 6 mm insulin syringe and 4 mm pen needle. Gestational diabetes handout from [facility]-KIEL 2016. Did you have gestational diabetes when you were pregnant? Handout from [facility]  April 2014    []Self-Management Gestational - RD class - My Food Plan for Gestational diabetes    []Glucose Meter     []Insulin Kit     []Other      Encounter Type Date Start Time End Time Comments No Show Dates   Assessment 8/17/20CB   11:30 12:45   []In Person  [x]Telephone    Class 1 - Understanding diabetes 8/24/20CB 11:30 12:30  [x]TelephoneAmerican Diabetes Association  www. diabetes. org    Class 2- Nutrition and diabetes   8/31/20 JW 11:30 12:30 [x]Telephone  Healthy Eating with Diabetes- Automatic Data of Diabetes and Digestive and Kidney   [email or web address] [email or web address]    Class 3 - Preventing Complications 9/14/20CB 11:45 1:00  [x]Telephone    Class 4 -  In depth Nutrition and sick day care    []Telephone  Diabetes Food hub  www. diabetesfoodhub.org     Class 5 - 3 month follow up / goal reassessment        Gestational - RN         Gestational - RD        Individual MNT         Shared Med Appt         Yearly Follow-up        Meter Instrx      How to Measure Your Blood Sugar - [facility] Patient Education  [email or web address]://[email or web address] [email or web address]    Insulin Instrx      []Pen  []Vial & Syringe   BD Diabetes Care: How to Inject Insulin with a Pen Needle  [email or web address] [email or web address]    Diabetes Care: How to Inject Insulin with a Syringe  [email or web address] [email or web address]       DSMS Support :   [] MNT      [] Annual update     [] Starting Fresh  adults living with diabetes or pre diabetes. [address] [place], [place]    [id number]-1:30pm- on 6 week rotation  Free -  REGISTRATION IS REQUIRED - GODX [phone] call for dates    []  Diabetes Group at  [address] [place] [address] - Free 6 week diabetes education support   classes - contact : Susan Yusuf [phone]  for dates and locations      []ADA  Where do I Begin, Living with Type 2 diabetes ADA home support program    Web site: diabetes. org/living    Call: 1800 DIABETES  e-mail: [email or web address]@[email or web address].uk. org     []  Internet web sites - ADAWeb site: www.diabetes. org       [] [facility] opens at 8 30 am daily for walkers, shops open at 10 am   [address] [place] [address]   [phone] Daily - 8:30am - 10am    3rd Tuesday of every month [facility] expert speakers visit from 9 - 10am        [] [address] [place] [address], [facility], [facility], [place], [facility] (site rotate)  Call [phone] or visit   website: [email or web address] for more details   Check web site for updated times/ dates       [] Saranya Motta Exercise Classes  Tripp Chi  Free class   [facility]  [address] [facility], [address] Tuesday and Thursday -   9 :30 am- 10:30am - ongoing   [] [address] classes   [facility]  [address] [facility], [address] [address] [facility] Thursday 11:00am - 11:45am     [] Third Wednesday Cooking  Class  Free  Registration is required     [address] [facility]. [address] [facility], [address] [facility] [phone] or   Email [email or web address]. [employer]   Wednesdays-5:00- 6:00 pm       [] Eat Smart  Be Active & Learn How - Free   Families & grandparents with children in home 0- 25 & pregnant moms          Various sites in community - call to find next session near you. [facility] extension office for future sessions - Jane Phelps  Email : [email or web address] [facility]  Phone: [phone]     [] (SNAP-Ed)   - free nutrition education   - adults and youth, who are eligible for the Supplemental Nutrition Assistance Program    Various sites in community - call to find next session near you. [facility] PASSEDI-CRANBERRY- SNAP-Ed  contact Shaun Francis, Email:[email or web address]           Post Education Referrals:      [] [place] Tobacco Quit information sheet and [address] , 21       [] Dental care - [facility]     [] [facility] link  phone number - for information and referral to [place]  Clinically  [address], WEIGHT MANAGEMENT        []Other  Mp Montague, RN

## 2020-09-21 ENCOUNTER — HOSPITAL ENCOUNTER (OUTPATIENT)
Dept: DIABETES SERVICES | Age: 80
Setting detail: THERAPIES SERIES
Discharge: HOME | End: 2020-09-21
Payer: MEDICARE

## 2020-09-21 PROCEDURE — G0108 DIAB MANAGE TRN  PER INDIV: HCPCS

## 2020-09-21 RX ORDER — ALLOPURINOL 100 MG/1
TABLET ORAL
Qty: 90 TABLET | Refills: 1 | Status: SHIPPED | OUTPATIENT
Start: 2020-09-21 | End: 2021-03-18

## 2020-09-21 NOTE — PROGRESS NOTES
Diabetes Self- Management Education Program Assessment - PHONE  This visit was done on the telephone  (During LOFDE-61 public health emergency). Pursuant to the emergency declaration under the [facility], [address] [place] [address] authority and the [employer] Act, this visit was conducted with patient's (and/or legal guardian's) consent, to reduce the patient's risk of exposure to COVID-19 and provide necessary medical care. The patient (and/or legal guardian) has also been advised to contact this office for worsening conditions or problems, and seek emergency medical treatment and/or call 911 if deemed necessary. Patient identification was verified at the start of the visit: Yes    Total time spent for this encounter: 1 hour   8/24/20CB 1 hour per phone  8/31/20 JW 1 hour, 11:30-12:30  9/14/20CB  1 hour per phone  9/21/20 JW 1 hour 11:30-12:30pm    Services were provided through a phone discussion to substitute for in-person clinic visit. Patient and provider were located at their individual home/office. Also see Diabetic Screening  Patient, Jeffry Robertson, contacted via phone call encounters for diabetes self-management education  visit/ assessment on 8/17/20     ( update if when return to face to face encounter)     MEDICAL HISTORY:  Past Medical History:   Diagnosis Date    Anemia     Anesthesia     woke up eary during surgery x1 , heard saw & felt the pressure.     Arthritis 7/15/2013    ASHD (arteriosclerotic heart disease) 7/15/2013    CAD (coronary artery disease)     has cardiac stent x 4.    Chronic kidney disease     stage 3    Claudication (HCC) 7/15/2013    COPD (chronic obstructive pulmonary disease) ([facility] [address]     Degenerative joint disease 7/15/2013    ED (erectile dysfunction) 7/15/2013    Full dentures     Gout     History of carpal tunnel syndrome 7/15/2013    History of colon polyps 7/15/2013    as needed for Chest pain 25 tablet 1    atenolol (TENORMIN) 25 MG tablet TAKE 1 TABLET DAILY 90 tablet 4    FISH OIL Take by mouth 2 times daily       therapeutic multivitamin-minerals (THERAGRAN-M) tablet Take 1 tablet by mouth daily. OTC       No current facility-administered medications for this encounter.    :     Comments:  Allergies: Allergies   Allergen Reactions    Ceclor [Cefaclor] Itching    Pcn [Penicillins] Itching       A1C blood level   Lab Results   Component Value Date    LABA1C 6.5 (H) 06/10/2020    LABA1C 6.3 (H) 09/25/2019    LABA1C 5.8 02/27/2019     Lab Results   Component Value Date    CREATININE 1.38 (H) 06/10/2020       Blood pressure   BP Readings from Last 3 Encounters:   08/04/20 104/68   06/02/20 132/82   09/24/19 120/80        Cholesterol  Lab Results   Component Value Date    LDLCHOLESTEROL 53 06/10/2020        Diabetes Self- Management Education Record    Participant Name: Judith Deleon  Referring Provider: Colin Freitas MD   Assessment/Evaluation Ratings:  1=Needs Instruction   4=Demonstrates Understanding/Competency  2=Needs Review   NC=Not Covered    3=Comprehends Key Points  N/A=Not Applicable  Topics/Learning Objectives Pre-session Assess Date:  8/17/20CB Instr. Date Reinforce Date Post- session Eval Comments   Diabetes disease process & Treatment process: Define diabetes & pre-diabetes; Identify own type of diabetes; role of the pancreas; signs/symptoms; diagnostic criteria; prevention & treatment options; contributing factors. 1 8/24/20CB 9/14/20CB  New Dx , family Hx with paternal Grandfather, uncle. Also has Gout and kidney disease. 8/17/20CB  Pt did receive packet in mail and referred to book by ADA Diabetes Your take control Guide. Encouraged to watch Youtube resource included in packet. 8/24/20CB    Pt still has not watched Youtube resource and encouraged to do so.  9/14/20CB   Incorporating nutritional management into lifestyle: Describe effect of type, has gout and osteoarthritis. appt with eye  next week, had cataract surgery in past  9/14/20CB     Developing strategies to address psychosocial issues:  Describe feelings about living with diabetes; Describe how stress, depression & anxiety affect blood glucose; Identify coping strategies; Identify support needed & support network available. 1 8/24/20CB   Motivated to keep BG in control and have healthy weight. Does not want to have complications or go on insulin like Grandfather. Lives with wife who is supportive and \"takes good care of him\"  8/17/20CB  Very motivated as started reading info in packet and making changes in diet. 8/24/20CB    Pt continues to be motivated ans appreciative of classes  9/14/20CB   Developing strategies to promote health/change behavior: Identify 7 self-care behaviors; Personal health risk factors; Benefits, challenges & strategies for behavioral change;    1         Individualized goal selection. My goal , to help me improve my health, I will:   1.learn to read labels, CHO      2.portion size       Plan  Follow-up Appointments planned August 24 @ 11:30am     Instruction Method: [x]Lecture/Discussion  []Power Point Presentation  [x]Handouts  []Return Demonstration    Education Materials/Equipment Provided (VIA Mail for phone visits)  :    [x]Self-Management - Initial assessment - Enrolment in to ADA  Where do I Begin, Living with Type 2 diabetes ADA home support program and  handout on diabetes education classes. 8/17/20CB  Patient did not receive packet in mail yet.     [x]Self-Management  Class 1 -Self-Management  Class 1 - \"Diabetes - your take control guide\" - ADA booklet -8/24/20CB  o  \"ready, set, start counting\" teaching sheet in diet section   o  GLP-1 understanding 8 core deficits puzzle sheet in the medication section  o one day food diary and envelope for return of diet HX   o  You tube and website resource sheet-Understanding Type 2 Diabetes from Animated Diabetes Patient https://youtu. be/[email or web address]      [x] Self-Management  Class 2 - Meal Plan and handout for serving sizes, smarter snacking, Ready Set Carb Counting / Plate Method, Nutrient Conversion and International Diabetes [address] Eating for People with Diabetes and Nutrition in the [employer] - fast facts about fast food8/31/20 JW    [x] Self-Management  Class 3 -  Diabetes your take control guide pages 21 - 30,  type 2 diabetes and the role of GLP- 1,  Individualized Diabetes report card 9/14/20CB    [x] Self-Management Class 4 - BD Booklet  Sick Day [place] and  Dinning Out Guide , recipe hand outs and tips, diabetes Cookbooks  ( when available)9/21/20 JW     []Self-Management - 3 month follow -  AADE7 Self care behaviors work sheets, 2020 [employer],  Online resource list - March 2020      []Self-Management  Gestational - RN class -Resource materials sent out : care booklet - \" Gestational Diabetes Mellitus ( GDM) toolkit form [place] gestational diabetes postpartum care learning collaborative 2018. \"Simple Guidelines for meal planning with gestational diabetes. SMBG sheets to fax back to [facility] weekly. BD  healthy injection site selection and rotation with 6 mm insulin syringe and 4 mm pen needle. Gestational diabetes handout from [facility] 2016. Did you have gestational diabetes when you were pregnant? Handout from [facility]  April 2014    []Self-Management Gestational - RD class - My Food Plan for Gestational diabetes    []Glucose Meter     []Insulin Kit     []Other      Encounter Type Date Start Time End Time Comments No Show Dates   Assessment 8/17/20CB   11:30 12:45   []In Person  [x]Telephone    Class 1 - Understanding diabetes 8/24/20CB 11:30 12:30  [x]TelephoneAmerican Diabetes Association  www. diabetes. org    Class 2- Nutrition and diabetes   8/31/20 JW 11:30 12:30 [x]Telephone  Healthy Eating with Diabetes- Automatic Data of Diabetes and Digestive [email or web address] for more details   Check web site for updated times/ dates       [] [address]  [address] [facility], [address] [facility] Tuesday and Thursday -   9 :30 am- 10:30am - ongoing   [] [address]  [address] [facility], [address] [address] [facility] Thursday 11:00am - 11:45am     [] Third Wednesday Cooking  Class  Free  Registration is required     [address] [facility]. 1100 [facility], [facility] [phone] or   Email [email or web address]   Wednesdays-5:00- 6:00 pm       [] Eat Smart  Be Active & Learn How - Free   Families & grandparents with children in home 0- 25 & pregnant moms          Various sites in community - call to find next session near you. [facility] extension office for future sessions - Julia Ashton  Email : [email or web address]  Phone: [phone]     [] (SNAP-Ed)   - free nutrition education   - adults and youth, who are eligible for the Supplemental Nutrition Assistance Program    Various sites in community - call to find next session near you. Cynthia SNAP-Ed  contact Trung Lew, Email:[email or web address]           Post Education Referrals:      [] [place] Tobacco Quit information sheet and [address] [facility] , [facility]       [] Dental care - [facility]     [] [address] link  phone number - for information and referral to [address], WOUND, WEIGHT MANAGEMENT        []Other  Patricia Lobo, RD, LD

## 2020-09-24 ENCOUNTER — HOSPITAL ENCOUNTER (OUTPATIENT)
Age: 80
Discharge: HOME | End: 2020-09-24
Payer: MEDICARE

## 2020-09-24 LAB
ABSOLUTE EOS #: 0.4 K/UL (ref 0–0.4)
ABSOLUTE IMMATURE GRANULOCYTE: ABNORMAL K/UL (ref 0–0.3)
ABSOLUTE LYMPH #: 2.6 K/UL (ref 1–4.8)
ABSOLUTE MONO #: 1 K/UL (ref 0.1–1.3)
ANION GAP SERPL CALCULATED.3IONS-SCNC: 11 MMOL/L (ref 9–17)
BASOPHILS # BLD: 0 % (ref 0–2)
BASOPHILS ABSOLUTE: 0 K/UL (ref 0–0.2)
BILIRUBIN URINE: NEGATIVE
BUN BLDV-MCNC: 35 MG/DL (ref 8–23)
BUN/CREAT BLD: ABNORMAL (ref 9–20)
CALCIUM SERPL-MCNC: 10.2 MG/DL (ref 8.6–10.4)
CHLORIDE BLD-SCNC: 109 MMOL/L (ref 98–107)
CO2: 23 MMOL/L (ref 20–31)
COLOR: YELLOW
COMMENT UA: NORMAL
CREAT SERPL-MCNC: 1.44 MG/DL (ref 0.7–1.2)
DIFFERENTIAL TYPE: ABNORMAL
EOSINOPHILS RELATIVE PERCENT: 5 % (ref 0–4)
GFR AFRICAN AMERICAN: 57 ML/MIN
GFR NON-AFRICAN AMERICAN: 47 ML/MIN
GFR SERPL CREATININE-BSD FRML MDRD: ABNORMAL ML/MIN/{1.73_M2}
GFR SERPL CREATININE-BSD FRML MDRD: ABNORMAL ML/MIN/{1.73_M2}
GLUCOSE BLD-MCNC: 90 MG/DL (ref 70–99)
GLUCOSE URINE: NEGATIVE
HCT VFR BLD CALC: 43.2 % (ref 41–53)
HEMOGLOBIN: 14.3 G/DL (ref 13.5–17.5)
IMMATURE GRANULOCYTES: ABNORMAL %
KETONES, URINE: NEGATIVE
LEUKOCYTE ESTERASE, URINE: NEGATIVE
LYMPHOCYTES # BLD: 26 % (ref 24–44)
MAGNESIUM: 2.2 MG/DL (ref 1.6–2.6)
MCH RBC QN AUTO: 30.6 PG (ref 26–34)
MCHC RBC AUTO-ENTMCNC: 33 G/DL (ref 31–37)
MCV RBC AUTO: 92.6 FL (ref 80–100)
MONOCYTES # BLD: 10 % (ref 1–7)
NITRITE, URINE: NEGATIVE
NRBC AUTOMATED: ABNORMAL PER 100 WBC
PDW BLD-RTO: 14.6 % (ref 11.5–14.9)
PH UA: 5.5 (ref 5–8)
PHOSPHORUS: 3.9 MG/DL (ref 2.5–4.5)
PLATELET # BLD: 245 K/UL (ref 150–450)
PLATELET ESTIMATE: ABNORMAL
PMV BLD AUTO: 10.4 FL (ref 6–12)
POTASSIUM SERPL-SCNC: 4.7 MMOL/L (ref 3.7–5.3)
PROTEIN UA: NEGATIVE
RBC # BLD: 4.67 M/UL (ref 4.5–5.9)
RBC # BLD: ABNORMAL 10*6/UL
SEG NEUTROPHILS: 59 % (ref 36–66)
SEGMENTED NEUTROPHILS ABSOLUTE COUNT: 5.8 K/UL (ref 1.3–9.1)
SODIUM BLD-SCNC: 143 MMOL/L (ref 135–144)
SPECIFIC GRAVITY UA: 1.02 (ref 1–1.03)
TURBIDITY: CLEAR
URINE HGB: NEGATIVE
UROBILINOGEN, URINE: NORMAL
WBC # BLD: 9.9 K/UL (ref 3.5–11)
WBC # BLD: ABNORMAL 10*3/UL

## 2020-09-24 PROCEDURE — 81003 URINALYSIS AUTO W/O SCOPE: CPT

## 2020-09-24 PROCEDURE — 36415 COLL VENOUS BLD VENIPUNCTURE: CPT

## 2020-09-24 PROCEDURE — 84100 ASSAY OF PHOSPHORUS: CPT

## 2020-09-24 PROCEDURE — 80048 BASIC METABOLIC PNL TOTAL CA: CPT

## 2020-09-24 PROCEDURE — 83735 ASSAY OF MAGNESIUM: CPT

## 2020-09-24 PROCEDURE — 85025 COMPLETE CBC W/AUTO DIFF WBC: CPT

## 2020-10-07 ENCOUNTER — IMMUNIZATION (OUTPATIENT)
Dept: FAMILY MEDICINE CLINIC | Age: 80
End: 2020-10-07
Payer: MEDICARE

## 2020-10-07 VITALS — WEIGHT: 184.6 LBS | TEMPERATURE: 97.1 F | BODY MASS INDEX: 26.49 KG/M2

## 2020-10-07 PROBLEM — Z99.89 DEPENDENCE ON OTHER ENABLING MACHINES AND DEVICES: Status: ACTIVE | Noted: 2020-10-07

## 2020-10-07 PROBLEM — H35.039 HYPERTENSIVE RETINOPATHY: Status: ACTIVE | Noted: 2020-10-07

## 2020-10-07 PROCEDURE — G0008 ADMIN INFLUENZA VIRUS VAC: HCPCS | Performed by: NURSE PRACTITIONER

## 2020-10-07 PROCEDURE — 90694 VACC AIIV4 NO PRSRV 0.5ML IM: CPT | Performed by: NURSE PRACTITIONER

## 2020-10-28 ENCOUNTER — PATIENT MESSAGE (OUTPATIENT)
Dept: FAMILY MEDICINE CLINIC | Age: 80
End: 2020-10-28

## 2020-11-02 ENCOUNTER — TELEPHONE (OUTPATIENT)
Dept: FAMILY MEDICINE CLINIC | Age: 80
End: 2020-11-02

## 2020-11-03 PROBLEM — M19.90 OSTEOARTHRITIS: Status: RESOLVED | Noted: 2020-11-03 | Resolved: 2020-11-03

## 2020-11-03 PROBLEM — I25.10 CAD (CORONARY ARTERY DISEASE): Status: RESOLVED | Noted: 2020-11-03 | Resolved: 2020-11-03

## 2020-11-23 RX ORDER — METFORMIN HYDROCHLORIDE 500 MG/1
TABLET, EXTENDED RELEASE ORAL
Qty: 90 TABLET | Refills: 1 | Status: SHIPPED | OUTPATIENT
Start: 2020-11-23 | End: 2021-04-05

## 2020-11-27 ENCOUNTER — OFFICE VISIT (OUTPATIENT)
Dept: FAMILY MEDICINE CLINIC | Age: 80
End: 2020-11-27
Payer: MEDICARE

## 2020-11-27 ENCOUNTER — HOSPITAL ENCOUNTER (OUTPATIENT)
Age: 80
Setting detail: SPECIMEN
Discharge: HOME | End: 2020-11-27
Payer: MEDICARE

## 2020-11-27 VITALS
HEART RATE: 58 BPM | DIASTOLIC BLOOD PRESSURE: 68 MMHG | RESPIRATION RATE: 16 BRPM | TEMPERATURE: 97.1 F | SYSTOLIC BLOOD PRESSURE: 112 MMHG | BODY MASS INDEX: 26.52 KG/M2 | WEIGHT: 184.8 LBS

## 2020-11-27 LAB
CREATININE URINE: 94 MG/DL (ref 39–259)
HBA1C MFR BLD: 5.8 %
MICROALBUMIN/CREAT 24H UR: <12 MG/L
MICROALBUMIN/CREAT UR-RTO: NORMAL MCG/MG CREAT

## 2020-11-27 PROCEDURE — 1036F TOBACCO NON-USER: CPT | Performed by: FAMILY MEDICINE

## 2020-11-27 PROCEDURE — 99214 OFFICE O/P EST MOD 30 MIN: CPT | Performed by: FAMILY MEDICINE

## 2020-11-27 PROCEDURE — G8484 FLU IMMUNIZE NO ADMIN: HCPCS | Performed by: FAMILY MEDICINE

## 2020-11-27 PROCEDURE — 1123F ACP DISCUSS/DSCN MKR DOCD: CPT | Performed by: FAMILY MEDICINE

## 2020-11-27 PROCEDURE — G8427 DOCREV CUR MEDS BY ELIG CLIN: HCPCS | Performed by: FAMILY MEDICINE

## 2020-11-27 PROCEDURE — 83036 HEMOGLOBIN GLYCOSYLATED A1C: CPT | Performed by: FAMILY MEDICINE

## 2020-11-27 PROCEDURE — G8417 CALC BMI ABV UP PARAM F/U: HCPCS | Performed by: FAMILY MEDICINE

## 2020-11-27 PROCEDURE — 4040F PNEUMOC VAC/ADMIN/RCVD: CPT | Performed by: FAMILY MEDICINE

## 2020-11-27 SDOH — ECONOMIC STABILITY: FOOD INSECURITY: WITHIN THE PAST 12 MONTHS, YOU WORRIED THAT YOUR FOOD WOULD RUN OUT BEFORE YOU GOT MONEY TO BUY MORE.: NEVER TRUE

## 2020-11-27 SDOH — ECONOMIC STABILITY: INCOME INSECURITY: HOW HARD IS IT FOR YOU TO PAY FOR THE VERY BASICS LIKE FOOD, HOUSING, MEDICAL CARE, AND HEATING?: NOT HARD AT ALL

## 2020-11-27 SDOH — ECONOMIC STABILITY: FOOD INSECURITY: WITHIN THE PAST 12 MONTHS, THE FOOD YOU BOUGHT JUST DIDN'T LAST AND YOU DIDN'T HAVE MONEY TO GET MORE.: NEVER TRUE

## 2020-11-27 ASSESSMENT — ENCOUNTER SYMPTOMS
BLOOD IN STOOL: 0
SHORTNESS OF BREATH: 0
CHEST TIGHTNESS: 0
ABDOMINAL PAIN: 0

## 2020-11-27 NOTE — PROGRESS NOTES
Subjective:      Patient ID: Kim Winslow is a 79 y.o. male. Chronic Disease Visit Information    BP Readings from Last 3 Encounters:   10/06/20 120/78   08/04/20 104/68   06/02/20 132/82          Hemoglobin A1C (%)   Date Value   06/10/2020 6.5 (H)   09/25/2019 6.3 (H)   02/27/2019 5.8     LDL Cholesterol (mg/dL)   Date Value   06/10/2020 53     HDL (mg/dL)   Date Value   06/10/2020 39 (L)     BUN (mg/dL)   Date Value   09/24/2020 35 (H)     CREATININE (mg/dL)   Date Value   09/24/2020 1.44 (H)     Glucose (mg/dL)   Date Value   09/24/2020 90   02/03/2012 143 (H)            Have you changed or started any medications since your last visit including any over-the-counter medicines, vitamins, or herbal medicines? no   Are you having any side effects from any of your medications? -  no  Have you stopped taking any of your medications? Is so, why? -  no    Have you seen any other physician or provider since your last visit? Yes - Records Obtained  Have you had any other diagnostic tests since your last visit? Yes - Records Obtained  Have you been seen in the emergency room and/or had an admission to a hospital since we last saw you? No  Have you had your annual diabetic retinal (eye) exam? Yes - Records Obtained - [facility]  Have you had your routine dental cleaning in the past 6 months? no    Have you activated your [employer] account? If not, what are your barriers?  Yes     Patient Care Team:  Helen Johnson MD as PCP - General (Family Medicine)  Helen Johnson MD as PCP - [facility] Provider  Armin Mckeon MD as Consulting Physician (Nephrology)  Roberta Cabrera MD as Consulting Physician (Ophthalmology)  Cristo Richey MD (Pulmonology)  Jerry Sheffield MD as Consulting Physician (Orthopedic Surgery)  Jose Gunderson MD as Consulting Physician (Cardiology)  Mann Osorio MD as Consulting Physician (Gastroenterology)         Medical History Review  Past Medical, Family, and Social History reviewed and does contribute to the patient presenting condition    Health Maintenance   Topic Date Due    DTaP/Tdap/Td vaccine (1 - Tdap) 03/19/1960    Shingles Vaccine (2 of 3) 11/14/2013    Annual Wellness Visit (AWV)  05/29/2019    Lipid screen  06/10/2021    Potassium monitoring  09/24/2021    Creatinine monitoring  09/24/2021    Flu vaccine  Completed    Pneumococcal 65+ years Vaccine  Completed    Hepatitis A vaccine  Aged Out    Hib vaccine  Aged Out    Meningococcal (ACWY) vaccine  Aged Out     HPI     Patient is a 71-year-old white male who presents for diabetes, hypertension, hyperlipidemia. Patient states that he is taking and tolerating his routine medication. His hemoglobin A1c today has improved to 5.8. He denies any fever, chills, chest pain, abdominal pain, shortness of breath. He has a good appetite and remains active. Review of Systems   Constitutional: Negative for chills and fever. HENT: Negative for congestion. Respiratory: Negative for chest tightness and shortness of breath. Cardiovascular: Negative for chest pain. Gastrointestinal: Negative for abdominal pain and blood in stool. Genitourinary: Negative for dysuria and hematuria. Skin: Negative for rash. Neurological: Negative for dizziness. Psychiatric/Behavioral: Negative for confusion and dysphoric mood. Objective:   Physical Exam  Vitals signs and nursing note reviewed. Constitutional:       General: He is not in acute distress. Appearance: He is well-developed. HENT:      Head: Normocephalic and atraumatic. Right Ear: Tympanic membrane, ear canal and external ear normal.      Left Ear: Tympanic membrane, ear canal and external ear normal.      Nose: Nose normal.      Mouth/Throat:      Mouth: Mucous membranes are moist.      Pharynx: Oropharynx is clear. Eyes:      General: No scleral icterus. Right eye: No discharge. Left eye: No discharge.       Conjunctiva/sclera: Conjunctivae normal.   Neck:      Musculoskeletal: Neck supple. Cardiovascular:      Rate and Rhythm: Normal rate and regular rhythm. Heart sounds: Normal heart sounds. Pulmonary:      Effort: Pulmonary effort is normal. No respiratory distress. Breath sounds: Normal breath sounds. No wheezing. Abdominal:      General: There is no distension. Palpations: Abdomen is soft. Tenderness: There is no abdominal tenderness. Skin:     General: Skin is warm and dry. Findings: No rash. Neurological:      Mental Status: He is alert and oriented to person, place, and time. Psychiatric:         Mood and Affect: Mood normal.         Behavior: Behavior normal.         Assessment:       Diagnosis Orders   1. Newly diagnosed diabetes ([facility] [address]  Microalbumin, Ur    POCT glycosylated hemoglobin (Hb A1C)    Lipid Panel    Glucose, random   2. Essential hypertension  ALT    Lipid Panel   3. Mixed hyperlipidemia  ALT    Lipid Panel           Plan:       Fallon Grace received counseling on the following healthy behaviors: nutrition, exercise and medication adherence  Reviewed prior labs and health maintenance  Continue current medications, diet and exercise. Discussed use, benefit, and side effects of prescribed medications. Barriers to medication compliance addressed. Patient given educational materials - see patient instructions  Was a self-tracking handout given in paper form or via [employer]? No:     Requested Prescriptions      No prescriptions requested or ordered in this encounter       All patient questions answered. Patient voiced understanding. Quality Measures    Body mass index is 26.52 kg/m². Elevated. Weight control planned discussed Healthy diet and regular exercise. BP: 112/68. Blood pressure is normal. Treatment plan consists of Weight Reduction.     Fall Risk 6/2/2020 2/25/2019 10/27/2017 7/28/2016 6/2/2015 5/22/2014   2 or more falls in past year? no no no no no no   Fall with injury in past year? no no no no no no     The patient does not have a history of falls. I did not - not indicated , complete a risk assessment for falls. A plan of care for falls No Treatment plan indicated    Lab Results   Component Value Date    LDLCHOLESTEROL 53 06/10/2020    (goal LDL reduction with dx if diabetes is 50% LDL reduction)    PHQ Scores 6/2/2020 2/25/2019 8/28/2018 5/19/2017 2/16/2016 1/30/2015 1/21/2014   PHQ2 Score 0 0 0 0 0 1 0   PHQ9 Score 0 0 0 0 0 1 0     Interpretation of Total Score Depression Severity: 1-4 = Minimal depression, 5-9 = Mild depression, 10-14 = Moderate depression, 15-19 = Moderately severe depression, 20-27 = Severe depression    Orders Placed This Encounter   Procedures    Microalbumin, Ur     Standing Status:   Future     Standing Expiration Date:   11/25/2021    ALT     Standing Status:   Future     Standing Expiration Date:   11/27/2021    Lipid Panel     Standing Status:   Future     Standing Expiration Date:   11/27/2021     Order Specific Question:   Is Patient Fasting?/# of Hours     Answer:    Fast 8-10 hours    Glucose, random     Standing Status:   Future     Standing Expiration Date:   11/27/2021    POCT glycosylated hemoglobin (Hb A1C)         Continue routine medication  Follow-up in 4 to 5 months or sooner if needed

## 2020-11-28 ENCOUNTER — HOSPITAL ENCOUNTER (OUTPATIENT)
Age: 80
Discharge: HOME | End: 2020-11-28
Payer: MEDICARE

## 2020-11-28 LAB
ALT SERPL-CCNC: 18 U/L (ref 5–41)
CHOLESTEROL/HDL RATIO: 2.8
CHOLESTEROL: 136 MG/DL
GLUCOSE BLD-MCNC: 97 MG/DL (ref 70–99)
HDLC SERPL-MCNC: 48 MG/DL
LDL CHOLESTEROL: 60 MG/DL (ref 0–130)
TRIGL SERPL-MCNC: 138 MG/DL
VLDLC SERPL CALC-MCNC: NORMAL MG/DL (ref 1–30)

## 2020-11-28 PROCEDURE — 82947 ASSAY GLUCOSE BLOOD QUANT: CPT

## 2020-11-28 PROCEDURE — 80061 LIPID PANEL: CPT

## 2020-11-28 PROCEDURE — 36415 COLL VENOUS BLD VENIPUNCTURE: CPT

## 2020-11-28 PROCEDURE — 84460 ALANINE AMINO (ALT) (SGPT): CPT

## 2020-12-07 ENCOUNTER — HOSPITAL ENCOUNTER (OUTPATIENT)
Dept: DIABETES SERVICES | Age: 80
Setting detail: THERAPIES SERIES
Discharge: HOME | End: 2020-12-07
Payer: MEDICARE

## 2020-12-07 PROCEDURE — G0108 DIAB MANAGE TRN  PER INDIV: HCPCS

## 2020-12-07 NOTE — LETTER
[facility]  [address] [facility]  Phone: [phone]         December 7, 2020    To Jenny Arroyo [address]  [facility] 25 [facility], [address] [place] [address]    From: Timothy Wood RN    Patient: Cleveland Echeverria   YOB: 1940   Date of Visit: 12/7/20     The following content has been reviewed since the last assessment: Managing Diabetes (Carb counting, monitoring, medications, physical activity)  Follow up assessment     An ongoing plan was created to include:Post Program support. Post Program Self Management Support Plans include: Annual Review due August 2021. Pt will need updated referral at that time. Thank you for the opportunity to provide Diabetes Self Management Education to your patient.

## 2020-12-07 NOTE — PROGRESS NOTES
Diabetes Self- Management Education Program Assessment - PHONE  This visit was done on the telephone  (During WQOUQ-45 public health emergency). Pursuant to the emergency declaration under the [facility], [address] [place] [address] and the [employer] Act, this visit was conducted with patient's (and/or legal guardian's) consent, to reduce the patient's risk of exposure to COVID-19 and provide necessary medical care. The patient (and/or legal guardian) has also been advised to contact this office for worsening conditions or problems, and seek emergency medical treatment and/or call 911 if deemed necessary. Patient identification was verified at the start of the visit: Yes    Total time spent for this encounter: 1 hour   8/24/20CB 1 hour per phone  8/31/20 JW 1 hour, 11:30-12:30  9/14/20CB  1 hour per phone  9/21/20 JW 1 hour 11:30-12:30pm  12/7/20CB  30 min by phone    Services were provided through a phone discussion to substitute for in-person clinic visit. Patient and provider were located at their individual home/office. Also see Diabetic Screening  Patient, Peter Jose, contacted via phone call encounters for diabetes self-management education  visit/ assessment on 8/17/20     ( update if when return to face to face encounter)     MEDICAL HISTORY:  Past Medical History:   Diagnosis Date    Anemia     Anesthesia     woke up eary during surgery x1 , heard saw & felt the pressure.     Arthritis 7/15/2013    ASHD (arteriosclerotic heart disease) 7/15/2013    CAD (coronary artery disease)     has cardiac stent x 4.    Chronic kidney disease     stage 3    Claudication (HCC) 7/15/2013    COPD (chronic obstructive pulmonary disease) ([facility] [address]     Degenerative joint disease 7/15/2013    ED (erectile dysfunction) 7/15/2013    Full dentures     Gout     History of carpal tunnel syndrome 7/15/2013    History of colon polyps 7/15/2013    History of hemorrhoids 7/15/2013    History of TIA (transient ischemic attack) 7/15/2013    Hypercholesteremia 7/15/2013    Hypertension     Hyperuricemia 7/15/2013    Leukocytosis     Renal cyst     Sleep apnea 07/15/2013    on cpap at hs.     Wears glasses     for reading     Family History   Problem Relation Age of Onset    Alzheimer's Disease Mother     Heart Disease Mother     Emphysema Father      Ceclor [cefaclor] and Pcn [penicillins]   Immunization History   Administered Date(s) Administered    Influenza 10/25/2013    Influenza Virus Vaccine 10/05/2010, 09/20/2011, 09/14/2012, 09/30/2014, 10/23/2015, 10/17/2016, 10/16/2017, 10/01/2018    Influenza, High Dose (Fluzone 65 yrs and older) 10/03/2018    Influenza, Norva Goran, IM, (6 mo and older Fluzone, Flulaval, Fluarix and 3 yrs and older Afluria) 10/25/2016    Influenza, Quadv, IM, PF (6 mo and older Fluzone, Flulaval, Fluarix, and 3 yrs and older Afluria) 10/19/2017    Influenza, Quadv, adjuvanted, 65 yrs +, IM, PF (Fluad) 10/07/2020    Influenza, Triv, inactivated, subunit, adjuvanted, IM (Fluad 65 yrs and older) 10/22/2019    Pneumococcal Conjugate 13-valent ([id number]) 03/11/2016    Pneumococcal Polysaccharide ([id number]) 04/12/2010, 04/05/2017    Tetanus 05/22/2014    Zoster Live (Zostavax) 09/19/2013     Current Medications  Current Outpatient Medications   Medication Sig Dispense Refill    metFORMIN (GLUCOPHAGE-XR) 500 MG extended release tablet TAKE 1 TABLET DAILY WITH BREAKFAST 90 tablet 1    allopurinol (ZYLOPRIM) 100 MG tablet TAKE 1 TABLET DAILY 90 tablet 1    rosuvastatin (CRESTOR) 10 MG tablet TAKE 1 TABLET DAILY 90 tablet 1    Fluticasone-Salmeterol 232-14 MCG/ACT AEPB       aspirin (ASPIRIN 81) 81 MG chewable tablet       CPAP Machine MISC --- ---      albuterol sulfate HFA (VENTOLIN HFA) 108 (90 Base) MCG/ACT inhaler 2 puffs as needed      lisinopril (PRINIVIL;ZESTRIL) 2.5 MG tablet TAKE 1 TABLET DAILY 90 tablet 3    nitroGLYCERIN (NITROSTAT) 0.4 MG SL tablet Place 1 tablet under the tongue every 5 minutes as needed for Chest pain 25 tablet 1    atenolol (TENORMIN) 25 MG tablet TAKE 1 TABLET DAILY 90 tablet 4    FISH OIL Take by mouth 2 times daily       therapeutic multivitamin-minerals (THERAGRAN-M) tablet Take 1 tablet by mouth daily. OTC       No current facility-administered medications for this encounter.    :     Comments:  Allergies: Allergies   Allergen Reactions    Ceclor [Cefaclor] Itching    Pcn [Penicillins] Itching       A1C blood level   Lab Results   Component Value Date    LABA1C 5.8 11/27/2020    LABA1C 6.5 (H) 06/10/2020    LABA1C 6.3 (H) 09/25/2019     Lab Results   Component Value Date    LABMICR CANNOT BE CALCULATED 11/27/2020    CREATININE 1.44 (H) 09/24/2020       Blood pressure   BP Readings from Last 3 Encounters:   11/27/20 112/68   10/06/20 120/78   08/04/20 104/68        Cholesterol  Lab Results   Component Value Date    LDLCHOLESTEROL 60 11/28/2020        Diabetes Self- Management Education Record    Participant Name: Cyndi Burns  Referring Provider: Obinna Kinney MD   Assessment/Evaluation Ratings:  1=Needs Instruction   4=Demonstrates Understanding/Competency  2=Needs Review   NC=Not Covered    3=Comprehends Key Points  N/A=Not Applicable  Topics/Learning Objectives Pre-session Assess Date:  8/17/20CB Instr. Date Reinforce Date Post- session Eval  12/7/20CB Comments   Diabetes disease process & Treatment process: Define diabetes & pre-diabetes; Identify own type of diabetes; role of the pancreas; signs/symptoms; diagnostic criteria; prevention & treatment options; contributing factors. 1 8/24/20CB 9/14/20CB 1 New Dx , family Hx with paternal Grandfather, uncle. Also has Gout and kidney disease. 8/17/20CB  Pt did receive packet in mail and referred to book by ADA Diabetes Your take control Guide.  Encouraged to watch Youtube resource included in packet. 8/24/20CB    Pt still has not watched Youtube resource and encouraged to do so.  9/14/20CB    Pt had questions re: T2Dm as genetic, reviewed T1DM and T2DM12/7/20CB   Incorporating nutritional management into lifestyle: Describe effect of type, amount & timing of food on blood glucose; Describe basic meal planning techniques & current nutrition guideline   1recently made changes, cut out pop( used to drink4 cans/d) and sweets, but drinks 2 (20oz) bottles gatorade a day, only 16oz water    Used to skip breakfast but trying to eat frozen pancake with coffee    12n whatever leftovers or sandwich    Snack occ fruit    5-6p no schedule Frozen dinner \"complete\" or single serving mac and cheese. Lacking vegetables    HS eating more fruit there or pretzels, no protein  Has lost 20# past month since omitting pop and eating fast food  8/17/20CB 8/31/20 JW- pt has been doing very well. Praised progress/efforts. Has lost weight and making healthy food choices/eating on a regular schedule. Used plate method. 9/21/20 JW 3 doing well with label reading and portio size. Asking more detailed questions re: refined and natural  12/7/20CB What to eat - Food groups, When to eat - timing of meals and snacks, and How much to eat - portions control. Suggest 60-75 grams cho/meal and 15 grams cho/snack   calories/ day   CHO choices/ meal   CHO choices/  day   grams of protein /day   gram of fat /day     Correctly read food labels & demonstrate CHO counting & portion control with personalized meal plan. Identify dining out strategies, & dietary sick day guidelines. 1 8/31/20 JW 9/21/20 JW 4    Incorporating physical activity into lifestyle:   Verbalize effect of exercise on blood glucose levels; benefits of regular exercise; safety considerations; contraindications; maintenance of activity. 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  He and wife limited due to lower back pain. Pt also has gout and COPD  8/17/20CB  He and wife used to do a lot of walking a year ago but limited now8/24/20CB   Using medications safely:  Identify effects of diabetes medicines on blood glucose levels; List diabetes medication taken, action & side effects;    1 8/24/20CB 9/14/20CB 3 Metformin  mg daily    Discussed action of Metformin as pt wanted to know. 8/24/20CB    Pt did not get mailorder of Metformin from [facility] and ha been out of Metformin. Pt called office who told him to call [facility] which he did and they won't ship for another 3 days. Pt to call back PCP to get a 1x RX to local Pharmacy and hopefully they can do override9/14/20CB       Insulin / Injectable - Appropriate injection sites; proper storage; supplies needed; proper technique; safe needle disposal guidelines. 1 9/14/20CB  3 Grandfather on insulin8/17/20CB    Briefly discussed. NA at this time9/14/20CB   Monitoring blood glucose, interpreting and using results:  Identify recommended & personal blood glucose targets; importance of testing; testing supplies; HgbA1C target levels; Factors affecting blood glucose; Importance of logging blood glucose levels for pattern recognition; ketone testing; safe lancet disposal.   1 8/24/20CB  3 A1C 6.5% on 6/10/20. Not checking BG, no meter    Wondering if he should get meter discussed benefits. 8/24/20CB    A1C 5.8% on 11/27/20 and very pleased. Never got meter12/7/20CB   Prevention, detection & treatment of acute complications:  Identify symptoms of hyper & hypoglycemia, and prevention & treatment strategies. 1 8/24/20CB  3 Sometimes feels light headed or fatigue but not sure if due to BG or BP? Can see how meter would be useful. 8/17/20CB    Did initially feel more tired and some blurred vision but assumed part of aging process8/24/20CB  Ouqvhegz80/7/20CB   Describe sick day guidelines & indications for  physician notification.  Identify short term consequences of poor control. Disaster preparedness strategies    1 9/21/20 JW  3 Reviewed 12/7/20CB   Prevention, detection & treatment of chronic complications:  Define the natural course of diabetes & describe the relationship of blood glucose levels to long term complications of diabetes. Identify preventative measures & standards of care. 1 9/14/20CB  3 Already has kidney disease unrelated to DM. Has COPD,Gout    Does upcoming appt 9/22 for eyes as PCP made referral. 8/24/20CB    Follows with kidney Dr, has dentures so no dentist, has gout and osteoarthritis. appt with eye Dr next week, had cataract surgery in past  9/14/20CB     Developing strategies to address psychosocial issues:  Describe feelings about living with diabetes; Describe how stress, depression & anxiety affect blood glucose; Identify coping strategies; Identify support needed & support network available. 1 8/24/20CB  4 Motivated to keep BG in control and have healthy weight. Does not want to have complications or go on insulin like Grandfather. Lives with wife who is supportive and \"takes good care of him\"8/17/20CB  Very motivated as started reading info in packet and making changes in diet. 8/24/20CB    Pt continues to be motivated ans appreciative of classes  9/14/20CB    Very motivated and asking appropriate questions to further knowledge. 12/7/20CB   Developing strategies to promote health/change behavior: Identify 7 self-care behaviors; Personal health risk factors; Benefits, challenges & strategies for behavioral change;    1         Individualized goal selection.             4  My goal , to help me improve my health, I will:   1.learn to read labels, CHO  100%12/7/20CB      2.portion size  100% 12/7/20CB     Plan  Follow-up Appointments planned August 24 @ 11:30am     Instruction Method: [x]Lecture/Discussion  []Power Point Presentation  [x]Handouts  []Return Demonstration    Education Materials/Equipment Provided (VIA Mail for phone visits)  : [x]Self-Management - Initial assessment - Enrolment in to ADA  Where do I Begin, Living with Type 2 diabetes ADA home support program and  handout on diabetes education classes. 8/17/20CB  Patient did not receive packet in mail yet. [x]Self-Management  Class 1 -Self-Management  Class 1 - \"Diabetes - your take control guide\" - ADA booklet -8/24/20CB  o  \"ready, set, start counting\" teaching sheet in diet section   o  GLP-1 understanding 8 core deficits puzzle sheet in the medication section  o one day food diary and envelope for return of diet HX   o  You tube and website resource sheet-Understanding Type 2 Diabetes from Animated Diabetes Patient [email or web address]://[email or web address] [email or web address]      [x] Self-Management  Class 2 - Meal Plan and handout for serving sizes, smarter snacking, Ready Set Carb Counting / Plate Method, Nutrient Conversion and International Diabetes [address] Eating for People with Diabetes and Nutrition in the [employer] - fast facts about fast food8/31/20 JW    [x] Self-Management  Class 3 -  Diabetes your take control guide pages 21 - 30,  type 2 diabetes and the role of GLP- 1,  Individualized Diabetes report card 9/14/20CB    [x] Self-Management Class 4 - BD Booklet  Sick Day [place] and  Dinning Out Guide , recipe hand outs and tips, diabetes Cookbooks  ( when available)9/21/20 JW     [x]Self-Management - 3 month follow -  AADE7 Self care behaviors work sheets, 2020 [employer],  Online resource list - March 2020 12/7/20CB     []Self-Management  Gestational - RN class -Resource materials sent out : care booklet - \" Gestational Diabetes Mellitus ( GDM) toolkit form [place] gestational diabetes postpartum care learning collaborative 2018. \"Simple Guidelines for meal planning with gestational diabetes. SMBG sheets to fax back to M weekly. BD  healthy injection site selection and rotation with 6 mm insulin syringe and 4 mm pen needle.  Gestational diabetes handout 2 diabetes ADA home support program    Web site: diabetes. org/living    Call: 1800 DIABETES  e-mail: [email or web address]@.com. org     []  Internet web sites - ADAWeb site: www.diabetes. org       [] [facility] opens at 8 30 am daily for walkers, shops open at 10 am   [address] [facility], [address] [facility]   [phone] Daily - 8:30am - 10am    3rd Tuesday of every month [facility] expert speakers visit from 9 - 10am        [] [address] [facility], [facility], [facility], [place], [facility] abrams (site rotate)  Call [phone] or visit   website: [email or web address] for more details   Check web site for updated times/ dates       [] [address]  [address] [facility], [address] [place] [address] [place] Tuesday and Thursday -   9 :30 am- 10:30am - ongoing   [] [address], [address] [address] [facility] Thursday 11:00am - 11:45am     [] Third Wednesday Cooking  Class  Free  Registration is required     [address] [facility]. [address] [facility], [address] [place] [address] [phone] or   Email [email or web address]   Wednesdays-5:00- 6:00 pm       [] Eat Smart  Be Active & Learn How - Free   Families & grandparents with children in home 0- 25 & pregnant moms          Various sites in community - call to find next session near you. [facility] extension office for future sessions - Kojo Frazier  Email : [email or web address]  Phone: [phone]     [] (SNAP-Ed)   - free nutrition education   - adults and youth, who are eligible for the Supplemental Nutrition Assistance Program    Various sites in community - call to find next session near you. [facility]-ER SNAP-Ed  contact Franklyn Delacruz, Email:[phone]

## 2020-12-22 ENCOUNTER — TELEPHONE (OUTPATIENT)
Dept: FAMILY MEDICINE CLINIC | Age: 80
End: 2020-12-22

## 2020-12-22 NOTE — TELEPHONE ENCOUNTER
I called patient to see if we could get him scheduled for a Medicare Wellness Visit. Patient stated he does not wish to schedule one.

## 2021-02-23 RX ORDER — ROSUVASTATIN CALCIUM 10 MG/1
TABLET, COATED ORAL
Qty: 90 TABLET | Refills: 1 | Status: SHIPPED | OUTPATIENT
Start: 2021-02-23 | End: 2021-08-02

## 2021-03-12 ENCOUNTER — TELEPHONE (OUTPATIENT)
Dept: FAMILY MEDICINE CLINIC | Age: 81
End: 2021-03-12

## 2021-03-12 ENCOUNTER — HOSPITAL ENCOUNTER (OUTPATIENT)
Age: 81
Discharge: HOME | End: 2021-03-14
Payer: MEDICARE

## 2021-03-12 ENCOUNTER — OFFICE VISIT (OUTPATIENT)
Dept: FAMILY MEDICINE CLINIC | Age: 81
End: 2021-03-12
Payer: MEDICARE

## 2021-03-12 ENCOUNTER — HOSPITAL ENCOUNTER (OUTPATIENT)
Dept: GENERAL RADIOLOGY | Age: 81
Discharge: HOME | End: 2021-03-14
Payer: MEDICARE

## 2021-03-12 VITALS
TEMPERATURE: 97.3 F | RESPIRATION RATE: 18 BRPM | WEIGHT: 187 LBS | SYSTOLIC BLOOD PRESSURE: 128 MMHG | HEART RATE: 64 BPM | BODY MASS INDEX: 26.83 KG/M2 | DIASTOLIC BLOOD PRESSURE: 68 MMHG

## 2021-03-12 DIAGNOSIS — M25.551 RIGHT HIP PAIN: ICD-10-CM

## 2021-03-12 DIAGNOSIS — M54.41 CHRONIC RIGHT-SIDED LOW BACK PAIN WITH RIGHT-SIDED SCIATICA: Primary | ICD-10-CM

## 2021-03-12 DIAGNOSIS — G89.29 CHRONIC RIGHT-SIDED LOW BACK PAIN WITH RIGHT-SIDED SCIATICA: Primary | ICD-10-CM

## 2021-03-12 DIAGNOSIS — M54.16 LUMBAR RADICULOPATHY: Primary | ICD-10-CM

## 2021-03-12 DIAGNOSIS — M54.31 SCIATICA OF RIGHT SIDE: ICD-10-CM

## 2021-03-12 PROBLEM — N18.30 SECONDARY DIABETES MELLITUS WITH STAGE 3 CHRONIC KIDNEY DISEASE (HCC): Status: ACTIVE | Noted: 2021-03-12

## 2021-03-12 PROBLEM — E13.22 SECONDARY DIABETES MELLITUS WITH STAGE 3 CHRONIC KIDNEY DISEASE (HCC): Status: ACTIVE | Noted: 2021-03-12

## 2021-03-12 PROCEDURE — G8484 FLU IMMUNIZE NO ADMIN: HCPCS | Performed by: NURSE PRACTITIONER

## 2021-03-12 PROCEDURE — 72100 X-RAY EXAM L-S SPINE 2/3 VWS: CPT

## 2021-03-12 PROCEDURE — 4040F PNEUMOC VAC/ADMIN/RCVD: CPT | Performed by: NURSE PRACTITIONER

## 2021-03-12 PROCEDURE — G8427 DOCREV CUR MEDS BY ELIG CLIN: HCPCS | Performed by: NURSE PRACTITIONER

## 2021-03-12 PROCEDURE — G8417 CALC BMI ABV UP PARAM F/U: HCPCS | Performed by: NURSE PRACTITIONER

## 2021-03-12 PROCEDURE — 1036F TOBACCO NON-USER: CPT | Performed by: NURSE PRACTITIONER

## 2021-03-12 PROCEDURE — 1123F ACP DISCUSS/DSCN MKR DOCD: CPT | Performed by: NURSE PRACTITIONER

## 2021-03-12 PROCEDURE — 99214 OFFICE O/P EST MOD 30 MIN: CPT | Performed by: NURSE PRACTITIONER

## 2021-03-12 RX ORDER — TIZANIDINE 2 MG/1
2 TABLET ORAL NIGHTLY PRN
Qty: 30 TABLET | Refills: 0 | Status: SHIPPED | OUTPATIENT
Start: 2021-03-12 | End: 2021-04-02 | Stop reason: ALTCHOICE

## 2021-03-12 RX ORDER — LIDOCAINE 50 MG/G
1 PATCH TOPICAL DAILY
Qty: 10 PATCH | Refills: 0 | Status: SHIPPED | OUTPATIENT
Start: 2021-03-12 | End: 2021-03-22

## 2021-03-12 RX ORDER — METHYLPREDNISOLONE 4 MG/1
TABLET ORAL
Qty: 1 KIT | Refills: 0 | Status: SHIPPED | OUTPATIENT
Start: 2021-03-12 | End: 2021-03-18

## 2021-03-12 ASSESSMENT — ENCOUNTER SYMPTOMS
NAUSEA: 0
BACK PAIN: 1
ABDOMINAL PAIN: 0
SHORTNESS OF BREATH: 0
COUGH: 0

## 2021-03-12 ASSESSMENT — PATIENT HEALTH QUESTIONNAIRE - PHQ9
SUM OF ALL RESPONSES TO PHQ9 QUESTIONS 1 & 2: 0
SUM OF ALL RESPONSES TO PHQ QUESTIONS 1-9: 0
SUM OF ALL RESPONSES TO PHQ QUESTIONS 1-9: 0

## 2021-03-12 NOTE — PROGRESS NOTES
Subjective:      Patient ID: Shruthi Chang is a 79 y.o. male. HYPERTENSION visit     BP Readings from Last 3 Encounters:   03/12/21 128/68   11/27/20 112/68   10/06/20 120/78       LDL Cholesterol (mg/dL)   Date Value   11/28/2020 60     HDL (mg/dL)   Date Value   11/28/2020 48     BUN (mg/dL)   Date Value   09/24/2020 35 (H)     CREATININE (mg/dL)   Date Value   09/24/2020 1.44 (H)     Glucose (mg/dL)   Date Value   11/28/2020 97   02/03/2012 143 (H)              Have you changed or started any medications since your last visit including any over-the-counter medicines, vitamins, or herbal medicines? no   Have you stopped taking any of your medications? Is so, why? -  no  Are you having any side effects from any of your medications? - no  How often do you miss doses of your medication? no      Have you seen any other physician or provider since your last visit?  no   Have you had any other diagnostic tests since your last visit?  no   Have you been seen in the emergency room and/or had an admission in a hospital since we last saw you?  no   Have you had your routine dental cleaning in the past 6 months?  no     Do you have an active MyChart account? If no, what is the barrier?   YES    Patient Care Team:  Fabián Nixon MD as PCP - General (Family Medicine)  Fabián Nixon MD as PCP - [facility][1] [facility] [place] [facility]  Kaylan Urban MD as Consulting Physician (Nephrology)  Scotty Driscoll MD as Consulting Physician (Ophthalmology)  Rosalia Escobedo MD (Pulmonology)  Aundrea Cosby MD as Consulting Physician (Orthopedic Surgery)  Pedro Goldstein MD as Consulting Physician (Cardiology)  Maggie Lovett MD as Consulting Physician (Gastroenterology)    Medical History Review  Past Medical, Family, and Social History reviewed and does contribute to the patient presenting condition    Health Maintenance   Topic Date Due    Hepatitis C screen  Never done    DTaP/Tdap/Td vaccine (1 - Tdap) Never done    Shingles Vaccine (2 of 3) 11/14/2013    Annual Wellness Visit (AWV)  Never done    Potassium monitoring  09/24/2021    Creatinine monitoring  09/24/2021    Lipid screen  11/28/2021    Flu vaccine  Completed    Pneumococcal 65+ years Vaccine  Completed    COVID-19 Vaccine  Completed    Hepatitis A vaccine  Aged Out    Hib vaccine  Aged Out    Meningococcal (ACWY) vaccine  Aged Out     HPI    79year old male presents with right hip/lower back pain and radiculopathy for 8 weeks. Pain is located in right gluteal area and radiated to right thigh and knee. Describes as sharp intermittent worse with movements. States he had right hip replacement 26 years ago and has had intermittent pain in the past but now it is getting worse over the past several weeks. Has hard time walking and started using Took ASA without relief. Denies weakness paresthesias in right leg. Denies change in bowel or bladder pattern. Hx of DM. Review of Systems   Constitutional: Negative for chills and fever. Respiratory: Negative for cough and shortness of breath. Cardiovascular: Negative for chest pain and palpitations. Gastrointestinal: Negative for abdominal pain and nausea. Musculoskeletal: Positive for arthralgias (right hip pain), back pain and gait problem. Neurological: Negative for dizziness, weakness and numbness. Psychiatric/Behavioral: Negative for agitation and behavioral problems. Objective:   Physical Exam  Vitals signs and nursing note reviewed. Constitutional:       General: He is not in acute distress. Appearance: Normal appearance. He is obese. HENT:      Nose: Nose normal.   Eyes:      Conjunctiva/sclera: Conjunctivae normal.   Neck:      Musculoskeletal: Neck supple. Cardiovascular:      Rate and Rhythm: Regular rhythm. Heart sounds: Normal heart sounds. Pulmonary:      Effort: Pulmonary effort is normal. No respiratory distress. Breath sounds: Normal breath sounds.    Abdominal: Palpations: Abdomen is soft. Tenderness: There is no abdominal tenderness. Musculoskeletal:         General: Tenderness present. No deformity. Right upper leg: He exhibits tenderness ( has limited ROM, ambulates with a walker ). Legs:    Lymphadenopathy:      Cervical: No cervical adenopathy. Skin:     General: Skin is warm and dry. Neurological:      Mental Status: He is alert and oriented to person, place, and time. Cranial Nerves: No cranial nerve deficit. Psychiatric:         Mood and Affect: Mood normal.         Behavior: Behavior normal.         Assessment:      1. Lumbar radiculopathy    2. Sciatica of right side    3. Right hip pain            Plan:      BP Readings from Last 3 Encounters:   03/12/21 128/68   11/27/20 112/68   10/06/20 120/78     /68 (Site: Right Upper Arm, Position: Sitting, Cuff Size: Medium Adult)   Pulse 64   Temp 97.3 °F (36.3 °C) (Infrared)   Resp 18   Wt 187 lb (84.8 kg)   BMI 26.83 kg/m²   Lab Results   Component Value Date    WBC 9.9 09/24/2020    HGB 14.3 09/24/2020    HCT 43.2 09/24/2020     09/24/2020    CHOL 136 11/28/2020    TRIG 138 11/28/2020    HDL 48 11/28/2020    ALT 18 11/28/2020    AST 31 09/25/2019     09/24/2020    K 4.7 09/24/2020     (H) 09/24/2020    CREATININE 1.44 (H) 09/24/2020    BUN 35 (H) 09/24/2020    CO2 23 09/24/2020    TSH 1.30 05/19/2017    PSA 1.10 06/24/2016    LABA1C 5.8 11/27/2020    LABMICR CANNOT BE CALCULATED 11/27/2020     Lab Results   Component Value Date    CALCIUM 10.2 09/24/2020    PHOS 3.9 09/24/2020     Lab Results   Component Value Date    LDLCHOLESTEROL 60 11/28/2020         1. Lumbar radiculopathy/2. Sciatica of right side  - conservative therapy. - methylPREDNISolone (MEDROL DOSEPACK) 4 MG tablet; Take by mouth as directed  Dispense: 1 kit; Refill: 0  - tiZANidine (ZANAFLEX) 2 MG tablet; Take 1 tablet by mouth nightly as needed (prn)  Dispense: 30 tablet;  Refill: 0  - XR LUMBAR SPINE (2-3 VIEWS); Future  - lidocaine (LIDODERM) 5 %; Place 1 patch onto the skin daily for 10 days 12 hours on, 12 hours off. Dispense: 10 patch; Refill: 0  - apply warm moist compress and avoid heavy lifting or pushing/pulling. Continue pain medications and follow up in 2 weeks. 3. Right hip pain  - cont pain medication and follow up with Dr. Faizan Camargo as scheduled         Requested Prescriptions     Signed Prescriptions Disp Refills    methylPREDNISolone (MEDROL DOSEPACK) 4 MG tablet 1 kit 0     Sig: Take by mouth as directed    tiZANidine (ZANAFLEX) 2 MG tablet 30 tablet 0     Sig: Take 1 tablet by mouth nightly as needed (prn)    lidocaine (LIDODERM) 5 % 10 patch 0     Sig: Place 1 patch onto the skin daily for 10 days 12 hours on, 12 hours off. There are no discontinued medications. Discussed use, benefit, and side effects of prescribed medications. Barriers to medication compliance addressed. All patient questions answered. Pt voiced understanding. Return for as scheduled , diabetes, HTN, HLD right sciatica .

## 2021-03-16 DIAGNOSIS — G89.29 CHRONIC RIGHT-SIDED LOW BACK PAIN WITH RIGHT-SIDED SCIATICA: Primary | ICD-10-CM

## 2021-03-16 DIAGNOSIS — M54.41 CHRONIC RIGHT-SIDED LOW BACK PAIN WITH RIGHT-SIDED SCIATICA: Primary | ICD-10-CM

## 2021-03-16 RX ORDER — HYDROCODONE BITARTRATE AND ACETAMINOPHEN 5; 325 MG/1; MG/1
1 TABLET ORAL 2 TIMES DAILY PRN
Qty: 6 TABLET | Refills: 0 | Status: SHIPPED | OUTPATIENT
Start: 2021-03-16 | End: 2021-03-19

## 2021-03-16 NOTE — TELEPHONE ENCOUNTER
Message conveyed to patient. Picking up RX for Norco.  Referring to Dr. Sukhjinder Eller (ok with patient) Urgent.

## 2021-03-16 NOTE — TELEPHONE ENCOUNTER
Donato Javed,  Patient's wife stopped in the office. She states her  is having a lot of pain upon walking and ambulates with a walker, sitting is ok. He has been taking the steroids, still a lot of pain. Is there something he can take for pain? Pharmacy is [facility]. XR lumbar showed scoliosis and multilevel degenerative changes with bony spurs. He does not have another appointment with Dr. Sudhakar Lanza until 3-23-21.

## 2021-03-18 RX ORDER — ALLOPURINOL 100 MG/1
TABLET ORAL
Qty: 90 TABLET | Refills: 1 | Status: SHIPPED | OUTPATIENT
Start: 2021-03-18 | End: 2021-09-13

## 2021-03-30 ENCOUNTER — OFFICE VISIT (OUTPATIENT)
Dept: ORTHOPEDIC SURGERY | Age: 81
End: 2021-03-30
Payer: MEDICARE

## 2021-03-30 VITALS — HEIGHT: 70 IN | WEIGHT: 186 LBS | BODY MASS INDEX: 26.63 KG/M2

## 2021-03-30 DIAGNOSIS — M54.50 ACUTE LOW BACK PAIN, UNSPECIFIED BACK PAIN LATERALITY, UNSPECIFIED WHETHER SCIATICA PRESENT: ICD-10-CM

## 2021-03-30 DIAGNOSIS — M51.36 DEGENERATIVE DISC DISEASE, LUMBAR: ICD-10-CM

## 2021-03-30 DIAGNOSIS — M41.9 SCOLIOSIS OF LUMBAR SPINE, UNSPECIFIED SCOLIOSIS TYPE: ICD-10-CM

## 2021-03-30 DIAGNOSIS — M54.16 LUMBAR RADICULAR PAIN: Primary | ICD-10-CM

## 2021-03-30 PROCEDURE — G8417 CALC BMI ABV UP PARAM F/U: HCPCS | Performed by: ORTHOPAEDIC SURGERY

## 2021-03-30 PROCEDURE — 4004F PT TOBACCO SCREEN RCVD TLK: CPT | Performed by: ORTHOPAEDIC SURGERY

## 2021-03-30 PROCEDURE — 4040F PNEUMOC VAC/ADMIN/RCVD: CPT | Performed by: ORTHOPAEDIC SURGERY

## 2021-03-30 PROCEDURE — G8484 FLU IMMUNIZE NO ADMIN: HCPCS | Performed by: ORTHOPAEDIC SURGERY

## 2021-03-30 PROCEDURE — 1123F ACP DISCUSS/DSCN MKR DOCD: CPT | Performed by: ORTHOPAEDIC SURGERY

## 2021-03-30 PROCEDURE — 99213 OFFICE O/P EST LOW 20 MIN: CPT | Performed by: ORTHOPAEDIC SURGERY

## 2021-03-30 PROCEDURE — G8427 DOCREV CUR MEDS BY ELIG CLIN: HCPCS | Performed by: ORTHOPAEDIC SURGERY

## 2021-03-30 RX ORDER — HYDROCODONE BITARTRATE AND ACETAMINOPHEN 5; 325 MG/1; MG/1
1 TABLET ORAL EVERY 4 HOURS PRN
Qty: 18 TABLET | Refills: 0 | Status: SHIPPED | OUTPATIENT
Start: 2021-03-30 | End: 2021-03-30 | Stop reason: SDUPTHER

## 2021-03-30 RX ORDER — HYDROCODONE BITARTRATE AND ACETAMINOPHEN 5; 325 MG/1; MG/1
1 TABLET ORAL EVERY 4 HOURS PRN
Qty: 18 TABLET | Refills: 0 | Status: SHIPPED | OUTPATIENT
Start: 2021-03-30 | End: 2021-04-02 | Stop reason: ALTCHOICE

## 2021-03-30 NOTE — PROGRESS NOTES
Patient ID: Jameel Flood is a [de-identified] y.o. male    Chief Compliant:  No chief complaint on file. HPI:  This is a [de-identified] y.o. male who presents to the clinic today as a new patient lumbar spine evaluation. Patient presents with a 4-week history of severe right radicular leg pain. Patient difficulty sleeping. Patient is using a walker to ambulate. Patient unable to take NSAIDs due to kidney disease      Review of Systems   All other systems reviewed and are negative. Past History:    Current Outpatient Medications:     allopurinol (ZYLOPRIM) 100 MG tablet, TAKE 1 TABLET DAILY, Disp: 90 tablet, Rfl: 1    tiZANidine (ZANAFLEX) 2 MG tablet, Take 1 tablet by mouth nightly as needed (prn), Disp: 30 tablet, Rfl: 0    rosuvastatin (CRESTOR) 10 MG tablet, TAKE 1 TABLET DAILY, Disp: 90 tablet, Rfl: 1    atenolol (TENORMIN) 25 MG tablet, TAKE 1 TABLET DAILY, Disp: 90 tablet, Rfl: 3    metFORMIN (GLUCOPHAGE-XR) 500 MG extended release tablet, TAKE 1 TABLET DAILY WITH BREAKFAST, Disp: 90 tablet, Rfl: 1    Fluticasone-Salmeterol 232-14 MCG/ACT AEPB, , Disp: , Rfl:     aspirin (ASPIRIN 81) 81 MG chewable tablet, , Disp: , Rfl:     CPAP Machine MISC, --- ---, Disp: , Rfl:     albuterol sulfate HFA (VENTOLIN HFA) 108 (90 Base) MCG/ACT inhaler, 2 puffs as needed, Disp: , Rfl:     lisinopril (PRINIVIL;ZESTRIL) 2.5 MG tablet, TAKE 1 TABLET DAILY, Disp: 90 tablet, Rfl: 3    nitroGLYCERIN (NITROSTAT) 0.4 MG SL tablet, Place 1 tablet under the tongue every 5 minutes as needed for Chest pain, Disp: 25 tablet, Rfl: 1    FISH OIL, Take by mouth 2 times daily , Disp: , Rfl:     therapeutic multivitamin-minerals (THERAGRAN-M) tablet, Take 1 tablet by mouth daily.  OTC, Disp: , Rfl:   Allergies   Allergen Reactions    Ceclor [Cefaclor] Itching    Pcn [Penicillins] Itching     Social History     Socioeconomic History    Marital status:      Spouse name: Not on file    Number of children: Not on file    Years of education: Not on file    Highest education level: Not on file   Occupational History    Not on file   Social Needs    Financial resource strain: Not hard at all    Food insecurity     Worry: Never true     Inability: Never true   [employer] needs     Medical: Not on file     Non-medical: Not on file   Tobacco Use    Smoking status: Former Smoker     Packs/day: 2.00     Years: 35.00     Pack years: 70.00     Quit date:      Years since quittin.2    Smokeless tobacco: Never Used   Substance and Sexual Activity    Alcohol use: No     Alcohol/week: 0.0 standard drinks     Comment: rarely    Drug use: No    Sexual activity: Not on file   Lifestyle    Physical activity     Days per week: Not on file     Minutes per session: Not on file    Stress: Not on file   Relationships    Social connections     Talks on phone: Not on file     Gets together: Not on file     Attends Jewish service: Not on file     Active member of club or organization: Not on file     Attends meetings of clubs or organizations: Not on file     Relationship status: Not on file    Intimate partner violence     Fear of current or ex partner: Not on file     Emotionally abused: Not on file     Physically abused: Not on file     Forced sexual activity: Not on file   Other Topics Concern    Not on file   Social History Narrative    Not on file     Past Medical History:   Diagnosis Date    Anemia     Anesthesia     woke up eary during surgery x1 , heard saw & felt the pressure.     Arthritis 7/15/2013    ASHD (arteriosclerotic heart disease) 7/15/2013    CAD (coronary artery disease)     has cardiac stent x 4.    Chronic kidney disease     stage 3    Claudication (HCC) 7/15/2013    COPD (chronic obstructive pulmonary disease) ([facility] [address]     Degenerative joint disease 7/15/2013    ED (erectile dysfunction) 7/15/2013    Full dentures     Gout     History of carpal tunnel syndrome 7/15/2013    History of colon polyps 7/15/2013    History of hemorrhoids 7/15/2013    History of TIA (transient ischemic attack) 7/15/2013    Hypercholesteremia 7/15/2013    Hypertension     Hyperuricemia 7/15/2013    Leukocytosis     Renal cyst     Sleep apnea 07/15/2013    on cpap at .  Wears glasses     for reading     Past Surgical History:   Procedure Laterality Date    BLEPHAROPLASTY Bilateral 04/02/2012    CARPAL TUNNEL RELEASE Right 11/2002    CATARACT REMOVAL WITH IMPLANT Left 03/02/2012    CATARACT REMOVAL WITH IMPLANT Right 03/05/2012    COLONOSCOPY  05/21/08    POLYPECTOMY   [address] x1 stent,1998 x2 stents,2009 x1,    EYE SURGERY      cataracts, eyelids.  JOINT REPLACEMENT Right 07/12/11     RT TOTAL SHOULDER REPLACEMENT    JOINT REPLACEMENT Right 1995    rt. hip replacement    JOINT REPLACEMENT Left 01/23/2018    ELLE    KNEE ARTHROPLASTY Left 04/04/2017    OK TOTAL HIP ARTHROPLASTY Left 1/23/2018    LEFT TOTAL HIP ARTHROPLASTY MINIMALLY INVASIVE ASI WITH BIOMET SYSTEM & GPS SPRAY APPLICATION performed by Mookie Weaver MD at [facility] ARTHROSCOPY Right 05/2002 repair    7-12-11 right shoulder reverse replacement.  SHOULDER SURGERY Left 02/24/2002    shoulder resurfacing    TOTAL KNEE ARTHROPLASTY Left 4/4/2017    KNEE TOTAL ARTHROPLASTY performed by Mookie Weaver MD at [facility] History   Problem Relation Age of Onset    Alzheimer's Disease Mother     Heart Disease Mother     Emphysema Father         Physical Exam:  Vitals signs and nursing note reviewed. Constitutional:       Appearance: She is well-developed. HENT:      Head: Normocephalic and atraumatic. Nose: Nose normal.   Eyes:      Conjunctiva/sclera: Conjunctivae normal.   Neck:      Musculoskeletal: Normal range of motion and neck supple. Pulmonary:      Effort: Pulmonary effort is normal. No respiratory distress.    Musculoskeletal:      Comments: Normal gait Skin:     General: Skin is warm and dry. Neurological:      Mental Status: She is alert and oriented to person, place, and time. Sensory: No sensory deficit. Psychiatric:         Behavior: Behavior normal.         Thought Content: Thought content normal.      Diagnostic xrays:     AP lateral lumbar spine reviewed from [id number] [facility] patient with lumbar to multilevel degenerative disc disease diffuse idiopathic skeletal hyperostosis and a mild scoliosis  Assessment and Plan:  1. Scoliosis of lumbar spine, unspecified scoliosis type    2. Degenerative disc disease, lumbar    3. Acute low back pain, unspecified back pain laterality, unspecified whether sciatica present    4. Lumbar radicular pain        4-week history intractable radicular right leg pain    MRI lumbar    [facility]    Physical therapy        Provider Attestation:  Kerry Rangel, personally performed the services described in this documentation. All medical record entries made by the scribe were at my direction and in my presence. I have reviewed the chart and discharge instructions and agree that the records reflect my personal performance and is accurate and complete. [place] Jevon Ardon MD 3/30/21     Scribe Attestation:  By signing my name below, Andrew Burnett, attest that this documentation has been prepared under the direction and in the presence of Dr. Katia Tian. Electronically signed: Mikey Hernadez, 3/30/21     Please note that this chart was generated using voice recognition Dragon dictation software. Although every effort was made to ensure the accuracy of this automated transcription, some errors in transcription may have occurred.

## 2021-04-02 ENCOUNTER — OFFICE VISIT (OUTPATIENT)
Dept: FAMILY MEDICINE CLINIC | Age: 81
End: 2021-04-02
Payer: MEDICARE

## 2021-04-02 VITALS
HEIGHT: 70 IN | TEMPERATURE: 97.7 F | WEIGHT: 190.2 LBS | BODY MASS INDEX: 27.23 KG/M2 | SYSTOLIC BLOOD PRESSURE: 108 MMHG | DIASTOLIC BLOOD PRESSURE: 72 MMHG | HEART RATE: 56 BPM

## 2021-04-02 DIAGNOSIS — E11.22 TYPE 2 DIABETES MELLITUS WITH STAGE 3 CHRONIC KIDNEY DISEASE, WITHOUT LONG-TERM CURRENT USE OF INSULIN, UNSPECIFIED WHETHER STAGE 3A OR 3B CKD (HCC): Primary | ICD-10-CM

## 2021-04-02 DIAGNOSIS — E78.2 MIXED HYPERLIPIDEMIA: ICD-10-CM

## 2021-04-02 DIAGNOSIS — I10 ESSENTIAL HYPERTENSION: ICD-10-CM

## 2021-04-02 DIAGNOSIS — N18.30 TYPE 2 DIABETES MELLITUS WITH STAGE 3 CHRONIC KIDNEY DISEASE, WITHOUT LONG-TERM CURRENT USE OF INSULIN, UNSPECIFIED WHETHER STAGE 3A OR 3B CKD (HCC): Primary | ICD-10-CM

## 2021-04-02 DIAGNOSIS — M1A.09X0 IDIOPATHIC CHRONIC GOUT OF MULTIPLE SITES WITHOUT TOPHUS: ICD-10-CM

## 2021-04-02 DIAGNOSIS — J44.9 CHRONIC OBSTRUCTIVE PULMONARY DISEASE, UNSPECIFIED COPD TYPE (HCC): ICD-10-CM

## 2021-04-02 PROBLEM — E13.22 SECONDARY DIABETES MELLITUS WITH STAGE 3 CHRONIC KIDNEY DISEASE (HCC): Status: RESOLVED | Noted: 2021-03-12 | Resolved: 2021-04-02

## 2021-04-02 PROBLEM — E11.9 NEWLY DIAGNOSED DIABETES (HCC): Status: RESOLVED | Noted: 2020-08-04 | Resolved: 2021-04-02

## 2021-04-02 LAB — HBA1C MFR BLD: 6.2 %

## 2021-04-02 PROCEDURE — 1123F ACP DISCUSS/DSCN MKR DOCD: CPT | Performed by: FAMILY MEDICINE

## 2021-04-02 PROCEDURE — 83036 HEMOGLOBIN GLYCOSYLATED A1C: CPT | Performed by: FAMILY MEDICINE

## 2021-04-02 PROCEDURE — 4040F PNEUMOC VAC/ADMIN/RCVD: CPT | Performed by: FAMILY MEDICINE

## 2021-04-02 PROCEDURE — G8417 CALC BMI ABV UP PARAM F/U: HCPCS | Performed by: FAMILY MEDICINE

## 2021-04-02 PROCEDURE — 4004F PT TOBACCO SCREEN RCVD TLK: CPT | Performed by: FAMILY MEDICINE

## 2021-04-02 PROCEDURE — 3023F SPIROM DOC REV: CPT | Performed by: FAMILY MEDICINE

## 2021-04-02 PROCEDURE — G8427 DOCREV CUR MEDS BY ELIG CLIN: HCPCS | Performed by: FAMILY MEDICINE

## 2021-04-02 PROCEDURE — 99214 OFFICE O/P EST MOD 30 MIN: CPT | Performed by: FAMILY MEDICINE

## 2021-04-02 PROCEDURE — G8926 SPIRO NO PERF OR DOC: HCPCS | Performed by: FAMILY MEDICINE

## 2021-04-02 RX ORDER — NITROGLYCERIN 0.4 MG/1
TABLET SUBLINGUAL
Qty: 25 TABLET | Refills: 1 | Status: SHIPPED | OUTPATIENT
Start: 2021-04-02 | End: 2021-08-06

## 2021-04-02 ASSESSMENT — ENCOUNTER SYMPTOMS
ABDOMINAL PAIN: 0
CHEST TIGHTNESS: 0
BLOOD IN STOOL: 0
SHORTNESS OF BREATH: 0

## 2021-04-02 NOTE — PROGRESS NOTES
Subjective:      Patient ID: Shannan Alves is a [de-identified] y.o. male. Chronic Disease Visit Information    BP Readings from Last 3 Encounters:   03/12/21 128/68   11/27/20 112/68   10/06/20 120/78          Hemoglobin A1C (%)   Date Value   11/27/2020 5.8   06/10/2020 6.5 (H)   09/25/2019 6.3 (H)     Microalb/Crt. Ratio (mcg/mg creat)   Date Value   11/27/2020 CANNOT BE CALCULATED     LDL Cholesterol (mg/dL)   Date Value   11/28/2020 60     HDL (mg/dL)   Date Value   11/28/2020 48     BUN (mg/dL)   Date Value   09/24/2020 35 (H)     CREATININE (mg/dL)   Date Value   09/24/2020 1.44 (H)     Glucose (mg/dL)   Date Value   11/28/2020 97   02/03/2012 143 (H)            Have you changed or started any medications since your last visit including any over-the-counter medicines, vitamins, or herbal medicines? no   Are you having any side effects from any of your medications? -  no  Have you stopped taking any of your medications? Is so, why? -  no    Have you seen any other physician or provider since your last visit? Yes - Records Obtained  Have you had any other diagnostic tests since your last visit? Yes - Records Obtained  Have you been seen in the emergency room and/or had an admission to a hospital since we last saw you? No  Have you had your annual diabetic retinal (eye) exam? no  Have you had your routine dental cleaning in the past 6 months? no    Have you activated your [employer] account? If not, what are your barriers?  Yes     Patient Care Team:  Anita Carmona MD as PCP - General (Family Medicine)  Anita Carmona MD as PCP - [facility]  Betha Oppenheim, MD as Consulting Physician (Nephrology)  Jennifer Araujo MD as Consulting Physician (Ophthalmology)  Carlos Betancourt MD (Pulmonology)  Sherian Leyden, MD as Consulting Physician (Orthopedic Surgery)  Guido Guzman MD as Consulting Physician (Cardiology)  Myrtle Durham MD as Consulting Physician (Gastroenterology)         Medical History Review Past Medical, Family, and Social History reviewed and does contribute to the patient presenting condition    Health Maintenance   Topic Date Due    DTaP/Tdap/Td vaccine (1 - Tdap) Never done    Shingles Vaccine (2 of 3) 11/14/2013    Annual Wellness Visit (AWV)  Never done    Potassium monitoring  09/24/2021    Creatinine monitoring  09/24/2021    Lipid screen  11/28/2021    Flu vaccine  Completed    Pneumococcal 65+ years Vaccine  Completed    COVID-19 Vaccine  Completed    Hepatitis A vaccine  Aged Out    Hib vaccine  Aged Out    Meningococcal (ACWY) vaccine  Aged Out     HPI  Patient is an 81-year-old white male who presents for diabetes, hypertension, hyperlipidemia, gout. He also has a history of COPD which is managed by his pulmonologist.  He states he is taking and tolerating his routine medication. His hemoglobin A1c today was 6.2. He denies any chest pain, abdominal pain, shortness of breath, fever or chills. He has a good appetite and tries to remain active. Review of Systems   Constitutional: Negative for chills and fever. HENT: Negative for congestion. Respiratory: Negative for chest tightness and shortness of breath. Cardiovascular: Negative for chest pain. Gastrointestinal: Negative for abdominal pain and blood in stool. Genitourinary: Negative for dysuria and hematuria. Skin: Negative for rash. Neurological: Negative for dizziness. Psychiatric/Behavioral: Negative for confusion and dysphoric mood. Objective:   Physical Exam  Vitals signs and nursing note reviewed. Constitutional:       General: He is not in acute distress. Appearance: He is well-developed. HENT:      Head: Normocephalic and atraumatic.       Right Ear: Tympanic membrane, ear canal and external ear normal.      Left Ear: Tympanic membrane, ear canal and external ear normal.      Nose: Nose normal.      Mouth/Throat:      Mouth: Mucous membranes are moist.      Pharynx: Oropharynx is clear.   Eyes:      General: No scleral icterus. Right eye: No discharge. Left eye: No discharge. Conjunctiva/sclera: Conjunctivae normal.   Neck:      Musculoskeletal: Neck supple. Cardiovascular:      Rate and Rhythm: Normal rate and regular rhythm. Heart sounds: Normal heart sounds. Pulmonary:      Effort: Pulmonary effort is normal. No respiratory distress. Breath sounds: Normal breath sounds. No wheezing. Abdominal:      General: There is no distension. Palpations: Abdomen is soft. Tenderness: There is no abdominal tenderness. Skin:     General: Skin is warm and dry. Findings: No rash. Neurological:      Mental Status: He is alert and oriented to person, place, and time. Psychiatric:         Mood and Affect: Mood normal.         Behavior: Behavior normal.         Assessment:      1. Type 2 diabetes mellitus with stage 3 chronic kidney disease, without long-term current use of insulin, unspecified whether stage 3a or 3b CKD ([facility])    - POCT glycosylated hemoglobin (Hb A1C)  - Basic Metabolic Panel; Future  - Lipid Panel; Future  Continue current management  2. Essential hypertension    - ALT; Future  - AST; Future  - Basic Metabolic Panel; Future  - Lipid Panel; Future  Continue current management  3. Mixed hyperlipidemia    - ALT; Future  - AST; Future  - Basic Metabolic Panel; Future  - Lipid Panel; Future  Continue current management  4. Idiopathic chronic gout of multiple sites without tophus    - Uric Acid; Future  Continue current management  5. Chronic obstructive pulmonary disease, unspecified [facility] ([facility]  Continue management by patient's pulmonologist          Plan:       Noris Sorto received counseling on the following healthy behaviors: nutrition and medication adherence  Reviewed prior labs and health maintenance  Continue current medications, diet and exercise. Discussed use, benefit, and side effects of prescribed medications.   Barriers to medication compliance addressed. Patient given educational materials - see patient instructions  Was a self-tracking handout given in paper form or via [employer]? No:     Requested Prescriptions     Signed Prescriptions Disp Refills    nitroGLYCERIN (NITROSTAT) 0.4 MG SL tablet 25 tablet 1     Sig: Place 1 tablet under tongue every 5 minutes as needed for chest pain. Maximum of 3 tablets/day. All patient questions answered. Patient voiced understanding. Quality Measures    Body mass index is 27.29 kg/m². Elevated. Weight control planned discussed Healthy diet and regular exercise. BP: 108/72. Blood pressure is normal. Treatment plan consists of Weight Reduction. Fall Risk 6/2/2020 2/25/2019 10/27/2017 7/28/2016 6/2/2015 5/22/2014   2 or more falls in past year? no no no no no no   Fall with injury in past year? no no no no no no     The patient does not have a history of falls. I did not - not indicated , complete a risk assessment for falls.  A plan of care for falls No Treatment plan indicated    Lab Results   Component Value Date    LDLCHOLESTEROL 60 11/28/2020    (goal LDL reduction with dx if diabetes is 50% LDL reduction)    PHQ Scores 3/12/2021 6/2/2020 2/25/2019 8/28/2018 5/19/2017 2/16/2016 1/30/2015   PHQ2 Score 0 0 0 0 0 0 1   PHQ9 Score 0 0 0 0 0 0 1     Interpretation of Total Score Depression Severity: 1-4 = Minimal depression, 5-9 = Mild depression, 10-14 = Moderate depression, 15-19 = Moderately severe depression, 20-27 = Severe depression    Orders Placed This Encounter   Procedures    ALT     Standing Status:   Future     Standing Expiration Date:   4/2/2022    AST     Standing Status:   Future     Standing Expiration Date:   4/2/2022    Basic Metabolic Panel     Fasting     Standing Status:   Future     Standing Expiration Date:   4/2/2022    Lipid Panel     Standing Status:   Future     Standing Expiration Date:   4/2/2022     Order Specific Question:   Is Patient Fasting?/# of Hours     Answer: Fast 8-10 hours    Uric Acid     Standing Status:   Future     Standing Expiration Date:   4/2/2022    POCT glycosylated hemoglobin (Hb A1C)     Orders Placed This Encounter   Medications    nitroGLYCERIN (NITROSTAT) 0.4 MG SL tablet     Sig: Place 1 tablet under tongue every 5 minutes as needed for chest pain. Maximum of 3 tablets/day.      Dispense:  25 tablet     Refill:  1         Continue routine medications  Follow-up in 4 months

## 2021-04-05 ENCOUNTER — HOSPITAL ENCOUNTER (OUTPATIENT)
Age: 81
Discharge: HOME | End: 2021-04-05
Payer: MEDICARE

## 2021-04-05 DIAGNOSIS — N18.30 TYPE 2 DIABETES MELLITUS WITH STAGE 3 CHRONIC KIDNEY DISEASE, WITHOUT LONG-TERM CURRENT USE OF INSULIN, UNSPECIFIED WHETHER STAGE 3A OR 3B CKD (HCC): ICD-10-CM

## 2021-04-05 DIAGNOSIS — M1A.09X0 IDIOPATHIC CHRONIC GOUT OF MULTIPLE SITES WITHOUT TOPHUS: ICD-10-CM

## 2021-04-05 DIAGNOSIS — E78.2 MIXED HYPERLIPIDEMIA: ICD-10-CM

## 2021-04-05 DIAGNOSIS — I10 ESSENTIAL HYPERTENSION: ICD-10-CM

## 2021-04-05 DIAGNOSIS — E11.22 TYPE 2 DIABETES MELLITUS WITH STAGE 3 CHRONIC KIDNEY DISEASE, WITHOUT LONG-TERM CURRENT USE OF INSULIN, UNSPECIFIED WHETHER STAGE 3A OR 3B CKD (HCC): ICD-10-CM

## 2021-04-05 LAB
ALT SERPL-CCNC: 18 U/L (ref 5–41)
ANION GAP SERPL CALCULATED.3IONS-SCNC: 11 MMOL/L (ref 9–17)
AST SERPL-CCNC: 21 U/L
BUN BLDV-MCNC: 32 MG/DL (ref 8–23)
BUN/CREAT BLD: ABNORMAL (ref 9–20)
CALCIUM SERPL-MCNC: 9.5 MG/DL (ref 8.6–10.4)
CHLORIDE BLD-SCNC: 105 MMOL/L (ref 98–107)
CHOLESTEROL/HDL RATIO: 3
CHOLESTEROL: 137 MG/DL
CO2: 26 MMOL/L (ref 20–31)
CREAT SERPL-MCNC: 1.68 MG/DL (ref 0.7–1.2)
GFR AFRICAN AMERICAN: 48 ML/MIN
GFR NON-AFRICAN AMERICAN: 40 ML/MIN
GFR SERPL CREATININE-BSD FRML MDRD: ABNORMAL ML/MIN/{1.73_M2}
GFR SERPL CREATININE-BSD FRML MDRD: ABNORMAL ML/MIN/{1.73_M2}
GLUCOSE BLD-MCNC: 120 MG/DL (ref 70–99)
HDLC SERPL-MCNC: 45 MG/DL
LDL CHOLESTEROL: 65 MG/DL (ref 0–130)
POTASSIUM SERPL-SCNC: 5 MMOL/L (ref 3.7–5.3)
SODIUM BLD-SCNC: 142 MMOL/L (ref 135–144)
TRIGL SERPL-MCNC: 136 MG/DL
URIC ACID: 6 MG/DL (ref 3.4–7)
VLDLC SERPL CALC-MCNC: NORMAL MG/DL (ref 1–30)

## 2021-04-05 PROCEDURE — 80048 BASIC METABOLIC PNL TOTAL CA: CPT

## 2021-04-05 PROCEDURE — 84460 ALANINE AMINO (ALT) (SGPT): CPT

## 2021-04-05 PROCEDURE — 84550 ASSAY OF BLOOD/URIC ACID: CPT

## 2021-04-05 PROCEDURE — 84450 TRANSFERASE (AST) (SGOT): CPT

## 2021-04-05 PROCEDURE — 80061 LIPID PANEL: CPT

## 2021-04-05 PROCEDURE — 36415 COLL VENOUS BLD VENIPUNCTURE: CPT

## 2021-04-07 ENCOUNTER — HOSPITAL ENCOUNTER (OUTPATIENT)
Dept: PHYSICAL THERAPY | Age: 81
Setting detail: THERAPIES SERIES
Discharge: HOME | End: 2021-04-07
Payer: MEDICARE

## 2021-04-07 PROCEDURE — 97162 PT EVAL MOD COMPLEX 30 MIN: CPT

## 2021-04-07 PROCEDURE — 97110 THERAPEUTIC EXERCISES: CPT

## 2021-04-12 RX ORDER — LISINOPRIL 2.5 MG/1
TABLET ORAL
Qty: 90 TABLET | Refills: 1 | Status: SHIPPED | OUTPATIENT
Start: 2021-04-12 | End: 2021-10-11

## 2021-04-13 ENCOUNTER — HOSPITAL ENCOUNTER (OUTPATIENT)
Dept: PHYSICAL THERAPY | Age: 81
Setting detail: THERAPIES SERIES
Discharge: HOME | End: 2021-04-13
Payer: MEDICARE

## 2021-04-13 PROCEDURE — 97112 NEUROMUSCULAR REEDUCATION: CPT

## 2021-04-13 PROCEDURE — 97110 THERAPEUTIC EXERCISES: CPT

## 2021-04-13 NOTE — PROGRESS NOTES
[address] [facility]   [address] [address] [facility] [address]   Phone: [phone]   Fax: [phone]    Physical Therapy Daily Treatment Note    Date:  2021  Patient Name:  Shannan Alves    :  1940  MRN: [id number]  Physician:  Anita Carmona MD    Insurance:Medicare  Diagnosis: Acute ow back pain (M54.5)  Onset Date:    Next Dr. Allison Devine: TBD   Visit# / total visits:   Cancels/No Shows: 0/0    Subjective: The patient has not had any major positive changes form the home program.  At this time his pain is unchanged. \"Im not shure if Im doing the exercise correctly. \"  Pain:  [x] Yes  [] No Location: low back and leg  Pain Rating: (0-10 scale) 4-5/10  Pain altered Tx:  [] No  [x] Yes  Action: review of home progrm and skill with the PREP. Comments:    Objective:  Limited flexion and extension of the lumbar spine in all directions  Strenght of lumbar spine is 2/5 due to loss of full AROM. Loss of both sitting and standing lumbar lordosis     Modalities: execises   Precautions:falls  Exercises:  Exercise Reps/ Time Weight/ Level Completed  Today Comments   Hook lying  5 min       DKTC 10 x 3       DKTC - hold  30 '' x 4       Hook lyaing  3 min       Seated flexion  5 min       Other:Avoid twisting, standing pinch     Specific Instructions for next treatment:Review skill of HEP and effectiveness of pain relief. Assessment: [x] Progressing toward goals. The patient skill with his HEP needs to improve befoe his symptoms can come under control. The review today should help to propell him in to a situation at home when he is able to lesson the lower back pain and function better. [] No change.      [] Other:    [x] Patient would continue to benefit from skilled physical therapy services in order to:improve lumbar AROM and strength to improve his functional skills like walking and bending etc.    STG/LTG  Problems :   ? Back Pain: [x]? ? ROM:                  [x]? ? Strength:             [x]? ? Function:  [x]? Postural Deviations  [x]? Gait Deviations                 STG: (to be met in 6  treatments)  1. ? Pain: lumbar spine to 2/10.  2. ? ROM:of lumbar spine to 25% loss in all directions. 3. ? Strength:of lumbar spine to 2+/5 in all directions. 4. ? Function: with OPTIMAL score of 9/3.  5. Independent with Home Exercise Programs(MET)  6. Demonstrate Knowledge of fall prevention(MET)  LTG: (to be met in 12 treatments)  1. Improve functional skills with the OPTIMAL score index of 6/3.  2. Patinet goals: To walk better without pain and/or a device. Pt. Education:  [x] Yes  [] No  [] Reviewed Prior HEP/Ed  Method of Education: [] Verbal  [] Demo  [] Written  Comprehension of Education:  [] Verbalizes understanding. [] Demonstrates understanding. [x] Needs review. [] Demonstrates/verbalizes HEP/Ed previously given. Plan: [x] Continue per plan of care. progress with HEP as tolerated   [] Other:      Treatment Charges: Mins Units   []  Modalities     [x]  Ther Exercise 30 2   []  Manual Therapy     []  Ther Activities     []  Aquatics     [x]  Neuromuscular 15 1   [] Vasocompression     [] Gait Training     [] Dry needling        [] 1 or 2 muscles        [] 3 or more muscles     []  Other     Total Treatment time 45 3     Time In: 2:15 pm            Time Out: 3:00 pm    Electronically signed by:  Griselda Alexis, PT      .

## 2021-04-15 ENCOUNTER — HOSPITAL ENCOUNTER (OUTPATIENT)
Dept: PHYSICAL THERAPY | Age: 81
Setting detail: THERAPIES SERIES
Discharge: HOME | End: 2021-04-15
Payer: MEDICARE

## 2021-04-15 PROCEDURE — 97110 THERAPEUTIC EXERCISES: CPT

## 2021-04-15 PROCEDURE — 97112 NEUROMUSCULAR REEDUCATION: CPT

## 2021-04-15 NOTE — PROGRESS NOTES
[address] [facility]   [address] [facility] [address]   Phone: [phone]   Fax: [phone]    Physical Therapy Daily Treatment Note  Date:  4/15/2021  Patient Name:  Uday Morrell    :  1940  MRN: [id number]   Physician: Rojelio Lucero MD      Insurance: Medicare  Medical Diagnosis:  Rehab Codes: M54.2  Onset Date: 03-10-21  Next 's appt.: TBD  Visit Count: 3/12   Cancel/No Show: 0/0    Subjective: The patient reported he spent a good part of the day yesterday working in his yard. He did have a moderate discomfort afterward. The exercises did help but only to a small degree. Pain:  [x] Yes  [] No Location: lower back  Pain Rating: (0-10 scale) 4-5/10  Pain altered Tx:  [x] No  [] Yes  Action:  Comments:Impress the use of exercise to limit his pain. Objective:  Lumbar spine AROM into flexion 50% loss, extension is 75% loss. Strength of lumbar spine is 2/5     Modalities: exercise     Precautions:falls    Exercises:  Exercise Reps/ Time Weight/ Level Completed  Today Comments   Hook laying  5 min   x    SKTC  15 x   x    DKTC 10 x 3   x    LTR  10 x   x    Hook laying  3 min   x    Sitting with forearms on knees  5 min   x                                              Other:Avoid extension and lifting and twisting. Specific Instructions for next treatment: progress with sitting flexion. Assessment: [x] Progressing toward goals. The patient is able to demonstrate good progress with his home program.  The fact that he was able to work in his yard at all yesterday is a sign of god change. Conatinue with the flexion program and add extension when possible. [] No change. [] Other:    [x] Patient would continue to benefit from skilled physical therapy services in order to: control his lower back pain with flexion program and restore full function of his ADL's. STG/LTG  Problems:    [x] ? Back Pain:     [x] ? ROM:     [x] ?  Strength:   [x] ? Function:  [x] Postural Deviations  [x] Gait Deviations    STG: (to be met in 6  treatments)  1. ? Pain: lumbar spine to 2/10.  2. ? ROM:of lumbar spine to 25% loss in all directions. 3. ? Strength:of lumbar spine to 2+/5 in all directions. 4. ? Function: with OPTIMAL score of 9/3.  5. Independent with Home Exercise Programs(MET)  6. Demonstrate Knowledge of fall prevention(MET)  LTG: (to be met in 12 treatments)  1. Improve functional skills with the OPTIMAL score index of 6/3.  2. Patinet goals: To walk better without pain and/or a device. Pt. Education:  [x] Yes  [] No  [x] Reviewed Prior HEP/Ed  Method of Education: [x] Verbal  [x] Demo  [x] Written  Comprehension of Education:  [] Verbalizes understanding. [] Demonstrates understanding. [x] Needs review. [x] Demonstrates/verbalizes HEP/Ed previously given. Plan: [x] Continue per plan of care.    [] Other:      Treatment Charges: Mins Units   []  Modalities     [x]  Ther Exercise 30 2   []  Manual Therapy     []  Ther Activities     []  Aquatics     [x]  Neuromuscular 15 1   [] Vasocompression     [] Gait Training     [] Dry needling        [] 1 or 2 muscles        [] 3 or more muscles     []  Other     Total Treatment time 45 3     Time In:12:45 pm            Time Out: 1:30 pm     Electronically signed by:  Dusty Chapman, PT

## 2021-04-20 ENCOUNTER — HOSPITAL ENCOUNTER (OUTPATIENT)
Dept: PHYSICAL THERAPY | Age: 81
Setting detail: THERAPIES SERIES
Discharge: HOME | End: 2021-04-20
Payer: MEDICARE

## 2021-04-20 PROCEDURE — 97110 THERAPEUTIC EXERCISES: CPT

## 2021-04-21 ENCOUNTER — HOSPITAL ENCOUNTER (OUTPATIENT)
Dept: MRI IMAGING | Age: 81
Discharge: HOME | End: 2021-04-23
Payer: MEDICARE

## 2021-04-21 DIAGNOSIS — M54.16 LUMBAR RADICULAR PAIN: ICD-10-CM

## 2021-04-21 DIAGNOSIS — M51.36 DEGENERATIVE DISC DISEASE, LUMBAR: ICD-10-CM

## 2021-04-21 DIAGNOSIS — M54.50 ACUTE LOW BACK PAIN, UNSPECIFIED BACK PAIN LATERALITY, UNSPECIFIED WHETHER SCIATICA PRESENT: ICD-10-CM

## 2021-04-21 DIAGNOSIS — M41.9 SCOLIOSIS OF LUMBAR SPINE, UNSPECIFIED SCOLIOSIS TYPE: ICD-10-CM

## 2021-04-21 PROCEDURE — 72148 MRI LUMBAR SPINE W/O DYE: CPT

## 2021-04-22 ENCOUNTER — HOSPITAL ENCOUNTER (OUTPATIENT)
Dept: PHYSICAL THERAPY | Age: 81
Setting detail: THERAPIES SERIES
Discharge: HOME | End: 2021-04-22
Payer: MEDICARE

## 2021-04-22 PROCEDURE — 97112 NEUROMUSCULAR REEDUCATION: CPT

## 2021-04-22 PROCEDURE — 97110 THERAPEUTIC EXERCISES: CPT

## 2021-04-27 ENCOUNTER — APPOINTMENT (OUTPATIENT)
Dept: PHYSICAL THERAPY | Age: 81
End: 2021-04-27
Payer: MEDICARE

## 2021-04-29 ENCOUNTER — APPOINTMENT (OUTPATIENT)
Dept: PHYSICAL THERAPY | Age: 81
End: 2021-04-29
Payer: MEDICARE

## 2021-04-29 ENCOUNTER — OFFICE VISIT (OUTPATIENT)
Dept: ORTHOPEDIC SURGERY | Age: 81
End: 2021-04-29
Payer: MEDICARE

## 2021-04-29 VITALS — BODY MASS INDEX: 26.77 KG/M2 | HEIGHT: 70 IN | WEIGHT: 187 LBS

## 2021-04-29 DIAGNOSIS — M48.062 LUMBAR STENOSIS WITH NEUROGENIC CLAUDICATION: ICD-10-CM

## 2021-04-29 DIAGNOSIS — M41.9 SCOLIOSIS OF LUMBAR SPINE, UNSPECIFIED SCOLIOSIS TYPE: Primary | ICD-10-CM

## 2021-04-29 DIAGNOSIS — M51.36 DEGENERATIVE DISC DISEASE, LUMBAR: ICD-10-CM

## 2021-04-29 DIAGNOSIS — M54.50 ACUTE LOW BACK PAIN, UNSPECIFIED BACK PAIN LATERALITY, UNSPECIFIED WHETHER SCIATICA PRESENT: ICD-10-CM

## 2021-04-29 DIAGNOSIS — M54.16 LUMBAR RADICULAR PAIN: ICD-10-CM

## 2021-04-29 PROCEDURE — G8427 DOCREV CUR MEDS BY ELIG CLIN: HCPCS | Performed by: ORTHOPAEDIC SURGERY

## 2021-04-29 PROCEDURE — 4004F PT TOBACCO SCREEN RCVD TLK: CPT | Performed by: ORTHOPAEDIC SURGERY

## 2021-04-29 PROCEDURE — G8417 CALC BMI ABV UP PARAM F/U: HCPCS | Performed by: ORTHOPAEDIC SURGERY

## 2021-04-29 PROCEDURE — 1123F ACP DISCUSS/DSCN MKR DOCD: CPT | Performed by: ORTHOPAEDIC SURGERY

## 2021-04-29 PROCEDURE — 4040F PNEUMOC VAC/ADMIN/RCVD: CPT | Performed by: ORTHOPAEDIC SURGERY

## 2021-04-29 PROCEDURE — 99213 OFFICE O/P EST LOW 20 MIN: CPT | Performed by: ORTHOPAEDIC SURGERY

## 2021-04-29 NOTE — PROGRESS NOTES
Patient ID: Gerson Correa is a [de-identified] y.o. male. Chief Complaint   Patient presents with    Follow-up     MRI Results         HPI   Please refer to my previous clinic notes. Patient is here for follow-up MRI lumbar spine    Patient is actually doing little bit better and that he is not using a walker at this time although he still limping pretty strongly    Past Medical History:   Diagnosis Date    Anemia     Anesthesia     woke up eary during surgery x1 , heard saw & felt the pressure.  Arthritis 7/15/2013    ASHD (arteriosclerotic heart disease) 7/15/2013    CAD (coronary artery disease)     has cardiac stent x 4.    Chronic kidney disease     stage 3    Claudication (HCC) 7/15/2013    COPD (chronic obstructive pulmonary disease) ([facility] [address]     Degenerative joint disease 7/15/2013    ED (erectile dysfunction) 7/15/2013    Full dentures     Gout     History of carpal tunnel syndrome 7/15/2013    History of colon polyps 7/15/2013    History of hemorrhoids 7/15/2013    History of TIA (transient ischemic attack) 7/15/2013    Hypercholesteremia 7/15/2013    Hypertension     Hyperuricemia 7/15/2013    Leukocytosis     Renal cyst     Sleep apnea 07/15/2013    on cpap at [facility].  Wears glasses     for reading     Past Surgical History:   Procedure Laterality Date    BLEPHAROPLASTY Bilateral 04/02/2012    CARPAL TUNNEL RELEASE Right 11/2002    CATARACT REMOVAL WITH IMPLANT Left 03/02/2012    CATARACT REMOVAL WITH IMPLANT Right 03/05/2012    COLONOSCOPY  05/21/08    POLYPECTOMY   Zenaida Wilhelm 336 x1 stent,1998 x2 stents,2009 x1,    EYE SURGERY      cataracts, eyelids.     JOINT REPLACEMENT Right 07/12/11     RT TOTAL SHOULDER REPLACEMENT    JOINT REPLACEMENT Right 1995    rt. hip replacement    JOINT REPLACEMENT Left 01/23/2018    ELLE    KNEE ARTHROPLASTY Left 04/04/2017    SD TOTAL HIP ARTHROPLASTY Left 1/23/2018    LEFT TOTAL HIP ARTHROPLASTY MINIMALLY INVASIVE ASI WITH BIOMET SYSTEM & GPS SPRAY APPLICATION performed by Chaim Velazquez MD at [facility] [address] ARTHROSCOPY Right 2002 repair    11 right shoulder reverse replacement.  SHOULDER SURGERY Left 2002    shoulder resurfacing    TOTAL KNEE ARTHROPLASTY Left 2017    KNEE TOTAL ARTHROPLASTY performed by Chaim Velazquez MD at [facility] History   Problem Relation Age of Onset    Alzheimer's Disease Mother     Heart Disease Mother     Emphysema Father      Social History     Occupational History    Not on file   Tobacco Use    Smoking status: Former Smoker     Packs/day: 2.00     Years: 35.00     Pack years: 70.00     Quit date:      Years since quittin.3    Smokeless tobacco: Current User    Tobacco comment: 3/30/21: NICOTINE LOZENGES - current use   Substance and Sexual Activity    Alcohol use: No     Alcohol/week: 0.0 standard drinks     Comment: rarely    Drug use: No    Sexual activity: Not on file        Review of Systems   All other systems reviewed and are negative. Physical Exam  Vitals signs and nursing note reviewed. Constitutional:       Appearance: He is well-developed. HENT:      Head: Normocephalic and atraumatic. Nose: Nose normal.   Eyes:      Conjunctiva/sclera: Conjunctivae normal.   Neck:      Musculoskeletal: Normal range of motion and neck supple. Pulmonary:      Effort: Pulmonary effort is normal. No respiratory distress. Musculoskeletal:      Comments: Normal gait     Skin:     General: Skin is warm and dry. Neurological:      Mental Status: He is alert and oriented to person, place, and time. Sensory: No sensory deficit. Psychiatric:         Behavior: Behavior normal.         Thought Content:  Thought content normal.     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 Scoliosis of lumbar spine, unspecified scoliosis type    2. Degenerative disc disease, lumbar    3. Acute low back pain, unspecified back pain laterality, unspecified whether sciatica present    4. Lumbar radicular pain    5. Lumbar stenosis with neurogenic claudication        Plan:     Trial lumbar epidural steroid injections    Follow-up 8 weeks    If fails patient is strong candidate for L3-5 PLIF    No orders of the defined types were placed in this encounter. Shoshana Calderón MD    Please note that this chart was generated using voicerecognition Dragon dictation software. Although every effort was made to ensurethe accuracy of this automated transcription, some errors in transcription may haveoccurred.

## 2021-04-30 ENCOUNTER — TELEPHONE (OUTPATIENT)
Dept: PAIN MANAGEMENT | Age: 81
End: 2021-04-30

## 2021-05-11 ASSESSMENT — ENCOUNTER SYMPTOMS
EYES NEGATIVE: 1
BACK PAIN: 1
GASTROINTESTINAL NEGATIVE: 1
ALLERGIC/IMMUNOLOGIC NEGATIVE: 1
RESPIRATORY NEGATIVE: 1

## 2021-05-11 ASSESSMENT — PAIN SCALES - GENERAL: PAINLEVEL_OUTOF10: 4

## 2021-05-11 ASSESSMENT — PAIN DESCRIPTION - PROGRESSION: CLINICAL_PROGRESSION: GRADUALLY WORSENING

## 2021-05-11 ASSESSMENT — PAIN DESCRIPTION - DIRECTION: RADIATING_TOWARDS: RIGHT LEG

## 2021-05-11 ASSESSMENT — PAIN DESCRIPTION - ORIENTATION: ORIENTATION: RIGHT

## 2021-05-11 ASSESSMENT — PAIN - FUNCTIONAL ASSESSMENT: PAIN_FUNCTIONAL_ASSESSMENT: PREVENTS OR INTERFERES SOME ACTIVE ACTIVITIES AND ADLS

## 2021-05-11 ASSESSMENT — PAIN DESCRIPTION - DESCRIPTORS: DESCRIPTORS: ACHING;SHARP

## 2021-05-11 ASSESSMENT — PAIN DESCRIPTION - PAIN TYPE: TYPE: CHRONIC PAIN

## 2021-05-11 NOTE — PROGRESS NOTES
identified. ;Assessed functional status. Current Pain Assessment  Pain Assessment  Pain Assessment: 0-10  Pain Level: 4  Patient's Stated Pain Goal: 2 (to decrease pain with increased activity)  Pain Type: Chronic pain  Pain Location: Back, Hip, Leg  Pain Orientation: Right  Pain Radiating Towards: right leg  Pain Descriptors: Aching, Sharp  Pain Frequency: Intermittent  Pain Onset: Gradual  Clinical Progression: Gradually worsening  Functional Pain Assessment: Prevents or interferes some active activities and ADLs  Non-Pharmaceutical Pain Intervention(s): Repositioned, Rest         ADVERSE MEDICATION EFFECTS:   Constipation: no  Bowel Regimen: Yes  Diet: common adult  Appetite:  ok  Sedation:  no  Urinary Retention: no    FOCUSED PAIN SCALE:  Highest : 8  Lowest :3  Average: Range-5  When and What  was your last procedure:na       Was your procedure effective:  not applicable    ACTIVITY/SOCIAL/EMOTIONAL:  Sleep Pattern: 8 hours per night. generally restful sleep  Energy Level: Normal  Currently attending Physical Therapy:  No, completed  Home Exercises: daily stretching  Mobility: {Painful to walk  Do you use assistive devices? No  Have you fallen in the last 30 days?:  No  Currently seeing a Psychiatrist or Psychologist:  No  Emotional Issues: normal   Mood: appropriate     ABERRANT BEHAVIORS SINCE LAST VISIT:  Have you ever been treated in another Pain Clinic yes, [facility]   Refills for prescriptions appropriate: not applicable  Lost Rx/pills: not applicable  Taking more medication than prescribed:  not applicable  Are you receiving PAIN medications from  other doctors: not applicable  Last Urine/Serum Drug Screen : will obtain in office  Was Serum/UDS as anticipated?   Will obtain in office  Brought pill bottles in :not applicable   Was Pill count appropriate? :not applicable   Are currently pregnant? not applicable  Recent ER visits: No  Have you had a COVID 19 Vaccine?: Yes      if yes, when? 2 x Moderna 2/2021, 3/2021  Total SOAP points:  Vitals:    05/17/21 1314   BP: 118/78   Pulse: 61   Resp: 18   Temp: 97.5 °F (36.4 °C)   SpO2: 99%              Past Medical History      Diagnosis Date    Anemia     Anesthesia     woke up eary during surgery x1 , heard saw & felt the pressure.  Arthritis 7/15/2013    ASHD (arteriosclerotic heart disease) 7/15/2013    CAD (coronary artery disease)     has cardiac stent x 4.    Chronic kidney disease     stage 3    Claudication (HCC) 7/15/2013    COPD [facility]ch[facility] obstructive pulmonary disease) ([facility] [address]     Degenerative joint disease 7/15/2013    ED (erectile dysfunction) 7/15/2013    Full dentures     Gout     History of carpal tunnel syndrome 7/15/2013    History of colon polyps 7/15/2013    History of hemorrhoids 7/15/2013    History of TIA (transient ischemic attack) 7/15/2013    Hypercholesteremia 7/15/2013    Hypertension     Hyperuricemia 7/15/2013    Leukocytosis     Renal cyst     Sleep apnea 07/15/2013    on cpap at [facility].  Wears glasses     for reading       Surgical History  Past Surgical History:   Procedure Laterality Date    BLEPHAROPLASTY Bilateral 04/02/2012    CARPAL TUNNEL RELEASE Right 11/2002    CATARACT REMOVAL WITH IMPLANT Left 03/02/2012    CATARACT REMOVAL WITH IMPLANT Right 03/05/2012    COLONOSCOPY  05/21/08    POLYPECTOMY   Zenaida Wilhelm 336 x1 stent,1998 x2 stents,2009 x1,    EYE SURGERY      cataracts, eyelids.  JOINT REPLACEMENT Right 07/12/11     RT TOTAL SHOULDER REPLACEMENT    JOINT REPLACEMENT Right 1995    rt. hip replacement    JOINT REPLACEMENT Left 01/23/2018    ELLE    KNEE ARTHROPLASTY Left 04/04/2017    MI TOTAL HIP ARTHROPLASTY Left 1/23/2018    LEFT TOTAL HIP ARTHROPLASTY MINIMALLY INVASIVE ASI WITH BIOMET SYSTEM & GPS SPRAY APPLICATION [facility] Cristobal [facility]is [facility] ARTHROSCOPY Right 05/2002 repair    7-12-11 right shoulder reverse replacement.     SHOULDER SURGERY Left 2002    shoulder resurfacing    TOTAL KNEE ARTHROPLASTY Left 2017    KNEE TOTAL ARTHROPLASTY performed by Kaci Swift MD at [address]       Medications  Current Outpatient Medications   Medication Sig Dispense Refill    lisinopril (PRINIVIL;ZESTRIL) 2.5 MG tablet TAKE 1 TABLET DAILY 90 tablet 1    nitroGLYCERIN (NITROSTAT) 0.4 MG SL tablet Place 1 tablet under tongue every 5 minutes as needed for chest pain. Maximum of 3 tablets/day. 25 tablet 1    allopurinol (ZYLOPRIM) 100 MG tablet TAKE 1 TABLET DAILY 90 tablet 1    rosuvastatin (CRESTOR) 10 MG tablet TAKE 1 TABLET DAILY 90 tablet 1    atenolol (TENORMIN) 25 MG tablet TAKE 1 TABLET DAILY 90 tablet 3    Fluticasone-Salmeterol 232-14 MCG/ACT AEPB       aspirin (ASPIRIN 81) 81 MG chewable tablet       CPAP Machine MISC --- ---      albuterol sulfate HFA (VENTOLIN HFA) 108 (90 Base) MCG/ACT inhaler 2 puffs as needed      FISH OIL Take by mouth 2 times daily       therapeutic multivitamin-minerals (THERAGRAN-M) tablet Take 1 tablet by mouth daily. OTC       No current facility-administered medications for this encounter. Allergies  Pcn [penicillins] and Ceclor [cefaclor]    Family History  family history includes Alzheimer's Disease in his mother; Emphysema in his father; Heart Disease in his mother.     Social History  Social History     Socioeconomic History    Marital status:      Spouse name: None    Number of children: None    Years of education: None    Highest education level: None   Occupational History    None   Tobacco Use    Smoking status: Former Smoker     Packs/day: 2.00     Years: 35.00     Pack years: 70.00     Quit date:      Years since quittin.3    Smokeless tobacco: Current User    Tobacco comment: 3/30/21: NICOTINE LOZENGES - current use   Substance and Sexual Activity    Alcohol use: No     Alcohol/week: 0.0 standard drinks     Comment: rarely    Drug use: No    Sexual activity: None   Other Topics Concern    None   Social History Narrative    None     Social Determinants of Health     Financial Resource Strain: Low Risk     Difficulty of Paying Living Expenses: Not hard at all   Food Insecurity: No Food Insecurity    Worried About Running Out of Food in the Last Year: Never true    Bobbi of Food in the Last Year: Never true   Transportation Needs:     Lack of Transportation (Medical):  Lack of Transportation (Non-Medical):    Physical Activity:     Days of Exercise per Week:     Minutes of Exercise per Session:    Stress:     Feeling of Stress :    Social Connections:     Frequency of Communication with Friends and Family:     Frequency of Social Gatherings with Friends and Family:     Attends Rastafari Services:     Active Member of Clubs or Organizations:     Attends Club or Organization Meetings:     Marital Status:    Intimate Partner Violence:     Fear of Current or Ex-Partner:     Emotionally Abused:     Physically Abused:     Sexually Abused:       reports no history of drug use. REVIEW OF SYSTEMS:      Review of Systems   Constitutional: Negative. Negative for activity change, appetite change and unexpected weight change. HENT: Negative. Negative for hearing loss, trouble swallowing and voice change. Eyes: Negative. Negative for photophobia, redness and visual disturbance. Respiratory: Negative. Negative for cough and shortness of breath. Cardiovascular: Negative. Negative for palpitations. Gastrointestinal: Negative. Negative for abdominal pain, constipation, nausea and vomiting. Endocrine: Negative. Negative for polyphagia. Genitourinary: Negative. Negative for dysuria and hematuria. Musculoskeletal: Positive for arthralgias and back pain. Skin: Negative. Negative for rash. Allergic/Immunologic: Negative. Neurological: Positive for weakness. Negative for tingling and numbness.    Hematological: Negative. Does not bruise/bleed easily. Psychiatric/Behavioral: Negative. Negative for self-injury, sleep disturbance and suicidal ideas. The patient is not nervous/anxious. GENERAL PHYSICAL EXAM:  Vitals: /78   Pulse 61   Temp 97.5 °F (36.4 °C) (Temporal)   Resp 18   Ht 5' 10\" (1.778 m)   Wt 187 lb (84.8 kg)   SpO2 99%   BMI 26.83 kg/m² , Body mass index is 26.83 kg/m². Physical Exam  Constitutional:       Appearance: Normal appearance. Comments: Looks younger than the stated age   HENT:      Head: Normocephalic and atraumatic. Nose: Nose normal.      Mouth/Throat:      Mouth: Mucous membranes are moist.   Eyes:      Extraocular Movements: Extraocular movements intact. Conjunctiva/sclera: Conjunctivae normal.      Pupils: Pupils are equal, round, and reactive to light. Cardiovascular:      Rate and Rhythm: Normal rate and regular rhythm. Pulses: Normal pulses. Pulmonary:      Effort: Pulmonary effort is normal.   Abdominal:      General: Bowel sounds are normal. There is no distension. Musculoskeletal:      Cervical back: Neck supple. No rigidity. Lumbar back: Positive right straight leg raise test.      Right lower leg: No edema. Left lower leg: No edema. Skin:     General: Skin is warm. Neurological:      General: No focal deficit present. Mental Status: He is alert and oriented to person, place, and time. Cranial Nerves: Cranial nerves are intact. No cranial nerve deficit. Sensory: Sensation is intact. Motor: Motor function is intact. No weakness or tremor. Coordination: Coordination is intact. Deep Tendon Reflexes:      Reflex Scores:       Patellar reflexes are 1+ on the right side and 1+ on the left side. Achilles reflexes are 1+ on the right side and 1+ on the left side.   Psychiatric:         Mood and Affect: Mood normal.         Speech: Speech normal.         Behavior: Behavior normal. Thought Content: Thought content normal.         Judgment: Judgment normal.      Right Ankle Exam     Muscle Strength   The patient has normal right ankle strength. Left Ankle Exam     Muscle Strength   The patient has normal left ankle strength. Right Knee Exam     Muscle Strength   The patient has normal right knee strength. Left Knee Exam     Muscle Strength   The patient has normal left knee strength. Right Hip Exam     Muscle Strength   The patient has normal right hip strength. Other   Scars: present      Left Hip Exam     Muscle Strength   The patient has normal left hip strength. Other   Scars: present      Back Exam     Tenderness   The patient is experiencing tenderness in the lumbar and sacroiliac. Range of Motion   Back extension: Limited and painful. Back flexion: Limited and painful. Back lateral bend right: Limited and painful. Back lateral bend left: Limited and painful. Back rotation right: Limited and painful. Back rotation left: Limited and painful. Tests   Straight leg raise right: positive    Other   Sensation: normal  Gait: antalgic   Scars: absent    Comments:  Skin --normal appearance  Surgical Scar --Absent   kyphosis absent and scoliosis absent  Alignment of her shoulders, scapulae and iliac crests--symmetric  Paraspinal tenderness:Present  Lumbar lordosis-----------Decreased  Movements of the lumbar spine indicated above are diminished range with pain   Facet loading---  : Right side--    Pain-Moderate                                   Left side----   Pain  Moderate  Jann's test -------------- Right side---positive                                           Left side-----negative              Nurses Notes and Vital Signs reviewed.     DATA  Labs:   4/5/2021  9:35 AM - Deirdre Crump Incoming Lab Results From [facility]    Component Value Ref Range & Units Status Collected Lab   Glucose 120High   70 - 99 mg/dL Final 04/05/2021  7:40 AM [facility]   BUN 32High   8 - 23 mg/dL Final 04/05/2021  7:40 AM [facility]- [address] 68[address]   0.70 - 1.20 mg/dL Final 04/05/2021  7:40 AM [facility]- [address] Lab   Bun/Cre Ratio NOT REPORTED  9 - 20 Final 04/05/2021  7:40 AM [facility] [address] Lab   Calcium 9.5  8.6 - 10.4 mg/dL Final 04/05/2021  7:40 AM [facility] [address] Lab   Sodium 142  135 - 144 mmol/L Final 04/05/2021  7:40 AM [facility] [address] Lab   Potassium 5.0  3.7 - 5.3 mmol/L Final 04/05/2021  7:40 AM [facility] [address] Lab   Chloride 105  98 - 107 mmol/L Final 04/05/2021  7:40 AM [facility] [address] Lab   CO2 26  20 - 31 mmol/L Final 04/05/2021  7:40 AM [facility] [address] Lab   Anion Gap 11  9 - 17 mmol/L Final 04/05/2021  7:40 AM [facility] [address] Lab   GFR Non- 40Low   >60 mL/min Final 04/05/2021  7:40 AM [facility] [address] Lab   GFR  48Low   >60 mL/min Final 04/05/2021  7:40 AM [facility] [address] Lab   GFR Comment        Final           Imaging:  Radiology Images and Reports reviewed where indicated and necessary  EXAMINATION:   MRI OF THE LUMBAR SPINE WITHOUT CONTRAST, 4/21/2021 3:03 pm       TECHNIQUE:   Multiplanar multisequence MRI of the lumbar spine was performed without the   administration of intravenous contrast.       COMPARISON:   None.       HISTORY:   ORDERING SYSTEM PROVIDED HISTORY: Scoliosis of lumbar spine, unspecified   scoliosis type   TECHNOLOGIST PROVIDED HISTORY: Scoliosis and multilevel DDD/changes   Reason for Exam: Scoliosis of lumbar spine, unspecified scoliosis type M41.9   (ICD-10-CM); Degenerative disc disease, lumbar M51.36 (ICD-10-CM); Acute low   back pain, unspecified back pain laterality, unspecified whether sciatica   present M54.5 (ICD-10-CM);  Lumbar radicular pain   Additional signs and symptoms: complains of right sided sciatica for the past   6 weeks       FINDINGS:   BONES/ALIGNMENT: There is normal alignment of the spine. The vertebral body   heights are maintained. The bone marrow signal appears unremarkable.  No   acute fracture is identified.       The S1 vertebral body is transitional and lumbarized.       SPINAL CORD: The conus terminates normally.       SOFT TISSUES: No paraspinal mass identified.       L1-L2: Annular disc bulge flattens the thecal sac.       L2-L3: Annular disc bulge flattens the thecal sac.       L3-L4: Annular disc bulge encroaches upon both exit foramina which are mildly   stenotic.  The central canal is mildly stenotic at this level.       L4-L5: Severe central canal stenosis is identified due to broad disc   osteophyte complex, facet arthropathy and ligamentum flavum hypertrophy. Both foramina are severely stenotic, greater on the right.       L5-S1: Broad disc osteophyte complex and facet arthropathy result in severe   bilateral foraminal stenosis, greater on the left.  The central canal is   moderately stenotic.       S1-S2:  No focal disc protrusion or stenosis is identified.           Impression   Transitional S1 vertebral body which is lumbarized.       Severe central canal stenosis and bilateral foraminal stenosis at L4-L5.       Mild central canal stenosis and mild bilateral foraminal stenosis at L3-L4.       Severe bilateral foraminal stenosis at L5-S1, greater on the left.  Central   canal is moderately stenotic at this level.      L4-L5:        Patient Active Problem List   Diagnosis    Chronic obstructive pulmonary disease (HCC)    Leukocytosis    Anemia in stage 3 chronic kidney disease ([facility] [address]    ASHD (arteriosclerotic heart disease)    History of TIA (transient ischemic attack)    History of hemorrhoids    Arthritis    History of carpal tunnel syndrome    ALEX on CPAP    ED (erectile dysfunction)    History of colon polyps    CKD (chronic kidney disease) stage 3, GFR 30-59 ml/min    CKD (chronic kidney disease), stage III    Renal cyst    Essential hypertension    Allergic rhinitis    Chronic idiopathic gout of multiple sites    Mixed hyperlipidemia    Primary osteoarthritis of left hip    Dependence on other enabling machines and devices    Hypertensive retinopathy    Type 2 diabetes mellitus with stage 3 chronic kidney disease, without long-term current use of insulin (HCC)    Lumbar stenosis with neurogenic claudication        ASSESSMENT    Jesusdestiny Evans is a [de-identified] y.o. male with     1. Lumbar radiculopathy    2. DDD (degenerative disc disease), lumbar    3. Lumbosacral spondylosis without myelopathy    4. Spinal stenosis of lumbar region with neurogenic claudication           PLAN  Patient's   [] x-ray    [] CT scan    [x] MRI  Were/was  Reviewed. These findings are consistent with the patient's symptoms and physical examination. [x] Patient's findings on the x-ray were explained to the patient using a bone modal.    Other reports reviewed include    [] Bone scan   [] EMG and nerve conduction studies   [x] Referral reports-  I also discussed with him the following treatment options Including advantages and disadvantages of each:    [x] Physical therapy    [x] Interventional pain treatment    [x] Medication management    [] Surgical options    Patient's OARRS were reviewed. It is acceptable and appears patient is not receiving prescriptions from multiple prescribers. Patient is  forthcoming regarding prescriptions for pain medication in the past  Controlled Substances Monitoring: Periodic Controlled Substance Monitoring: No signs of potential drug abuse or diversion identified. , Assessed functional status. (Eugene Luong MD)    The following screens were also reviewed  SOAPP- the score is 2. (Values:cates patient is  <4minimal potential  4-7 Moderate potential  >7 High potential  for drug addiction  Counselling/Preventive measures for pain  Control:    [x]  Spine strengthening exercises are discussed with patient in detail.     Patient is counseled on importance of exercise and,core strengthening. Some  specific exercises to strengthen the abdominal muscles and low back muscles Were discussed. Also aquatic (water) physical therapy and benefits were explained to patient. including \" Water supports the body and minimizes the effect of gravity, making it easier for patients to start an exercise program.\"   The following important principles were discussed with patient:  1. Limit Bed Rest--Studies show that people with short-term low-back pain who rest feel more pain and have a harder time with daily tasks than those who stay active. 2. Keep Exercising-patient is advised to stay away from strenuous activities like gardening and avoid whatever motion caused the pain in the first place.   3. Maintain Good Posture-Exercises  to maintain good posture were shown to patient. 4. To do exercises learned in PT regularly. [x]Information (handout) on exercise was  given to patient. The following treatment plan was developed after discussion with patient:    We discussed Lumbar Epidural steroid Injections x 1  at L3 - L4./L4L5    Patient tried and failed NSAIDS,Home exercises, Physical Therapy, Chiropractic manipulations without relief. Patient exhibited signs of radiculopathy with positive straight leg raising test on right    Patient has not had prior Lumbar Surgery. We will see the patient in 2 weeks after the procedures and re-evaluate symptoms.     Orders Placed This Encounter   Procedures    Lumbar Epidural Steroid Injection/Caudal     Standing Status:   Future     Standing Expiration Date:   5/17/2022    FLUORO FOR SURGICAL PROCEDURES     Standing Status:   Future     Standing Expiration Date:   5/17/2022    Saline lock IV     Standing Status:   Future     Standing Expiration Date:   11/17/2022       Decision Making Process : Patient's health history and referral records thoroughly reviewed before focused physical examination and discussion with patient. Over 50% of today's visit is spent on examining the patient and counseling. Level of complexity of date to be reviewed is Moderate. The chart date reviewed include the following: Imaging Reports. Summary of Care. Time spent reviewing with patient the below reports:   Medication safety, Treatment options. Level of diagnosis and management options of this case is multiple: involving the following management options: Interventions as needed, medication management as appropriate, future visits, activity modification, heat/ice as needed, Urine drug screen as required. [x]The patient's questions were answered to the best of my abilities. .    Documentation:  I communicated with the patient and/or health care decision maker about plan of care  Details of this discussion including any medical advice provided: Total Time: 50-60 minutes    This note was created using voice recognition software. There may be inaccuracies of transcription  that are inadvertently overlooked prior to the signature. There is any questions about the transcription please contact me.     Electronically signed by Harvey Smith MD on 5/17/2021 at 9:34 PM

## 2021-05-17 ENCOUNTER — HOSPITAL ENCOUNTER (OUTPATIENT)
Dept: PAIN MANAGEMENT | Age: 81
Discharge: HOME | End: 2021-05-17
Payer: MEDICARE

## 2021-05-17 VITALS
HEART RATE: 61 BPM | TEMPERATURE: 97.5 F | DIASTOLIC BLOOD PRESSURE: 78 MMHG | WEIGHT: 187 LBS | RESPIRATION RATE: 18 BRPM | HEIGHT: 70 IN | SYSTOLIC BLOOD PRESSURE: 118 MMHG | BODY MASS INDEX: 26.77 KG/M2 | OXYGEN SATURATION: 99 %

## 2021-05-17 DIAGNOSIS — M51.36 DDD (DEGENERATIVE DISC DISEASE), LUMBAR: ICD-10-CM

## 2021-05-17 DIAGNOSIS — M47.817 LUMBOSACRAL SPONDYLOSIS WITHOUT MYELOPATHY: ICD-10-CM

## 2021-05-17 DIAGNOSIS — M54.16 LUMBAR RADICULOPATHY: Primary | ICD-10-CM

## 2021-05-17 DIAGNOSIS — M48.062 SPINAL STENOSIS OF LUMBAR REGION WITH NEUROGENIC CLAUDICATION: ICD-10-CM

## 2021-05-17 PROCEDURE — 99205 OFFICE O/P NEW HI 60 MIN: CPT

## 2021-05-17 ASSESSMENT — ENCOUNTER SYMPTOMS
TROUBLE SWALLOWING: 0
SHORTNESS OF BREATH: 0
NAUSEA: 0
COUGH: 0
VOICE CHANGE: 0
VOMITING: 0
ABDOMINAL PAIN: 0
PHOTOPHOBIA: 0
EYE REDNESS: 0
CONSTIPATION: 0

## 2021-06-01 ENCOUNTER — HOSPITAL ENCOUNTER (OUTPATIENT)
Age: 81
Discharge: HOME | End: 2021-06-01
Payer: MEDICARE

## 2021-06-01 DIAGNOSIS — D63.1 ANEMIA IN STAGE 3A CHRONIC KIDNEY DISEASE (HCC): ICD-10-CM

## 2021-06-01 DIAGNOSIS — N18.30 BENIGN HYPERTENSION WITH CKD (CHRONIC KIDNEY DISEASE) STAGE III (HCC): ICD-10-CM

## 2021-06-01 DIAGNOSIS — N18.30 SECONDARY DIABETES MELLITUS WITH STAGE 3 CHRONIC KIDNEY DISEASE (HCC): ICD-10-CM

## 2021-06-01 DIAGNOSIS — I12.9 BENIGN HYPERTENSION WITH CKD (CHRONIC KIDNEY DISEASE) STAGE III (HCC): ICD-10-CM

## 2021-06-01 DIAGNOSIS — N28.1 RENAL CYST: ICD-10-CM

## 2021-06-01 DIAGNOSIS — N18.31 ANEMIA IN STAGE 3A CHRONIC KIDNEY DISEASE (HCC): ICD-10-CM

## 2021-06-01 DIAGNOSIS — N18.31 STAGE 3A CHRONIC KIDNEY DISEASE (HCC): ICD-10-CM

## 2021-06-01 DIAGNOSIS — E13.22 SECONDARY DIABETES MELLITUS WITH STAGE 3 CHRONIC KIDNEY DISEASE (HCC): ICD-10-CM

## 2021-06-01 LAB
ABSOLUTE EOS #: 0.6 K/UL (ref 0–0.4)
ABSOLUTE IMMATURE GRANULOCYTE: ABNORMAL K/UL (ref 0–0.3)
ABSOLUTE LYMPH #: 2.3 K/UL (ref 1–4.8)
ABSOLUTE MONO #: 0.8 K/UL (ref 0.1–1.3)
ANION GAP SERPL CALCULATED.3IONS-SCNC: 10 MMOL/L (ref 9–17)
BASOPHILS # BLD: 0 % (ref 0–2)
BASOPHILS ABSOLUTE: 0 K/UL (ref 0–0.2)
BUN BLDV-MCNC: 27 MG/DL (ref 8–23)
BUN/CREAT BLD: ABNORMAL (ref 9–20)
CALCIUM SERPL-MCNC: 9.8 MG/DL (ref 8.6–10.4)
CHLORIDE BLD-SCNC: 106 MMOL/L (ref 98–107)
CO2: 26 MMOL/L (ref 20–31)
CREAT SERPL-MCNC: 1.38 MG/DL (ref 0.7–1.2)
CREATININE URINE: 151 MG/DL (ref 39–259)
DIFFERENTIAL TYPE: ABNORMAL
EOSINOPHILS RELATIVE PERCENT: 6 % (ref 0–4)
GFR AFRICAN AMERICAN: >60 ML/MIN
GFR NON-AFRICAN AMERICAN: 50 ML/MIN
GFR SERPL CREATININE-BSD FRML MDRD: ABNORMAL ML/MIN/{1.73_M2}
GFR SERPL CREATININE-BSD FRML MDRD: ABNORMAL ML/MIN/{1.73_M2}
GLUCOSE BLD-MCNC: 151 MG/DL (ref 70–99)
HCT VFR BLD CALC: 43.4 % (ref 41–53)
HEMOGLOBIN: 13.9 G/DL (ref 13.5–17.5)
IMMATURE GRANULOCYTES: ABNORMAL %
LYMPHOCYTES # BLD: 23 % (ref 24–44)
MAGNESIUM: 2.2 MG/DL (ref 1.6–2.6)
MCH RBC QN AUTO: 30.3 PG (ref 26–34)
MCHC RBC AUTO-ENTMCNC: 32.1 G/DL (ref 31–37)
MCV RBC AUTO: 94.3 FL (ref 80–100)
MICROALBUMIN/CREAT 24H UR: <12 MG/L
MICROALBUMIN/CREAT UR-RTO: NORMAL MCG/MG CREAT
MONOCYTES # BLD: 8 % (ref 1–7)
NRBC AUTOMATED: ABNORMAL PER 100 WBC
PDW BLD-RTO: 13.5 % (ref 11.5–14.9)
PHOSPHORUS: 3.8 MG/DL (ref 2.5–4.5)
PLATELET # BLD: 255 K/UL (ref 150–450)
PLATELET ESTIMATE: ABNORMAL
PMV BLD AUTO: 10.6 FL (ref 6–12)
POTASSIUM SERPL-SCNC: 4.7 MMOL/L (ref 3.7–5.3)
RBC # BLD: 4.6 M/UL (ref 4.5–5.9)
RBC # BLD: ABNORMAL 10*6/UL
SEG NEUTROPHILS: 63 % (ref 36–66)
SEGMENTED NEUTROPHILS ABSOLUTE COUNT: 6.2 K/UL (ref 1.3–9.1)
SODIUM BLD-SCNC: 142 MMOL/L (ref 135–144)
WBC # BLD: 9.9 K/UL (ref 3.5–11)
WBC # BLD: ABNORMAL 10*3/UL

## 2021-06-01 PROCEDURE — 85025 COMPLETE CBC W/AUTO DIFF WBC: CPT

## 2021-06-01 PROCEDURE — 80048 BASIC METABOLIC PNL TOTAL CA: CPT

## 2021-06-01 PROCEDURE — 82043 UR ALBUMIN QUANTITATIVE: CPT

## 2021-06-01 PROCEDURE — 36415 COLL VENOUS BLD VENIPUNCTURE: CPT

## 2021-06-01 PROCEDURE — 83735 ASSAY OF MAGNESIUM: CPT

## 2021-06-01 PROCEDURE — 82570 ASSAY OF URINE CREATININE: CPT

## 2021-06-01 PROCEDURE — 84100 ASSAY OF PHOSPHORUS: CPT

## 2021-06-03 ENCOUNTER — TELEPHONE (OUTPATIENT)
Dept: PAIN MANAGEMENT | Age: 81
End: 2021-06-03

## 2021-06-03 ENCOUNTER — OFFICE VISIT (OUTPATIENT)
Dept: FAMILY MEDICINE CLINIC | Age: 81
End: 2021-06-03
Payer: MEDICARE

## 2021-06-03 VITALS
SYSTOLIC BLOOD PRESSURE: 126 MMHG | DIASTOLIC BLOOD PRESSURE: 80 MMHG | HEIGHT: 70 IN | TEMPERATURE: 98.2 F | HEART RATE: 60 BPM | BODY MASS INDEX: 27.26 KG/M2 | WEIGHT: 190.4 LBS

## 2021-06-03 DIAGNOSIS — Z00.00 ROUTINE GENERAL MEDICAL EXAMINATION AT A HEALTH CARE FACILITY: ICD-10-CM

## 2021-06-03 DIAGNOSIS — Z00.00 MEDICARE ANNUAL WELLNESS VISIT, INITIAL: Primary | ICD-10-CM

## 2021-06-03 PROCEDURE — G0438 PPPS, INITIAL VISIT: HCPCS | Performed by: NURSE PRACTITIONER

## 2021-06-03 PROCEDURE — 1123F ACP DISCUSS/DSCN MKR DOCD: CPT | Performed by: NURSE PRACTITIONER

## 2021-06-03 PROCEDURE — 4040F PNEUMOC VAC/ADMIN/RCVD: CPT | Performed by: NURSE PRACTITIONER

## 2021-06-03 SDOH — ECONOMIC STABILITY: INCOME INSECURITY: HOW HARD IS IT FOR YOU TO PAY FOR THE VERY BASICS LIKE FOOD, HOUSING, MEDICAL CARE, AND HEATING?: NOT HARD AT ALL

## 2021-06-03 SDOH — ECONOMIC STABILITY: FOOD INSECURITY: WITHIN THE PAST 12 MONTHS, THE FOOD YOU BOUGHT JUST DIDN'T LAST AND YOU DIDN'T HAVE MONEY TO GET MORE.: NEVER TRUE

## 2021-06-03 SDOH — ECONOMIC STABILITY: TRANSPORTATION INSECURITY
IN THE PAST 12 MONTHS, HAS THE LACK OF TRANSPORTATION KEPT YOU FROM MEDICAL APPOINTMENTS OR FROM GETTING MEDICATIONS?: NO

## 2021-06-03 SDOH — ECONOMIC STABILITY: FOOD INSECURITY: WITHIN THE PAST 12 MONTHS, YOU WORRIED THAT YOUR FOOD WOULD RUN OUT BEFORE YOU GOT MONEY TO BUY MORE.: NEVER TRUE

## 2021-06-03 SDOH — ECONOMIC STABILITY: TRANSPORTATION INSECURITY
IN THE PAST 12 MONTHS, HAS LACK OF TRANSPORTATION KEPT YOU FROM MEETINGS, WORK, OR FROM GETTING THINGS NEEDED FOR DAILY LIVING?: NO

## 2021-06-03 ASSESSMENT — PATIENT HEALTH QUESTIONNAIRE - PHQ9
SUM OF ALL RESPONSES TO PHQ QUESTIONS 1-9: 0
SUM OF ALL RESPONSES TO PHQ QUESTIONS 1-9: 0
1. LITTLE INTEREST OR PLEASURE IN DOING THINGS: 0
SUM OF ALL RESPONSES TO PHQ9 QUESTIONS 1 & 2: 0
2. FEELING DOWN, DEPRESSED OR HOPELESS: 0
SUM OF ALL RESPONSES TO PHQ QUESTIONS 1-9: 0

## 2021-06-03 ASSESSMENT — LIFESTYLE VARIABLES
HOW OFTEN DO YOU HAVE A DRINK CONTAINING ALCOHOL: 1
HOW MANY STANDARD DRINKS CONTAINING ALCOHOL DO YOU HAVE ON A TYPICAL DAY: 0

## 2021-06-03 NOTE — PROGRESS NOTES
Medicare Annual Wellness Visit  Name: Jeovanny Del Toro Date: 6/3/2021   MRN: [id number] Sex: Male   Age: [de-identified] y.o. Ethnicity: Non-/Non    : 1940 Race: Mann Chatman is here for Medicare AWV    Screenings for behavioral, psychosocial and functional/safety risks, and cognitive dysfunction are all negative except as indicated below. These results, as well as other patient data from the [address] form, are documented in Flowsheets linked to this Encounter. Allergies   Allergen Reactions    Pcn [Penicillins] Itching    Ceclor [Cefaclor] Itching and Rash       Prior to Visit Medications    Medication Sig Taking? Authorizing Provider   lisinopril (PRINIVIL;ZESTRIL) 2.5 MG tablet TAKE 1 TABLET DAILY Yes Royce Ceja MD   nitroGLYCERIN (NITROSTAT) 0.4 MG SL tablet Place 1 tablet under tongue every 5 minutes as needed for chest pain. Maximum of 3 tablets/day. Yes Royce Ceja MD   allopurinol (ZYLOPRIM) 100 MG tablet TAKE 1 TABLET DAILY Yes Royce Ceja MD   rosuvastatin (CRESTOR) 10 MG tablet TAKE 1 TABLET DAILY Yes Royce Ceja MD   atenolol (TENORMIN) 25 MG tablet TAKE 1 TABLET DAILY Yes Mike Key MD   Fluticasone-Salmeterol 111-16 MCG/ACT AEPB  Yes Historical Provider, MD   aspirin (ASPIRIN 81) 81 MG chewable tablet  Yes Historical Provider, MD   CPAP Machine MISC --- --- Yes Historical Provider, MD   albuterol sulfate HFA (VENTOLIN HFA) 108 (90 Base) MCG/ACT inhaler 2 puffs as needed Yes Historical Provider, MD   FISH OIL Take by mouth 2 times daily  Yes Historical Provider, MD   therapeutic multivitamin-minerals (THERAGRAN-M) tablet Take 1 tablet by mouth daily. OTC Yes Royce Ceja MD       Past Medical History:   Diagnosis Date    Anemia     Anesthesia     woke up eary during surgery x1 , heard saw & felt the pressure.     Arthritis 7/15/2013    ASHD (arteriosclerotic heart disease) 7/15/2013    CAD (coronary artery disease)     has cardiac stent x 4.    Chronic kidney disease     stage 3    Claudication (HCC) 7/15/2013    COPD (chronic obstructive pulmonary disease) ([address]     Degenerative joint disease 7/15/2013    ED (erectile dysfunction) 7/15/2013    Full dentures     Gout     History of carpal tunnel syndrome 7/15/2013    History of colon polyps 7/15/2013    History of hemorrhoids 7/15/2013    History of TIA (transient ischemic attack) 7/15/2013    Hypercholesteremia 7/15/2013    Hypertension     Hyperuricemia 7/15/2013    Leukocytosis     Renal cyst     Sleep apnea 07/15/2013    on cpap at hs.  Wears glasses     for reading       Past Surgical History:   Procedure Laterality Date    BLEPHAROPLASTY Bilateral 04/02/2012    CARPAL TUNNEL RELEASE Right 11/2002    CATARACT REMOVAL WITH IMPLANT Left 03/02/2012    CATARACT REMOVAL WITH IMPLANT Right 03/05/2012    COLONOSCOPY  05/21/08    POLYPECTOMY   Zenaida Wilhelm 336 x1 stent,1998 x2 stents,2009 x1,    EYE SURGERY      cataracts, eyelids.  JOINT REPLACEMENT Right 07/12/11     RT TOTAL SHOULDER REPLACEMENT    JOINT REPLACEMENT Right 1995    rt. hip replacement    JOINT REPLACEMENT Left 01/23/2018    ELLE    KNEE ARTHROPLASTY Left 04/04/2017    VT TOTAL HIP ARTHROPLASTY Left 1/23/2018    LEFT TOTAL HIP ARTHROPLASTY MINIMALLY INVASIVE ASI WITH BIOMET SYSTEM & GPS SPRAY APPLICATION performed by Hosea Dennis MD at [address] ARTHROSCOPY Right 05/2002 repair    7-12-11 right shoulder reverse replacement.     SHOULDER SURGERY Left 02/24/2002    shoulder resurfacing    TOTAL KNEE ARTHROPLASTY Left 4/4/2017    KNEE TOTAL ARTHROPLASTY performed by Hosea Dennis MD at [address] History   Problem Relation Age of Onset    Alzheimer's Disease Mother     Heart Disease Mother     Emphysema Father        CareTeam (Including outside providers/suppliers regularly involved in providing care): General Health and ACP:  General  In general, how would you say your health is?: Fair  In the past 7 days, have you experienced any of the following? New or Increased Pain, New or Increased Fatigue, Loneliness, Social Isolation, Stress or Anger?: (!) New or Increased Pain, New or Increased Fatigue  Do you get the social and emotional support that you need?: Yes  Do you have a Living Will?: Yes  Advance Directives     Power of  Living Will ACP-Advance Directive ACP-Power of     Not on File Not on File Not on File Not on File      General Health Risk Interventions:  · Pain issues: chronic back pain and follows up with pain management     Health Habits/Nutrition:  Health Habits/Nutrition  Do you exercise for at least 20 minutes 2-3 times per week?: Yes  Have you lost any weight without trying in the past 3 months?: No  Do you eat only one meal per day?: (!) Yes  Have you seen the dentist within the past year?: N/A - wear dentures  Body mass index: (!) 27.32  Health Habits/Nutrition Interventions:  · Nutritional issues:  educational materials for healthy, well-balanced diet provided     Safety:  Safety  Do you have working smoke detectors?: Yes  Have all throw rugs been removed or fastened?: (!) No  Do you have non-slip mats or surfaces in all bathtubs/showers?: (!) No  Do all of your stairways have a railing or banister?: Yes  Are your doorways, halls and stairs free of clutter?: Yes  Do you always fasten your seatbelt when you are in a car?: Yes  Safety Interventions:  · Home safety tips provided    ADL:  ADLs  In the past 7 days, did you need help from others to perform any of the following everyday activities? Eating, dressing, grooming, bathing, toileting, or walking/balance?: None  In the past 7 days, did you need help from others to take care of any of the following?  Laundry, housekeeping, banking/finances, shopping, telephone use, food preparation, transportation, or taking medications?: (!) Shopping  ADL Interventions:  · pt has back pain and ambulates with a walker    Personalized Preventive Plan   Current Health Maintenance Status  Immunization History   Administered Date(s) Administered    COVID-19, Moderna, PF, 100mcg/0.5mL 01/26/2021, 02/23/2021    Influenza 10/25/2013    Influenza Virus Vaccine 10/05/2010, 09/20/2011, 09/14/2012, 09/30/2014, 10/23/2015, 10/17/2016, 10/16/2017, 10/01/2018    Influenza, High Dose (Fluzone 65 yrs and older) 10/03/2018    Influenza, Juan Majestic, IM, (6 mo and older Fluzone, Flulaval, Fluarix and 3 yrs and older Afluria) 10/25/2016    Influenza, Quadv, IM, PF (6 mo and older Fluzone, Flulaval, Fluarix, and 3 yrs and older Afluria) 10/19/2017    Influenza, Quadv, adjuvanted, 65 yrs +, IM, PF (Fluad) 10/07/2020    Influenza, Triv, inactivated, subunit, adjuvanted, IM (Fluad 65 yrs and older) 10/22/2019    Pneumococcal Conjugate 13-valent ([id number]) 03/11/2016    Pneumococcal Polysaccharide ([id number]) 04/12/2010, 04/05/2017    Tetanus 05/22/2014    Zoster Live (Zostavax) 09/19/2013        Health Maintenance   Topic Date Due    DTaP/Tdap/Td vaccine (1 - Tdap) Never done    Shingles Vaccine (2 of 3) 11/14/2013    Annual Wellness Visit (AWV)  Never done    Lipid screen  04/05/2022    Potassium monitoring  06/01/2022    Creatinine monitoring  06/01/2022    Flu vaccine  Completed    Pneumococcal 65+ years Vaccine  Completed    COVID-19 Vaccine  Completed    Hepatitis A vaccine  Aged Out    Hib vaccine  Aged Out    Meningococcal (ACWY) vaccine  Aged Out     Recommendations for [employer] Due: see orders and patient instructions/AVS.  . Recommended screening schedule for the next 5-10 years is provided to the patient in written form: see Patient Instructions/AVS.    There are no diagnoses linked to this encounter.

## 2021-06-03 NOTE — TELEPHONE ENCOUNTER
Spoke with patient and reviewed pre-procedure instructions. Medications reviewed and patient verbalizes stopping aspirin. Will take heart/blood pressure meds morning of procedure. Reviewed mask protocol and entrance usage.

## 2021-06-07 ENCOUNTER — HOSPITAL ENCOUNTER (OUTPATIENT)
Dept: GENERAL RADIOLOGY | Age: 81
Discharge: HOME | End: 2021-06-09
Payer: MEDICARE

## 2021-06-07 ENCOUNTER — HOSPITAL ENCOUNTER (OUTPATIENT)
Dept: PAIN MANAGEMENT | Age: 81
Discharge: HOME | End: 2021-06-07
Payer: MEDICARE

## 2021-06-07 VITALS
HEART RATE: 51 BPM | TEMPERATURE: 97.7 F | DIASTOLIC BLOOD PRESSURE: 72 MMHG | RESPIRATION RATE: 16 BRPM | SYSTOLIC BLOOD PRESSURE: 143 MMHG | OXYGEN SATURATION: 96 %

## 2021-06-07 DIAGNOSIS — M54.16 LUMBAR RADICULOPATHY: Primary | ICD-10-CM

## 2021-06-07 DIAGNOSIS — M54.16 LUMBAR RADICULOPATHY: ICD-10-CM

## 2021-06-07 DIAGNOSIS — M47.816 LUMBAR SPONDYLOSIS: ICD-10-CM

## 2021-06-07 DIAGNOSIS — M51.36 DDD (DEGENERATIVE DISC DISEASE), LUMBAR: ICD-10-CM

## 2021-06-07 DIAGNOSIS — M47.817 LUMBOSACRAL SPONDYLOSIS WITHOUT MYELOPATHY: ICD-10-CM

## 2021-06-07 DIAGNOSIS — M47.816 ARTHROPATHY OF LUMBAR FACET JOINT: ICD-10-CM

## 2021-06-07 PROCEDURE — 62323 NJX INTERLAMINAR LMBR/SAC: CPT

## 2021-06-07 PROCEDURE — 3209999900 FLUORO FOR SURGICAL PROCEDURES

## 2021-06-07 PROCEDURE — 62323 NJX INTERLAMINAR LMBR/SAC: CPT | Performed by: PAIN MEDICINE

## 2021-06-07 PROCEDURE — 6360000002 HC RX W HCPCS: Performed by: PAIN MEDICINE

## 2021-06-07 PROCEDURE — 6360000004 HC RX CONTRAST MEDICATION: Performed by: PAIN MEDICINE

## 2021-06-07 RX ORDER — TRIAMCINOLONE ACETONIDE 40 MG/ML
INJECTION, SUSPENSION INTRA-ARTICULAR; INTRAMUSCULAR
Status: COMPLETED | OUTPATIENT
Start: 2021-06-07 | End: 2021-06-07

## 2021-06-07 RX ADMIN — TRIAMCINOLONE ACETONIDE 80 MG: 40 INJECTION, SUSPENSION INTRA-ARTICULAR; INTRAMUSCULAR at 10:50

## 2021-06-07 RX ADMIN — IOHEXOL 2 ML: 180 INJECTION INTRAVENOUS at 10:49

## 2021-06-07 ASSESSMENT — PAIN - FUNCTIONAL ASSESSMENT
PAIN_FUNCTIONAL_ASSESSMENT: PREVENTS OR INTERFERES SOME ACTIVE ACTIVITIES AND ADLS
PAIN_FUNCTIONAL_ASSESSMENT: 0-10

## 2021-06-07 ASSESSMENT — PAIN DESCRIPTION - LOCATION: LOCATION: BACK

## 2021-06-07 ASSESSMENT — PAIN DESCRIPTION - DIRECTION: RADIATING_TOWARDS: RIGHT LEG

## 2021-06-07 ASSESSMENT — PAIN DESCRIPTION - ONSET: ONSET: ON-GOING

## 2021-06-07 ASSESSMENT — PAIN DESCRIPTION - DESCRIPTORS: DESCRIPTORS: ACHING;SHARP

## 2021-06-07 ASSESSMENT — PAIN DESCRIPTION - PROGRESSION: CLINICAL_PROGRESSION: GRADUALLY IMPROVING

## 2021-06-07 ASSESSMENT — PAIN DESCRIPTION - PAIN TYPE: TYPE: CHRONIC PAIN

## 2021-06-07 ASSESSMENT — PAIN DESCRIPTION - ORIENTATION: ORIENTATION: LOWER;RIGHT;LEFT

## 2021-06-07 ASSESSMENT — PAIN DESCRIPTION - FREQUENCY: FREQUENCY: INTERMITTENT

## 2021-06-07 ASSESSMENT — PAIN SCALES - GENERAL: PAINLEVEL_OUTOF10: 3

## 2021-06-07 NOTE — PROCEDURES
Pre-Procedure Note    Patient Name: Leticia Kan   YOB: 1940  Room/Bed: Room/bed info not found  Medical Record Number: [id number]  Date: 6/7/2021       Indication:    1. Lumbar radiculopathy    2. DDD (degenerative disc disease), lumbar    3. Lumbar spondylosis    4. Arthropathy of lumbar facet joint        Consent: On file. Vital Signs:   Vitals:    06/07/21 1047   BP: (!) 168/106   Pulse: 61   Resp: 16   Temp:    SpO2: 97%       Past Medical History:   has a past medical history of Anemia, Anesthesia, Arthritis, ASHD (arteriosclerotic heart disease), CAD (coronary artery disease), Chronic kidney disease, Claudication ([address] COPD (chronic obstructive pulmonary disease) ([facility] [address] [address] Degenerative joint disease, ED (erectile dysfunction), Full dentures, Gout, History of carpal tunnel syndrome, History of colon polyps, History of hemorrhoids, History of TIA (transient ischemic attack), Hypercholesteremia, Hypertension, Hyperuricemia, Leukocytosis, Renal cyst, Sleep apnea, and Wears glasses. Past Surgical History:   has a past surgical history that includes Coronary angioplasty with stent (1994 x1 stent,1998 x2 stents,2009 x1,); Colonoscopy (05/21/08); Shoulder arthroscopy (Right, 05/2002 repair); Carpal tunnel release (Right, 11/2002); shoulder surgery (Left, 02/24/2002); Cataract removal with implant (Left, 03/02/2012); Cataract removal with implant (Right, 03/05/2012); eye surgery; Blepharoplasty (Bilateral, 04/02/2012); Knee Arthroplasty (Left, 04/04/2017); Total knee arthroplasty (Left, 4/4/2017); pr total hip arthroplasty (Left, 1/23/2018); joint replacement (Right, 07/12/11 ); joint replacement (Right, 1995); and joint replacement (Left, 01/23/2018). Pre-Sedation Documentation and Exam:   Vital signs have been reviewed (see flow sheet for vitals).      Mallampati Airway Assessment:  normal    ASA Classification:  Class 3 - A patient with severe systemic disease that limits activity but is not incapacitating    Sedation/ Anesthesia Plan:   intravenous sedation   as needed. Medications Planned:   midazolam (Versed) / Fentanyl  Intravenously  as needed. Patient is an appropriate candidate for plan of sedation: yes  Patient's History and Physical examination was reviewed and there is no change. Electronically signed by Leslie Blackman MD on 6/7/2021 at 11:00 AM        Preoperative Diagnosis:    1. Lumbar radiculopathy    2. DDD (degenerative disc disease), lumbar    3. Lumbar spondylosis    4. Arthropathy of lumbar facet joint        Postoperative Diagnosis:    1. Lumbar radiculopathy    2. DDD (degenerative disc disease), lumbar    3. Lumbar spondylosis    4. Arthropathy of lumbar facet joint        Procedure Performed:  Lumbar epidural steroid injection under fluoroscopy guidance without IV sedation. Indication for the Procedure: The patient failed conservative management  for pain in the low back radiating to lower extremities. As the patient is not responding to conservative management and it is interfering with activities of daily living we decided to proceed with lumbar epidural steroid injection. The procedure and risks were discussed with the patient and an informed consent was obtained  Current Pain Assessment  Pain Assessment  Pain Assessment: 0-10  Pain Level: 3  Patient's Stated Pain Goal: 2  Pain Type: Chronic pain  Pain Location: Back  Pain Orientation: Lower, Right, Left  Pain Radiating Towards: right leg  Pain Descriptors: Aching, Sharp  Pain Frequency: Intermittent  Pain Onset: On-going  Clinical Progression: Gradually improving  Functional Pain Assessment: Prevents or interferes some active activities and ADLs  Non-Pharmaceutical Pain Intervention(s): Rest  POSS Score (Patient Ctrl Analgesia): 1     Procedure:       Patient's vital signs including BP, EKG and SaO2 were monitored by the RN and they remained stable during the procedure.    A meaningful communication was kept up with the patient throughout  the procedure. The patient is placed in prone position. Skin over the back was prepped and draped in sterile manner. Then using fluoroscopy the L3, L4 interspace was observed and the skin and deep tissues in the right paramedian area were infiltrated with 4 ml of 1% lidocaine. The #20-gauge, 3-1/2 inch Tuohy needle was inserted through the skin wheal and the epidural space was identified using loss of resistance technique with normal saline. This was confirmed with AP and lateral views using fluoroscopy after injecting about 1 ml of Omnipaque-180 and observing the spread of the contrast in the epidural space. Then after negative aspiration a total of 80 mg of triamcinolone with 8 ml of normal saline was injected into the epidural space. The needle is removed and a Band-Aid was placed over the needle insertion site. Patient's vital signs remained stable and the patient tolerated the procedure well. The patient was discharged home in stable condition and will be followed in the pain clinic in the next few weeks for further planning.     Electronically signed by Armin Villegas MD on 6/7/2021 at 11:00 AM

## 2021-06-07 NOTE — PROGRESS NOTES
Discharge criteria met. Patient alert and oriented x3  Post procedure dressing dry and intact. Sensory and motor function intact as per pre-procedure. Instructions and follow up reviewed with pt at patient at discharge. Patient discharged per wheelchair, gait steady  @ 1120.  - Sandi Shade

## 2021-06-08 ENCOUNTER — TELEPHONE (OUTPATIENT)
Dept: PAIN MANAGEMENT | Age: 81
End: 2021-06-08

## 2021-06-22 ENCOUNTER — HOSPITAL ENCOUNTER (OUTPATIENT)
Dept: PAIN MANAGEMENT | Age: 81
Discharge: HOME | End: 2021-06-22
Payer: MEDICARE

## 2021-06-22 DIAGNOSIS — M54.16 LUMBAR RADICULOPATHY: Primary | ICD-10-CM

## 2021-06-22 DIAGNOSIS — M48.062 LUMBAR STENOSIS WITH NEUROGENIC CLAUDICATION: ICD-10-CM

## 2021-06-22 PROCEDURE — 99213 OFFICE O/P EST LOW 20 MIN: CPT

## 2021-06-22 PROCEDURE — 99212 OFFICE O/P EST SF 10 MIN: CPT | Performed by: NURSE PRACTITIONER

## 2021-06-22 ASSESSMENT — ENCOUNTER SYMPTOMS
COUGH: 0
CONSTIPATION: 0
SHORTNESS OF BREATH: 0
BACK PAIN: 0

## 2021-06-22 NOTE — PROGRESS NOTES
Patient completed a telephone visit today for follow up after procedure    Chief Complaint: back pain    PMH Pt complains of low back pain. MRI with Severe central canal stenosis and bilateral foraminal stenosis at L4-L5. Mild central canal stenosis and mild bilateral foraminal stenosis at L3-L4. Severe bilateral foraminal stenosis at L5-S1, greater on the left.  Central canal is moderately stenotic at this level. He did see Dr. Yony Manzo who recommended LESI. If no relief, patient would be a candidate for L3-5 PLIF. Pt had LESI 6/7/21 and reports 100% relief. He states he was feeling somewhat better before the injections due to some exercises he started at home. He continues exercises with benefit. Back Pain  This is a chronic problem. The current episode started more than 1 year ago. The problem occurs rarely. The problem has been rapidly improving since onset. The pain is present in the lumbar spine. The pain is at a severity of 0/10. The patient is experiencing no pain. Pertinent negatives include no chest pain or fever. He has tried home exercises (LESI) for the symptoms. The treatment provided significant relief. Patient denies any new neurological symptoms. No bowel or bladder incontinence, no weakness, and no falling. Past Medical History:   Diagnosis Date    Anemia     Anesthesia     woke up eary during surgery x1 , heard saw & felt the pressure.     Arthritis 7/15/2013    ASHD (arteriosclerotic heart disease) 7/15/2013    CAD (coronary artery disease)     has cardiac stent x 4.    Chronic kidney disease     stage 3    Claudication (HCC) 7/15/2013    COPD (chronic obstructive pulmonary disease) ([address]     Degenerative joint disease 7/15/2013    ED (erectile dysfunction) 7/15/2013    Full dentures     Gout     History of carpal tunnel syndrome 7/15/2013    History of colon polyps 7/15/2013    History of hemorrhoids 7/15/2013    History of TIA (transient ischemic attack) Fluticasone-Salmeterol 232-14 MCG/ACT AEPB, , Disp: , Rfl:     aspirin (ASPIRIN 81) 81 MG chewable tablet, , Disp: , Rfl:     CPAP Machine MISC, --- ---, Disp: , Rfl:     albuterol sulfate HFA (VENTOLIN HFA) 108 (90 Base) MCG/ACT inhaler, 2 puffs as needed, Disp: , Rfl:     FISH OIL, Take by mouth 2 times daily , Disp: , Rfl:     therapeutic multivitamin-minerals (THERAGRAN-M) tablet, Take 1 tablet by mouth daily. OTC, Disp: , Rfl:     Family History   Problem Relation Age of Onset    Alzheimer's Disease Mother     Heart Disease Mother     Emphysema Father        Social History     Socioeconomic History    Marital status:      Spouse name: Not on file    Number of children: Not on file    Years of education: Not on file    Highest education level: Not on file   Occupational History    Not on file   Tobacco Use    Smoking status: Former Smoker     Packs/day: 2.00     Years: 35.00     Pack years: 70.00     Quit date:      Years since quittin.4    Smokeless tobacco: Current User    Tobacco comment: 3/30/21: NICOTINE LOZENGES - current use   Substance and Sexual Activity    Alcohol use: No     Alcohol/week: 0.0 standard drinks     Comment: rarely    Drug use: No    Sexual activity: Not on file   Other Topics Concern    Not on file   Social History Narrative    Not on file     Social Determinants of Health     Financial Resource Strain: Low Risk     Difficulty of Paying Living Expenses: Not hard at all   Food Insecurity: No Food Insecurity    Worried About [address] [employer] in the Last Year: Never true    [facility] in the Last Year: Never true   Transportation Needs: No Transportation Needs    Lack of Transportation (Medical): No    Lack of Transportation (Non-Medical):  No   Physical Activity:     Days of Exercise per Week:     Minutes of Exercise per Session:    Stress:     Feeling of Stress :    Social Connections:     Frequency of Communication with Friends and Family:     Frequency of Social Gatherings with Friends and Family:     Attends Church Services:     Active Member of Clubs or Organizations:     Attends Club or Organization Meetings:     Marital Status:    Intimate Partner Violence:     Fear of Current or Ex-Partner:     Emotionally Abused:     Physically Abused:     Sexually Abused:        Review of Systems:  Review of Systems   Constitutional: Negative for chills and fever. Cardiovascular: Negative for chest pain and palpitations. Respiratory: Negative for cough and shortness of breath. Musculoskeletal: Negative for back pain. Gastrointestinal: Negative for constipation. Neurological: Negative for disturbances in coordination and loss of balance. Physical Exam:  There were no vitals taken for this visit. Physical Exam  Neurological:      Mental Status: He is alert. Psychiatric:         Mood and Affect: Mood normal.         Record/Diagnostics Review:    As reviewed above    Assessment:  Problem List Items Addressed This Visit     Lumbar stenosis with neurogenic claudication    Lumbar radiculopathy - Primary             Treatment Plan:  Significant relief following LESI  Continues home exercise with benefit  Follow up as needed    Braxton Negro, was evaluated through a synchronous (real-time) audio-video encounter. The patient (or guardian if applicable) is aware that this is a billable service. Verbal consent to proceed has been obtained within the past 12 months. The visit was conducted pursuant to the emergency declaration under the [address] [place] [address] authority and the [place] Act. Patient identification was verified, and a caregiver was present when appropriate. The patient was located in a state where the provider was credentialed to provide care.     Total time spent for this encounter: 10 minutes    --Marbella Anne APRN - CNP on 6/22/2021 at 10:46 AM    An electronic signature was used to authenticate this note.

## 2021-06-24 ENCOUNTER — OFFICE VISIT (OUTPATIENT)
Dept: ORTHOPEDIC SURGERY | Age: 81
End: 2021-06-24
Payer: MEDICARE

## 2021-06-24 VITALS — HEIGHT: 70 IN | BODY MASS INDEX: 27.35 KG/M2 | WEIGHT: 191 LBS | RESPIRATION RATE: 12 BRPM

## 2021-06-24 DIAGNOSIS — M48.062 LUMBAR STENOSIS WITH NEUROGENIC CLAUDICATION: ICD-10-CM

## 2021-06-24 DIAGNOSIS — M41.9 SCOLIOSIS OF LUMBAR SPINE, UNSPECIFIED SCOLIOSIS TYPE: ICD-10-CM

## 2021-06-24 DIAGNOSIS — M51.36 DEGENERATIVE DISC DISEASE, LUMBAR: ICD-10-CM

## 2021-06-24 DIAGNOSIS — M54.16 LUMBAR RADICULAR PAIN: Primary | ICD-10-CM

## 2021-06-24 PROCEDURE — 4040F PNEUMOC VAC/ADMIN/RCVD: CPT | Performed by: ORTHOPAEDIC SURGERY

## 2021-06-24 PROCEDURE — G8417 CALC BMI ABV UP PARAM F/U: HCPCS | Performed by: ORTHOPAEDIC SURGERY

## 2021-06-24 PROCEDURE — 4004F PT TOBACCO SCREEN RCVD TLK: CPT | Performed by: ORTHOPAEDIC SURGERY

## 2021-06-24 PROCEDURE — 99213 OFFICE O/P EST LOW 20 MIN: CPT | Performed by: ORTHOPAEDIC SURGERY

## 2021-06-24 PROCEDURE — G8427 DOCREV CUR MEDS BY ELIG CLIN: HCPCS | Performed by: ORTHOPAEDIC SURGERY

## 2021-06-24 PROCEDURE — 1123F ACP DISCUSS/DSCN MKR DOCD: CPT | Performed by: ORTHOPAEDIC SURGERY

## 2021-06-24 NOTE — PROGRESS NOTES
Years since quittin.4    Smokeless tobacco: Current User    Tobacco comment: 3/30/21: NICOTINE LOZENGES - current use   Substance and Sexual Activity    Alcohol use: No     Alcohol/week: 0.0 standard drinks     Comment: rarely    Drug use: No    Sexual activity: Not on file   Other Topics Concern    Not on file   Social History Narrative    Not on file     Social Determinants of Health     Financial Resource Strain: Low Risk     Difficulty of Paying Living Expenses: Not hard at all   Food Insecurity: No Food Insecurity    Worried About [address] in the Last Year: Never true    [facility] in the Last Year: Never true   Transportation Needs: No Transportation Needs    Lack of Transportation (Medical): No    Lack of Transportation (Non-Medical): No   Physical Activity:     Days of Exercise per Week:     Minutes of Exercise per Session:    Stress:     Feeling of Stress :    Social Connections:     Frequency of Communication with Friends and Family:     Frequency of Social Gatherings with Friends and Family:     Attends Gnosticist Services:     Active Member of Clubs or Organizations:     Attends Club or Organization Meetings:     Marital Status:    Intimate Partner Violence:     Fear of Current or Ex-Partner:     Emotionally Abused:     Physically Abused:     Sexually Abused:      Past Medical History:   Diagnosis Date    Anemia     Anesthesia     woke up eary during surgery x1 , heard saw & felt the pressure.     Arthritis 7/15/2013    ASHD (arteriosclerotic heart disease) 7/15/2013    CAD (coronary artery disease)     has cardiac stent x 4.    Chronic kidney disease     stage 3    Claudication (HCC) 7/15/2013    COPD (chronic obstructive pulmonary disease) ([facility] [address]     Degenerative joint disease 7/15/2013    ED (erectile dysfunction) 7/15/2013    Full dentures     Gout     History of carpal tunnel syndrome 7/15/2013    History of colon polyps 7/15/2013    History of hemorrhoids 7/15/2013    History of TIA (transient ischemic attack) 7/15/2013    Hypercholesteremia 7/15/2013    Hypertension     Hyperuricemia 7/15/2013    Leukocytosis     Renal cyst     Sleep apnea 07/15/2013    on cpap at .  Wears glasses     for reading     Past Surgical History:   Procedure Laterality Date    BLEPHAROPLASTY Bilateral 04/02/2012    CARPAL TUNNEL RELEASE Right 11/2002    CATARACT REMOVAL WITH IMPLANT Left 03/02/2012    CATARACT REMOVAL WITH IMPLANT Right 03/05/2012    COLONOSCOPY  05/21/08    POLYPECTOMY   [address] x1 stent,1998 x2 stents,2009 x1,    EYE SURGERY      cataracts, eyelids.  JOINT REPLACEMENT Right 07/12/11     RT TOTAL SHOULDER REPLACEMENT    JOINT REPLACEMENT Right 1995    rt. hip replacement    JOINT REPLACEMENT Left 01/23/2018    ELLE    KNEE ARTHROPLASTY Left 04/04/2017    [place] TOTAL HIP ARTHROPLASTY Left 1/23/2018    LEFT TOTAL HIP ARTHROPLASTY MINIMALLY INVASIVE ASI WITH BIOMET SYSTEM & GPS SPRAY APPLICATION performed by Dany Cuevas MD at [address] ARTHROSCOPY Right 05/2002 repair    7-12-11 right shoulder reverse replacement.  SHOULDER SURGERY Left 02/24/2002    shoulder resurfacing    TOTAL KNEE ARTHROPLASTY Left 4/4/2017    KNEE TOTAL ARTHROPLASTY performed by D[facility]uevas MD at [facility] History   Problem Relation Age of Onset    Alzheimer's Disease Mother     Heart Disease Mother     Emphysema Father         Physical Exam:  Vitals signs and nursing note reviewed. Constitutional:       Appearance: She is well-developed. HENT:      Head: Normocephalic and atraumatic. Nose: Nose normal.   Eyes:      Conjunctiva/sclera: Conjunctivae normal.   Neck:      Musculoskeletal: Normal range of motion and neck supple. Pulmonary:      Effort: Pulmonary effort is normal. No respiratory distress.    Musculoskeletal:      Comments: Normal gait     Skin:     General: Skin is warm and dry. Neurological:      Mental Status: She is alert and oriented to person, place, and time. Sensory: No sensory deficit. Psychiatric:         Behavior: Behavior normal.         Thought Content: Thought content normal.        Diagnostic imaging:    Lumbar spinal stenosis L4 3 4 and L4-5    Assessment and Plan:  1. Lumbar radicular pain    2. Scoliosis of lumbar spine, unspecified scoliosis type    3. Degenerative disc disease, lumbar    4. Lumbar stenosis with neurogenic claudication      Follow up in 3 months    Provider Attestation:  Margo Rangel, personally performed the services described in this documentation. All medical record entries made by the scribe were at my direction and in my presence. I have reviewed the chart and discharge instructions and agree that the records reflect my personal performance and is accurate and complete. Harden Lorenzo Clerance Gilford, MD 6/24/21     Scribe Attestation:  By signing my name below, Cachorro Israel, attest that this documentation has been prepared under the direction and in the presence of Dr. Avani Hadley. Electronically signed: Bayard Cheadle, Scribe, 6/24/21     Please note that this chart was generated using voice recognition Dragon dictation software. Although every effort was made to ensure the accuracy of this automated transcription, some errors in transcription may have occurred.

## 2021-07-07 ENCOUNTER — OFFICE VISIT (OUTPATIENT)
Dept: ORTHOPEDIC SURGERY | Age: 81
End: 2021-07-07
Payer: MEDICARE

## 2021-07-07 DIAGNOSIS — Z96.641 HISTORY OF RIGHT HIP REPLACEMENT: Primary | ICD-10-CM

## 2021-07-07 PROCEDURE — 4004F PT TOBACCO SCREEN RCVD TLK: CPT | Performed by: ORTHOPAEDIC SURGERY

## 2021-07-07 PROCEDURE — G8417 CALC BMI ABV UP PARAM F/U: HCPCS | Performed by: ORTHOPAEDIC SURGERY

## 2021-07-07 PROCEDURE — 1123F ACP DISCUSS/DSCN MKR DOCD: CPT | Performed by: ORTHOPAEDIC SURGERY

## 2021-07-07 PROCEDURE — 99213 OFFICE O/P EST LOW 20 MIN: CPT | Performed by: ORTHOPAEDIC SURGERY

## 2021-07-07 PROCEDURE — G8428 CUR MEDS NOT DOCUMENT: HCPCS | Performed by: ORTHOPAEDIC SURGERY

## 2021-07-07 PROCEDURE — 4040F PNEUMOC VAC/ADMIN/RCVD: CPT | Performed by: ORTHOPAEDIC SURGERY

## 2021-07-07 NOTE — PROGRESS NOTES
[place] M.D.            [facility] [address], [address] [facility]           Dept Phone: [phone]           Dept Fax:  [phone] [place]          Dept Phone: [phone]           Dept Fax:  [phone]      Chief Compliant:  Chief Complaint   Patient presents with    Pain     H/O  RT ELLE 1995        History of Present Illness: This is a [de-identified] y.o. male who presents to the clinic today for evaluation / follow up of status post bilateral total hips the first 1 performed in 1995 the left side performed in 2013. Patient states that he has no hip complaints at all. He does have what sounds like pretty significant lumbar spinal stenosis and he presently is seeing Dr. Manju Bai for this. He has had a recent epidural steroid injection with modest relief. He has had of up upcoming appointment to discuss possible surgical mention of his symptoms do not margarita. In regards to his hips however though the patient has no complaints whatsoever. .       Review of Systems   Constitutional: Negative for fever, chills, sweats. Eyes: Negative for changes in vision, or pain. HENT: Negative for ear ache, epistaxis, or sore throat. Respiratory/Cardio: Negative for Chest pain, palpitations, SOB, or cough. Gastrointestinal: Negative for abdominal pain, N/V/D. Genitourinary: Negative for dysuria, frequency, urgency, or hematuria. Neurological: Negative for headache, numbness, or weakness. Integumentary: Negative for rash, itching, laceration, or abrasion. Musculoskeletal: Positive for Pain (H/O  RT ELLE 1995)       Physical Exam:  Constitutional: Patient is oriented to person, place, and time. Patient appears well-developed and well nourished.    HENT: Negative otherwise noted  Head: Normocephalic and Atraumatic  Nose: Normal  Eyes: Conjunctivae and EOM are normal  Neck: Normal range of motion Neck supple. Respiratory/Cardio: Effort normal. No respiratory distress. Musculoskeletal: Examination of patient's right hip and notes he has pain-free motion on flexion to greater than 90 degrees internal rotation 20 external [place] 40 without any discomfort. He has active straight leg raise negative [place] he has no trochanteric findings    Examination patient's left hip again shows no pain on flexion greater than 90 degrees of internal extra rotation negative [place]inch[place]'s no obvious trochanteric findings no obvious leg length discrepancy. Neurological: Patient is alert and oriented to person, place, and time. Normal strenght. No sensory deficit. Skin: Skin is warm and dry  Psychiatric: Behavior is normal. Thought content normal.  Nursing note and vitals reviewed. Labs and Imaging:     XR taken today:  XR PELVIS (1-2 VIEWS)    Result Date: 7/7/2021  X-rays taken today reviewed by me show standing AP of the pelvis. Patient is status post bilateral total hip arthroplasties. The patient right hip is a older version longstem press-fit prosthesis. Patient has no obvious loosening of the femoral component. Patient does have some superior polyethylene wear with slight off-center E centricity. There is no change in the acetabular component. There is been no interval change since films taken last year. Patient status post left total of arthroplasty with a short tapered stem and there is been no apparent change in the in this is since the most recent films taken in 2020 as well. Orders Placed This Encounter   Procedures    XR PELVIS (1-2 VIEWS)     Standing Status:   Future     Number of Occurrences:   1     Standing Expiration Date:   7/7/2022       Assessment and Plan:  1. History of right hip replacement    2.       History of left total hip arthroplasty        This is a [de-identified] y.o. male who presents to the clinic today for evaluation / follow up of bilateral total hips. Past History:    Current Outpatient Medications:     lisinopril (PRINIVIL;ZESTRIL) 2.5 MG tablet, TAKE 1 TABLET DAILY, Disp: 90 tablet, Rfl: 1    nitroGLYCERIN (NITROSTAT) 0.4 MG SL tablet, Place 1 tablet under tongue every 5 minutes as needed for chest pain. Maximum of 3 tablets/day., Disp: 25 tablet, Rfl: 1    allopurinol (ZYLOPRIM) 100 MG tablet, TAKE 1 TABLET DAILY, Disp: 90 tablet, Rfl: 1    rosuvastatin (CRESTOR) 10 MG tablet, TAKE 1 TABLET DAILY, Disp: 90 tablet, Rfl: 1    atenolol (TENORMIN) 25 MG tablet, TAKE 1 TABLET DAILY, Disp: 90 tablet, Rfl: 3    Fluticasone-Salmeterol 232-14 MCG/ACT AEPB, , Disp: , Rfl:     aspirin (ASPIRIN 81) 81 MG chewable tablet, , Disp: , Rfl:     CPAP Machine MISC, --- ---, Disp: , Rfl:     albuterol sulfate HFA (VENTOLIN HFA) 108 (90 Base) MCG/ACT inhaler, 2 puffs as needed, Disp: , Rfl:     FISH OIL, Take by mouth 2 times daily , Disp: , Rfl:     therapeutic multivitamin-minerals (THERAGRAN-M) tablet, Take 1 tablet by mouth daily.  OTC, Disp: , Rfl:   Allergies   Allergen Reactions    Pcn [Penicillins] Itching    Ceclor [Cefaclor] Itching and Rash     Social History     Socioeconomic History    Marital status:      Spouse name: Not on file    Number of children: Not on file    Years of education: Not on file    Highest education level: Not on file   Occupational History    Not on file   Tobacco Use    Smoking status: Former Smoker     Packs/day: 2.00     Years: 35.00     Pack years: 70.00     Quit date:      Years since quittin.5    Smokeless tobacco: Current User    Tobacco comment: 3/30/21: NICOTINE LOZENGES - current use   Substance and Sexual Activity    Alcohol use: No     Alcohol/week: 0.0 standard drinks     Comment: rarely    Drug use: No    Sexual activity: Not on file   Other Topics Concern    Not on file   Social History Narrative    Not on file     Social Determinants of Health     Financial Resource Strain: Low Risk     Difficulty of Paying Living Expenses: Not hard at all   Food Insecurity: No Food Insecurity    Worried About Running Out of Food in the Last Year: Never true    Bobbi of Food in the Last Year: Never true   Transportation Needs: No Transportation Needs    Lack of Transportation (Medical): No    Lack of Transportation (Non-Medical): No   Physical Activity:     Days of Exercise per Week:     Minutes of Exercise per Session:    Stress:     Feeling of Stress :    Social Connections:     Frequency of Communication with Friends and Family:     Frequency of Social Gatherings with Friends and Family:     Attends Confucianism Services:     Active Member of Clubs or Organizations:     Attends Club or Organization Meetings:     Marital Status:    Intimate Partner Violence:     Fear of Current or Ex-Partner:     Emotionally Abused:     Physically Abused:     Sexually Abused:      Past Medical History:   Diagnosis Date    Anemia     Anesthesia     woke up eary during surgery x1 , heard saw & felt the pressure.  Arthritis 7/15/2013    ASHD (arteriosclerotic heart disease) 7/15/2013    CAD (coronary artery disease)     has cardiac stent x 4.    Chronic kidney disease     stage 3    Claudication (HCC) 7/15/2013    COPD (chronic obstructive pulmonary disease) ([facility] [address]     Degenerative joint disease 7/15/2013    ED (erectile dysfunction) 7/15/2013    Full dentures     Gout     History of carpal tunnel syndrome 7/15/2013    History of colon polyps 7/15/2013    History of hemorrhoids 7/15/2013    History of TIA (transient ischemic attack) 7/15/2013    Hypercholesteremia 7/15/2013    Hypertension     Hyperuricemia 7/15/2013    Leukocytosis     Renal cyst     Sleep apnea 07/15/2013    on cpap at .     Wears glasses     for reading     Past Surgical History:   Procedure Laterality Date    BLEPHAROPLASTY Bilateral 04/02/2012    CARPAL TUNNEL RELEASE Right 11/2002    CATARACT REMOVAL WITH IMPLANT Left 03/02/2012    CATARACT REMOVAL WITH IMPLANT Right 03/05/2012    COLONOSCOPY  05/21/08    POLYPECTOMY   [address] x1 stent,1998 x2 stents,2009 x1,    EYE SURGERY      cataracts, eyelids.  JOINT REPLACEMENT Right 07/12/11     RT TOTAL SHOULDER REPLACEMENT    JOINT REPLACEMENT Right 1995    rt. hip replacement    JOINT REPLACEMENT Left 01/23/2018    ELLE    KNEE ARTHROPLASTY Left 04/04/2017    [place] TOTAL HIP ARTHROPLASTY Left 1/23/2018    LEFT TOTAL HIP ARTHROPLASTY MINIMALLY INVASIVE ASI WITH BIOMET SYSTEM & GPS SPRAY APPLICATION performed by Mavis Johnson MD at [address] ARTHROSCOPY Right 05/2002 repair    7-12-11 right shoulder reverse replacement.  SHOULDER SURGERY Left 02/24/2002    shoulder resurfacing    TOTAL KNEE ARTHROPLASTY Left 4/4/2017    KNEE TOTAL ARTHROPLASTY performed by Mavis Johnson MD at [address] [place] [address] History   Problem Relation Age of Onset    Alzheimer's Disease Mother     Heart Disease Mother     Emphysema Father    Plan  I did inform the patient his wife that he does have some mild polyethylene wear of his right hip but at 27 years is certainly acceptable I do not see any obvious evidence for significant lucency around either the acetabular or femoral components. His left total hip is functioning as well. I would continue to observe the patient for now. He is contemplating having low back surgery per Dr. Eric Goldberg is having significant problems there but the meantime we can just continue to follow-up for his hips. Back here on an annual basis      Provider Attestation:  Varinder Madsen, personally performed the services described in this documentation. All medical record entries made by the scribe were at my direction and in my presence.  I have reviewed the chart and discharge instructions and agree that the records reflect my personal performance and is accurate and complete. Rosenda Villegas MD. 07/07/21      Please note that this chart was generated using voice recognition Dragon dictation software. Although every effort was made to ensure the accuracy of this automated transcription, some errors in transcription may have occurred.

## 2021-08-02 RX ORDER — ROSUVASTATIN CALCIUM 10 MG/1
TABLET, COATED ORAL
Qty: 90 TABLET | Refills: 1 | Status: SHIPPED | OUTPATIENT
Start: 2021-08-02 | End: 2022-04-04 | Stop reason: SDUPTHER

## 2021-08-06 ENCOUNTER — OFFICE VISIT (OUTPATIENT)
Dept: FAMILY MEDICINE CLINIC | Age: 81
End: 2021-08-06
Payer: MEDICARE

## 2021-08-06 VITALS
BODY MASS INDEX: 27.8 KG/M2 | WEIGHT: 194.2 LBS | HEIGHT: 70 IN | SYSTOLIC BLOOD PRESSURE: 135 MMHG | OXYGEN SATURATION: 95 % | DIASTOLIC BLOOD PRESSURE: 80 MMHG | HEART RATE: 56 BPM | RESPIRATION RATE: 15 BRPM

## 2021-08-06 DIAGNOSIS — E11.22 TYPE 2 DIABETES MELLITUS WITH STAGE 3 CHRONIC KIDNEY DISEASE, WITHOUT LONG-TERM CURRENT USE OF INSULIN, UNSPECIFIED WHETHER STAGE 3A OR 3B CKD (HCC): Primary | ICD-10-CM

## 2021-08-06 DIAGNOSIS — I10 ESSENTIAL HYPERTENSION: ICD-10-CM

## 2021-08-06 DIAGNOSIS — N18.30 TYPE 2 DIABETES MELLITUS WITH STAGE 3 CHRONIC KIDNEY DISEASE, WITHOUT LONG-TERM CURRENT USE OF INSULIN, UNSPECIFIED WHETHER STAGE 3A OR 3B CKD (HCC): Primary | ICD-10-CM

## 2021-08-06 DIAGNOSIS — M1A.09X0 IDIOPATHIC CHRONIC GOUT OF MULTIPLE SITES WITHOUT TOPHUS: ICD-10-CM

## 2021-08-06 DIAGNOSIS — E78.2 MIXED HYPERLIPIDEMIA: ICD-10-CM

## 2021-08-06 LAB — HBA1C MFR BLD: 6.4 %

## 2021-08-06 PROCEDURE — 99214 OFFICE O/P EST MOD 30 MIN: CPT | Performed by: FAMILY MEDICINE

## 2021-08-06 PROCEDURE — 4040F PNEUMOC VAC/ADMIN/RCVD: CPT | Performed by: FAMILY MEDICINE

## 2021-08-06 PROCEDURE — 4004F PT TOBACCO SCREEN RCVD TLK: CPT | Performed by: FAMILY MEDICINE

## 2021-08-06 PROCEDURE — G8417 CALC BMI ABV UP PARAM F/U: HCPCS | Performed by: FAMILY MEDICINE

## 2021-08-06 PROCEDURE — G8427 DOCREV CUR MEDS BY ELIG CLIN: HCPCS | Performed by: FAMILY MEDICINE

## 2021-08-06 PROCEDURE — 1123F ACP DISCUSS/DSCN MKR DOCD: CPT | Performed by: FAMILY MEDICINE

## 2021-08-06 PROCEDURE — 83036 HEMOGLOBIN GLYCOSYLATED A1C: CPT | Performed by: FAMILY MEDICINE

## 2021-08-06 ASSESSMENT — ENCOUNTER SYMPTOMS
BLOOD IN STOOL: 0
ABDOMINAL PAIN: 0
SHORTNESS OF BREATH: 0
CHEST TIGHTNESS: 0

## 2021-08-06 NOTE — PROGRESS NOTES
Subjective:      Patient ID: Shira Hyde is a [de-identified] y.o. male. Visit Information    Have you changed or started any medications since your last visit including any over-the-counter medicines, vitamins, or herbal medicines? no   Are you having any side effects from any of your medications? -  no  Have you stopped taking any of your medications? Is so, why? -  no    Have you seen any other physician or provider since your last visit? Yes - Records Obtained  Have you had any other diagnostic tests since your last visit? Yes - Records Obtained  Have you been seen in the emergency room and/or had an admission to a hospital since we last saw you? No  Have you had your routine dental cleaning in the past 6 months? no    Have you activated your Ev[employer] account? If not, what are your barriers?  Yes     Patient Care Team:  Shay Castaneda MD as PCP - General (Family Medicine)  Shay Castaneda MD as PCP [facility]  Axel Eller MD as Consulting Physician (Nephrology)  Sanyt Walls MD as Consulting Physician (Ophthalmology)  Stephen Guerin MD (Pulmonology)  Ramo Patino MD as Consulting Physician (Orthopedic Surgery)  Lauryn Aguilera MD as Consulting Physician (Cardiology)  Skye Huynh MD as Consulting Physician (Gastroenterology)    Medical History Review  Past Medical, Family, and Social History reviewed and does contribute to the patient presenting condition    Health Maintenance   Topic Date Due    DTaP/Tdap/Td vaccine (1 - Tdap) Never done    Shingles Vaccine (2 of 3) 11/14/2013    Flu vaccine (1) 09/01/2021    Lipid screen  04/05/2022    Potassium monitoring  06/01/2022    Creatinine monitoring  06/01/2022    Annual Wellness Visit (AWV)  06/04/2022    Pneumococcal 65+ years Vaccine  Completed    COVID-19 Vaccine  Completed    Hepatitis A vaccine  Aged Out    Hib vaccine  Aged Out    Meningococcal (ACWY) vaccine  Aged Out     HPI  Patient is an 71-year-old white male who oriented to person, place, and time. Psychiatric:         Mood and Affect: Mood normal.         Behavior: Behavior normal.         Assessment:       Diagnosis Orders   1. Type 2 diabetes mellitus with stage 3 chronic kidney disease, without long-term current use of insulin, unspecified whether stage 3a or 3b CKD (HCC)  POCT glycosylated hemoglobin (Hb A1C)    ALT    AST    Lipid Panel   2. Essential hypertension  ALT    AST    Lipid Panel   3. Mixed hyperlipidemia  ALT    AST    Lipid Panel   4. Idiopathic chronic gout of multiple sites without tophus             Plan:       Zenon Reed received counseling on the following healthy behaviors: nutrition and medication adherence  Reviewed prior labs and health maintenance  Continue current medications, diet and exercise. Discussed use, benefit, and side effects of prescribed medications. Barriers to medication compliance addressed. Patient given educational materials - see patient instructions  Was a self-tracking handout given in paper form or via [employer]? No:     Requested Prescriptions      No prescriptions requested or ordered in this encounter       All patient questions answered. Patient voiced understanding. Quality Measures    Body mass index is 27.86 kg/m². Elevated. Weight control planned discussed Healthy diet and regular exercise. BP: 135/80. Blood pressure is normal. Treatment plan consists of No treatment change needed. Fall Risk 6/3/2021 6/2/2020 2/25/2019 10/27/2017 7/28/2016 6/2/2015 5/22/2014   2 or more falls in past year? no no no no no no no   Fall with injury in past year? no no no no no no no     The patient does not have a history of falls. I did not - not indicated , complete a risk assessment for falls.  A plan of care for falls No Treatment plan indicated    Lab Results   Component Value Date    LDLCHOLESTEROL 65 04/05/2021    (goal LDL reduction with dx if diabetes is 50% LDL reduction)    PHQ Scores 6/3/2021 3/12/2021 6/2/2020 2/25/2019 8/28/2018 5/19/2017 2/16/2016   PHQ2 Score 0 0 0 0 0 0 0   PHQ9 Score 0 0 0 0 0 0 0     Interpretation of Total Score Depression Severity: 1-4 = Minimal depression, 5-9 = Mild depression, 10-14 = Moderate depression, 15-19 = Moderately severe depression, 20-27 = Severe depression    Orders Placed This Encounter   Procedures    ALT     Standing Status:   Future     Standing Expiration Date:   8/6/2022    AST     Standing Status:   Future     Standing Expiration Date:   8/6/2022    Lipid Panel     Standing Status:   Future     Standing Expiration Date:   8/6/2022     Order Specific Question:   Is Patient Fasting?/# of Hours     Answer:    Fast 8-10 hours    POCT glycosylated hemoglobin (Hb A1C)         Continue routine medications  Follow-up in 4 to 5 months

## 2021-08-09 ENCOUNTER — HOSPITAL ENCOUNTER (OUTPATIENT)
Age: 81
Discharge: HOME | End: 2021-08-09
Payer: MEDICARE

## 2021-08-09 DIAGNOSIS — I10 ESSENTIAL HYPERTENSION: ICD-10-CM

## 2021-08-09 DIAGNOSIS — E11.22 TYPE 2 DIABETES MELLITUS WITH STAGE 3 CHRONIC KIDNEY DISEASE, WITHOUT LONG-TERM CURRENT USE OF INSULIN, UNSPECIFIED WHETHER STAGE 3A OR 3B CKD (HCC): ICD-10-CM

## 2021-08-09 DIAGNOSIS — N18.30 TYPE 2 DIABETES MELLITUS WITH STAGE 3 CHRONIC KIDNEY DISEASE, WITHOUT LONG-TERM CURRENT USE OF INSULIN, UNSPECIFIED WHETHER STAGE 3A OR 3B CKD (HCC): ICD-10-CM

## 2021-08-09 DIAGNOSIS — E78.2 MIXED HYPERLIPIDEMIA: ICD-10-CM

## 2021-08-09 LAB
ALT SERPL-CCNC: 18 U/L (ref 5–41)
AST SERPL-CCNC: 22 U/L
CHOLESTEROL/HDL RATIO: 3.5
CHOLESTEROL: 149 MG/DL
HDLC SERPL-MCNC: 42 MG/DL
LDL CHOLESTEROL: 70 MG/DL (ref 0–130)
TRIGL SERPL-MCNC: 185 MG/DL
VLDLC SERPL CALC-MCNC: ABNORMAL MG/DL (ref 1–30)

## 2021-08-09 PROCEDURE — 84460 ALANINE AMINO (ALT) (SGPT): CPT

## 2021-08-09 PROCEDURE — 36415 COLL VENOUS BLD VENIPUNCTURE: CPT

## 2021-08-09 PROCEDURE — 80061 LIPID PANEL: CPT

## 2021-08-09 PROCEDURE — 84450 TRANSFERASE (AST) (SGOT): CPT

## 2021-08-10 ENCOUNTER — OFFICE VISIT (OUTPATIENT)
Dept: ORTHOPEDIC SURGERY | Age: 81
End: 2021-08-10
Payer: MEDICARE

## 2021-08-10 VITALS — WEIGHT: 189 LBS | HEIGHT: 70 IN | BODY MASS INDEX: 27.06 KG/M2 | RESPIRATION RATE: 12 BRPM

## 2021-08-10 DIAGNOSIS — M48.062 LUMBAR STENOSIS WITH NEUROGENIC CLAUDICATION: ICD-10-CM

## 2021-08-10 DIAGNOSIS — M41.9 SCOLIOSIS OF LUMBAR SPINE, UNSPECIFIED SCOLIOSIS TYPE: ICD-10-CM

## 2021-08-10 DIAGNOSIS — M54.16 LUMBAR RADICULAR PAIN: Primary | ICD-10-CM

## 2021-08-10 DIAGNOSIS — M54.50 ACUTE LOW BACK PAIN, UNSPECIFIED BACK PAIN LATERALITY, UNSPECIFIED WHETHER SCIATICA PRESENT: ICD-10-CM

## 2021-08-10 DIAGNOSIS — M51.36 DEGENERATIVE DISC DISEASE, LUMBAR: ICD-10-CM

## 2021-08-10 PROCEDURE — 4040F PNEUMOC VAC/ADMIN/RCVD: CPT | Performed by: ORTHOPAEDIC SURGERY

## 2021-08-10 PROCEDURE — 1123F ACP DISCUSS/DSCN MKR DOCD: CPT | Performed by: ORTHOPAEDIC SURGERY

## 2021-08-10 PROCEDURE — G8417 CALC BMI ABV UP PARAM F/U: HCPCS | Performed by: ORTHOPAEDIC SURGERY

## 2021-08-10 PROCEDURE — 99213 OFFICE O/P EST LOW 20 MIN: CPT | Performed by: ORTHOPAEDIC SURGERY

## 2021-08-10 PROCEDURE — 4004F PT TOBACCO SCREEN RCVD TLK: CPT | Performed by: ORTHOPAEDIC SURGERY

## 2021-08-10 PROCEDURE — G8427 DOCREV CUR MEDS BY ELIG CLIN: HCPCS | Performed by: ORTHOPAEDIC SURGERY

## 2021-08-10 NOTE — PROGRESS NOTES
81 MG chewable tablet, , Disp: , Rfl:     CPAP Machine MISC, --- ---, Disp: , Rfl:     albuterol sulfate HFA (VENTOLIN HFA) 108 (90 Base) MCG/ACT inhaler, 2 puffs as needed, Disp: , Rfl:     FISH OIL, Take by mouth 2 times daily , Disp: , Rfl:     therapeutic multivitamin-minerals (THERAGRAN-M) tablet, Take 1 tablet by mouth daily. OTC, Disp: , Rfl:   Allergies   Allergen Reactions    Pcn [Penicillins] Itching    Ceclor [Cefaclor] Itching and Rash     Social History     Socioeconomic History    Marital status:      Spouse name: Not on file    Number of children: Not on file    Years of education: Not on file    Highest education level: Not on file   Occupational History    Not on file   Tobacco Use    Smoking status: Former Smoker     Packs/day: 2.00     Years: 35.00     Pack years: 70.00     Quit date:      Years since quittin.6    Smokeless tobacco: Current User    Tobacco comment: 3/30/21: NICOTINE LOZENGES - current use   Substance and Sexual Activity    Alcohol use: No     Alcohol/week: 0.0 standard drinks     Comment: rarely    Drug use: No    Sexual activity: Not on file   Other Topics Concern    Not on file   Social History Narrative    Not on file     Social Determinants of Health     Financial Resource Strain: Low Risk     Difficulty of Paying Living Expenses: Not hard at all   Food Insecurity: No Food Insecurity    Worried About [facility] in the Last Year: Never true    [facility] in the Last Year: Never true   Transportation Needs: No Transportation Needs    Lack of Transportation (Medical): No    Lack of Transportation (Non-Medical):  No   Physical Activity:     Days of Exercise per Week:     Minutes of Exercise per Session:    Stress:     Feeling of Stress :    Social Connections:     Frequency of Communication with Friends and Family:     Frequency of Social Gatherings with Friends and Family:     Attends Anabaptist Services:     Active Member of Clubs or Organizations:     Attends Club or Organization Meetings:     Marital Status:    Intimate Partner Violence:     Fear of Current or Ex-Partner:     Emotionally Abused:     Physically Abused:     Sexually Abused:      Past Medical History:   Diagnosis Date    Anemia     Anesthesia     woke up eary during surgery x1 , heard saw & felt the pressure.  Arthritis 7/15/2013    ASHD (arteriosclerotic heart disease) 7/15/2013    CAD (coronary artery disease)     has cardiac stent x 4.    Chronic kidney disease     stage 3    Claudication (HCC) 7/15/2013    COPD (chronic obstructive pulmonary disease) ([address]     Degenerative joint disease 7/15/2013    ED (erectile dysfunction) 7/15/2013    Full dentures     Gout     History of carpal tunnel syndrome 7/15/2013    History of colon polyps 7/15/2013    History of hemorrhoids 7/15/2013    History of TIA (transient ischemic attack) 7/15/2013    Hypercholesteremia 7/15/2013    Hypertension     Hyperuricemia 7/15/2013    Leukocytosis     Renal cyst     Sleep apnea 07/15/2013    on cpap at .  Wears glasses     for reading     Past Surgical History:   Procedure Laterality Date    BLEPHAROPLASTY Bilateral 04/02/2012    CARPAL TUNNEL RELEASE Right 11/2002    CATARACT REMOVAL WITH IMPLANT Left 03/02/2012    CATARACT REMOVAL WITH IMPLANT Right 03/05/2012    COLONOSCOPY  05/21/08    POLYPECTOMY   [address] x1 stent,1998 x2 stents,2009 x1,    EYE SURGERY      cataracts, eyelids.     JOINT REPLACEMENT Right 07/12/11     RT TOTAL SHOULDER REPLACEMENT    JOINT REPLACEMENT Right 1995    rt. hip replacement    JOINT REPLACEMENT Left 01/23/2018    ELLE    KNEE ARTHROPLASTY Left 04/04/2017    NH TOTAL HIP ARTHROPLASTY Left 1/23/2018    LEFT TOTAL HIP ARTHROPLASTY MINIMALLY INVASIVE ASI WITH BIOMET SYSTEM & GPS SPRAY APPLICATION performed by Heeln Farrell MD at [facility] Right 05/2002 repair    7-12-11 right shoulder reverse replacement.  SHOULDER SURGERY Left 02/24/2002    shoulder resurfacing    TOTAL KNEE ARTHROPLASTY Left 4/4/2017    KNEE TOTAL ARTHROPLASTY performed by Grace Turner MD at [facility] History   Problem Relation Age of Onset    Alzheimer's Disease Mother     Heart Disease Mother     Emphysema Father         Physical Exam:  Vitals signs and nursing note reviewed. Constitutional:       Appearance: well-developed. HENT:      Head: Normocephalic and atraumatic. Nose: Nose normal.   Eyes:      Conjunctiva/sclera: Conjunctivae normal.   Neck:      Musculoskeletal: Normal range of motion and neck supple. Pulmonary:      Effort: Pulmonary effort is normal. No respiratory distress. Musculoskeletal:      Comments: Normal gait     Skin:     General: Skin is warm and dry. Neurological:      Mental Status: Alert and oriented to person, place, and time. Sensory: No sensory deficit. Psychiatric:         Behavior: Behavior normal.         Thought Content: Thought content normal.        Provider Attestation:  Nancy Rangel, personally performed the services described in this documentation. All medical record entries made by the scribe were at my direction and in my presence. I have reviewed the chart and discharge instructions and agree that the records reflect my personal performance and is accurate and complete. Franca Sweet MD 8/10/21     Scribe Attestation:  By signing my name below, Jesse Ybarra attgrace that this documentation has been prepared under the direction and in the presence of Dr. Nneka Forrester. Electronically signed: Mikey Cash, 8/10/21     Please note that this chart was generated using voice recognition Dragon dictation software. Although every effort was made to ensure the accuracy of this automated transcription, some errors in transcription may have occurred.

## 2021-08-12 DIAGNOSIS — M1A.09X0 IDIOPATHIC CHRONIC GOUT OF MULTIPLE SITES WITHOUT TOPHUS: ICD-10-CM

## 2021-08-12 DIAGNOSIS — E78.2 MIXED HYPERLIPIDEMIA: ICD-10-CM

## 2021-08-12 DIAGNOSIS — I10 ESSENTIAL HYPERTENSION: ICD-10-CM

## 2021-08-12 DIAGNOSIS — N18.30 TYPE 2 DIABETES MELLITUS WITH STAGE 3 CHRONIC KIDNEY DISEASE, WITHOUT LONG-TERM CURRENT USE OF INSULIN, UNSPECIFIED WHETHER STAGE 3A OR 3B CKD (HCC): Primary | ICD-10-CM

## 2021-08-12 DIAGNOSIS — E11.22 TYPE 2 DIABETES MELLITUS WITH STAGE 3 CHRONIC KIDNEY DISEASE, WITHOUT LONG-TERM CURRENT USE OF INSULIN, UNSPECIFIED WHETHER STAGE 3A OR 3B CKD (HCC): Primary | ICD-10-CM

## 2021-08-31 ENCOUNTER — HOSPITAL ENCOUNTER (OUTPATIENT)
Dept: DIABETES SERVICES | Age: 81
Setting detail: THERAPIES SERIES
Discharge: HOME | End: 2021-08-31
Payer: MEDICARE

## 2021-08-31 PROCEDURE — G0108 DIAB MANAGE TRN  PER INDIV: HCPCS

## 2021-08-31 NOTE — PROGRESS NOTES
Hypertension     Hyperuricemia 7/15/2013    Leukocytosis     Renal cyst     Sleep apnea 07/15/2013    on cpap at hs.     Wears glasses     for reading     Family History   Problem Relation Age of Onset    Alzheimer's Disease Mother     Heart Disease Mother     Emphysema Father      Pcn [penicillins] and Ceclor [cefaclor]   Immunization History   Administered Date(s) Administered    COVID-19, Moderna, PF, 100mcg/0.5mL 01/26/2021, 02/23/2021    Influenza 10/25/2013    Influenza Virus Vaccine 10/05/2010, 09/20/2011, 09/14/2012, 09/30/2014, 10/23/2015, 10/17/2016, 10/16/2017, 10/01/2018    Influenza, High Dose (Fluzone 65 yrs and older) 10/03/2018    Influenza, Darrall Spittle, IM, (6 mo and older Fluzone, Flulaval, Fluarix and 3 yrs and older Afluria) 10/25/2016    Influenza, Quadv, IM, PF (6 mo and older Fluzone, Flulaval, Fluarix, and 3 yrs and older Afluria) 10/19/2017    Influenza, Quadv, adjuvanted, 65 yrs +, IM, PF (Fluad) 10/07/2020    Influenza, Triv, inactivated, subunit, adjuvanted, IM (Fluad 65 yrs and older) 10/22/2019    Pneumococcal Conjugate 13-valent ([id number]) 03/11/2016    Pneumococcal Polysaccharide ([id number]) 04/12/2010, 04/05/2017    Tetanus 05/22/2014    Zoster Live (Zostavax) 09/19/2013     Current Medications  Current Outpatient Medications   Medication Sig Dispense Refill    rosuvastatin (CRESTOR) 10 MG tablet TAKE 1 TABLET DAILY 90 tablet 1    lisinopril (PRINIVIL;ZESTRIL) 2.5 MG tablet TAKE 1 TABLET DAILY 90 tablet 1    allopurinol (ZYLOPRIM) 100 MG tablet TAKE 1 TABLET DAILY 90 tablet 1    atenolol (TENORMIN) 25 MG tablet TAKE 1 TABLET DAILY 90 tablet 3    Fluticasone-Salmeterol 232-14 MCG/ACT AEPB       aspirin (ASPIRIN 81) 81 MG chewable tablet       CPAP Machine MISC --- ---      albuterol sulfate HFA (VENTOLIN HFA) 108 (90 Base) MCG/ACT inhaler 2 puffs as needed      FISH OIL Take by mouth 2 times daily       therapeutic multivitamin-minerals (THERAGRAN-M) tablet Take 1 tablet by mouth daily. OTC       No current facility-administered medications for this encounter.   :     Comments:  Allergies: Allergies   Allergen Reactions    Pcn [Penicillins] Itching    Ceclor [Cefaclor] Itching and Rash       A1C blood level   Lab Results   Component Value Date    LABA1C 6.4 08/06/2021    LABA1C 6.2 04/02/2021    LABA1C 5.8 11/27/2020     Lab Results   Component Value Date    LABMICR CANNOT BE CALCULATED 06/01/2021    CREATININE 1.38 (H) 06/01/2021       Blood pressure   BP Readings from Last 3 Encounters:   08/06/21 135/80   06/09/21 122/64   06/07/21 (!) 143/72        Cholesterol  Lab Results   Component Value Date    LDLCHOLESTEROL 70 08/09/2021        Diabetes Self- Management Education Record    Participant Name: James Kiser  Referring Provider: Cesar Lobo MD   Assessment/Evaluation Ratings:  1=Needs Instruction   4=Demonstrates Understanding/Competency  2=Needs Review   NC=Not Covered    3=Comprehends Key Points  N/A=Not Applicable  Topics/Learning Objectives Pre-session Assess Date:  8/17/20CB 8/31/21rs - annual update  Instr. Date Reinforce Date Post- session Eval  12/7/20CB Comments   Diabetes disease process & Treatment process: Define diabetes & pre-diabetes; Identify own type of diabetes; role of the pancreas; signs/symptoms; diagnostic criteria; prevention & treatment options; contributing factors. 1    2- 8/31/21rs 8/24/20CB 9/14/20CB 1 New Dx , family Hx with paternal Grandfather, uncle. Also has Gout and kidney disease. 8/17/20CB  Pt did receive packet in mail and referred to book by ADA Diabetes Your take control Guide. Encouraged to watch Youtube resource included in packet. 8/24/20CB    Pt still has not watched Youtube resource and encouraged to do so.  9/14/20CB    Pt had questions re: T2Dm as genetic, reviewed T1DM and T2DM12/7/20CB   Incorporating nutritional management into lifestyle: Describe effect of type, amount & timing of food on blood to lower back pain. Pt also has gout and COPD  8/17/20CB  He and wife used to do a lot of walking a year ago but limited now8/24/20CB  8/31/21rs- need lower back surgery in sept 2021 - stated he can only tolerate 100 ft before pain -  Zachary - plan for rods and skews -     Using medications safely:  Identify effects of diabetes medicines on blood glucose levels; List diabetes medication taken, action & side effects;    1  2 8/24/20CB 9/14/20CB 3 Metformin  mg daily    Discussed action of Metformin as pt wanted to know. 8/24/20CB    Pt did not get mailorder of Metformin from [facility] and ha been out of Metformin. Pt called office who told him to call [facility] which he did and they won't ship for another 3 days. Pt to call back PCP to get a 1x RX to local Pharmacy and hopefully they can do override9/14/20CB    - 8/31/21rs - still off metformin - due to CKD stage 3 now -        Insulin / Injectable - Appropriate injection sites; proper storage; supplies needed; proper technique; safe needle disposal guidelines. 1  n/a 9/14/20CB  3 Grandfather on insulin8/17/20CB    Briefly discussed. NA at this time9/14/20CB   Monitoring blood glucose, interpreting and using results:  Identify recommended & personal blood glucose targets; importance of testing; testing supplies; HgbA1C target levels; Factors affecting blood glucose; Importance of logging blood glucose levels for pattern recognition; ketone testing; safe lancet disposal.   1  2 8/24/20CB  3 A1C 6.5% on 6/10/20. Not checking BG, no meter    Wondering if he should get meter discussed benefits. 8/24/20CB    A1C 5.8% on 11/27/20 and very pleased. Never got meter12/7/20CB    8/31/21rs - saw trend up in a1c and does not check BG at home at this time       Prevention, detection & treatment of acute complications:  Identify symptoms of hyper & hypoglycemia, and prevention & treatment strategies.     1  2 8/24/20CB  3 Sometimes feels light headed or fatigue but not sure if due to BG or BP? Can see how meter would be useful. 8/17/20CB    Did initially feel more tired and some blurred vision but assumed part of aging process8/24/20CB  Aqhyhqqm70/7/20CB       Describe sick day guidelines & indications for  physician notification. Identify short term consequences of poor control. Disaster preparedness strategies    1  2 9/21/20 JW  3 Reviewed 12/7/20CB   Prevention, detection & treatment of chronic complications:  Define the natural course of diabetes & describe the relationship of blood glucose levels to long term complications of diabetes. Identify preventative measures & standards of care. 1  2 9/14/20CB  3 Already has kidney disease unrelated to DM. Has COPD,Gout    Does upcoming appt 9/22 for eyes as PCP made referral. 8/24/20CB    Follows with kidney Dr, has dentures so no dentist, has gout and osteoarthritis. appt with eye Dr next week, had cataract surgery in past  9/14/20CB 8/31/21rs - controlled b/p on meds, cholesterol - in control and on meds- some pain with feet on top of foot - no N/ T in feet - has had all vaccination for covid ( feb 2021) and willing to get booster - oct will get flu shot     Developing strategies to address psychosocial issues:  Describe feelings about living with diabetes; Describe how stress, depression & anxiety affect blood glucose; Identify coping strategies; Identify support needed & support network available. 1  2 8/24/20CB  4 Motivated to keep BG in control and have healthy weight. Does not want to have complications or go on insulin like Grandfather. Lives with wife who is supportive and \"takes good care of him\"8/17/20CB  Very motivated as started reading info in packet and making changes in diet. 8/24/20CB    Pt continues to be motivated ans appreciative of classes  9/14/20CB    Very motivated and asking appropriate questions to further knowledge. 12/7/20CB 8/31/21rs - expressed concerned about back surgery - will wife and support system in place    Developing strategies to promote health/change behavior: Identify 7 self-care behaviors; Personal health risk factors; Benefits, challenges & strategies for behavioral change;    1  2         Individualized goal selection. 4  My goal , to help me improve my health, I will:   1.learn to read labels, CHO  100%12/7/20CB      2.portion size  100% 12/7/20CB    3. Healthy eating - try to cut back on pepsi - stop and try add in green tea or more water     Plan  Follow-up Appointments planned AS PHONE CALL - SEPT 30 WITH RD    Instruction Method: [x]Lecture/Discussion  []Power Point Presentation  [x]Handouts  []Return Demonstration    Education Materials/Equipment Provided (VIA Mail for phone visits)  :    [x]Self-Management - Initial assessment - Enrolment in to ADA  Where do I Begin, Living with Type 2 diabetes ADA home support program and  handout on diabetes education classes. 8/17/20CB  Patient did not receive packet in mail yet. [x]Self-Management  Class 1 -Self-Management  Class 1 - \"Diabetes - your take control guide\" - ADA booklet -8/24/20CB  o  \"ready, set, start counting\" teaching sheet in diet section   o  GLP-1 understanding 8 core deficits puzzle sheet in the medication section  o one day food diary and envelope for return of diet HX   o  You tube and website resource sheet-Understanding Type 2 Diabetes from Animated Diabetes Patient https://[email or web address] be/[email or web address]      [x] Self-Management  Class 2 - Meal Plan and handout for serving sizes, smarter snacking, Ready Set Carb Counting / Plate Method, Nutrient Conversion and International Diabetes [address] Eating for People with Diabetes and Nutrition in the [employer] - fast facts about fast food8/31/20 JW    [x] Self-Management  Class 3 -  Diabetes your take control guide pages 20 - 30,  type 2 diabetes and the role of GLP- 1,  Individualized Diabetes report card 9/14/20CB    [x] Self-Management Class 4 - BD Booklet  Sick Day [place] and  Dinning Out Guide , [employer] and tips, diabetes Cookbooks  ( when available)9/21/20 JW     [x]Self-Management - 3 month follow -  AADE7 Self care behaviors work sheets, 2020 [employer],  Online resource list - March 2020 12/7/20CB     []Self-Management  Gestational - RN class -Resource materials sent out : care booklet - \" Gestational Diabetes Mellitus ( GDM) toolkit form [place] gestational diabetes postpartum care learning collaborative 2018. \"Simple Guidelines for meal planning with gestational diabetes. SMBG sheets to fax back to MFM weekly. BD  healthy injection site selection and rotation with 6 mm insulin syringe and 4 mm pen needle. Gestational diabetes handout from [facility] 2016. Did you have gestational diabetes when you were pregnant? Handout from [facility]  April 2014    []Self-Management Gestational - RD class - My Food Plan for Gestational diabetes    []Glucose Meter     []Insulin Kit     [x]Other  -8/31/21rs- how to check BG at home and link to you tube video on how to check bg at home- healthy eating tips / plate method with reminder for only non caloric liquids - [employer] stones for care book let - diabetes and you    Encounter Type Date Start Time End Time Comments No Show Dates   Assessment 8/17/20CB   11:30 12:45   []In Person  [x]Telephone    Class 1 - Understanding diabetes 8/24/20CB 11:30 12:30  [x]TelephoneAmerican Diabetes Association  www. diabetes. org    Class 2- Nutrition and diabetes   8/31/20 JW 11:30 12:30 [x]Telephone  Healthy Eating with Diabetes- Automatic Data of Diabetes and Digestive and Kidney   https://youtu. be/[email or web address]    Class 3 - Preventing Complications 6/27/31QT 67:91 1:00  [x]Telephone    Class 4 -  In depth Nutrition and sick day care 9/21/20 JW 11:30 12:30 [x]Telephone  Diabetes Food hub  www. diabetesfoodhub.org     Class 5 - 3 month follow up / goal reassessment 12/7/20CB 11:45 12:20 X by phone    Gestational - RN         Gestational - RD        Individual MNT         Shared Med Appt         Yearly Follow-up 8/31/21rs     1 30   2 25  X - A1C TREND UP 5.8- 6.2 AND 6/4 ( AUG 2021) NEED TO QUIT PEPSI     Meter Instrx      How to Measure Your Blood Sugar - [facility] Patient Education  https://youtu. be/[email or web address]    Insulin Instrx      []Pen  []Vial & Syringe   BD Diabetes Care: How to Inject Insulin with a Pen Needle  https://youtu. be/[email or web address]    Diabetes Care: How to Inject Insulin with a Syringe  https://youtu. be/[email or web address]-[email or web address]       DSMS Support :   [x] MNT      [x] Annual update - 2020/ 21    [] Starting Fresh  adults living with diabetes or pre diabetes. [address] [place], [place]    [id number]:30pm- on 6 week rotation  Free -  REGISTRATION IS REQUIRED - [facility] [phone] call for dates    []  Diabetes Group at  [address] - Free 6 week diabetes education support   classes - contact : Susan Yusuf [phone]  for dates and locations      [x]ADA  Where do I Begin, Living with Type 2 diabetes ADA home support program    Web site: diabetes. org/living    Call: 1800 DIABETES  e-mail: [email or web address] org     []  Internet web sites - ADAWeb site: www.diabetes. org                  Post Education Referrals:      [] [place] Tobacco Quit information sheet and [address] [facility]       [] Dental care - [facility] of [facility]     [] [facility] ([facility]) link  phone number - for information and referral to [facility]  Clinically  [address] [facility], WEIGHT MANAGEMENT        []Other  Chriss Gomez RN CDE

## 2021-09-07 ENCOUNTER — HOSPITAL ENCOUNTER (OUTPATIENT)
Dept: PREADMISSION TESTING | Age: 81
Discharge: HOME | End: 2021-09-11
Payer: MEDICARE

## 2021-09-07 VITALS
TEMPERATURE: 98.7 F | DIASTOLIC BLOOD PRESSURE: 79 MMHG | SYSTOLIC BLOOD PRESSURE: 155 MMHG | RESPIRATION RATE: 16 BRPM | BODY MASS INDEX: 27.06 KG/M2 | WEIGHT: 189 LBS | HEART RATE: 60 BPM | HEIGHT: 70 IN | OXYGEN SATURATION: 100 %

## 2021-09-07 LAB
ABSOLUTE EOS #: 0.6 K/UL (ref 0–0.4)
ABSOLUTE IMMATURE GRANULOCYTE: ABNORMAL K/UL (ref 0–0.3)
ABSOLUTE LYMPH #: 2.2 K/UL (ref 1–4.8)
ABSOLUTE MONO #: 1.1 K/UL (ref 0.1–1.3)
ANION GAP SERPL CALCULATED.3IONS-SCNC: 10 MMOL/L (ref 9–17)
BASOPHILS # BLD: 0 % (ref 0–2)
BASOPHILS ABSOLUTE: 0 K/UL (ref 0–0.2)
BUN BLDV-MCNC: 28 MG/DL (ref 8–23)
BUN/CREAT BLD: ABNORMAL (ref 9–20)
CALCIUM SERPL-MCNC: 9.6 MG/DL (ref 8.6–10.4)
CHLORIDE BLD-SCNC: 104 MMOL/L (ref 98–107)
CO2: 27 MMOL/L (ref 20–31)
CREAT SERPL-MCNC: 1.56 MG/DL (ref 0.7–1.2)
DIFFERENTIAL TYPE: ABNORMAL
EOSINOPHILS RELATIVE PERCENT: 5 % (ref 0–4)
GFR AFRICAN AMERICAN: 52 ML/MIN
GFR NON-AFRICAN AMERICAN: 43 ML/MIN
GFR SERPL CREATININE-BSD FRML MDRD: ABNORMAL ML/MIN/{1.73_M2}
GFR SERPL CREATININE-BSD FRML MDRD: ABNORMAL ML/MIN/{1.73_M2}
GLUCOSE BLD-MCNC: 119 MG/DL (ref 70–99)
HCT VFR BLD CALC: 43.2 % (ref 41–53)
HEMOGLOBIN: 14.1 G/DL (ref 13.5–17.5)
IMMATURE GRANULOCYTES: ABNORMAL %
LYMPHOCYTES # BLD: 18 % (ref 24–44)
MCH RBC QN AUTO: 30.1 PG (ref 26–34)
MCHC RBC AUTO-ENTMCNC: 32.5 G/DL (ref 31–37)
MCV RBC AUTO: 92.5 FL (ref 80–100)
MONOCYTES # BLD: 9 % (ref 1–7)
NRBC AUTOMATED: ABNORMAL PER 100 WBC
PDW BLD-RTO: 13.8 % (ref 11.5–14.9)
PLATELET # BLD: 233 K/UL (ref 150–450)
PLATELET ESTIMATE: ABNORMAL
PMV BLD AUTO: 10.3 FL (ref 6–12)
POTASSIUM SERPL-SCNC: 4.8 MMOL/L (ref 3.7–5.3)
RBC # BLD: 4.67 M/UL (ref 4.5–5.9)
RBC # BLD: ABNORMAL 10*6/UL
SEG NEUTROPHILS: 68 % (ref 36–66)
SEGMENTED NEUTROPHILS ABSOLUTE COUNT: 8.2 K/UL (ref 1.3–9.1)
SODIUM BLD-SCNC: 141 MMOL/L (ref 135–144)
WBC # BLD: 12.1 K/UL (ref 3.5–11)
WBC # BLD: ABNORMAL 10*3/UL

## 2021-09-07 PROCEDURE — 85025 COMPLETE CBC W/AUTO DIFF WBC: CPT

## 2021-09-07 PROCEDURE — 93005 ELECTROCARDIOGRAM TRACING: CPT | Performed by: ANESTHESIOLOGY

## 2021-09-07 PROCEDURE — 36415 COLL VENOUS BLD VENIPUNCTURE: CPT

## 2021-09-07 PROCEDURE — 80048 BASIC METABOLIC PNL TOTAL CA: CPT

## 2021-09-07 RX ORDER — NITROGLYCERIN 0.4 MG/1
0.4 TABLET SUBLINGUAL EVERY 5 MIN PRN
COMMUNITY

## 2021-09-07 RX ORDER — POLYETHYLENE GLYCOL 3350 17 G
4 POWDER IN PACKET (EA) ORAL PRN
Status: ON HOLD | COMMUNITY
End: 2021-09-25 | Stop reason: HOSPADM

## 2021-09-07 ASSESSMENT — PAIN DESCRIPTION - ORIENTATION: ORIENTATION: RIGHT

## 2021-09-07 ASSESSMENT — PAIN SCALES - GENERAL: PAINLEVEL_OUTOF10: 2

## 2021-09-07 ASSESSMENT — PAIN DESCRIPTION - LOCATION: LOCATION: LEG

## 2021-09-07 ASSESSMENT — PAIN DESCRIPTION - PAIN TYPE: TYPE: NEUROPATHIC PAIN

## 2021-09-07 NOTE — H&P
HISTORY and [address]       NAME:  Peter Garcia  MRN: [id number]   YOB: 1940   Date: 9/7/2021   Age: [de-identified] y.o. Gender: male       COMPLAINT AND PRESENT HISTORY:      Peter Garcia is [de-identified] y.o.,  male, undergoing preadmission testing for Lumbar radicular pain, Scoliosis of lumbar spine,  Degenerative disc disease, lumbar and  Acute low back pain. Patient will be having:  L3-L5 POSTERIOR LUMBAR DECOMPRESSSION & INSTRUMENTED FUSION. Pt has DDD, C/O of pain in the lumbar spine area, radiates to the lower limbs on right leg side. Patient has a long term history of low back pain. He has a onset of back pain that he states was approx 50 yrs ago, but was  different . Patient reports acute on chronic pain starting in February 2021 with severe right radicular pain. He denies any prior injury to his back and denies any prior back surgeries. Patient has however had multiple joint surgeries of shoulders and knee and hip. Pt complains of pain and  limitation in the range of motion in his back. Patient rates the pain at 7/10  Patient reports difficulty sleeping due to pain. Pt states after a 100 ft walk the  pain kicks in and aggravates his sxs. Pt reports that sitting or lying down helps relieve sxs. Pt  Is unable to take NSAIDs due to kidney disease. Patient has used stretches, Physical therapy, and x1 injection given interventions to help with the pain. Patient uses a walker to aide in ambulation. No recent falls or trauma. Significant Medical History:  CAD- has x 4 stents, CKD3, COPD-mdi , hx of TIA-baby aspirin, HTN-atenolol, lisinopril,  HLD-crestor, fish oil,  ALEX-uses CPAP nightly    Patient was hypertensive at his visit, he reports no headache or dizziness. He reports feeling anxious regarding this surgery. Anticoagulant /blood thinner: Aspirin    No chest pain, palpitations or diaphoresis. No recent URI. Pt admits to SOB. No  fever or chills. Patient has hx of waking up x1 during surgery otherwise no problems with  anesthesia . Patient had Labs and EKG done and will require surgical clearance from Provider. Pt states that he seen his PCP about a month ago. MRI OF THE LUMBAR SPINE WITHOUT CONTRAST, 4/21/2021 3:03 pm     TECHNIQUE:  Multiplanar multisequence MRI of the lumbar spine was performed without the  administration of intravenous contrast.     COMPARISON:  None. HISTORY:  ORDERING SYSTEM PROVIDED HISTORY: Scoliosis of lumbar spine, unspecified  scoliosis type  TECHNOLOGIST PROVIDED HISTORY: Scoliosis and multilevel DDD/changes  Reason for Exam: Scoliosis of lumbar spine, unspecified scoliosis type M41.9  (ICD-10-CM); Degenerative disc disease, lumbar M51.36 (ICD-10-CM); Acute low  back pain, unspecified back pain laterality, unspecified whether sciatica  present M54.5 (ICD-10-CM); Lumbar radicular pain  Additional signs and symptoms: complains of right sided sciatica for the past  6 weeks     FINDINGS:  BONES/ALIGNMENT: There is normal alignment of the spine. The vertebral body  heights are maintained. The bone marrow signal appears unremarkable.  No  acute fracture is identified.     The S1 vertebral body is transitional and lumbarized.     SPINAL CORD: The conus terminates normally.     SOFT TISSUES: No paraspinal mass identified.     L1-L2: Annular disc bulge flattens the thecal sac.     L2-L3: Annular disc bulge flattens the thecal sac.     L3-L4: Annular disc bulge encroaches upon both exit foramina which are mildly  stenotic.  The central canal is mildly stenotic at this level.     L4-L5: Severe central canal stenosis is identified due to broad disc  osteophyte complex, facet arthropathy and ligamentum flavum hypertrophy. Both foramina are severely stenotic, greater on the right.     L5-S1: Broad disc osteophyte complex and facet arthropathy result in severe  bilateral foraminal stenosis, greater on the left.  The central canal is  moderately stenotic.     S1-S2:  No focal disc protrusion or stenosis is identified.   Impression  Transitional S1 vertebral body which is lumbarized.     Severe central canal stenosis and bilateral foraminal stenosis at L4-L5.     Mild central canal stenosis and mild bilateral foraminal stenosis at L3-L4.     Severe bilateral foraminal stenosis at L5-S1, greater on the left.  Central  canal is moderately stenotic at this level. PAST MEDICAL HISTORY     Past Medical History:   Diagnosis Date    Anemia     Anesthesia     woke up eary during surgery x1 , heard saw & felt the pressure.  Arthritis 7/15/2013    ASHD (arteriosclerotic heart disease) 7/15/2013    CAD (coronary artery disease)     has cardiac stent x 4.    Chronic kidney disease     stage 3    COPD (chronic obstructive pulmonary disease) ([facility] [address]     COVID-19 vaccine series completed     [address] 1/26/2021, 2/23/2021    Degenerative joint disease 7/15/2013    ED (erectile dysfunction) 7/15/2013    Full dentures     Gout     History of carpal tunnel syndrome 07/15/2013    had surgery    History of colon polyps 7/15/2013    History of heart attack     silent one, patient was unaware    History of hemorrhoids 7/15/2013    History of TIA (transient ischemic attack) 7/15/2013    Hypercholesteremia 7/15/2013    Hypertension     Hyperuricemia 7/15/2013    Leukocytosis     Renal cyst     Sciatic nerve pain     4th and 5th lumbar vertebrae causing pinched nerve    Sleep apnea 07/15/2013    on cpap at [facility].     Wears glasses     for reading         SURGICAL HISTORY       Past Surgical History:   Procedure Laterality Date    BLEPHAROPLASTY Bilateral 04/02/2012    CARPAL TUNNEL RELEASE Right 11/2002    CATARACT REMOVAL WITH IMPLANT Left 03/02/2012    CATARACT REMOVAL WITH IMPLANT Right 03/05/2012    COLONOSCOPY  05/21/08    POLYPECTOMY    CORONARY ANGIOPLASTY WITH STENT PLACEMENT  1994 x1 stent,1998 x2 stents,2009 x1,  EYE SURGERY      cataracts, eyelids.  JOINT REPLACEMENT Right 11     RT TOTAL SHOULDER REPLACEMENT    JOINT REPLACEMENT Right     rt. hip replacement    JOINT REPLACEMENT Left 2018    ELLE    KNEE ARTHROPLASTY Left 2017    NH TOTAL HIP ARTHROPLASTY Left 2018    LEFT TOTAL HIP ARTHROPLASTY MINIMALLY INVASIVE ASI WITH BIOMET SYSTEM & GPS SPRAY APPLICATION performed by Milly Pritchard MD at [place] [address] ARTHROSCOPY Right 2002 repair    11 right shoulder reverse replacement.     SHOULDER SURGERY Left 2002    shoulder resurfacing    TOTAL KNEE ARTHROPLASTY Left 2017    KNEE TOTAL ARTHROPLASTY performed by [facility] Pritchard [facility] at [facility] HISTORY       Family History   Problem Relation Age of Onset    Alzheimer's Disease Mother     Heart Disease Mother     Emphysema Father        SOCIAL HISTORY       Social History     Socioeconomic History    Marital status:      Spouse name: None    Number of children: None    Years of education: None    Highest education level: None   Occupational History    None   Tobacco Use    Smoking status: Former Smoker     Packs/day: 2.00     Years: 35.00     Pack years: 70.00     Quit date:      Years since quittin.6    Smokeless tobacco: Former User     Types: Chew    Tobacco comment: 3/30/21: NICOTINE LOZENGES - current use   Substance and Sexual Activity    Alcohol use: No     Alcohol/week: 0.0 standard drinks     Comment: rarely    Drug use: No    Sexual activity: None   Other Topics Concern    None   Social History Narrative    None     Social Determinants of Health     Financial Resource Strain: Low Risk     Difficulty of Paying Living Expenses: Not hard at all   Food Insecurity: No Food Insecurity    Worried About Running Out of Food in the Last Year: Never true    Bobbi of Food in the Last Year: Never true   Transportation Needs: No Transportation Needs    Lack of Transportation (Medical): No    Lack of Transportation (Non-Medical): No   Physical Activity:     Days of Exercise per Week:     Minutes of Exercise per Session:    Stress:     Feeling of Stress :    Social Connections:     Frequency of Communication with Friends and Family:     Frequency of Social Gatherings with Friends and Family:     Attends Shinto Services:     Active Member of Clubs or Organizations:     Attends Club or Organization Meetings:     Marital Status:    Intimate Partner Violence:     Fear of Current or Ex-Partner:     Emotionally Abused:     Physically Abused:     Sexually Abused:            REVIEW OF SYSTEMS      Allergies   Allergen Reactions    Pcn [Penicillins] Itching    Ceclor [Cefaclor] Itching and Rash       Current Outpatient Medications on File Prior to Encounter   Medication Sig Dispense Refill    nitroGLYCERIN (NITROSTAT) 0.4 MG SL tablet Place 0.4 mg under the tongue every 5 minutes as needed for Chest pain up to max of 3 total doses. If no relief after 1 dose, call 911.  nicotine polacrilex (COMMIT) 4 MG lozenge Take 4 mg by mouth as needed for Smoking cessation      rosuvastatin (CRESTOR) 10 MG tablet TAKE 1 TABLET DAILY 90 tablet 1    lisinopril (PRINIVIL;ZESTRIL) 2.5 MG tablet TAKE 1 TABLET DAILY 90 tablet 1    allopurinol (ZYLOPRIM) 100 MG tablet TAKE 1 TABLET DAILY 90 tablet 1    atenolol (TENORMIN) 25 MG tablet TAKE 1 TABLET DAILY 90 tablet 3    Fluticasone-Salmeterol 232-14 MCG/ACT AEPB 2 times daily       aspirin (ASPIRIN 81) 81 MG chewable tablet Take 81 mg by mouth daily       CPAP Machine MISC --- ---      albuterol sulfate HFA (VENTOLIN HFA) 108 (90 Base) MCG/ACT inhaler 2 puffs as needed      FISH OIL Take by mouth 2 times daily       therapeutic multivitamin-minerals (THERAGRAN-M) tablet Take 1 tablet by mouth daily. OTC       No current facility-administered medications on file prior to encounter. General health:  Fairly good. walker. EXTREMITIES:  Symmetrical, no pretibial edema. No calf tenderness. No discoloration or ulcerations. More weakness on right side with SLR. NEUROLOGIC:  The patient is conscious, alert, oriented,Cranial nerve II-XII intact, taste and smell were not examined. No apparent focal sensory or motor deficits.                                                                                      PROVISIONAL DIAGNOSES / SURGERY:      L3-L5 POSTERIOR LUMBAR DECOMPRESSSION & INSTRUMENTED FUSION     Lumbar radicular pain     Scoliosis of lumbar spine     Degenerative disc disease, lumbar     Acute low back pain      Patient Active Problem List    Diagnosis Date Noted    Lumbar radiculopathy     Lumbar stenosis with neurogenic claudication 04/29/2021    Type 2 diabetes mellitus with stage 3 chronic kidney disease, without long-term current use of insulin ([address] 04/02/2021    Dependence on other enabling machines and devices 10/07/2020    Hypertensive retinopathy 10/07/2020    Primary osteoarthritis of left hip 01/23/2018    Mixed hyperlipidemia 02/24/2017    Chronic idiopathic gout of multiple sites 06/24/2016    Essential hypertension 10/13/2015    Allergic rhinitis 10/13/2015    CKD (chronic kidney disease), stage III ([address] 03/10/2015    Renal cyst     CKD (chronic kidney disease) stage 3, GFR 30-59 ml/min ([address] 01/21/2014    ASHD (arteriosclerotic heart disease) 07/15/2013    History of TIA (transient ischemic attack) 07/15/2013    History of hemorrhoids 07/15/2013    Arthritis 07/15/2013    History of carpal tunnel syndrome 07/15/2013    ALEX on CPAP 07/15/2013    ED (erectile dysfunction) 07/15/2013    History of colon polyps 07/15/2013    Chronic obstructive pulmonary disease (HCC)     Leukocytosis     Anemia in stage 3 chronic kidney disease ([address]            DARRELL JACKSON, BHAVYA - CNP on 9/7/2021 at 2:08 PM

## 2021-09-07 NOTE — PROGRESS NOTES
Dr. Kali Ames, anesthesia, was contacted and informed of the patient's planned surgery, history, and unconfirmed sinus bradycardia EKG results from EKG done today in [facility]. Medical clearance required. Surgery scheduling will notify Dr. Justice Alvarez office who will be responsible for making sure the clearance is obtained and is in the chart for surgery.

## 2021-09-07 NOTE — H&P (VIEW-ONLY)
HISTORY and [facility] [address]       NAME:  Jeny Tineo  MRN: [id number]   YOB: 1940   Date: 9/7/2021   Age: [de-identified] y.o. Gender: male       COMPLAINT AND PRESENT HISTORY:      Jeny Tineo is [de-identified] y.o.,  male, undergoing preadmission testing for Lumbar radicular pain, Scoliosis of lumbar spine,  Degenerative disc disease, lumbar and  Acute low back pain. Patient will be having:  L3-L5 POSTERIOR LUMBAR DECOMPRESSSION & INSTRUMENTED FUSION. Pt has DDD, C/O of pain in the lumbar spine area, radiates to the lower limbs on right leg side. Patient has a long term history of low back pain. He has a onset of back pain that he states was approx 50 yrs ago, but was  different . Patient reports acute on chronic pain starting in February 2021 with severe right radicular pain. He denies any prior injury to his back and denies any prior back surgeries. Patient has however had multiple joint surgeries of shoulders and knee and hip. Pt complains of pain and  limitation in the range of motion in his back. Patient rates the pain at 7/10  Patient reports difficulty sleeping due to pain. Pt states after a 100 ft walk the  pain kicks in and aggravates his sxs. Pt reports that sitting or lying down helps relieve sxs. Pt  Is unable to take NSAIDs due to kidney disease. Patient has used stretches, Physical therapy, and x1 injection given interventions to help with the pain. Patient uses a walker to aide in ambulation. No recent falls or trauma. Significant Medical History:  CAD- has x 4 stents, CKD3, COPD-mdi , hx of TIA-baby aspirin, HTN-atenolol, lisinopril,  HLD-crestor, fish oil,  ALEX-uses CPAP nightly    Patient was hypertensive at his visit, he reports no headache or dizziness. He reports feeling anxious regarding this surgery. Anticoagulant /blood thinner: Aspirin    No chest pain, palpitations or diaphoresis. No recent URI. Pt admits to SOB. No  fever or chills. Patient has hx of waking up x1 during surgery otherwise no problems with  anesthesia . Patient had Labs and EKG done and will require surgical clearance from Provider. Pt states that he seen his PCP about a month ago. MRI OF THE LUMBAR SPINE WITHOUT CONTRAST, 4/21/2021 3:03 pm     TECHNIQUE:  Multiplanar multisequence MRI of the lumbar spine was performed without the  administration of intravenous contrast.     COMPARISON:  None. HISTORY:  ORDERING SYSTEM PROVIDED HISTORY: Scoliosis of lumbar spine, unspecified  scoliosis type  TECHNOLOGIST PROVIDED HISTORY: Scoliosis and multilevel DDD/changes  Reason for Exam: Scoliosis of lumbar spine, unspecified scoliosis type M41.9  (ICD-10-CM); Degenerative disc disease, lumbar M51.36 (ICD-10-CM); Acute low  back pain, unspecified back pain laterality, unspecified whether sciatica  present M54.5 (ICD-10-CM); Lumbar radicular pain  Additional signs and symptoms: complains of right sided sciatica for the past  6 weeks     FINDINGS:  BONES/ALIGNMENT: There is normal alignment of the spine. The vertebral body  heights are maintained. The bone marrow signal appears unremarkable.  No  acute fracture is identified.     The S1 vertebral body is transitional and lumbarized.     SPINAL CORD: The conus terminates normally.     SOFT TISSUES: No paraspinal mass identified.     L1-L2: Annular disc bulge flattens the thecal sac.     L2-L3: Annular disc bulge flattens the thecal sac.     L3-L4: Annular disc bulge encroaches upon both exit foramina which are mildly  stenotic.  The central canal is mildly stenotic at this level.     L4-L5: Severe central canal stenosis is identified due to broad disc  osteophyte complex, facet arthropathy and ligamentum flavum hypertrophy. Both foramina are severely stenotic, greater on the right.     L5-S1: Broad disc osteophyte complex and facet arthropathy result in severe  bilateral foraminal stenosis, greater on the left.  The central canal is  moderately stenotic.     S1-S2:  No focal disc protrusion or stenosis is identified.   Impression  Transitional S1 vertebral body which is lumbarized.     Severe central canal stenosis and bilateral foraminal stenosis at L4-L5.     Mild central canal stenosis and mild bilateral foraminal stenosis at L3-L4.     Severe bilateral foraminal stenosis at L5-S1, greater on the left.  Central  canal is moderately stenotic at this level. PAST MEDICAL HISTORY     Past Medical History:   Diagnosis Date    Anemia     Anesthesia     woke up eary during surgery x1 , heard saw & felt the pressure.  Arthritis 7/15/2013    ASHD (arteriosclerotic heart disease) 7/15/2013    CAD (coronary artery disease)     has cardiac stent x 4.    Chronic kidney disease     stage 3    COPD (chronic obstructive pulmonary disease) ([facility] [address]     COVID-19 vaccine series completed     Magda Nico 1/26/2021, 2/23/2021    Degenerative joint disease 7/15/2013    ED (erectile dysfunction) 7/15/2013    Full dentures     Gout     History of carpal tunnel syndrome 07/15/2013    had surgery    History of colon polyps 7/15/2013    History of heart attack     silent one, patient was unaware    History of hemorrhoids 7/15/2013    History of TIA (transient ischemic attack) 7/15/2013    Hypercholesteremia 7/15/2013    Hypertension     Hyperuricemia 7/15/2013    Leukocytosis     Renal cyst     Sciatic nerve pain     4th and 5th lumbar vertebrae causing pinched nerve    Sleep apnea 07/15/2013    on cpap at [facility].     Wears glasses     for reading         SURGICAL HISTORY       Past Surgical History:   Procedure Laterality Date    BLEPHAROPLASTY Bilateral 04/02/2012    CARPAL TUNNEL RELEASE Right 11/2002    CATARACT REMOVAL WITH IMPLANT Left 03/02/2012    CATARACT REMOVAL WITH IMPLANT Right 03/05/2012    COLONOSCOPY  05/21/08    POLYPECTOMY    CORONARY ANGIOPLASTY WITH STENT PLACEMENT  1994 x1 stent,1998 x2 stents,2009 x1,  EYE SURGERY      cataracts, eyelids.  JOINT REPLACEMENT Right 11     RT TOTAL SHOULDER REPLACEMENT    JOINT REPLACEMENT Right     rt. hip replacement    JOINT REPLACEMENT Left 2018    ELLE    KNEE ARTHROPLASTY Left 2017    UT TOTAL HIP ARTHROPLASTY Left 2018    LEFT TOTAL HIP ARTHROPLASTY MINIMALLY INVASIVE ASI WITH BIOMET SYSTEM & GPS SPRAY APPLICATION performed by Marguerite Perdomo MD at [facility] ARTHROSCOPY Right 2002 repair    11 right shoulder reverse replacement.     SHOULDER SURGERY Left 2002    shoulder resurfacing    TOTAL KNEE ARTHROPLASTY Left 2017    KNEE TOTAL ARTHROPLASTY performed by Marguerite Perdomo MD at [facility] HISTORY       Family History   Problem Relation Age of Onset    Alzheimer's Disease Mother     Heart Disease Mother     Emphysema Father        SOCIAL HISTORY       Social History     Socioeconomic History    Marital status:      Spouse name: None    Number of children: None    Years of education: None    Highest education level: None   Occupational History    None   Tobacco Use    Smoking status: Former Smoker     Packs/day: 2.00     Years: 35.00     Pack years: 70.00     Quit date:      Years since quittin.6    Smokeless tobacco: Former User     Types: Chew    Tobacco comment: 3/30/21: NICOTINE LOZENGES - current use   Substance and Sexual Activity    Alcohol use: No     Alcohol/week: 0.0 standard drinks     Comment: rarely    Drug use: No    Sexual activity: None   Other Topics Concern    None   Social History Narrative    None     Social Determinants of Health     Financial Resource Strain: Low Risk     Difficulty of Paying Living Expenses: Not hard at all   Food Insecurity: No Food Insecurity    Worried About Running Out of Food in the Last Year: Never true    Bobbi of Food in the Last Year: Never true   Transportation Needs: No Transportation Needs    Lack of Transportation (Medical): No    Lack of Transportation (Non-Medical): No   Physical Activity:     Days of Exercise per Week:     Minutes of Exercise per Session:    Stress:     Feeling of Stress :    Social Connections:     Frequency of Communication with Friends and Family:     Frequency of Social Gatherings with Friends and Family:     Attends Baptism Services:     Active Member of Clubs or Organizations:     Attends Club or Organization Meetings:     Marital Status:    Intimate Partner Violence:     Fear of Current or Ex-Partner:     Emotionally Abused:     Physically Abused:     Sexually Abused:            REVIEW OF SYSTEMS      Allergies   Allergen Reactions    Pcn [Penicillins] Itching    Ceclor [Cefaclor] Itching and Rash       Current Outpatient Medications on File Prior to Encounter   Medication Sig Dispense Refill    nitroGLYCERIN (NITROSTAT) 0.4 MG SL tablet Place 0.4 mg under the tongue every 5 minutes as needed for Chest pain up to max of 3 total doses. If no relief after 1 dose, call 911.  nicotine polacrilex (COMMIT) 4 MG lozenge Take 4 mg by mouth as needed for Smoking cessation      rosuvastatin (CRESTOR) 10 MG tablet TAKE 1 TABLET DAILY 90 tablet 1    lisinopril (PRINIVIL;ZESTRIL) 2.5 MG tablet TAKE 1 TABLET DAILY 90 tablet 1    allopurinol (ZYLOPRIM) 100 MG tablet TAKE 1 TABLET DAILY 90 tablet 1    atenolol (TENORMIN) 25 MG tablet TAKE 1 TABLET DAILY 90 tablet 3    Fluticasone-Salmeterol 232-14 MCG/ACT AEPB 2 times daily       aspirin (ASPIRIN 81) 81 MG chewable tablet Take 81 mg by mouth daily       CPAP Machine MISC --- ---      albuterol sulfate HFA (VENTOLIN HFA) 108 (90 Base) MCG/ACT inhaler 2 puffs as needed      FISH OIL Take by mouth 2 times daily       therapeutic multivitamin-minerals (THERAGRAN-M) tablet Take 1 tablet by mouth daily. OTC       No current facility-administered medications on file prior to encounter. General health:  Fairly good. No fever or chills. Skin:  No itching, redness or rash. HEENT:  No headache, epistaxis or sore throat. Neck:  No pain, stiffness or masses. Cardiovascular/Respiratory system:  No chest pain, palpitation or shortness of breath. Gastrointestinal tract: No abdominal pain, Dysphagia, nausea, vomiting, diarrhea or constipation. Genitourinary:  No burning on micturition. No hesitancy, urgency, frequency or discoloration of urine. Locomotor:  See HPI. Neuropsychiatric:  No referable complaints. GENERAL PHYSICAL EXAM:     Vitals: BP (!) 155/79   Pulse 60   Temp 98.7 °F (37.1 °C) (Infrared)   Resp 16   Ht 5' 10\" (1.778 m)   Wt 189 lb (85.7 kg)   SpO2 100%   BMI 27.12 kg/m²  Body mass index is 27.12 kg/m². GENERAL APPEARANCE:   Maria G Conte is [de-identified] y.o.,  male, mildly obese, nourished, conscious, alert. Does not appear to be distress or pain at this time. SKIN:  Warm, dry, no cyanosis or jaundice. HEAD:  Normocephalic, atraumatic, no swelling or tenderness. EYES:  Pupils equal, reactive to light. EARS:  No discharge, no marked hearing loss. NOSE:  No rhinorrhea, epistaxis or septal deformity. THROAT:  Not congested. No ulceration bleeding or discharge. NECK:  No stiffness, trachea central.                  CHEST:  Symmetrical and equal on expansion. HEART:  RRR S1 > S2. No audible murmurs or gallops. LUNGS:  Equal on expansion, normal breath sounds. No adventitious sounds. ABDOMEN:  Mildly obese. Soft on palpation. No localized tenderness. No guarding or rigidity. LYMPHATICS:  No palpable cervical lymphadenopathy. LOCOMOTOR, BACK AND SPINE:  No tenderness or deformities.  Pt comes in with walker. EXTREMITIES:  Symmetrical, no pretibial edema. No calf tenderness. No discoloration or ulcerations. More weakness on right side with SLR. NEUROLOGIC:  The patient is conscious, alert, oriented,Cranial nerve II-XII intact, taste and smell were not examined. No apparent focal sensory or motor deficits.                                                                                      PROVISIONAL DIAGNOSES / SURGERY:      L3-L5 POSTERIOR LUMBAR DECOMPRESSSION & INSTRUMENTED FUSION     Lumbar radicular pain     Scoliosis of lumbar spine     Degenerative disc disease, lumbar     Acute low back pain      Patient Active Problem List    Diagnosis Date Noted    Lumbar radiculopathy     Lumbar stenosis with neurogenic claudication 04/29/2021    Type 2 diabetes mellitus with stage 3 chronic kidney disease, without long-term current use of insulin ([address] 04/02/2021    Dependence on other enabling machines and devices 10/07/2020    Hypertensive retinopathy 10/07/2020    Primary osteoarthritis of left hip 01/23/2018    Mixed hyperlipidemia 02/24/2017    Chronic idiopathic gout of multiple sites 06/24/2016    Essential hypertension 10/13/2015    Allergic rhinitis 10/13/2015    CKD (chronic kidney disease), stage III ([address] 03/10/2015    Renal cyst     CKD (chronic kidney disease) stage 3, GFR 30-59 ml/min ([address] 01/21/2014    ASHD (arteriosclerotic heart disease) 07/15/2013    History of TIA (transient ischemic attack) 07/15/2013    History of hemorrhoids 07/15/2013    Arthritis 07/15/2013    History of carpal tunnel syndrome 07/15/2013    ALEX on CPAP 07/15/2013    ED (erectile dysfunction) 07/15/2013    History of colon polyps 07/15/2013    Chronic obstructive pulmonary disease (HCC)     Leukocytosis     Anemia in stage 3 chronic kidney disease ([address]            DARRELL JACKSON, BHAVYA - CNP on 9/7/2021 at 2:08 PM

## 2021-09-10 LAB
EKG ATRIAL RATE: 53 BPM
EKG P AXIS: 29 DEGREES
EKG P-R INTERVAL: 190 MS
EKG Q-T INTERVAL: 426 MS
EKG QRS DURATION: 98 MS
EKG QTC CALCULATION (BAZETT): 399 MS
EKG R AXIS: -2 DEGREES
EKG T AXIS: 24 DEGREES
EKG VENTRICULAR RATE: 53 BPM

## 2021-09-10 PROCEDURE — 93010 ELECTROCARDIOGRAM REPORT: CPT | Performed by: INTERNAL MEDICINE

## 2021-09-13 RX ORDER — ALLOPURINOL 100 MG/1
TABLET ORAL
Qty: 90 TABLET | Refills: 1 | Status: SHIPPED | OUTPATIENT
Start: 2021-09-13 | End: 2022-02-22

## 2021-09-17 ENCOUNTER — OFFICE VISIT (OUTPATIENT)
Dept: FAMILY MEDICINE CLINIC | Age: 81
End: 2021-09-17
Payer: MEDICARE

## 2021-09-17 ENCOUNTER — ANESTHESIA EVENT (OUTPATIENT)
Dept: OPERATING ROOM | Age: 81
DRG: 455 | End: 2021-09-17
Payer: MEDICARE

## 2021-09-17 VITALS
WEIGHT: 193 LBS | TEMPERATURE: 97.9 F | HEIGHT: 70 IN | SYSTOLIC BLOOD PRESSURE: 117 MMHG | DIASTOLIC BLOOD PRESSURE: 68 MMHG | BODY MASS INDEX: 27.63 KG/M2 | OXYGEN SATURATION: 98 % | HEART RATE: 56 BPM

## 2021-09-17 DIAGNOSIS — I10 ESSENTIAL HYPERTENSION: ICD-10-CM

## 2021-09-17 DIAGNOSIS — H61.23 BILATERAL HEARING LOSS DUE TO CERUMEN IMPACTION: ICD-10-CM

## 2021-09-17 DIAGNOSIS — H61.23 BILATERAL IMPACTED CERUMEN: Primary | ICD-10-CM

## 2021-09-17 PROCEDURE — 69210 REMOVE IMPACTED EAR WAX UNI: CPT | Performed by: FAMILY MEDICINE

## 2021-09-17 PROCEDURE — G8427 DOCREV CUR MEDS BY ELIG CLIN: HCPCS | Performed by: FAMILY MEDICINE

## 2021-09-17 PROCEDURE — 1123F ACP DISCUSS/DSCN MKR DOCD: CPT | Performed by: FAMILY MEDICINE

## 2021-09-17 PROCEDURE — 99213 OFFICE O/P EST LOW 20 MIN: CPT | Performed by: FAMILY MEDICINE

## 2021-09-17 PROCEDURE — 4040F PNEUMOC VAC/ADMIN/RCVD: CPT | Performed by: FAMILY MEDICINE

## 2021-09-17 PROCEDURE — G8417 CALC BMI ABV UP PARAM F/U: HCPCS | Performed by: FAMILY MEDICINE

## 2021-09-17 PROCEDURE — 1036F TOBACCO NON-USER: CPT | Performed by: FAMILY MEDICINE

## 2021-09-17 ASSESSMENT — ENCOUNTER SYMPTOMS
CHEST TIGHTNESS: 0
ABDOMINAL PAIN: 0
SHORTNESS OF BREATH: 0

## 2021-09-17 NOTE — PROGRESS NOTES
for complaint of recent bilateral hearing loss. Patient states that in the past he has had bilateral impacted cerumen removal.  He states he is taking and tolerating his routine medication. He denies any fever, chills, chest pain or shortness of breath. Review of Systems   Constitutional: Negative for chills and fever. HENT: Positive for hearing loss (  Bilateral ). Negative for congestion. Respiratory: Negative for chest tightness and shortness of breath. Cardiovascular: Negative for chest pain. Gastrointestinal: Negative for abdominal pain. Genitourinary: Negative for dysuria and enuresis. Skin: Negative for rash. Objective:   Physical Exam  Vitals and nursing note reviewed. Constitutional:       General: He is not in acute distress. Appearance: He is well-developed. HENT:      Head: Normocephalic and atraumatic. Right Ear: Tympanic membrane, ear canal and external ear normal. There is impacted cerumen. Left Ear: Tympanic membrane, ear canal and external ear normal. There is impacted cerumen. Ears:      Comments: Bilateral ear exam was normal after impacted cerumen was removed     Nose: Nose normal.      Mouth/Throat:      Mouth: Mucous membranes are moist.      Pharynx: Oropharynx is clear. Eyes:      General: No scleral icterus. Right eye: No discharge. Left eye: No discharge. Conjunctiva/sclera: Conjunctivae normal.   Cardiovascular:      Rate and Rhythm: Normal rate and regular rhythm. Heart sounds: Normal heart sounds. Pulmonary:      Effort: Pulmonary effort is normal. No respiratory distress. Breath sounds: Normal breath sounds. No wheezing. Abdominal:      General: There is no distension. Palpations: Abdomen is soft. Tenderness: There is no abdominal tenderness. Musculoskeletal:      Cervical back: Neck supple. Skin:     General: Skin is warm and dry. Findings: No rash.    Neurological:      Mental Status: He is alert and oriented to person, place, and time. Psychiatric:         Mood and Affect: Mood normal.         Behavior: Behavior normal.         Assessment:       Diagnosis Orders   1. Bilateral impacted cerumen  58248 - [place] REMOVE IMPACTED EAR WAX   2. Essential hypertension     3. Bilateral hearing loss due to cerumen impaction  89598 - [place] REMOVE IMPACTED EAR WAX           Plan:            Orders Placed This Encounter   Procedures    38184 - [place] REMOVE IMPACTED EAR WAX     Bilateral impacted cerumen removed with ear irrigation and curette. Patient stated that he could hear much better bilaterally after procedure.      Continue routine medications  Follow-up as scheduled

## 2021-09-20 ENCOUNTER — APPOINTMENT (OUTPATIENT)
Dept: GENERAL RADIOLOGY | Age: 81
DRG: 455 | End: 2021-09-20
Attending: ORTHOPAEDIC SURGERY
Payer: MEDICARE

## 2021-09-20 ENCOUNTER — HOSPITAL ENCOUNTER (INPATIENT)
Age: 81
LOS: 1 days | Discharge: HOME | DRG: 455 | End: 2021-09-21
Attending: ORTHOPAEDIC SURGERY | Admitting: ORTHOPAEDIC SURGERY
Payer: MEDICARE

## 2021-09-20 ENCOUNTER — ANESTHESIA (OUTPATIENT)
Dept: OPERATING ROOM | Age: 81
DRG: 455 | End: 2021-09-20
Payer: MEDICARE

## 2021-09-20 VITALS — OXYGEN SATURATION: 100 % | TEMPERATURE: 96.3 F | SYSTOLIC BLOOD PRESSURE: 98 MMHG | DIASTOLIC BLOOD PRESSURE: 55 MMHG

## 2021-09-20 DIAGNOSIS — M54.50 ACUTE MIDLINE LOW BACK PAIN WITHOUT SCIATICA: Primary | ICD-10-CM

## 2021-09-20 PROBLEM — M54.9 BACK PAIN: Status: ACTIVE | Noted: 2021-09-20

## 2021-09-20 PROCEDURE — 6370000000 HC RX 637 (ALT 250 FOR IP): Performed by: ORTHOPAEDIC SURGERY

## 2021-09-20 PROCEDURE — 3600000013 HC SURGERY LEVEL 3 ADDTL 15MIN: Performed by: ORTHOPAEDIC SURGERY

## 2021-09-20 PROCEDURE — 2500000003 HC RX 250 WO HCPCS: Performed by: NURSE ANESTHETIST, CERTIFIED REGISTERED

## 2021-09-20 PROCEDURE — C1713 ANCHOR/SCREW BN/BN,TIS/BN: HCPCS | Performed by: ORTHOPAEDIC SURGERY

## 2021-09-20 PROCEDURE — 3209999900 FLUORO FOR SURGICAL PROCEDURES

## 2021-09-20 PROCEDURE — 3700000000 HC ANESTHESIA ATTENDED CARE: Performed by: ORTHOPAEDIC SURGERY

## 2021-09-20 PROCEDURE — 94640 AIRWAY INHALATION TREATMENT: CPT

## 2021-09-20 PROCEDURE — 6360000002 HC RX W HCPCS: Performed by: ORTHOPAEDIC SURGERY

## 2021-09-20 PROCEDURE — 94664 DEMO&/EVAL PT USE INHALER: CPT

## 2021-09-20 PROCEDURE — 3600000003 HC SURGERY LEVEL 3 BASE: Performed by: ORTHOPAEDIC SURGERY

## 2021-09-20 PROCEDURE — 2580000003 HC RX 258: Performed by: ANESTHESIOLOGY

## 2021-09-20 PROCEDURE — 7100000001 HC PACU RECOVERY - ADDTL 15 MIN: Performed by: ORTHOPAEDIC SURGERY

## 2021-09-20 PROCEDURE — 2580000003 HC RX 258: Performed by: ORTHOPAEDIC SURGERY

## 2021-09-20 PROCEDURE — 1200000000 HC SEMI PRIVATE

## 2021-09-20 PROCEDURE — 3700000001 HC ADD 15 MINUTES (ANESTHESIA): Performed by: ORTHOPAEDIC SURGERY

## 2021-09-20 PROCEDURE — 2720000010 HC SURG SUPPLY STERILE: Performed by: ORTHOPAEDIC SURGERY

## 2021-09-20 PROCEDURE — 0SG1071 FUSION OF 2 OR MORE LUMBAR VERTEBRAL JOINTS WITH AUTOLOGOUS TISSUE SUBSTITUTE, POSTERIOR APPROACH, POSTERIOR COLUMN, OPEN APPROACH: ICD-10-PCS | Performed by: ORTHOPAEDIC SURGERY

## 2021-09-20 PROCEDURE — 2500000003 HC RX 250 WO HCPCS: Performed by: ORTHOPAEDIC SURGERY

## 2021-09-20 PROCEDURE — 2700000000 HC OXYGEN THERAPY PER DAY

## 2021-09-20 PROCEDURE — 2709999900 HC NON-CHARGEABLE SUPPLY: Performed by: ORTHOPAEDIC SURGERY

## 2021-09-20 PROCEDURE — 2580000003 HC RX 258: Performed by: NURSE ANESTHETIST, CERTIFIED REGISTERED

## 2021-09-20 PROCEDURE — 01NB0ZZ RELEASE LUMBAR NERVE, OPEN APPROACH: ICD-10-PCS | Performed by: ORTHOPAEDIC SURGERY

## 2021-09-20 PROCEDURE — 6360000002 HC RX W HCPCS: Performed by: NURSE ANESTHETIST, CERTIFIED REGISTERED

## 2021-09-20 PROCEDURE — 0SB20ZZ EXCISION OF LUMBAR VERTEBRAL DISC, OPEN APPROACH: ICD-10-PCS | Performed by: ORTHOPAEDIC SURGERY

## 2021-09-20 PROCEDURE — 6360000002 HC RX W HCPCS: Performed by: ANESTHESIOLOGY

## 2021-09-20 PROCEDURE — 7100000000 HC PACU RECOVERY - FIRST 15 MIN: Performed by: ORTHOPAEDIC SURGERY

## 2021-09-20 PROCEDURE — 0SG10AJ FUSION OF 2 OR MORE LUMBAR VERTEBRAL JOINTS WITH INTERBODY FUSION DEVICE, POSTERIOR APPROACH, ANTERIOR COLUMN, OPEN APPROACH: ICD-10-PCS | Performed by: ORTHOPAEDIC SURGERY

## 2021-09-20 DEVICE — IMPLANTABLE DEVICE: Type: IMPLANTABLE DEVICE | Site: SPINE LUMBAR | Status: FUNCTIONAL

## 2021-09-20 DEVICE — SCREW SPNL L45MM DIA6.5MM PEDCL POLYAX 2 LD THRD TOP LD FOR: Type: IMPLANTABLE DEVICE | Site: SPINE LUMBAR | Status: FUNCTIONAL

## 2021-09-20 DEVICE — SET SCR SPNL POLYAX ATR EVEREST: Type: IMPLANTABLE DEVICE | Site: SPINE LUMBAR | Status: FUNCTIONAL

## 2021-09-20 DEVICE — GRAFT BNE CHIP 30 CC FD CORTICAL CANC: Type: IMPLANTABLE DEVICE | Site: SPINE LUMBAR | Status: FUNCTIONAL

## 2021-09-20 RX ORDER — MORPHINE SULFATE 4 MG/ML
4 INJECTION, SOLUTION INTRAMUSCULAR; INTRAVENOUS
Status: DISCONTINUED | OUTPATIENT
Start: 2021-09-20 | End: 2021-09-21 | Stop reason: HOSPADM

## 2021-09-20 RX ORDER — OXYCODONE HYDROCHLORIDE 10 MG/1
10 TABLET ORAL EVERY 4 HOURS PRN
Status: DISCONTINUED | OUTPATIENT
Start: 2021-09-20 | End: 2021-09-21 | Stop reason: HOSPADM

## 2021-09-20 RX ORDER — CLINDAMYCIN PHOSPHATE 900 MG/50ML
900 INJECTION INTRAVENOUS ONCE
Status: COMPLETED | OUTPATIENT
Start: 2021-09-20 | End: 2021-09-20

## 2021-09-20 RX ORDER — ONDANSETRON 2 MG/ML
4 INJECTION INTRAMUSCULAR; INTRAVENOUS
Status: COMPLETED | OUTPATIENT
Start: 2021-09-20 | End: 2021-09-20

## 2021-09-20 RX ORDER — SODIUM CHLORIDE 9 MG/ML
25 INJECTION, SOLUTION INTRAVENOUS PRN
Status: DISCONTINUED | OUTPATIENT
Start: 2021-09-20 | End: 2021-09-20 | Stop reason: HOSPADM

## 2021-09-20 RX ORDER — SODIUM CHLORIDE 0.9 % (FLUSH) 0.9 %
10 SYRINGE (ML) INJECTION EVERY 12 HOURS SCHEDULED
Status: DISCONTINUED | OUTPATIENT
Start: 2021-09-20 | End: 2021-09-21 | Stop reason: HOSPADM

## 2021-09-20 RX ORDER — OXYCODONE HYDROCHLORIDE 5 MG/1
5 TABLET ORAL EVERY 4 HOURS PRN
Status: DISCONTINUED | OUTPATIENT
Start: 2021-09-20 | End: 2021-09-21 | Stop reason: HOSPADM

## 2021-09-20 RX ORDER — PROPOFOL 10 MG/ML
INJECTION, EMULSION INTRAVENOUS PRN
Status: DISCONTINUED | OUTPATIENT
Start: 2021-09-20 | End: 2021-09-20 | Stop reason: SDUPTHER

## 2021-09-20 RX ORDER — ROCURONIUM BROMIDE 10 MG/ML
INJECTION, SOLUTION INTRAVENOUS PRN
Status: DISCONTINUED | OUTPATIENT
Start: 2021-09-20 | End: 2021-09-20 | Stop reason: SDUPTHER

## 2021-09-20 RX ORDER — FENTANYL CITRATE 50 UG/ML
50 INJECTION, SOLUTION INTRAMUSCULAR; INTRAVENOUS EVERY 5 MIN PRN
Status: DISCONTINUED | OUTPATIENT
Start: 2021-09-20 | End: 2021-09-20 | Stop reason: HOSPADM

## 2021-09-20 RX ORDER — CYCLOBENZAPRINE HCL 10 MG
10 TABLET ORAL 3 TIMES DAILY PRN
Status: DISCONTINUED | OUTPATIENT
Start: 2021-09-20 | End: 2021-09-21 | Stop reason: HOSPADM

## 2021-09-20 RX ORDER — DIPHENHYDRAMINE HYDROCHLORIDE 50 MG/ML
12.5 INJECTION INTRAMUSCULAR; INTRAVENOUS
Status: DISCONTINUED | OUTPATIENT
Start: 2021-09-20 | End: 2021-09-20 | Stop reason: HOSPADM

## 2021-09-20 RX ORDER — GLYCOPYRROLATE 1 MG/5 ML
SYRINGE (ML) INTRAVENOUS PRN
Status: DISCONTINUED | OUTPATIENT
Start: 2021-09-20 | End: 2021-09-20 | Stop reason: SDUPTHER

## 2021-09-20 RX ORDER — SODIUM CHLORIDE, SODIUM LACTATE, POTASSIUM CHLORIDE, CALCIUM CHLORIDE 600; 310; 30; 20 MG/100ML; MG/100ML; MG/100ML; MG/100ML
INJECTION, SOLUTION INTRAVENOUS CONTINUOUS PRN
Status: DISCONTINUED | OUTPATIENT
Start: 2021-09-20 | End: 2021-09-20 | Stop reason: SDUPTHER

## 2021-09-20 RX ORDER — HYDROCODONE BITARTRATE AND ACETAMINOPHEN 5; 325 MG/1; MG/1
1 TABLET ORAL PRN
Status: DISCONTINUED | OUTPATIENT
Start: 2021-09-20 | End: 2021-09-20 | Stop reason: HOSPADM

## 2021-09-20 RX ORDER — FENTANYL CITRATE 50 UG/ML
INJECTION, SOLUTION INTRAMUSCULAR; INTRAVENOUS PRN
Status: DISCONTINUED | OUTPATIENT
Start: 2021-09-20 | End: 2021-09-20 | Stop reason: SDUPTHER

## 2021-09-20 RX ORDER — CLINDAMYCIN PHOSPHATE 900 MG/50ML
900 INJECTION INTRAVENOUS EVERY 8 HOURS
Status: COMPLETED | OUTPATIENT
Start: 2021-09-20 | End: 2021-09-21

## 2021-09-20 RX ORDER — SODIUM CHLORIDE 0.9 % (FLUSH) 0.9 %
5-40 SYRINGE (ML) INJECTION EVERY 12 HOURS SCHEDULED
Status: DISCONTINUED | OUTPATIENT
Start: 2021-09-20 | End: 2021-09-20 | Stop reason: HOSPADM

## 2021-09-20 RX ORDER — SODIUM CHLORIDE 0.9 % (FLUSH) 0.9 %
5-40 SYRINGE (ML) INJECTION PRN
Status: DISCONTINUED | OUTPATIENT
Start: 2021-09-20 | End: 2021-09-20 | Stop reason: HOSPADM

## 2021-09-20 RX ORDER — DEXAMETHASONE SODIUM PHOSPHATE 4 MG/ML
INJECTION, SOLUTION INTRA-ARTICULAR; INTRALESIONAL; INTRAMUSCULAR; INTRAVENOUS; SOFT TISSUE PRN
Status: DISCONTINUED | OUTPATIENT
Start: 2021-09-20 | End: 2021-09-20 | Stop reason: SDUPTHER

## 2021-09-20 RX ORDER — MORPHINE SULFATE 2 MG/ML
2 INJECTION, SOLUTION INTRAMUSCULAR; INTRAVENOUS
Status: DISCONTINUED | OUTPATIENT
Start: 2021-09-20 | End: 2021-09-21 | Stop reason: HOSPADM

## 2021-09-20 RX ORDER — SODIUM CHLORIDE, SODIUM LACTATE, POTASSIUM CHLORIDE, CALCIUM CHLORIDE 600; 310; 30; 20 MG/100ML; MG/100ML; MG/100ML; MG/100ML
INJECTION, SOLUTION INTRAVENOUS CONTINUOUS
Status: DISCONTINUED | OUTPATIENT
Start: 2021-09-20 | End: 2021-09-20

## 2021-09-20 RX ORDER — LISINOPRIL 5 MG/1
2.5 TABLET ORAL DAILY
Status: DISCONTINUED | OUTPATIENT
Start: 2021-09-20 | End: 2021-09-21 | Stop reason: HOSPADM

## 2021-09-20 RX ORDER — ALBUTEROL SULFATE 90 UG/1
1 AEROSOL, METERED RESPIRATORY (INHALATION) EVERY 4 HOURS PRN
Status: DISCONTINUED | OUTPATIENT
Start: 2021-09-20 | End: 2021-09-21 | Stop reason: HOSPADM

## 2021-09-20 RX ORDER — ALLOPURINOL 100 MG/1
100 TABLET ORAL DAILY
Status: DISCONTINUED | OUTPATIENT
Start: 2021-09-20 | End: 2021-09-21 | Stop reason: HOSPADM

## 2021-09-20 RX ORDER — TRANEXAMIC ACID 100 MG/ML
INJECTION, SOLUTION INTRAVENOUS PRN
Status: DISCONTINUED | OUTPATIENT
Start: 2021-09-20 | End: 2021-09-20 | Stop reason: SDUPTHER

## 2021-09-20 RX ORDER — HYDROCODONE BITARTRATE AND ACETAMINOPHEN 5; 325 MG/1; MG/1
2 TABLET ORAL PRN
Status: DISCONTINUED | OUTPATIENT
Start: 2021-09-20 | End: 2021-09-20 | Stop reason: HOSPADM

## 2021-09-20 RX ORDER — PHENYLEPHRINE HYDROCHLORIDE 10 MG/ML
INJECTION INTRAVENOUS PRN
Status: DISCONTINUED | OUTPATIENT
Start: 2021-09-20 | End: 2021-09-20 | Stop reason: SDUPTHER

## 2021-09-20 RX ORDER — SODIUM CHLORIDE 9 MG/ML
25 INJECTION, SOLUTION INTRAVENOUS PRN
Status: DISCONTINUED | OUTPATIENT
Start: 2021-09-20 | End: 2021-09-21 | Stop reason: HOSPADM

## 2021-09-20 RX ORDER — MORPHINE SULFATE 2 MG/ML
2 INJECTION, SOLUTION INTRAMUSCULAR; INTRAVENOUS EVERY 5 MIN PRN
Status: DISCONTINUED | OUTPATIENT
Start: 2021-09-20 | End: 2021-09-20 | Stop reason: HOSPADM

## 2021-09-20 RX ORDER — FENTANYL CITRATE 50 UG/ML
25 INJECTION, SOLUTION INTRAMUSCULAR; INTRAVENOUS EVERY 5 MIN PRN
Status: COMPLETED | OUTPATIENT
Start: 2021-09-20 | End: 2021-09-20

## 2021-09-20 RX ORDER — LIDOCAINE HYDROCHLORIDE 20 MG/ML
INJECTION, SOLUTION EPIDURAL; INFILTRATION; INTRACAUDAL; PERINEURAL PRN
Status: DISCONTINUED | OUTPATIENT
Start: 2021-09-20 | End: 2021-09-20 | Stop reason: SDUPTHER

## 2021-09-20 RX ORDER — LIDOCAINE HYDROCHLORIDE 10 MG/ML
1 INJECTION, SOLUTION EPIDURAL; INFILTRATION; INTRACAUDAL; PERINEURAL
Status: DISCONTINUED | OUTPATIENT
Start: 2021-09-20 | End: 2021-09-20 | Stop reason: HOSPADM

## 2021-09-20 RX ORDER — SODIUM CHLORIDE 0.9 % (FLUSH) 0.9 %
10 SYRINGE (ML) INJECTION PRN
Status: DISCONTINUED | OUTPATIENT
Start: 2021-09-20 | End: 2021-09-21 | Stop reason: HOSPADM

## 2021-09-20 RX ORDER — NITROGLYCERIN 0.4 MG/1
0.4 TABLET SUBLINGUAL EVERY 5 MIN PRN
Status: DISCONTINUED | OUTPATIENT
Start: 2021-09-20 | End: 2021-09-21 | Stop reason: HOSPADM

## 2021-09-20 RX ORDER — MEPERIDINE HYDROCHLORIDE 25 MG/ML
12.5 INJECTION INTRAMUSCULAR; INTRAVENOUS; SUBCUTANEOUS EVERY 5 MIN PRN
Status: DISCONTINUED | OUTPATIENT
Start: 2021-09-20 | End: 2021-09-20 | Stop reason: HOSPADM

## 2021-09-20 RX ORDER — HYDRALAZINE HYDROCHLORIDE 20 MG/ML
5 INJECTION INTRAMUSCULAR; INTRAVENOUS EVERY 10 MIN PRN
Status: DISCONTINUED | OUTPATIENT
Start: 2021-09-20 | End: 2021-09-20 | Stop reason: HOSPADM

## 2021-09-20 RX ORDER — METOCLOPRAMIDE HYDROCHLORIDE 5 MG/ML
10 INJECTION INTRAMUSCULAR; INTRAVENOUS
Status: DISCONTINUED | OUTPATIENT
Start: 2021-09-20 | End: 2021-09-20 | Stop reason: HOSPADM

## 2021-09-20 RX ORDER — VANCOMYCIN HYDROCHLORIDE 1 G/20ML
INJECTION, POWDER, LYOPHILIZED, FOR SOLUTION INTRAVENOUS PRN
Status: DISCONTINUED | OUTPATIENT
Start: 2021-09-20 | End: 2021-09-20 | Stop reason: ALTCHOICE

## 2021-09-20 RX ORDER — ATENOLOL 25 MG/1
25 TABLET ORAL DAILY
Status: DISCONTINUED | OUTPATIENT
Start: 2021-09-20 | End: 2021-09-21 | Stop reason: HOSPADM

## 2021-09-20 RX ORDER — BUDESONIDE AND FORMOTEROL FUMARATE DIHYDRATE 80; 4.5 UG/1; UG/1
2 AEROSOL RESPIRATORY (INHALATION) 2 TIMES DAILY
Status: DISCONTINUED | OUTPATIENT
Start: 2021-09-20 | End: 2021-09-21 | Stop reason: HOSPADM

## 2021-09-20 RX ORDER — ROSUVASTATIN CALCIUM 10 MG/1
10 TABLET, COATED ORAL DAILY
Status: DISCONTINUED | OUTPATIENT
Start: 2021-09-20 | End: 2021-09-21 | Stop reason: HOSPADM

## 2021-09-20 RX ORDER — LABETALOL HYDROCHLORIDE 5 MG/ML
5 INJECTION, SOLUTION INTRAVENOUS EVERY 10 MIN PRN
Status: DISCONTINUED | OUTPATIENT
Start: 2021-09-20 | End: 2021-09-20 | Stop reason: HOSPADM

## 2021-09-20 RX ADMIN — OXYCODONE HYDROCHLORIDE 5 MG: 5 TABLET ORAL at 13:52

## 2021-09-20 RX ADMIN — OXYCODONE HYDROCHLORIDE 10 MG: 10 TABLET ORAL at 18:13

## 2021-09-20 RX ADMIN — PHENYLEPHRINE HYDROCHLORIDE 200 MCG: 10 INJECTION INTRAVENOUS at 08:33

## 2021-09-20 RX ADMIN — PHENYLEPHRINE HYDROCHLORIDE 25 MCG/MIN: 10 INJECTION INTRAVENOUS at 08:40

## 2021-09-20 RX ADMIN — ROCURONIUM BROMIDE 30 MG: 10 INJECTION, SOLUTION INTRAVENOUS at 07:28

## 2021-09-20 RX ADMIN — FENTANYL CITRATE 25 MCG: 0.05 INJECTION, SOLUTION INTRAMUSCULAR; INTRAVENOUS at 13:08

## 2021-09-20 RX ADMIN — FENTANYL CITRATE 25 MCG: 0.05 INJECTION, SOLUTION INTRAMUSCULAR; INTRAVENOUS at 12:41

## 2021-09-20 RX ADMIN — MORPHINE SULFATE 4 MG: 4 INJECTION INTRAVENOUS at 15:24

## 2021-09-20 RX ADMIN — FENTANYL CITRATE 25 MCG: 0.05 INJECTION, SOLUTION INTRAMUSCULAR; INTRAVENOUS at 12:56

## 2021-09-20 RX ADMIN — PROPOFOL 200 MG: 10 INJECTION, EMULSION INTRAVENOUS at 07:28

## 2021-09-20 RX ADMIN — ALLOPURINOL 100 MG: 100 TABLET ORAL at 15:24

## 2021-09-20 RX ADMIN — FENTANYL CITRATE 25 MCG: 0.05 INJECTION, SOLUTION INTRAMUSCULAR; INTRAVENOUS at 12:27

## 2021-09-20 RX ADMIN — PHENYLEPHRINE HYDROCHLORIDE 100 MCG: 10 INJECTION INTRAVENOUS at 08:10

## 2021-09-20 RX ADMIN — FENTANYL CITRATE 50 MCG: 50 INJECTION, SOLUTION INTRAMUSCULAR; INTRAVENOUS at 11:30

## 2021-09-20 RX ADMIN — FENTANYL CITRATE 100 MCG: 50 INJECTION, SOLUTION INTRAMUSCULAR; INTRAVENOUS at 07:28

## 2021-09-20 RX ADMIN — PROPOFOL 50 MCG/KG/MIN: 10 INJECTION, EMULSION INTRAVENOUS at 07:33

## 2021-09-20 RX ADMIN — LIDOCAINE HYDROCHLORIDE 100 MG: 20 INJECTION, SOLUTION EPIDURAL; INFILTRATION; INTRACAUDAL at 07:28

## 2021-09-20 RX ADMIN — SODIUM CHLORIDE, POTASSIUM CHLORIDE, SODIUM LACTATE AND CALCIUM CHLORIDE: 600; 310; 30; 20 INJECTION, SOLUTION INTRAVENOUS at 06:35

## 2021-09-20 RX ADMIN — CLINDAMYCIN PHOSPHATE 900 MG: 900 INJECTION, SOLUTION INTRAVENOUS at 23:58

## 2021-09-20 RX ADMIN — PHENYLEPHRINE HYDROCHLORIDE 200 MCG: 10 INJECTION INTRAVENOUS at 07:45

## 2021-09-20 RX ADMIN — CLINDAMYCIN PHOSPHATE 900 MG: 900 INJECTION, SOLUTION INTRAVENOUS at 15:24

## 2021-09-20 RX ADMIN — FENTANYL CITRATE 50 MCG: 50 INJECTION, SOLUTION INTRAMUSCULAR; INTRAVENOUS at 08:06

## 2021-09-20 RX ADMIN — ONDANSETRON 4 MG: 2 INJECTION INTRAMUSCULAR; INTRAVENOUS at 13:12

## 2021-09-20 RX ADMIN — SODIUM CHLORIDE, PRESERVATIVE FREE 10 ML: 5 INJECTION INTRAVENOUS at 21:22

## 2021-09-20 RX ADMIN — Medication 0.4 MG: at 07:35

## 2021-09-20 RX ADMIN — CLINDAMYCIN PHOSPHATE 900 MG: 900 INJECTION, SOLUTION INTRAVENOUS at 07:34

## 2021-09-20 RX ADMIN — ROSUVASTATIN CALCIUM 10 MG: 10 TABLET, FILM COATED ORAL at 15:24

## 2021-09-20 RX ADMIN — BUDESONIDE AND FORMOTEROL FUMARATE DIHYDRATE 2 PUFF: 80; 4.5 AEROSOL RESPIRATORY (INHALATION) at 18:57

## 2021-09-20 RX ADMIN — PHENYLEPHRINE HYDROCHLORIDE 100 MCG: 10 INJECTION INTRAVENOUS at 11:02

## 2021-09-20 RX ADMIN — SODIUM CHLORIDE, POTASSIUM CHLORIDE, SODIUM LACTATE AND CALCIUM CHLORIDE: 600; 310; 30; 20 INJECTION, SOLUTION INTRAVENOUS at 13:22

## 2021-09-20 RX ADMIN — TRANEXAMIC ACID 1000 MG: 100 INJECTION, SOLUTION INTRAVENOUS at 07:33

## 2021-09-20 RX ADMIN — SODIUM CHLORIDE, POTASSIUM CHLORIDE, SODIUM LACTATE AND CALCIUM CHLORIDE: 600; 310; 30; 20 INJECTION, SOLUTION INTRAVENOUS at 07:23

## 2021-09-20 RX ADMIN — DEXAMETHASONE SODIUM PHOSPHATE 8 MG: 4 INJECTION, SOLUTION INTRAMUSCULAR; INTRAVENOUS at 08:01

## 2021-09-20 ASSESSMENT — PULMONARY FUNCTION TESTS
PIF_VALUE: 19
PIF_VALUE: 18
PIF_VALUE: 17
PIF_VALUE: 1
PIF_VALUE: 16
PIF_VALUE: 18
PIF_VALUE: 18
PIF_VALUE: 17
PIF_VALUE: 16
PIF_VALUE: 18
PIF_VALUE: 17
PIF_VALUE: 17
PIF_VALUE: 18
PIF_VALUE: 2
PIF_VALUE: 18
PIF_VALUE: 19
PIF_VALUE: 18
PIF_VALUE: 18
PIF_VALUE: 2
PIF_VALUE: 20
PIF_VALUE: 12
PIF_VALUE: 17
PIF_VALUE: 18
PIF_VALUE: 17
PIF_VALUE: 18
PIF_VALUE: 18
PIF_VALUE: 17
PIF_VALUE: 19
PIF_VALUE: 18
PIF_VALUE: 15
PIF_VALUE: 15
PIF_VALUE: 18
PIF_VALUE: 16
PIF_VALUE: 18
PIF_VALUE: 12
PIF_VALUE: 16
PIF_VALUE: 18
PIF_VALUE: 17
PIF_VALUE: 19
PIF_VALUE: 18
PIF_VALUE: 17
PIF_VALUE: 1
PIF_VALUE: 18
PIF_VALUE: 18
PIF_VALUE: 17
PIF_VALUE: 16
PIF_VALUE: 17
PIF_VALUE: 26
PIF_VALUE: 18
PIF_VALUE: 15
PIF_VALUE: 16
PIF_VALUE: 18
PIF_VALUE: 18
PIF_VALUE: 2
PIF_VALUE: 18
PIF_VALUE: 16
PIF_VALUE: 19
PIF_VALUE: 18
PIF_VALUE: 16
PIF_VALUE: 18
PIF_VALUE: 17
PIF_VALUE: 18
PIF_VALUE: 17
PIF_VALUE: 18
PIF_VALUE: 17
PIF_VALUE: 17
PIF_VALUE: 15
PIF_VALUE: 18
PIF_VALUE: 17
PIF_VALUE: 18
PIF_VALUE: 15
PIF_VALUE: 17
PIF_VALUE: 18
PIF_VALUE: 18
PIF_VALUE: 19
PIF_VALUE: 18
PIF_VALUE: 18
PIF_VALUE: 16
PIF_VALUE: 16
PIF_VALUE: 2
PIF_VALUE: 18
PIF_VALUE: 19
PIF_VALUE: 18
PIF_VALUE: 19
PIF_VALUE: 18
PIF_VALUE: 8
PIF_VALUE: 18
PIF_VALUE: 15
PIF_VALUE: 18
PIF_VALUE: 17
PIF_VALUE: 16
PIF_VALUE: 18
PIF_VALUE: 16
PIF_VALUE: 18
PIF_VALUE: 18
PIF_VALUE: 19
PIF_VALUE: 18
PIF_VALUE: 16
PIF_VALUE: 16
PIF_VALUE: 19
PIF_VALUE: 15
PIF_VALUE: 18
PIF_VALUE: 17
PIF_VALUE: 18
PIF_VALUE: 19
PIF_VALUE: 18
PIF_VALUE: 16
PIF_VALUE: 18
PIF_VALUE: 17
PIF_VALUE: 18
PIF_VALUE: 18
PIF_VALUE: 15
PIF_VALUE: 19
PIF_VALUE: 18
PIF_VALUE: 17
PIF_VALUE: 18
PIF_VALUE: 19
PIF_VALUE: 18
PIF_VALUE: 17
PIF_VALUE: 18
PIF_VALUE: 20
PIF_VALUE: 18
PIF_VALUE: 17
PIF_VALUE: 19
PIF_VALUE: 18
PIF_VALUE: 19
PIF_VALUE: 19
PIF_VALUE: 18
PIF_VALUE: 0
PIF_VALUE: 16
PIF_VALUE: 15
PIF_VALUE: 18
PIF_VALUE: 19
PIF_VALUE: 18
PIF_VALUE: 15
PIF_VALUE: 19
PIF_VALUE: 19
PIF_VALUE: 18
PIF_VALUE: 18
PIF_VALUE: 17
PIF_VALUE: 18
PIF_VALUE: 17
PIF_VALUE: 16
PIF_VALUE: 19
PIF_VALUE: 18
PIF_VALUE: 17
PIF_VALUE: 18
PIF_VALUE: 19
PIF_VALUE: 18
PIF_VALUE: 8
PIF_VALUE: 16
PIF_VALUE: 18
PIF_VALUE: 21
PIF_VALUE: 18
PIF_VALUE: 17
PIF_VALUE: 18
PIF_VALUE: 17
PIF_VALUE: 18
PIF_VALUE: 4
PIF_VALUE: 17
PIF_VALUE: 18
PIF_VALUE: 18
PIF_VALUE: 31
PIF_VALUE: 18
PIF_VALUE: 16
PIF_VALUE: 16
PIF_VALUE: 18
PIF_VALUE: 17
PIF_VALUE: 17
PIF_VALUE: 18
PIF_VALUE: 16
PIF_VALUE: 18
PIF_VALUE: 16
PIF_VALUE: 16
PIF_VALUE: 18
PIF_VALUE: 16
PIF_VALUE: 18
PIF_VALUE: 18
PIF_VALUE: 17
PIF_VALUE: 18
PIF_VALUE: 17
PIF_VALUE: 18
PIF_VALUE: 18
PIF_VALUE: 17
PIF_VALUE: 18
PIF_VALUE: 18
PIF_VALUE: 17
PIF_VALUE: 18
PIF_VALUE: 18
PIF_VALUE: 19
PIF_VALUE: 18
PIF_VALUE: 18
PIF_VALUE: 17
PIF_VALUE: 18
PIF_VALUE: 18
PIF_VALUE: 16
PIF_VALUE: 15
PIF_VALUE: 18
PIF_VALUE: 16
PIF_VALUE: 19
PIF_VALUE: 18
PIF_VALUE: 0
PIF_VALUE: 18
PIF_VALUE: 17
PIF_VALUE: 16
PIF_VALUE: 1
PIF_VALUE: 19
PIF_VALUE: 17
PIF_VALUE: 17
PIF_VALUE: 16
PIF_VALUE: 17
PIF_VALUE: 18
PIF_VALUE: 17
PIF_VALUE: 18

## 2021-09-20 ASSESSMENT — PAIN SCALES - GENERAL
PAINLEVEL_OUTOF10: 5
PAINLEVEL_OUTOF10: 7
PAINLEVEL_OUTOF10: 3
PAINLEVEL_OUTOF10: 9
PAINLEVEL_OUTOF10: 5
PAINLEVEL_OUTOF10: 8
PAINLEVEL_OUTOF10: 6
PAINLEVEL_OUTOF10: 7
PAINLEVEL_OUTOF10: 8
PAINLEVEL_OUTOF10: 6
PAINLEVEL_OUTOF10: 4

## 2021-09-20 ASSESSMENT — PAIN DESCRIPTION - LOCATION
LOCATION: BACK

## 2021-09-20 ASSESSMENT — PAIN DESCRIPTION - ORIENTATION
ORIENTATION: LOWER

## 2021-09-20 ASSESSMENT — PAIN DESCRIPTION - FREQUENCY: FREQUENCY: CONTINUOUS

## 2021-09-20 ASSESSMENT — ENCOUNTER SYMPTOMS: STRIDOR: 0

## 2021-09-20 ASSESSMENT — PAIN - FUNCTIONAL ASSESSMENT: PAIN_FUNCTIONAL_ASSESSMENT: 0-10

## 2021-09-20 ASSESSMENT — PAIN DESCRIPTION - DESCRIPTORS
DESCRIPTORS: DULL;ACHING
DESCRIPTORS: DULL
DESCRIPTORS: DULL

## 2021-09-20 ASSESSMENT — PAIN DESCRIPTION - PAIN TYPE
TYPE: SURGICAL PAIN

## 2021-09-20 NOTE — ANESTHESIA PRE PROCEDURE
Department of Anesthesiology  Preprocedure Note       Name:  Cyndi Burns   Age:  [de-identified] y.o.  :  1940                                          MRN:  [id number]         Date:  2021      Surgeon: Ryne Bhatti):  Jose Hernandez MD    Procedure: Procedure(s):  L3-L5 POSTERIOR LUMBAR DECOMPRESSSION & INSTRUMENTED FUSION    Medications prior to admission:   Prior to Admission medications    Medication Sig Start Date End Date Taking? Authorizing Provider   allopurinol (ZYLOPRIM) 100 MG tablet TAKE 1 TABLET DAILY 21  Yes Obinna Kinney MD   nicotine polacrilex (COMMIT) 4 MG lozenge Take 4 mg by mouth as needed for Smoking cessation   Yes Historical Provider, MD   rosuvastatin (CRESTOR) 10 MG tablet TAKE 1 TABLET DAILY 21  Yes Obinna Kinney MD   lisinopril (PRINIVIL;ZESTRIL) 2.5 MG tablet TAKE 1 TABLET DAILY 21  Yes Obinna Kinney MD   atenolol (TENORMIN) 25 MG tablet TAKE 1 TABLET DAILY 20  Yes Nathan Carrion MD   Fluticasone-Salmeterol 136-54 MCG/ACT AEPB 2 times daily  20  Yes Historical Provider, MD   aspirin (ASPIRIN 81) 81 MG chewable tablet Take 81 mg by mouth daily    Yes Historical Provider, MD   FISH OIL Take by mouth 2 times daily    Yes Historical Provider, MD   therapeutic multivitamin-minerals (THERAGRAN-M) tablet Take 1 tablet by mouth daily. OTC   Yes Obinna Kinney MD   nitroGLYCERIN (NITROSTAT) 0.4 MG SL tablet Place 0.4 mg under the tongue every 5 minutes as needed for Chest pain up to max of 3 total doses. If no relief after 1 dose, call 911.     Historical Provider, MD   CPAP Machine MISC --- ---    Historical Provider, MD   albuterol sulfate HFA (VENTOLIN HFA) 108 (90 Base) MCG/ACT inhaler 2 puffs as needed    Historical Provider, MD       Current medications:    Current Facility-Administered Medications   Medication Dose Route Frequency Provider Last Rate Last Admin    lactated ringers infusion   IntraVENous Continuous Bridget Johansen  mL/hr at 09/20/21 0635 New Bag at 09/20/21 0635    sodium chloride flush 0.9 % injection 5-40 mL  5-40 mL IntraVENous 2 times per day Yohan Mcintyre MD        sodium chloride flush 0.9 % injection 5-40 mL  5-40 mL IntraVENous PRN Yohan Mcintyre MD        0.9 % sodium chloride infusion  25 mL IntraVENous PRN Yohan Mcintyre MD        lidocaine PF 1 % injection 1 mL  1 mL IntraDERmal Once PRN Yohan Mcintyre MD           Allergies: Allergies   Allergen Reactions    Pcn [Penicillins] Itching    Ceclor [Cefaclor] Itching and Rash       Problem List:    Patient Active Problem List   Diagnosis Code    Chronic obstructive pulmonary disease ([facility]) J44.9    Leukocytosis D72.829    Anemia in stage 3 chronic kidney disease ([facility]) N18.30, D63.1    ASHD (arteriosclerotic heart disease) I25.10    History of TIA (transient ischemic attack) Z86.73    History of hemorrhoids Z87.19    Arthritis M19.90    History of carpal tunnel syndrome Z86.69    ALEX on CPAP G47.33, Z99.89    ED (erectile dysfunction) N52.9    History of colon polyps Z86.010    CKD (chronic kidney disease) stage 3, GFR 30-59 ml/min ([facility]) N18.30    CKD (chronic kidney disease), stage III ([facility]) N18.30    Renal cyst N28.1    Essential hypertension I10    Allergic rhinitis J30.9    Chronic idiopathic gout of multiple sites M1A. 200X0    Mixed hyperlipidemia E78.2    Primary osteoarthritis of left hip M16.12    Dependence on other enabling machines and devices Z99.89    Hypertensive retinopathy H35.039    Type 2 diabetes mellitus with stage 3 chronic kidney disease, without long-term current use of insulin ([facility]) E11.22, N18.30    Lumbar stenosis with neurogenic claudication M48.062    Lumbar radiculopathy M54.16       Past Medical History:        Diagnosis Date    Anemia     Anesthesia     woke up eary during surgery x1 , heard saw & felt the pressure.     Arthritis 7/15/2013    ASHD (arteriosclerotic heart disease) 7/15/2013    CAD (coronary artery disease)     has cardiac stent x 4.    Chronic kidney disease     stage 3    COPD (chronic obstructive pulmonary disease) ([facility] [address]     COVID-19 vaccine series completed     Babetta Public 1/26/2021, 2/23/2021    Degenerative joint disease 7/15/2013    ED (erectile dysfunction) 7/15/2013    Full dentures     Gout     History of carpal tunnel syndrome 07/15/2013    had surgery    History of colon polyps 7/15/2013    History of heart attack     silent one, patient was unaware    History of hemorrhoids 7/15/2013    History of TIA (transient ischemic attack) 7/15/2013    Hypercholesteremia 7/15/2013    Hypertension     Hyperuricemia 7/15/2013    Leukocytosis     Renal cyst     Sciatic nerve pain     4th and 5th lumbar vertebrae causing pinched nerve    Sleep apnea 07/15/2013    on cpap at .  Wears glasses     for reading       Past Surgical History:        Procedure Laterality Date    BLEPHAROPLASTY Bilateral 04/02/2012    CARPAL TUNNEL RELEASE Right 11/2002    CATARACT REMOVAL WITH IMPLANT Left 03/02/2012    CATARACT REMOVAL WITH IMPLANT Right 03/05/2012    COLONOSCOPY  05/21/08    POLYPECTOMY   Zenaida Wilhelm 336 x1 stent,1998 x2 stents,2009 x1,    EYE SURGERY      cataracts, eyelids.  JOINT REPLACEMENT Right 07/12/11     RT TOTAL SHOULDER REPLACEMENT    JOINT REPLACEMENT Right 1995    rt. hip replacement    JOINT REPLACEMENT Left 01/23/2018    ELLE    KNEE ARTHROPLASTY Left 04/04/2017    MD TOTAL HIP ARTHROPLASTY Left 1/23/2018    LEFT TOTAL HIP ARTHROPLASTY MINIMALLY INVASIVE ASI WITH BIOMET SYSTEM & GPS SPRAY APPLICATION performed by Consuelo Santana MD at [facility] ARTHROSCOPY Right 05/2002 repair    7-12-11 right shoulder reverse replacement.     SHOULDER SURGERY Left 02/24/2002    shoulder resurfacing    TOTAL KNEE ARTHROPLASTY Left 4/4/2017    KNEE TOTAL ARTHROPLASTY performed by Consuelo Santana MD at [address] [place] [address] History:    Social History     Tobacco Use    Smoking status: Former Smoker     Packs/day: 2.00     Years: 35.00     Pack years: 70.00     Quit date:      Years since quittin.7    Smokeless tobacco: Former User     Types: Chew    Tobacco comment: 3/30/21: NICOTINE LOZENGES - current use   Substance Use Topics    Alcohol use: No     Alcohol/week: 0.0 standard drinks     Comment: rarely                                Counseling given: Not Answered  Comment: 3/30/21: NICOTINE LOZENGES - current use      Vital Signs (Current):   Vitals:    21 0551 21 0609   BP:  139/67   Pulse:  57   Resp:  16   Temp:  97.7 °F (36.5 °C)   TempSrc:  Infrared   SpO2:  99%   Weight: 193 lb (87.5 kg)    Height: 5' 10\" (1.778 m)                                               BP Readings from Last 3 Encounters:   21 139/67   21 117/68   21 (!) 155/79       NPO Status: Time of last liquid consumption:                         Time of last solid consumption: 1800                        Date of last liquid consumption: 21                        Date of last solid food consumption: 21    BMI:   Wt Readings from Last 3 Encounters:   21 193 lb (87.5 kg)   21 193 lb (87.5 kg)   21 189 lb (85.7 kg)     Body mass index is 27.69 kg/m².     CBC:   Lab Results   Component Value Date    WBC 12.1 2021    RBC 4.67 2021    RBC 4.43 2012    HGB 14.1 2021    HCT 43.2 2021    MCV 92.5 2021    RDW 13.8 2021     2021     2012       CMP:   Lab Results   Component Value Date     2021    K 4.8 2021     2021    CO2 27 2021    BUN 28 2021    CREATININE 1.56 2021    GFRAA 52 2021    LABGLOM 43 2021    GLUCOSE 119 2021    GLUCOSE 143 2012    PROT 7.0 2017    CALCIUM 9.6 2021    BILITOT 0.82 2014    ALKPHOS 57 2014    AST 22 2021 ALT 18 08/09/2021       POC Tests: No results for input(s): POCGLU, POCNA, POCK, POCCL, POCBUN, POCHEMO, POCHCT in the last 72 hours. Coags: No results found for: PROTIME, INR, APTT    HCG (If Applicable): No results found for: PREGTESTUR, PREGSERUM, HCG, HCGQUANT     ABGs: No results found for: PHART, PO2ART, EIQ3VFQ, OGH1YOL, BEART, E9OSXYFP     Type & Screen (If Applicable):  No results found for: LABABO, LABRH    Drug/Infectious Status (If Applicable):  No results found for: HIV, HEPCAB    COVID-19 Screening (If Applicable): No results found for: COVID19        Anesthesia Evaluation  Patient summary reviewed and Nursing notes reviewed history of anesthetic complications:   Airway: Mallampati: II  TM distance: >3 FB   Neck ROM: full  Mouth opening: > = 3 FB Dental:    (+) upper dentures      Pulmonary: breath sounds clear to auscultation  (+) COPD:  sleep apnea: on CPAP,      (-) rhonchi, wheezes, rales, stridor and no decreased breath sounds                           Cardiovascular:    (+) hypertension:, CAD:, CABG/stent: no interval change,     (-) murmur, weak pulses,  friction rub, systolic click, carotid bruit,  JVD and peripheral edema    ECG reviewed  Rhythm: regular  Rate: normal                    Neuro/Psych:   (+) neuromuscular disease:,             GI/Hepatic/Renal:   (+) renal disease: CRI,           Endo/Other:    (+) DiabetesType II DM, , .                 Abdominal:             Vascular: Other Findings:             Anesthesia Plan      general     ASA 3       Induction: intravenous. MIPS: Postoperative opioids intended and Prophylactic antiemetics administered. Anesthetic plan and risks discussed with patient. Plan discussed with CRNA.                   Sandrine Orlando MD   9/20/2021

## 2021-09-20 NOTE — PROGRESS NOTES
Patient admitted to room 2048 from surgery. Vital signs are stable, no signs of distress noted at this time. Bed is in lowest position, brake is locked, and call light is within reach. Head to toe assessment performed, see documentation for details.

## 2021-09-20 NOTE — OP NOTE
[address], [address]                                OPERATIVE REPORT    PATIENT NAME: Nuha Olivares                   :        1940  MED REC NO:   [id number]                              ROOM:  ACCOUNT NO:   [de-identified]                           ADMIT DATE: 2021  PROVIDER:     Andreina Morgan    DATE OF PROCEDURE:  2021    PREOPERATIVE DIAGNOSES:  Lumbar degenerative disc disease, lumbar spinal  stenosis, low back pain, radicular leg pain. POSTOPERATIVE DIAGNOSES:  Lumbar degenerative disc disease, lumbar  spinal stenosis, low back pain, radicular leg pain. PROCEDURE PERFORMED:  1. L3-4 and L4-5 posterior decompression with laminectomy, bilateral  partial medial facetectomies, neural foraminotomies, posterior fusion  L3-4 and L4-5. 2.  Posterior segmental instrumentation L3 to L5, posterior interbody  fusion L3-4 and E9-6 with application of intervertebral body fusion cage  L3-4 and L4-5, local autograft, nonstructural allograft bone grafting,  fluoroscopic assistance. OPERATING SURGEON:  Andreina Morgan MD    FIRST ASSISTANT:  _____. ANESTHESIA:  General.    BLOOD LOSS:  500. COMPLICATIONS:  None. FINDINGS:  1.  Neuromonitoring performed throughout the case, no significant  abnormalities. 2.  [place] [employer] screw nuris system with expandable 15-degree  lordotic intervertebral body fusion cages. 3.  Final AP and lateral fluoroscopic images showed acceptable disc  space height, restoration of lumbar lordosis, graft hardware placement. PROCEDURE IN DETAIL:  The patient was taken to the operating room,  placed under general anesthesia, transferred to the [facility]  table, checked for padding and positioning. Back was prepped and draped  in the usual sterile fashion. Midline incision was made, carried down through skin and subcutaneous  tissue.   Paraspinal musculature was elevated out to the transverse  process of L3, L4 and what we were calling L5, although L5 was a bit  transitional and with another transitional level below it. After getting exposure, lateral gutters decorticated with high-speed  bur. Pedicle entry position established with a high-speed bur. Screws  were then placed with the aid of fluoroscopic assistance. Post  placement of screws, screws were stimulated and found to be acceptable. Checked AP, lateral and en face fluoroscopically, they looked  acceptable. Laminectomies were then completed followed by significant bilateral  partial medial facetectomies and neural foraminotomies. We had to place a nuris on the right to direct the disc spaces actually  enough to get into them to do the disc space prep. At this juncture  having completed bilateral annulotomies, with combination of scalpel,  intradiskal pituitaries, curettes, paddle abraham also I had removed  some of the calcified disc complex with Kerrisons. We then placed the  15 degree lordotic cages and with the aid of fluoroscopic assistance  expanded them. Disc space was then impaction grafted with local  autograft bilaterally to the cage and posteriorly to the posterior  aspect of the disc space. Rods were then gently compressed and torqued  and final AP and lateral fluoroscopic images were obtained. A gram of  vancomycin was placed in the depths of the wound. It should be noted  that having completed all this lateral gutters were grafted with a  minimal amount of local autograft that remained as well as crushed  cortical cancellous allograft. A gram of vancomycin was then placed in  the depths of wound. The deep fascia was reapproximated with #2 Vicryl  sutures, subcuticular layer with 2-0 Vicryl followed by skin staples. Sterile dressing was applied. The patient was awakened from anesthesia,  taken to recovery room in stable condition.     COMPLICATIONS ARISING DURING THE OPERATION: None noted.         KAYLIE Shelley    D: 09/20/2021 12:27:48       T: 09/20/2021 12:31:21     [email or web address]  Job#: [id number]     Doc#: [id number]    CC:

## 2021-09-20 NOTE — ANESTHESIA POSTPROCEDURE EVALUATION
POST- ANESTHESIA EVALUATION       Pt Name: Roland Peoples  MRN: [id number]  YOB: 1940  Date of evaluation: 9/20/2021  Time:  2:18 PM      /66   Pulse 55   Temp 97.9 °F (36.6 °C)   Resp 16   Ht 5' 10\" (1.778 m)   Wt 193 lb (87.5 kg)   SpO2 98%   BMI 27.69 kg/m²      Consciousness Level  Awake  Cardiopulmonary Status  Stable  Pain Adequately Treated YES  Nausea / Vomiting  NO  Adequate Hydration  YES  Anesthesia Related Complications NONE      Electronically signed by Allyson Cheng MD on 9/20/2021 at 2:18 PM       Department of Anesthesiology  Postprocedure Note    Patient: Roland Peoples  MRN: [id number]  YOB: 1940  Date of evaluation: 9/20/2021  Time:  2:18 PM     Procedure Summary     Date: 09/20/21 Room / Location: [address] [place] [address] Mindi Gottlieb 01 / [facility]: [facility]    Anesthesia Start: 2228 Anesthesia Stop: 1209    Procedure: L3-L5 POSTERIOR LUMBAR DECOMPRESSSION & INSTRUMENTED FUSION (N/A Spine Lumbar) Diagnosis: (DEGENERATIVE DISC DISEASE (PT FULLY VACCINATED))    Surgeons: Justus Trevino MD Responsible Provider: Allyson Cheng MD    Anesthesia Type: general ASA Status: 3          Anesthesia Type: general    Mayur Phase I: Mayur Score: 8    Mayur Phase II:      Last vitals: Reviewed and per EMR flowsheets.        Anesthesia Post Evaluation

## 2021-09-20 NOTE — BRIEF OP NOTE
Brief Postoperative Note      Patient: Nicky Steve  YOB: 1940  MRN: [id number]    Date of Procedure: 9/20/2021    Pre-Op Diagnosis: DEGENERATIVE DISC DISEASE (PT FULLY VACCINATED) stenosis radicular pain    Post-Op Diagnosis: Same       Procedure(s):  L3-L5 POSTERIOR LUMBAR DECOMPRESSSION & INSTRUMENTED FUSION    Surgeon(s):  Yuliya Valencia MD    Assistant:  Physician Assistant: RENUKA Land    Anesthesia: General    Estimated Blood Loss (mL): 737    Complications: None    Specimens:   * No specimens in log *    Implants:  Implant Name Type Inv. Item Serial No.  Lot No. LRB No. Used Action   SCREW SPNL L45MM DIA6. 5MM PEDCL POLYAX 2 LD THRD TOP LD FOR  SCREW SPNL L45MM DIA6. 5MM PEDCL POLYAX 2 LD THRD TOP LD FOR  [employer]-WD  N/A 4 Implanted   SCREW SPNL POLYAX 6.5X40 MM ZORA FEN INVASIVE EVEREST  SCREW SPNL POLYAX 6.5X40 MM ZORA FEN INVASIVE EVEREST  [employer] SPINE HOWM-WD  N/A 2 Implanted   SET SCR SPNL POLYAX ATR EVEREST  SET SCR SPNL POLYAX ATR EVEREST  K2[employer]-WD  N/A 6 Implanted   ANEESH SPNL CONTOURED 5.5X80 MM LUMBAR TI ALICIA  ANEESH SPNL CONTOURED 5.5X80 MM LUMBAR TI ALICIA  ELLE SPINE HOWM-WD  N/A 1 Implanted   GRAFT BNE CHIP 30 CC FD CORTICAL CANC  GRAFT BNE CHIP 30 CC FD CORTICAL CANC  [facility] MEDICAL-WD [id number] N/A 1 Implanted   ANEESH SPNL CONTOURED 5.5X75 MM LUMBAR TI ALICIA  ANEESH SPNL CONTOURED 5.5X75 MM LUMBAR TI ALICIA  ELLE SPINE HOWM-WD  N/A 1 Implanted         Drains: * No LDAs found *    Findings: see dictation    Electronically signed by Yuliya Valencia MD on 9/20/2021 at 12:12 PM

## 2021-09-20 NOTE — PROGRESS NOTES
Patient stood at edge of bed with walker, and 1 assist.  Patient attempted to void at this time, but was unsuccessful. Patient due to void by 2230.

## 2021-09-20 NOTE — INTERVAL H&P NOTE
Update History & Physical    The patient's History and Physical of September 7, 2021 was reviewed with the patient and I examined the patient. There was no change. The surgical site was confirmed by the patient and me. L3-L5 POSTERIOR LUMBAR DECOMPRESSSION & INSTRUMENTED FUSION. Per Dr Sarika Wilburn. Pt Npo since the past midnight, pt took tenolol and lisinopril today with sip of water  Pt had no hxx of MRSA infection   Pt stopped Baby aspirin one week ago   Pt denies any problemi with anesthesia     See nursing flow sheet for vital signs     Plan: The risks, benefits, expected outcome, and alternative to the recommended procedure have been discussed with the patient. Patient understands and wants to proceed with the procedure.      Electronically signed by BHAVYA West CNP on 9/20/2021 at 5:47 AM

## 2021-09-21 VITALS
HEIGHT: 70 IN | SYSTOLIC BLOOD PRESSURE: 120 MMHG | OXYGEN SATURATION: 100 % | TEMPERATURE: 98.2 F | RESPIRATION RATE: 16 BRPM | HEART RATE: 75 BPM | DIASTOLIC BLOOD PRESSURE: 77 MMHG | WEIGHT: 193 LBS | BODY MASS INDEX: 27.63 KG/M2

## 2021-09-21 LAB
HCT VFR BLD CALC: 36.4 % (ref 41–53)
HEMOGLOBIN: 12 G/DL (ref 13.5–17.5)
MCH RBC QN AUTO: 30.4 PG (ref 26–34)
MCHC RBC AUTO-ENTMCNC: 33 G/DL (ref 31–37)
MCV RBC AUTO: 92 FL (ref 80–100)
NRBC AUTOMATED: ABNORMAL PER 100 WBC
PDW BLD-RTO: 13.9 % (ref 11.5–14.9)
PLATELET # BLD: 202 K/UL (ref 150–450)
PMV BLD AUTO: 9.8 FL (ref 6–12)
RBC # BLD: 3.96 M/UL (ref 4.5–5.9)
WBC # BLD: 20.9 K/UL (ref 3.5–11)

## 2021-09-21 PROCEDURE — 97161 PT EVAL LOW COMPLEX 20 MIN: CPT

## 2021-09-21 PROCEDURE — 94640 AIRWAY INHALATION TREATMENT: CPT

## 2021-09-21 PROCEDURE — 85027 COMPLETE CBC AUTOMATED: CPT

## 2021-09-21 PROCEDURE — 94761 N-INVAS EAR/PLS OXIMETRY MLT: CPT

## 2021-09-21 PROCEDURE — 6370000000 HC RX 637 (ALT 250 FOR IP): Performed by: ORTHOPAEDIC SURGERY

## 2021-09-21 PROCEDURE — 97166 OT EVAL MOD COMPLEX 45 MIN: CPT

## 2021-09-21 PROCEDURE — 99024 POSTOP FOLLOW-UP VISIT: CPT | Performed by: ORTHOPAEDIC SURGERY

## 2021-09-21 PROCEDURE — 2580000003 HC RX 258: Performed by: ORTHOPAEDIC SURGERY

## 2021-09-21 PROCEDURE — 36415 COLL VENOUS BLD VENIPUNCTURE: CPT

## 2021-09-21 RX ORDER — OXYCODONE HYDROCHLORIDE AND ACETAMINOPHEN 5; 325 MG/1; MG/1
1 TABLET ORAL EVERY 6 HOURS PRN
Qty: 28 TABLET | Refills: 0 | Status: ON HOLD | OUTPATIENT
Start: 2021-09-21 | End: 2021-09-25 | Stop reason: HOSPADM

## 2021-09-21 RX ADMIN — LISINOPRIL 2.5 MG: 5 TABLET ORAL at 09:07

## 2021-09-21 RX ADMIN — OXYCODONE HYDROCHLORIDE 10 MG: 10 TABLET ORAL at 14:37

## 2021-09-21 RX ADMIN — ATENOLOL 25 MG: 25 TABLET ORAL at 09:08

## 2021-09-21 RX ADMIN — SODIUM CHLORIDE, PRESERVATIVE FREE 10 ML: 5 INJECTION INTRAVENOUS at 09:09

## 2021-09-21 RX ADMIN — OXYCODONE HYDROCHLORIDE 10 MG: 10 TABLET ORAL at 06:10

## 2021-09-21 RX ADMIN — ALLOPURINOL 100 MG: 100 TABLET ORAL at 09:07

## 2021-09-21 RX ADMIN — BUDESONIDE AND FORMOTEROL FUMARATE DIHYDRATE 2 PUFF: 80; 4.5 AEROSOL RESPIRATORY (INHALATION) at 07:50

## 2021-09-21 RX ADMIN — ROSUVASTATIN CALCIUM 10 MG: 10 TABLET, FILM COATED ORAL at 09:07

## 2021-09-21 RX ADMIN — OXYCODONE HYDROCHLORIDE 10 MG: 10 TABLET ORAL at 00:02

## 2021-09-21 ASSESSMENT — PAIN SCALES - GENERAL
PAINLEVEL_OUTOF10: 6
PAINLEVEL_OUTOF10: 7
PAINLEVEL_OUTOF10: 8
PAINLEVEL_OUTOF10: 9
PAINLEVEL_OUTOF10: 6

## 2021-09-21 ASSESSMENT — PAIN DESCRIPTION - ORIENTATION
ORIENTATION: LOWER

## 2021-09-21 ASSESSMENT — PAIN DESCRIPTION - LOCATION
LOCATION: BACK

## 2021-09-21 ASSESSMENT — PAIN DESCRIPTION - PROGRESSION: CLINICAL_PROGRESSION: NOT CHANGED

## 2021-09-21 ASSESSMENT — PAIN DESCRIPTION - ONSET: ONSET: ON-GOING

## 2021-09-21 ASSESSMENT — PAIN DESCRIPTION - PAIN TYPE
TYPE: SURGICAL PAIN

## 2021-09-21 ASSESSMENT — PAIN DESCRIPTION - FREQUENCY
FREQUENCY: CONTINUOUS

## 2021-09-21 ASSESSMENT — PAIN DESCRIPTION - DESCRIPTORS
DESCRIPTORS: ACHING

## 2021-09-21 ASSESSMENT — PAIN - FUNCTIONAL ASSESSMENT: PAIN_FUNCTIONAL_ASSESSMENT: PREVENTS OR INTERFERES SOME ACTIVE ACTIVITIES AND ADLS

## 2021-09-21 NOTE — FLOWSHEET NOTE
SC visit with patient, wife and daughter; patient anxious as he provided medical update and welcomed prayer. Listening presence and support;     09/21/21 1445   Encounter Summary   Services provided to: Patient and family together   Referral/Consult From: [address] [place] [address]; Children   Continue Visiting   (9/21/21)   Complexity of Encounter Moderate   Length of Encounter 15 minutes   Spiritual Assessment Completed Yes   Spiritual/Mosque   Type Spiritual support   Assessment Approachable; Anxious; Loneliness;Coping;Helplessness   Intervention Active listening;Explored feelings, thoughts, concerns;Prayer;Sustaining presence/ Ministry of presence; Discussed illness/injury and it's impact   Outcome Expressed gratitude;Engaged in conversation;Comfort;Expressed feelings/needs/concerns;Coping; Hopeful;Receptive

## 2021-09-21 NOTE — PROGRESS NOTES
Hospitalist Progress Note  9/20/2021 9:27 PM  Subjective:   Admit Date: 9/20/2021  PCP: Annabella Sierra MD     Full Code      C/c:post op management      Interval History: pt admitted for l3 l5 decompression and instrumented fusion    Diet: ADULT DIET; Regular                                ip days:0  Medications:   Scheduled Meds:   allopurinol  100 mg Oral Daily    atenolol  25 mg Oral Daily    budesonide-formoterol  2 puff Inhalation BID    lisinopril  2.5 mg Oral Daily    rosuvastatin  10 mg Oral Daily    sodium chloride flush  10 mL IntraVENous 2 times per day    clindamycin (CLEOCIN) IV  900 mg IntraVENous Q8H     Continuous Infusions:   sodium chloride       PRN Meds:.albuterol sulfate HFA, nitroGLYCERIN, sodium chloride flush, sodium chloride, oxyCODONE **OR** oxyCODONE, morphine **OR** morphine, cyclobenzaprine     CBC: No results for input(s): WBC, HGB, PLT in the last 72 hours. BMP:  No results for input(s): NA, K, CL, CO2, BUN, CREATININE, GLUCOSE in the last 72 hours. Hepatic: No results for input(s): AST, ALT, ALB, BILITOT, ALKPHOS in the last 72 hours. Troponin: No results for input(s): TROPONINI in the last 72 hours. BNP: No results for input(s): BNP in the last 72 hours. Lipids: No results for input(s): CHOL, HDL in the last 72 hours. Invalid input(s): LDLCALCU  INR: No results for input(s): INR in the last 72 hours.     Objective:   Vitals: BP (!) 148/76   Pulse 70   Temp 98.1 °F (36.7 °C)   Resp 16   Ht 5' 10\" (1.778 m)   Wt 193 lb (87.5 kg)   SpO2 93%   BMI 27.69 kg/m²   General appearance: alert, appears stated age and cooperative  Skin: Skin color, texture, turgor normal. No rashes or lesions  Lungs: clear to auscultation bilaterally  Heart: regular rate and rhythm, S1, S2 normal, no murmur, click, rub or gallop  Abdomen: soft, non-tender; bowel sounds normal; no masses,  no organomegaly  Extremities: extremities normal, atraumatic, no cyanosis or edema  Neurologic: Mental status: Alert, oriented, thought content appropriate    Prophylaxis:   DVT with  [] lovenox        [] heparin        [x] Scd        [] none                 :     Radiology:  No results found. Assessment :   1. Lumbar decompression surgery  2. htn     Plan:   1. Continue present plan  2. See order    Patient Active Problem List:     Chronic obstructive pulmonary disease (HCC)     Leukocytosis     Anemia in stage 3 chronic kidney disease (HCC)     ASHD (arteriosclerotic heart disease)     History of TIA (transient ischemic attack)     History of hemorrhoids     Arthritis     History of carpal tunnel syndrome     ALEX on CPAP     ED (erectile dysfunction)     History of colon polyps     CKD (chronic kidney disease) stage 3, GFR 30-59 ml/min (HCC)     CKD (chronic kidney disease), stage III (HCC)     Renal cyst     Essential hypertension     Allergic rhinitis     Chronic idiopathic gout of multiple sites     Mixed hyperlipidemia     Primary osteoarthritis of left hip     Dependence on other enabling machines and devices     Hypertensive retinopathy     Type 2 diabetes mellitus with stage 3 chronic kidney disease, without long-term current use of insulin ([facility] [address]     Spinal stenosis of lumbar region with neurogenic claudication     Lumbar radicular pain     Back pain      Anticipated Disposition upon discharge: [] Home                                                                         [] Home with Home Health                                                                         [] [facility]                                                                         [] [address]      Patient is admitted as inpatient status because of co-morbidities listed above, severity of signs and symptoms as outlined, requirement for current medical therapies and most importantly because of direct risk to patient if care not provided in a hospital setting.           Sebastian Goode, MD, MD  [facility] Hospitalist

## 2021-09-21 NOTE — DISCHARGE INSTR - COC
Continuity of Care Form    Patient Name: Nenita Olivares   :  1940  MRN:  [id number]    Admit date:  2021  Discharge date:  2021    Code Status Order: Full Code   Advance Directives:      Admitting Physician:  Siegfried Severs, MD  PCP: Ebonie Palma MD    Discharging Nurse: [facility] Unit/Room#: 9220/4468-84  Discharging Unit Phone Number: [phone]    Emergency Contact:   Extended Emergency Contact Information  Primary Emergency Contact: Wendie Mccray  Address: [place] [address] [address] [place] [address] Phone: [phone]  Work Phone: [phone]  Mobile Phone: [phone]  Relation: Spouse  Hearing or visual needs: None  Other needs: None  Preferred language: English   needed? No    Past Surgical History:  Past Surgical History:   Procedure Laterality Date    BLEPHAROPLASTY Bilateral 2012    CARPAL TUNNEL RELEASE Right 2002    CATARACT REMOVAL WITH IMPLANT Left 2012    CATARACT REMOVAL WITH IMPLANT Right 2012    COLONOSCOPY  08    POLYPECTOMY   [address] x1 stent,1998 x2 stents,2009 x1,    EYE SURGERY      cataracts, eyelids.  JOINT REPLACEMENT Right 11     RT TOTAL SHOULDER REPLACEMENT    JOINT REPLACEMENT Right     rt. hip replacement    JOINT REPLACEMENT Left 2018    ELLE    KNEE ARTHROPLASTY Left 2017    LUMBAR FUSION N/A 2021    L3-L5 POSTERIOR LUMBAR DECOMPRESSSION & INSTRUMENTED FUSION performed by Siegfried Severs, MD at [facility] Left 2018    LEFT TOTAL HIP ARTHROPLASTY MINIMALLY INVASIVE ASI WITH BIOMET SYSTEM & GPS SPRAY APPLICATION performed by Magy Banuelos MD at [place] [address] ARTHROSCOPY Right 2002 repair    11 right shoulder reverse replacement.     SHOULDER SURGERY Left 2002    shoulder resurfacing    TOTAL KNEE ARTHROPLASTY Left 2017    KNEE TOTAL ARTHROPLASTY performed by [place] Allen MD at [address] [place] [address] OR       Immunization History:   Immunization History   Administered Date(s) Administered    COVID-19, Moderna, PF, 100mcg/0.5mL 01/26/2021, 02/23/2021    Influenza 10/25/2013    Influenza Virus Vaccine 10/05/2010, 09/20/2011, 09/14/2012, 09/30/2014, 10/23/2015, 10/17/2016, 10/16/2017, 10/01/2018    Influenza, High Dose (Fluzone 65 yrs and older) 10/03/2018    Influenza, Azalee Fees, IM, (6 mo and older Fluzone, Flulaval, Fluarix and 3 yrs and older Afluria) 10/25/2016    Influenza, Quadv, IM, PF (6 mo and older Fluzone, Flulaval, Fluarix, and 3 yrs and older Afluria) 10/19/2017    Influenza, Quadv, adjuvanted, 65 yrs +, IM, PF (Fluad) 10/07/2020    Influenza, Triv, inactivated, subunit, adjuvanted, IM (Fluad 65 yrs and older) 10/22/2019    Pneumococcal Conjugate 13-valent (Labqisj21) 03/11/2016    Pneumococcal Polysaccharide (Gqsyzpbui17) 04/12/2010, 04/05/2017    Tetanus 05/22/2014    Zoster Live (Zostavax) 09/19/2013       Active Problems:  Patient Active Problem List   Diagnosis Code    Chronic obstructive pulmonary disease (HCC) J44.9    Leukocytosis D72.829    Anemia in stage 3 chronic kidney disease (HCC) N18.30, D63.1    ASHD (arteriosclerotic heart disease) I25.10    History of TIA (transient ischemic attack) Z86.73    History of hemorrhoids Z87.19    Arthritis M19.90    History of carpal tunnel syndrome Z86.69    ALEX on CPAP G47.33, Z99.89    ED (erectile dysfunction) N52.9    History of colon polyps Z86.010    CKD (chronic kidney disease) stage 3, GFR 30-59 ml/min ([facility]) N18.30    CKD (chronic kidney disease), stage III (HCC) N18.30    Renal cyst N28.1    Essential hypertension I10    Allergic rhinitis J30.9    Chronic idiopathic gout of multiple sites M1A. 200X0    Mixed hyperlipidemia E78.2    Primary osteoarthritis of left hip M16.12    Dependence on other enabling machines and devices Z99.89    Hypertensive retinopathy H35.039    Type 2 diabetes mellitus with stage 3 chronic kidney disease, without long-term current use of insulin ([facility]) E11.22, N18.30    Spinal stenosis of lumbar region with neurogenic claudication M48.062    Lumbar radicular pain M54.16    Back pain M54.9       Isolation/Infection:   Isolation            No Isolation          Patient Infection Status       None to display            Nurse Assessment:  Last Vital Signs: BP (!) 144/76   Pulse 75   Temp 98.1 °F (36.7 °C) (Oral)   Resp 24   Ht 5' 10\" (1.778 m)   Wt 193 lb (87.5 kg)   SpO2 95%   BMI 27.69 kg/m²     Last documented pain score (0-10 scale): Pain Level: 6  Last Weight:   Wt Readings from Last 1 Encounters:   09/20/21 193 lb (87.5 kg)     Mental Status:  oriented    IV Access:  - None    Nursing Mobility/ADLs:  Walking   Independent  Transfer  Independent  Bathing  Assisted  Dressing  Assisted  Toileting  Independent  Feeding  Independent  Med Admin  Assisted  Med Delivery   whole    Wound Care Documentation and Therapy:        Elimination:  Continence:   · Bowel: No  · Bladder: No  Urinary Catheter: None   Colostomy/Ileostomy/Ileal Conduit: No       Date of Last BM: 09/19/2021    Intake/Output Summary (Last 24 hours) at 9/21/2021 1111  Last data filed at 9/21/2021 0604  Gross per 24 hour   Intake 2600 ml   Output 1050 ml   Net 1550 ml     I/O last 3 completed shifts: In: 26 [P.O.:1000; I.V.:1600]  Out: 1050 [Urine:650; Blood:400]    Safety Concerns:     None    Impairments/Disabilities:      None    Nutrition Therapy:  Current Nutrition Therapy:   - Oral Diet:  General    Routes of Feeding: None  Liquids: No Restrictions  Daily Fluid Restriction: no  Last Modified Barium Swallow with Video (Video Swallowing Test): not done    Treatments at the Time of Hospital Discharge:   Respiratory Treatments: none  Oxygen Therapy:  is not on home oxygen therapy.   Ventilator:    - No ventilator support     Rehab Therapies: N/A  Weight Bearing Status/Restrictions: No weight bearing restirctions  Other Medical Equipment (for information only, NOT a DME order):  walker  Other Treatments: Skilled Nursing Assessment, Medication Education and Monitoring    Patient's personal belongings (please select all that are sent with patient):  Glasses    RN SIGNATURE:  Electronically signed by Emre Salgado RN on 9/21/21 at 2:17 PM EDT    CASE MANAGEMENT/SOCIAL WORK SECTION    Inpatient Status Date: 9/20/2021    Readmission Risk Assessment Score:  Readmission Risk              Risk of Unplanned Readmission:  8           Discharging to [facility]/ [facility]  P: [phone]  F: [phone]      / signature: Electronically signed by Rody Leavitt RN on 9/21/21 at 2:54 PM EDT    PHYSICIAN SECTION    Prognosis: Fair    Condition at Discharge: Stable    Rehab Potential (if transferring to Rehab): Good    Recommended Labs or Other Treatments After Discharge: none    Physician Certification: I certify the above information and transfer of Jeny Tineo  is necessary for the continuing treatment of the diagnosis listed and that he requires [address] for greater 30 days.      Update Admission H&P: No change in H&P    PHYSICIAN SIGNATURE:  Electronically signed by Aaron Fong MD on 9/21/21 at 1:06 PM EDT

## 2021-09-21 NOTE — CARE COORDINATION
CASE MANAGEMENT NOTE:    Admission Date:  9/20/2021 Perla Emery is a [de-identified] y.o.  male    Admitted for : Back pain [M54.9]    Met with:  Patient    PCP:  Dr. Sheela Arita:  Medicare      Is patient alert and oriented at time of discussion:  Yes    Current Residence/ Living Arrangements:  independently at home             Current Services PTA:  No    Does patient go to outpatient dialysis: No  If yes, location and chair time: N/A    Is patient agreeable to VNS: No    Freedom of choice provided:  Yes    List of [address] provided: No    VNS chosen:  No    DME:  walker    Home Oxygen: No    Nebulizer: No    CPAP/BIPAP: No    Supplier: N/A    Potential Assistance Needed: No    SNF needed: No    Freedom of choice and list provided: NA    Pharmacy:  [facility]       Does Patient want to use MEDS to BEDS? No    Is patient currently receiving oral anticoagulation therapy? No    Is the Patient an [facility] with Readmission Risk Score greater than 14%? No  If yes, pt needs a follow up appointment made within 7 days. Family Members/Caregivers that pt would like involved in their care:    Yes    If yes, list name here: Lan Klein    Transportation Provider:  Patient and Family             Discharge Plan:  9/21: MEDICARE - From 1-story home with wife Lauren Jean. He states he is independent and drives. DME - Caroline Santos. Declines VNS. POD #1 L3-L5 decompression with fusion. Should discharge home later today. [place] - 8%.  IMM signed 9/21 @ 11:05 AM. Radha Coombs                  Electronically signed by: Sandy Duque RN on 9/21/2021 at 11:09 AM

## 2021-09-21 NOTE — PROGRESS NOTES
Discharge instructions given to patient, wife and daughter. All 3 state understanding of D/C instructions. Wife and daughter are dressing patient and patient will be taken by W/C out for D/C.

## 2021-09-21 NOTE — PROGRESS NOTES
Surgical Progress Note    POD: 1    Patient doing well  Vitals:    21 0015   BP: 128/72   Pulse: 70   Resp: 18   Temp: 98.6 °F (37 °C)   SpO2: 96%      Temp (24hrs), Av.3 °F (35.7 °C), Min:95.7 °F (35.4 °C), Max:99.5 °F (37.5 °C)       Pain Control good  No unusual nausea    Exam: legs much better        Lungs:  No respiratory distress    Labs reviewed:  Labs:  WBC/Hgb/Hct/Plts:  20.9/12.0/36.4/202 ( 6939)             I/O last 3 completed shifts: In: 26 [P.O.:1000;  I.V.:1600]  Out: 400 [Blood:400]    Assessment:    Patient Active Problem List   Diagnosis    Chronic obstructive pulmonary disease (HCC)    Leukocytosis    Anemia in stage 3 chronic kidney disease ([address]    ASHD (arteriosclerotic heart disease)    History of TIA (transient ischemic attack)    History of hemorrhoids    Arthritis    History of carpal tunnel syndrome    ALEX on CPAP    ED (erectile dysfunction)    History of colon polyps    CKD (chronic kidney disease) stage 3, GFR 30-59 ml/min (HCC)    CKD (chronic kidney disease), stage III (HCC)    Renal cyst    Essential hypertension    Allergic rhinitis    Chronic idiopathic gout of multiple sites    Mixed hyperlipidemia    Primary osteoarthritis of left hip    Dependence on other enabling machines and devices    Hypertensive retinopathy    Type 2 diabetes mellitus with stage 3 chronic kidney disease, without long-term current use of insulin ([address]    Spinal stenosis of lumbar region with neurogenic claudication    Lumbar radicular pain    Back pain       Plan:  See my orders  discharge    Siegfried Severs, MD MD  2021 6:57 AM

## 2021-09-21 NOTE — DISCHARGE SUMMARY
Discharge Summary    Attending Physician: Perry Condon MD  Admit Date: 9/20/2021  Discharge Date:    Primary Care Physician: Kiesha Rees MD    Admitting Diagnosis:  Active Problems:    Spinal stenosis of lumbar region with neurogenic claudication    Lumbar radicular pain    Back pain  Resolved Problems:    * No resolved hospital problems. *        Discharge Diagnoses: Active Problems:    Spinal stenosis of lumbar region with neurogenic claudication    Lumbar radicular pain    Back pain  Resolved Problems:    * No resolved hospital problems. *         Past Medical History:   Diagnosis Date    Anemia     Anesthesia     woke up eary during surgery x1 , heard saw & felt the pressure.  Arthritis 7/15/2013    ASHD (arteriosclerotic heart disease) 7/15/2013    CAD (coronary artery disease)     has cardiac stent x 4.    Chronic kidney disease     stage 3    COPD [facility] obstructive [facility] disease) [facility]     COVID-19 vaccine series completed     [facility] 1/26/2021, 2/23/2021    Degenerative joint disease 7/15/2013    ED (erectile dysfunction) 7/15/2013    Full dentures     Gout     History of carpal tunnel syndrome 07/15/2013    had surgery    History of colon polyps 7/15/2013    History of heart attack     silent one, patient was unaware    History of hemorrhoids 7/15/2013    History of TIA (transient ischemic attack) 7/15/2013    Hypercholesteremia 7/15/2013    Hypertension     Hyperuricemia 7/15/2013    Leukocytosis     Renal cyst     Sciatic nerve pain     4th and 5th lumbar vertebrae causing pinched nerve    Sleep apnea 07/15/2013    on cpap at .     Wears glasses     for reading       Procedures Performed and Findings  Procedure(s):  L3-L5 POSTERIOR LUMBAR DECOMPRESSSION & INSTRUMENTED FUSION     Consultations Obtained  IP CONSULT TO PRIMARY CARE PROVIDER  IP CONSULT TO Shlomo Norton Course  uncomplicated    Discharge Medications       Medication List      ASK your doctor about these medications    allopurinol 100 MG tablet  Commonly known as: ZYLOPRIM  TAKE 1 TABLET DAILY     Aspirin 81 81 MG chewable tablet  Generic drug: aspirin     atenolol 25 MG tablet  Commonly known as: TENORMIN  TAKE 1 TABLET DAILY     CPAP Machine Misc     FISH OIL     Fluticasone-Salmeterol 232-14 MCG/ACT Aepb     lisinopril 2.5 MG tablet  Commonly known as: PRINIVIL;ZESTRIL  TAKE 1 TABLET DAILY     nicotine polacrilex 4 MG lozenge  Commonly known as: COMMIT     nitroGLYCERIN 0.4 MG SL tablet  Commonly known as: NITROSTAT     rosuvastatin 10 MG tablet  Commonly known as: CRESTOR  TAKE 1 TABLET DAILY     therapeutic multivitamin-minerals tablet     Ventolin  (90 Base) MCG/ACT inhaler  Generic drug: albuterol sulfate HFA             Discharge Condition  Stable       Activity on Discharge  As tolerated       Discharge Disposition:  Home    Discharge Instructions  See Orders    Follow-Up Scheduled    No follow-up provider specified.     Electronically signed by Ira Noguera MD on 9/21/2021 at 6:59 AM

## 2021-09-21 NOTE — CARE COORDINATION
Continuity of Care Form    Patient Name: Meek    :  1940  MRN:  [id number]    Admit date:  2021  Discharge date:  2021    Code Status Order: Full Code   Advance Directives:      Admitting Physician:  Ira Noguera MD  PCP: Maria Eugenia Key MD    Discharging Nurse: [facility] Unit/Room#: 7454/4462-08  Discharging Unit Phone Number: [phone]    Emergency Contact:   Extended Emergency Contact Information  Primary Emergency Contact: Wendie Mccray  Address: [place] [address] [address] [place] [address] Phone: [phone]  Work Phone: [phone]  Mobile Phone: [phone]  Relation: Spouse  Hearing or visual needs: None  Other needs: None  Preferred language: English   needed? No    Past Surgical History:  Past Surgical History:   Procedure Laterality Date    BLEPHAROPLASTY Bilateral 2012    CARPAL TUNNEL RELEASE Right 2002    CATARACT REMOVAL WITH IMPLANT Left 2012    CATARACT REMOVAL WITH IMPLANT Right 2012    COLONOSCOPY  08    POLYPECTOMY   [address] [facility] [address] x1 stent,1998 x2 stents,2009 x1,    EYE SURGERY      cataracts, eyelids.  JOINT REPLACEMENT Right 11     RT TOTAL SHOULDER REPLACEMENT    JOINT REPLACEMENT Right     rt. hip replacement    JOINT REPLACEMENT Left 2018    ELLE    KNEE ARTHROPLASTY Left 2017    LUMBAR FUSION N/A 2021    L3-L5 POSTERIOR LUMBAR DECOMPRESSSION & INSTRUMENTED FUSION performed by Ira Noguera MD at [facility] Left 2018    LEFT TOTAL HIP ARTHROPLASTY MINIMALLY INVASIVE ASI WITH BIOMET SYSTEM & GPS SPRAY APPLICATION performed by Mavis Johnson MD at [place] [address] ARTHROSCOPY Right 2002 repair    11 right shoulder reverse replacement.     SHOULDER SURGERY Left 2002    shoulder resurfacing    TOTAL KNEE ARTHROPLASTY Left 2017    KNEE TOTAL ARTHROPLASTY performed by Jaden Hernandez MD at [facility] OR       Immunization History:   Immunization History   Administered Date(s) Administered    COVID-19, Moderna, PF, 100mcg/0.5mL 01/26/2021, 02/23/2021    Influenza 10/25/2013    Influenza Virus Vaccine 10/05/2010, 09/20/2011, 09/14/2012, 09/30/2014, 10/23/2015, 10/17/2016, 10/16/2017, 10/01/2018    Influenza, High Dose (Fluzone 65 yrs and older) 10/03/2018    Influenza, Dulcy Cocks, IM, (6 mo and older Fluzone, Flulaval, Fluarix and 3 yrs and older Afluria) 10/25/2016    Influenza, Quadv, IM, PF (6 mo and older Fluzone, Flulaval, Fluarix, and 3 yrs and older Afluria) 10/19/2017    Influenza, Quadv, adjuvanted, 65 yrs +, IM, PF (Fluad) 10/07/2020    Influenza, Triv, inactivated, subunit, adjuvanted, IM (Fluad 65 yrs and older) 10/22/2019    Pneumococcal Conjugate 13-valent (Srxipjw78) 03/11/2016    Pneumococcal Polysaccharide (Irpdbopwj95) 04/12/2010, 04/05/2017    Tetanus 05/22/2014    Zoster Live (Zostavax) 09/19/2013       Active Problems:  Patient Active Problem List   Diagnosis Code    Chronic obstructive pulmonary disease (HCC) J44.9    Leukocytosis D72.829    Anemia in stage 3 chronic kidney disease (HCC) N18.30, D63.1    ASHD (arteriosclerotic heart disease) I25.10    History of TIA (transient ischemic attack) Z86.73    History of hemorrhoids Z87.19    Arthritis M19.90    History of carpal tunnel syndrome Z86.69    ALEX on CPAP G47.33, Z99.89    ED (erectile dysfunction) N52.9    History of colon polyps Z86.010    CKD (chronic kidney disease) stage 3, GFR 30-59 ml/min ([facility]) N18.30    CKD (chronic kidney disease), stage III ([facility]) N18.30    Renal cyst N28.1    Essential hypertension I10    Allergic rhinitis J30.9    Chronic idiopathic gout of multiple sites M1A. 200X0    Mixed hyperlipidemia E78.2    Primary osteoarthritis of left hip M16.12    Dependence on other enabling machines and devices Z99.89    Hypertensive retinopathy H35.039    Type 2 diabetes mellitus with stage 3 chronic kidney disease, without long-term current use of insulin ([facility]) E11.22, N18.30    Spinal stenosis of lumbar region with neurogenic claudication M48.062    Lumbar radicular pain M54.16    Back pain M54.9       Isolation/Infection:   Isolation            No Isolation          Patient Infection Status       None to display            Nurse Assessment:  Last Vital Signs: BP (!) 144/76   Pulse 75   Temp 98.1 °F (36.7 °C) (Oral)   Resp 24   Ht 5' 10\" (1.778 m)   Wt 193 lb (87.5 kg)   SpO2 95%   BMI 27.69 kg/m²     Last documented pain score (0-10 scale): Pain Level: 6  Last Weight:   Wt Readings from Last 1 Encounters:   09/20/21 193 lb (87.5 kg)     Mental Status:  oriented    IV Access:  - None    Nursing Mobility/ADLs:  Walking   Independent  Transfer  Independent  Bathing  Assisted  Dressing  Assisted  Toileting  Independent  Feeding  Independent  Med Admin  Assisted  Med Delivery   whole    Wound Care Documentation and Therapy:        Elimination:  Continence:   · Bowel: No  · Bladder: No  Urinary Catheter: None   Colostomy/Ileostomy/Ileal Conduit: No       Date of Last BM: 09/19/2021    Intake/Output Summary (Last 24 hours) at 9/21/2021 1111  Last data filed at 9/21/2021 0604  Gross per 24 hour   Intake 2600 ml   Output 1050 ml   Net 1550 ml     I/O last 3 completed shifts: In: [address] [P.O.:1000; I.V.:1600]  Out: 1050 [Urine:650; Blood:400]    Safety Concerns:     None    Impairments/Disabilities:      None    Nutrition Therapy:  Current Nutrition Therapy:   - Oral Diet:  General    Routes of Feeding: None  Liquids: No Restrictions  Daily Fluid Restriction: no  Last Modified Barium Swallow with Video (Video Swallowing Test): not done    Treatments at the Time of Hospital Discharge:   Respiratory Treatments: none  Oxygen Therapy:  is not on home oxygen therapy.   Ventilator:    - No ventilator support     Rehab Therapies: N/A  Weight Bearing Status/Restrictions: No weight bearing restirctions  Other Medical Equipment (for information only, NOT a DME order):  walker  Other Treatments: Skilled Nursing Assessment, Medication Education and Monitoring    Patient's personal belongings (please select all that are sent with patient):  Glasses    RN SIGNATURE:  Electronically signed by Efrain Ribera RN on 9/21/21 at 2:17 PM EDT    CASE MANAGEMENT/SOCIAL WORK SECTION    Inpatient Status Date: 9/20/2021    Readmission Risk Assessment Score:  Readmission Risk              Risk of Unplanned Readmission:  8           Discharging to [facility]/ [facility]  P: [phone]  F: [phone]      / signature: Electronically signed by Coreen Jo RN on 9/21/21 at 2:54 PM EDT    PHYSICIAN SECTION    Prognosis: Fair    Condition at Discharge: Stable    Rehab Potential (if transferring to Rehab): Good    Recommended Labs or Other Treatments After Discharge: none    Physician Certification: I certify the above information and transfer of Sakshi Bowles  is necessary for the continuing treatment of the diagnosis listed and that he requires [address] for greater 30 days. Update Admission H&P: No change in H&P    PHYSICIAN SIGNATURE:  Electronically signed by Scott Kapoor MD on 9/21/21 at 1:06 PM EDT                          Current Discharge Medication List    START taking these medications    Medication Dose   oxyCODONE-acetaminophen (PERCOCET) 5-325 MG per tablet 1 tablet   Take 1 tablet by mouth every 6 hours as needed for Pain for up to 7 days. Intended supply: 7 days.  Take lowest dose possible to manage pain   Quantity: 28 tablet Refills: 0       Comments: Reduce doses taken as pain becomes manageable      CONTINUE these medications which have NOT CHANGED    Medication Dose   allopurinol (ZYLOPRIM) 100 MG tablet    TAKE 1 TABLET DAILY   Quantity: 90 tablet Refills: 1       nicotine polacrilex (COMMIT) 4 MG lozenge 4 mg   Take 4 mg by mouth as needed for Smoking cessation       rosuvastatin (CRESTOR) 10 MG tablet    TAKE 1 TABLET DAILY   Quantity: 90 tablet Refills: 1       lisinopril (PRINIVIL;ZESTRIL) 2.5 MG tablet    TAKE 1 TABLET DAILY   Quantity: 90 tablet Refills: 1       atenolol (TENORMIN) 25 MG tablet    TAKE 1 TABLET DAILY   Quantity: 90 tablet Refills: 3       Fluticasone-Salmeterol 232-14 MCG/ACT AEPB    2 times daily        aspirin (ASPIRIN 81) 81 MG chewable tablet 81 mg   Take 81 mg by mouth daily        FISH OIL    Take by mouth 2 times daily        therapeutic multivitamin-minerals (THERAGRAN-M) tablet 1 tablet   Take 1 tablet by mouth daily. OTC       nitroGLYCERIN (NITROSTAT) 0.4 MG SL tablet 0.4 mg   Place 0.4 mg under the tongue every 5 minutes as needed for Chest pain up to max of 3 total doses. If no relief after 1 dose, call 911.        CPAP Machine MISC    --- ---       albuterol sulfate HFA (VENTOLIN HFA) 108 (90 Base) MCG/ACT inhaler    2 puffs as needed

## 2021-09-21 NOTE — PROGRESS NOTES
[address]    Occupational Therapy Evaluation  Date: 21  Patient Name: Roland Peoples       Room: 7195/2734-52  MRN: [id number]  Account: [de-identified]   : 1940  ([de-identified] y.o.) Gender: male     Discharge Recommendations: The patient's needs are being met with no further Occupational Therapy recommended at discharge. Equipment Needed:  (TBD)    Referring Practitioner: Dr. Cathy Quiroz  Diagnosis: 21 L 3-5 PLIF    Treatment Diagnosis: Impaired self-care status  Past Medical History:  has a past medical history of Anemia, Anesthesia, Arthritis, ASHD (arteriosclerotic heart disease), CAD (coronary artery disease), Chronic kidney disease, COPD (chronic obstructive pulmonary disease) ([facility] [address] COVID-19 vaccine series completed, Degenerative joint disease, ED (erectile dysfunction), Full dentures, Gout, History of carpal tunnel syndrome, History of colon polyps, History of heart attack, History of hemorrhoids, History of TIA (transient ischemic attack), Hypercholesteremia, Hypertension, Hyperuricemia, Leukocytosis, Renal cyst, Sciatic nerve pain, Sleep apnea, and Wears glasses. Past Surgical History:   has a past surgical history that includes Coronary angioplasty with stent (1994 x1 stent,1998 x2 stents,2009 x1,); Colonoscopy (08); Shoulder arthroscopy (Right, 2002 repair); Carpal tunnel release (Right, 2002); shoulder surgery (Left, 2002); Cataract removal with implant (Left, 2012); Cataract removal with implant (Right, 2012); eye surgery; Blepharoplasty (Bilateral, 2012); Knee Arthroplasty (Left, 2017); Total knee arthroplasty (Left, 2017); pr total hip arthroplasty (Left, 2018); joint replacement (Right, 11 ); joint replacement (Right, ); joint replacement (Left, 2018); and lumbar fusion (N/A, 2021).     Restrictions  Restrictions/Precautions: Fall Risk, Surgical Protocols  Implants present? : Metal implants (B ELLE, L TKA, B TSA, 4 cardiac stents)  Required Braces or Orthoses?: No     Vitals  Temp: 98.1 °F (36.7 °C)  Pulse: 75  Resp: 24  BP: (!) 144/76  Height: 5' 10\" (177.8 cm)  Weight: 193 lb (87.5 kg)  BMI (Calculated): 27.8  Oxygen Therapy  SpO2: 95 %  Pulse Oximeter Device Mode: Intermittent  Pulse Oximeter Device Location: Finger  O2 Device: None (Room air)  Skin Assessment: Clean, dry, & intact  FiO2 : 28 %  O2 Flow Rate (L/min): 2 L/min  Level of Consciousness: Alert (0)    Subjective  Subjective: \"She'll probably insist on it\"  Comments: When asked if his spouse would A him with LB dressing at discharge. Spouse in room later and confirms willingness to help. Overall Orientation Status: Within Functional Limits  Vision  Vision: Impaired  Vision Exceptions: Wears glasses for distance (Driving)  Hearing  Hearing: Within functional limits  Social/Functional History  Lives With: Spouse  Type of Home: House  Home Layout: One level, Laundry in basement (Spouse does laundry)  Home Access: Stairs to enter with rails  Entrance Stairs - Number of Steps: 3  Entrance Stairs - Rails: Both (Can reach both at the same time)  Bathroom Shower/Tub: Tub/Shower unit, Curtain, Shower chair without back  [employer]: Standard  Bathroom Equipment: Grab bars in shower, Grab bars around toilet  Bathroom Accessibility: Walker accessible  Home Equipment: Rolling walker, Cane, Crutches, Reacher  Receives Help From: Family  ADL Assistance: Independent  Homemaking Assistance: Needs assistance (Spouse is primary, pt helps out as able)  Homemaking Responsibilities: No  Ambulation Assistance: Independent (w/RW for ~1 month until about 2 weeks before surgery)  Transfer Assistance: Independent  Active : Yes  Mode of Transportation: Car  Occupation: Retired  Type of occupation: [employer]  IADL Comments: Pt sleeps in a flat bed - has a recliner. Additional Comments: Spouse is also retired and able to A as needed at discharge.   Pain Assessment  Pain Assessment: 0-10  Pain Level: 6  Pain Type: Surgical pain  Pain Location: Back  Pain Orientation: Lower  Pain Descriptors: Aching  Pain Frequency: Continuous    Objective  Vision - Basic Assessment  Patient Visual Report: No visual complaint reported. Cognition  Overall Cognitive Status: WFL   Perception  Overall Perceptual Status: WFL  Sensation  Overall Sensation Status: WFL   ADL  Feeding: Independent  Grooming: Setup  UE Bathing: Setup  LE Bathing: Minimal assistance (A for lower legs/feet; CGA in standing)  UE Dressing: Setup  LE Dressing: Maximum assistance (A for foot level dressing)  Toileting: Contact guard assistance  Additional Comments: Above levels based on pt report, observation, and clinical reasoning unless otherwise noted. Pt attempted to doff socks but was unable 2* pain/stiffness and post-op limitations. Spouse present and reports willingness to A as needed at discharge. UE Function  LUE Strength  Gross LUE Strength: WFL  L Hand General: 4+/5     LUE Tone: Normotonic     LUE AROM (degrees)  LUE AROM : WFL     Left Hand AROM (degrees)  Left Hand AROM: WFL     RUE Strength  Gross RUE Strength: WFL  R Hand General: 4+/5      RUE Tone: Normotonic     RUE AROM (degrees)  RUE AROM : WFL     Right Hand AROM (degrees)  Right Hand AROM: WFL    Fine Motor Skills  Coordination  Movements Are Fluid And Coordinated: Yes     Mobility  Supine to Sit: Stand by assistance       Balance  Sitting Balance: Independent  Standing Balance: Contact guard assistance     Functional Mobility  Functional Mobility Comments: Pt ambulated in room/hallway with RW and CGA  Bed mobility  Supine to Sit: Stand by assistance  Scooting: Stand by assistance (at EOB)  Comment: Pt left sitting up in bedside chair at end of session.      Transfers  Stand Step Transfers: Contact guard assistance  Stand Pivot Transfers: Contact guard assistance  Sit to stand: Contact guard assistance (Initial attempt Min A but improved w/further trials)  Stand to sit: Contact guard assistance  Transfer Comments: w/RW; cues for hand placement and pacing  Functional Activity Tolerance  Functional Activity Tolerance: Tolerates 10 - 20 min exercise with multiple rests   Assessment  Performance deficits / Impairments: Decreased functional mobility , Decreased ADL status, Decreased endurance, Decreased balance  Treatment Diagnosis: Impaired self-care status  Prognosis: Good  Decision Making: Medium Complexity  REQUIRES OT FOLLOW UP: Yes  Activity Tolerance: Patient Tolerated treatment well    Goals  Patient Goals   Patient goals : Return home with A from family as needed. Short term goals  Time Frame for Short term goals: By Discharge  Short term goal 1: Pt will V/D good understanding of AE/modified techniques for LB ADL's and complete tasks with Min A. Short term goal 2: Pt will V/D good understanding of back care principles as they relate to self-care routine. Short term goal 3: Pt will complete toilet transfer/toileting with SUP and Good safety using least restrictive device. Short term goal 4: Pt will actively participate in 15-20 minutes of therapeutic exercise/activity to promote increased independence and safety with self-care and mobility.     Plan  Safety Devices  Safety Devices in place: Yes  Type of devices: Patient at risk for falls, Gait belt, Left in chair, Call light within reach, Nurse notified     Plan  Times per week: 5-7  Times per day: Daily  Current Treatment Recommendations: Balance Training, Functional Mobility Training, Endurance Training, Pain Management, Safety Education & Training, Patient/Caregiver Education & Training, Equipment Evaluation, Education, & procurement, Self-Care / ADL    Equipment Recommendations  Equipment Needed:  (TBD)  OT Individual Minutes  Time In: 3313  Time Out: 1630  Minutes: 20    Electronically signed by Sherrill Hernandez on 9/21/21 at 9:52 AM EDT

## 2021-09-21 NOTE — PROGRESS NOTES
Hospitalist Progress Note  9/21/2021 12:52 PM  Subjective:   Admit Date: 9/20/2021  PCP: Jose Saucedo MD     Full Code      C/c:No chief complaint on file. Interval History: doing well,    Diet: ADULT DIET; Regular                                ip days:1  Medications:   Scheduled Meds:   allopurinol  100 mg Oral Daily    atenolol  25 mg Oral Daily    budesonide-formoterol  2 puff Inhalation BID    lisinopril  2.5 mg Oral Daily    rosuvastatin  10 mg Oral Daily    sodium chloride flush  10 mL IntraVENous 2 times per day     Continuous Infusions:   sodium chloride       PRN Meds:.albuterol sulfate HFA, nitroGLYCERIN, sodium chloride flush, sodium chloride, oxyCODONE **OR** oxyCODONE, morphine **OR** morphine, cyclobenzaprine     CBC:   Recent Labs     09/21/21  0613   WBC 20.9*   HGB 12.0*        BMP:  No results for input(s): NA, K, CL, CO2, BUN, CREATININE, GLUCOSE in the last 72 hours. Hepatic: No results for input(s): AST, ALT, ALB, BILITOT, ALKPHOS in the last 72 hours. Troponin: No results for input(s): TROPONINI in the last 72 hours. BNP: No results for input(s): BNP in the last 72 hours. Lipids: No results for input(s): CHOL, HDL in the last 72 hours. Invalid input(s): LDLCALCU  INR: No results for input(s): INR in the last 72 hours.     Objective:   Vitals: BP (!) 144/76   Pulse 75   Temp 98.1 °F (36.7 °C) (Oral)   Resp 24   Ht 5' 10\" (1.778 m)   Wt 193 lb (87.5 kg)   SpO2 95%   BMI 27.69 kg/m²   General appearance: alert, appears stated age and cooperative  Skin: Skin color, texture, turgor normal. No rashes or lesions  Lungs: clear to auscultation bilaterally  Heart: regular rate and rhythm, S1, S2 normal, no murmur, click, rub or gallop  Abdomen: soft, non-tender; bowel sounds normal; no masses,  no organomegaly  Extremities: extremities normal, atraumatic, no cyanosis or edema  Neurologic: Mental status: Alert, oriented, thought content appropriate    Prophylaxis: DVT with  [] lovenox        [] heparin        [x] Scd        [] none:     Radiology:  No results found. Assessment :   1. Post  laminectomy/stable       Plan:   1.  stable  2. Ok for d/c    Patient Active Problem List:     Chronic obstructive pulmonary disease (HCC)     Leukocytosis     Anemia in stage 3 chronic kidney disease (HCC)     ASHD (arteriosclerotic heart disease)     History of TIA (transient ischemic attack)     History of hemorrhoids     Arthritis     History of carpal tunnel syndrome     ALEX on CPAP     ED (erectile dysfunction)     History of colon polyps     CKD (chronic kidney disease) stage 3, GFR 30-59 ml/min (HCC)     CKD (chronic kidney disease), stage III (HCC)     Renal cyst     Essential hypertension     Allergic rhinitis     Chronic idiopathic gout of multiple sites     Mixed hyperlipidemia     Primary osteoarthritis of left hip     Dependence on other enabling machines and devices     Hypertensive retinopathy     Type 2 diabetes mellitus with stage 3 chronic kidney disease, without long-term current use of insulin ([facility] [address]     Spinal stenosis of lumbar region with neurogenic claudication     Lumbar radicular pain     Back pain      Anticipated Disposition upon discharge: [] Home                                                                         [] Home with Home Health                                                                         [] [facility]                                                                         [] [address]      Patient is admitted as inpatient status because of co-morbidities listed above, severity of signs and symptoms as outlined, requirement for current medical therapies and most importantly because of direct risk to patient if care not provided in a hospital setting.           Lashaun Diaz MD, MD  Rounding Hospitalist

## 2021-09-21 NOTE — PROGRESS NOTES
Physical Therapy    Facility/Department: [facility]  Initial Assessment    NAME: Talon Denton  : 1940  MRN: [id number]    Date of Service: 2021    Discharge Recommendations: The patient may need non-skilled mobility assistance after discharge. PT Equipment Recommendations  Equipment Needed: No  Other: Pt has RW at home - recommend to use at all times    Assessment   Body structures, Functions, Activity limitations: Decreased functional mobility ; Decreased ADL status; Decreased strength;Decreased endurance;Decreased balance;Decreased posture; Increased pain  Assessment: Pt most limited by pain this date. Pt did have an episode of knee buckling upon initial standing that improved. Pt would benefit from continued PT prior to returning home and  supports at home iwth use of RW. Treatment Diagnosis: Impaired functional mobility 2* back pain  Specific instructions for Next Treatment: gait, stairs, HEP  Prognosis: Good  Decision Making: Low Complexity  History: s/p L3-L5 POSTERIOR LUMBAR DECOMPRESSSION & INSTRUMENTED FUSION on 21  Exam: ROM, MMT, bed mobility, transfers, amb, balance  Clinical Presentation: Pt alert, cooperative, pleasant  Barriers to Learning: pain  REQUIRES PT FOLLOW UP: Yes  Activity Tolerance  Activity Tolerance: Patient Tolerated treatment well;Patient limited by pain       Patient Diagnosis(es): The encounter diagnosis was Acute midline low back pain without sciatica.      has a past medical history of Anemia, Anesthesia, Arthritis, ASHD (arteriosclerotic heart disease), CAD (coronary artery disease), Chronic kidney disease, COPD (chronic obstructive pulmonary disease) ([facility] [address] COVID-19 vaccine series completed, Degenerative joint disease, ED (erectile dysfunction), Full dentures, Gout, History of carpal tunnel syndrome, History of colon polyps, History of heart attack, History of hemorrhoids, History of TIA (transient ischemic attack), Hypercholesteremia, Hypertension, activities and ADLs  Non-Pharmaceutical Pain Intervention(s): Ambulation/Increased Activity; Distraction;Repositioned; Rest  Response to Pain Intervention: Patient Satisfied  Vital Signs  Patient Currently in Pain: Yes  Oxygen Therapy  O2 Device: None (Room air)       Orientation  Orientation  Overall Orientation Status: Within Functional Limits  Social/Functional History  Social/Functional History  Lives With: Spouse  Type of Home: House  Home Layout: One level, Laundry in basement (Spouse does laundry)  Home Access: Stairs to enter with rails  Entrance Stairs - Number of Steps: 3  Entrance Stairs - Rails: Both (Can reach both at the same time)  Bathroom Shower/Tub: Tub/Shower unit, Curtain, Shower chair without back  [employer]: Standard  Bathroom Equipment: Grab bars in shower, Grab bars around toilet  Bathroom Accessibility: Walker accessible  Home Equipment: Rolling walkerAdalid, [address] [facility], [address] Help From: Family  ADL Assistance: Independent  Homemaking Assistance: Needs assistance (Spouse is primary, pt helps out as able)  Homemaking Responsibilities: No  Ambulation Assistance: Independent (w/RW for ~1 month until about 2 weeks before surgery)  Transfer Assistance: Independent  Active : Yes  Mode of Transportation: Car  Occupation: Retired  Type of occupation: [employer]  IADL Comments: Pt sleeps in a flat bed - has a recliner. Additional Comments: Spouse is also retired and able to A as needed at discharge.   Cognition        Objective          AROM RLE (degrees)  RLE AROM: WFL  AROM LLE (degrees)  LLE AROM : WFL  AROM RUE (degrees)  RUE General AROM: See OT  AROM LUE (degrees)  LUE General AROM: See OT  Strength RLE  Strength RLE: WFL  Comment: Grossly 4-/5  Strength LLE  Strength LLE: WFL  Comment: Grossly 4-/5  Strength RUE  Comment: See OT  Strength LUE  Comment: See OT     Sensation  Overall Sensation Status: WFL (pt denies)  Bed mobility  Rolling to Right: Stand by assistance  Supine to Sit: Stand by assistance  Sit to Supine: Unable to assess  Scooting: Stand by assistance  Comment: Head of bed flat, cues for log rolling technique and no dizziness sitting edge of bed. Pt uses bed rail to advance to edge of bed. Educated on recliner if bed at home is difficult. Pt up in chair end of session. Transfers  Sit to Stand: Minimal Assistance;Contact guard assistance  Stand to sit: Contact guard assistance  Bed to Chair: Contact guard assistance  Stand Pivot Transfers: Contact guard assistance  Comment: Min x1 initially to stand, B LE buckled due to pain. 2nd attempt to stand with cues and use of RW - pt improved to CGA. Ambulation  Ambulation?: Yes  Ambulation 1  Surface: level tile  Device: Rolling Walker  Assistance: Contact guard assistance  Quality of Gait: slow piper, forward flexed posture with shuffling steps, mild unsteadiness but no LOB  Gait Deviations: Decreased step length;Decreased step height;Shuffles; Slow Piper  Distance: 39'  Comments: Cues for RW management and safety. Cues for upright posture. Stairs/Curb  Stairs?: No     Balance  Posture: Fair  Sitting - Static: Good  Sitting - Dynamic: Good;-  Standing - Static: Fair;+  Standing - Dynamic: Fair  Comments: Standing balance with RW. Pt is a falll risk. Pt advised to use RW at all times. Plan   Plan  Times per week: BID  Specific instructions for Next Treatment: gait, stairs, HEP  Current Treatment Recommendations: Strengthening, Functional Mobility Training, Balance Training, Transfer Training, Endurance Training, Gait Training, Stair training, Equipment Evaluation, Education, & procurement, Patient/Caregiver Education & Training, Safety Education & Training, Home Exercise Program, Pain Management, Positioning  Safety Devices  Type of devices:  All fall risk precautions in place, Call light within reach, Gait belt, Patient at risk for falls, Left in chair, Nurse notified (VIRI Rosado)    G-Code       OutComes Score AM-PAC Score  AM-PAC Inpatient Mobility Raw Score : 16 (09/21/21 1002)  AM-PAC Inpatient T-Scale Score : 40.78 (09/21/21 1002)  Mobility Inpatient CMS 0-100% Score: 54.16 (09/21/21 1002)  Mobility Inpatient CMS G-Code Modifier : CK (09/21/21 1002)          Goals  Short term goals  Time Frame for Short term goals: 2-3 days  Short term goal 1: Pt to demo IND bed mobility wtih good log rolling technique. Short term goal 2: Pt to perform transfers SBA with device. Short term goal 3: Pt to amb 75'-100' SBA with device. Short term goal 4: Pt to asv (Simultaneous filing. User may not have seen previous data.)  Short term goal 5: Pt to demo good technique for HEP for BLE strengthening and balance. Patient Goals   Patient goals :  To go home       Therapy Time   Individual Concurrent Group Co-treatment   Time In [facility]         Time Out 0911         Minutes 24[facility], [address]

## 2021-09-22 ENCOUNTER — CARE COORDINATION (OUTPATIENT)
Dept: CASE MANAGEMENT | Age: 81
End: 2021-09-22

## 2021-09-22 DIAGNOSIS — M48.062 SPINAL STENOSIS OF LUMBAR REGION WITH NEUROGENIC CLAUDICATION: Primary | ICD-10-CM

## 2021-09-22 PROCEDURE — 1111F DSCHRG MED/CURRENT MED MERGE: CPT | Performed by: FAMILY MEDICINE

## 2021-09-22 NOTE — CARE COORDINATION
[facility] Transitions Initial Follow Up Call    Call within 2 business days of discharge: Yes    Patient: Beth Mcnamara Patient : 1940   MRN: <[id number]>  Reason for Admission: Spinal stenosis of lumbar region with neurogenic claudication    Lumbar radicular pain    Back pain  Discharge Date: 21 RARS: Readmission Risk Score: 8      Last Discharge [facility]       Complaint Diagnosis Description Type Department Provider    21  Acute midline low back pain without sciatica Admission (Discharged) Anupama Moreno MD           Spoke with:Ger introduced to the role of CTN, he states he is doing okay, he has pain at an 8 on a 0-10 pain scale taking percocet ATC. Using walker to ambulate. Does not have appetite but is drinking well , normal bowel and bladder elimination. CTN did encourage patient to eat food high in protein when he can eat to aid in healing process. Medications reviewed and patient taking all as directed. 1111 f complete. Patient has surgeon f/u scheduled has not made PCP f/u ye declined assistance. Ctn called [facility] to check on referral status spoke to [facility] she has referral and is pushing through for call, will contact CTN with more information when it becomes available. No other concern noted    Facility: [address] [place] [address] services provided:  Obtained and reviewed discharge summary and/or continuity of care documents  Communication with home health agencies or other community services the patient is currently using-[facility]  Transitions of Care Initial Call    Was this an external facility discharge?  No     Challenges to be reviewed by the provider   Additional needs identified to be addressed with provider: No  none             Method of communication with provider : none      Advance Care Planning:   Does patient have an Advance Directive: reviewed and current, reviewed and needs to be updated, not on file; education provided, patient declined education, decision maker updated and referral to internal ACP facilitator. Was this a readmission? No  Patient stated reason for admission: lumbar stenosis  Patients top risk factors for readmission: functional physical ability, lack of knowledge about disease and medication management    Care Transition Nurse (CTN) contacted the patient by telephone to perform post hospital discharge assessment. Verified name and  with patient as identifiers. Provided introduction to self, and explanation of the CTN role. CTN reviewed discharge instructions, medical action plan and red flags with patient who verbalized understanding. Patient given an opportunity to ask questions and does not have any further questions or concerns at this time. Were discharge instructions available to patient? Yes. Reviewed appropriate site of care based on symptoms and resources available to patient including: PCP, Specialist and Home health. The patient agrees to contact the PCP office for questions related to their healthcare. Medication reconciliation was performed with patient, who verbalizes understanding of administration of home medications. Advised obtaining a 90-day supply of all daily and as-needed medications. Covid Risk Education     Educated patient about risk for severe COVID-19 due to risk factors according to CDC guidelines. LPN CC reviewed discharge instructions, medical action plan and red flag symptoms with the patient who verbalized understanding. Discussed COVID vaccination status: Yes. Education provided on COVID-19 vaccination as appropriate. Discussed exposure protocols and quarantine with CDC Guidelines. Patient was given an opportunity to verbalize any questions and concerns and agrees to contact LPN CC or health care provider for questions related to their healthcare. Reviewed and educated patient on any new and changed medications related to discharge diagnosis.      Was patient discharged with a pulse oximeter? No Discussed and confirmed pulse oximeter discharge instructions and when to notify provider or seek emergency care. LPN CC provided contact information. Plan for follow-up call in 3-5 days based on severity of symptoms and risk factors.   Plan for next call: symptom management-pain management [facility]      Care Transitions 24 Hour Call    Schedule Follow Up Appointment with PCP: Completed  Do you have any ongoing symptoms?: Yes  Patient-reported symptoms: Pain, Fatigue  Interventions for patient-reported symptoms: Other  Do you have a copy of your discharge instructions?: Yes  Do you have all of your prescriptions and are they filled?: Yes  Have you been contacted by a [facility] Pharmacist?: No  Have you scheduled your follow up appointment?: Yes  How are you going to get to your appointment?: Car - family or friend to transport  Were you discharged with any Home Care or Post Acute Services: Yes  Post Acute Services: Home Health (Comment: Janeen Gutierrez )  Do you feel like you have everything you need to keep you well at home?: Yes  Care Transitions Interventions         Follow Up  Future Appointments   Date Time Provider Kaye Cardi   9/28/2021 10:00 AM Jany Londono MD [facility]   9/30/2021  1:30 PM Micky Brittle, [address], [address] [place] [address]   10/5/2021 10:00 AM Jany Londono MD [id number]   10/13/2021  1:00 PM SCHEDULE, VARGHESE Wright   1/6/2022 11:30 Lavinia Rosado MD [facility]   7/13/2022  9:00 AM Grace Turner MD [address] [place] [address]

## 2021-09-23 ENCOUNTER — TELEPHONE (OUTPATIENT)
Dept: ORTHOPEDIC SURGERY | Age: 81
End: 2021-09-23

## 2021-09-23 NOTE — TELEPHONE ENCOUNTER
Ami Bolivar with [facility] wants Dr Elvie Collet to know that PT developed a mild fever. PT 's wife is not able to help him at all in standing up or bathing or anything else. Ami Bolivar is asking if there s anyway Dr Elvie Collet can arrange to help PT get into a facility or at least get some home health aid for the PT with bathing and other post-op care.    Ami Bolivar s # [phone]

## 2021-09-24 ENCOUNTER — HOSPITAL ENCOUNTER (INPATIENT)
Age: 81
LOS: 3 days | Discharge: NURSING, REHAB OR LONG-TERM CARE | DRG: 948 | End: 2021-09-27
Attending: EMERGENCY MEDICINE | Admitting: ORTHOPAEDIC SURGERY
Payer: MEDICARE

## 2021-09-24 DIAGNOSIS — M54.50 ACUTE EXACERBATION OF CHRONIC LOW BACK PAIN: Primary | ICD-10-CM

## 2021-09-24 DIAGNOSIS — G89.29 ACUTE EXACERBATION OF CHRONIC LOW BACK PAIN: Primary | ICD-10-CM

## 2021-09-24 DIAGNOSIS — M54.16 LUMBAR RADICULAR PAIN: ICD-10-CM

## 2021-09-24 DIAGNOSIS — M48.062 SPINAL STENOSIS OF LUMBAR REGION WITH NEUROGENIC CLAUDICATION: ICD-10-CM

## 2021-09-24 PROBLEM — K59.03 DRUG-INDUCED CONSTIPATION: Status: ACTIVE | Noted: 2021-09-24

## 2021-09-24 PROBLEM — R26.2 DIFFICULTY WALKING: Status: ACTIVE | Noted: 2021-09-24

## 2021-09-24 LAB
ABSOLUTE EOS #: 0.2 K/UL (ref 0–0.4)
ABSOLUTE IMMATURE GRANULOCYTE: ABNORMAL K/UL (ref 0–0.3)
ABSOLUTE LYMPH #: 1.2 K/UL (ref 1–4.8)
ABSOLUTE MONO #: 1.1 K/UL (ref 0.1–1.3)
ANION GAP SERPL CALCULATED.3IONS-SCNC: 11 MMOL/L (ref 9–17)
BASOPHILS # BLD: 1 % (ref 0–2)
BASOPHILS ABSOLUTE: 0.1 K/UL (ref 0–0.2)
BUN BLDV-MCNC: 22 MG/DL (ref 8–23)
BUN/CREAT BLD: ABNORMAL (ref 9–20)
CALCIUM SERPL-MCNC: 9.1 MG/DL (ref 8.6–10.4)
CHLORIDE BLD-SCNC: 99 MMOL/L (ref 98–107)
CO2: 24 MMOL/L (ref 20–31)
CREAT SERPL-MCNC: 1.15 MG/DL (ref 0.7–1.2)
DIFFERENTIAL TYPE: ABNORMAL
EOSINOPHILS RELATIVE PERCENT: 2 % (ref 0–4)
GFR AFRICAN AMERICAN: >60 ML/MIN
GFR NON-AFRICAN AMERICAN: >60 ML/MIN
GFR SERPL CREATININE-BSD FRML MDRD: ABNORMAL ML/MIN/{1.73_M2}
GFR SERPL CREATININE-BSD FRML MDRD: ABNORMAL ML/MIN/{1.73_M2}
GLUCOSE BLD-MCNC: 164 MG/DL (ref 70–99)
HCT VFR BLD CALC: 37.7 % (ref 41–53)
HEMOGLOBIN: 12.5 G/DL (ref 13.5–17.5)
IMMATURE GRANULOCYTES: ABNORMAL %
LYMPHOCYTES # BLD: 10 % (ref 24–44)
MCH RBC QN AUTO: 30 PG (ref 26–34)
MCHC RBC AUTO-ENTMCNC: 33.1 G/DL (ref 31–37)
MCV RBC AUTO: 90.8 FL (ref 80–100)
MONOCYTES # BLD: 10 % (ref 1–7)
NRBC AUTOMATED: ABNORMAL PER 100 WBC
PDW BLD-RTO: 13.4 % (ref 11.5–14.9)
PLATELET # BLD: 281 K/UL (ref 150–450)
PLATELET ESTIMATE: ABNORMAL
PMV BLD AUTO: 9.4 FL (ref 6–12)
POTASSIUM SERPL-SCNC: 4.4 MMOL/L (ref 3.7–5.3)
RBC # BLD: 4.15 M/UL (ref 4.5–5.9)
RBC # BLD: ABNORMAL 10*6/UL
SEG NEUTROPHILS: 77 % (ref 36–66)
SEGMENTED NEUTROPHILS ABSOLUTE COUNT: 9 K/UL (ref 1.3–9.1)
SODIUM BLD-SCNC: 134 MMOL/L (ref 135–144)
WBC # BLD: 11.5 K/UL (ref 3.5–11)
WBC # BLD: ABNORMAL 10*3/UL

## 2021-09-24 PROCEDURE — 6360000002 HC RX W HCPCS: Performed by: ORTHOPAEDIC SURGERY

## 2021-09-24 PROCEDURE — 85025 COMPLETE CBC W/AUTO DIFF WBC: CPT

## 2021-09-24 PROCEDURE — 96374 THER/PROPH/DIAG INJ IV PUSH: CPT

## 2021-09-24 PROCEDURE — 80048 BASIC METABOLIC PNL TOTAL CA: CPT

## 2021-09-24 PROCEDURE — 2580000003 HC RX 258: Performed by: FAMILY MEDICINE

## 2021-09-24 PROCEDURE — 6370000000 HC RX 637 (ALT 250 FOR IP): Performed by: FAMILY MEDICINE

## 2021-09-24 PROCEDURE — 6360000002 HC RX W HCPCS: Performed by: EMERGENCY MEDICINE

## 2021-09-24 PROCEDURE — 6370000000 HC RX 637 (ALT 250 FOR IP): Performed by: EMERGENCY MEDICINE

## 2021-09-24 PROCEDURE — 2580000003 HC RX 258: Performed by: ORTHOPAEDIC SURGERY

## 2021-09-24 PROCEDURE — 99285 EMERGENCY DEPT VISIT HI MDM: CPT

## 2021-09-24 PROCEDURE — 99024 POSTOP FOLLOW-UP VISIT: CPT | Performed by: ORTHOPAEDIC SURGERY

## 2021-09-24 PROCEDURE — 1200000000 HC SEMI PRIVATE

## 2021-09-24 PROCEDURE — 36415 COLL VENOUS BLD VENIPUNCTURE: CPT

## 2021-09-24 RX ORDER — SODIUM CHLORIDE 0.9 % (FLUSH) 0.9 %
5-40 SYRINGE (ML) INJECTION PRN
Status: DISCONTINUED | OUTPATIENT
Start: 2021-09-24 | End: 2021-09-27 | Stop reason: HOSPADM

## 2021-09-24 RX ORDER — ACETAMINOPHEN 325 MG/1
650 TABLET ORAL EVERY 4 HOURS PRN
Status: DISCONTINUED | OUTPATIENT
Start: 2021-09-24 | End: 2021-09-27 | Stop reason: HOSPADM

## 2021-09-24 RX ORDER — BUDESONIDE AND FORMOTEROL FUMARATE DIHYDRATE 80; 4.5 UG/1; UG/1
2 AEROSOL RESPIRATORY (INHALATION) 2 TIMES DAILY
Status: DISCONTINUED | OUTPATIENT
Start: 2021-09-24 | End: 2021-09-27 | Stop reason: HOSPADM

## 2021-09-24 RX ORDER — SODIUM CHLORIDE 9 MG/ML
INJECTION, SOLUTION INTRAVENOUS CONTINUOUS
Status: DISCONTINUED | OUTPATIENT
Start: 2021-09-24 | End: 2021-09-26

## 2021-09-24 RX ORDER — BISACODYL 10 MG
10 SUPPOSITORY, RECTAL RECTAL ONCE
Status: DISCONTINUED | OUTPATIENT
Start: 2021-09-24 | End: 2021-09-27 | Stop reason: HOSPADM

## 2021-09-24 RX ORDER — SODIUM CHLORIDE 9 MG/ML
25 INJECTION, SOLUTION INTRAVENOUS PRN
Status: DISCONTINUED | OUTPATIENT
Start: 2021-09-24 | End: 2021-09-27 | Stop reason: HOSPADM

## 2021-09-24 RX ORDER — ONDANSETRON 4 MG/1
4 TABLET, ORALLY DISINTEGRATING ORAL EVERY 8 HOURS PRN
Status: DISCONTINUED | OUTPATIENT
Start: 2021-09-24 | End: 2021-09-27 | Stop reason: HOSPADM

## 2021-09-24 RX ORDER — NITROGLYCERIN 0.4 MG/1
0.4 TABLET SUBLINGUAL EVERY 5 MIN PRN
Status: DISCONTINUED | OUTPATIENT
Start: 2021-09-24 | End: 2021-09-27 | Stop reason: HOSPADM

## 2021-09-24 RX ORDER — ONDANSETRON 2 MG/ML
4 INJECTION INTRAMUSCULAR; INTRAVENOUS EVERY 6 HOURS PRN
Status: DISCONTINUED | OUTPATIENT
Start: 2021-09-24 | End: 2021-09-27 | Stop reason: HOSPADM

## 2021-09-24 RX ORDER — ALBUTEROL SULFATE 90 UG/1
2 AEROSOL, METERED RESPIRATORY (INHALATION) EVERY 4 HOURS PRN
Status: DISCONTINUED | OUTPATIENT
Start: 2021-09-24 | End: 2021-09-27 | Stop reason: HOSPADM

## 2021-09-24 RX ORDER — MORPHINE SULFATE 2 MG/ML
4 INJECTION, SOLUTION INTRAMUSCULAR; INTRAVENOUS ONCE
Status: COMPLETED | OUTPATIENT
Start: 2021-09-24 | End: 2021-09-24

## 2021-09-24 RX ORDER — OXYCODONE HYDROCHLORIDE AND ACETAMINOPHEN 5; 325 MG/1; MG/1
2 TABLET ORAL EVERY 6 HOURS PRN
Status: DISCONTINUED | OUTPATIENT
Start: 2021-09-24 | End: 2021-09-25

## 2021-09-24 RX ORDER — ALLOPURINOL 100 MG/1
100 TABLET ORAL DAILY
Status: DISCONTINUED | OUTPATIENT
Start: 2021-09-24 | End: 2021-09-27 | Stop reason: HOSPADM

## 2021-09-24 RX ORDER — DEXAMETHASONE SODIUM PHOSPHATE 10 MG/ML
10 INJECTION, SOLUTION INTRAMUSCULAR; INTRAVENOUS ONCE
Status: COMPLETED | OUTPATIENT
Start: 2021-09-24 | End: 2021-09-24

## 2021-09-24 RX ORDER — M-VIT,TX,IRON,MINS/CALC/FOLIC 27MG-0.4MG
1 TABLET ORAL DAILY
Status: DISCONTINUED | OUTPATIENT
Start: 2021-09-24 | End: 2021-09-27 | Stop reason: HOSPADM

## 2021-09-24 RX ORDER — ROSUVASTATIN CALCIUM 10 MG/1
10 TABLET, COATED ORAL DAILY
Status: DISCONTINUED | OUTPATIENT
Start: 2021-09-24 | End: 2021-09-27 | Stop reason: HOSPADM

## 2021-09-24 RX ORDER — SODIUM CHLORIDE 0.9 % (FLUSH) 0.9 %
5-40 SYRINGE (ML) INJECTION EVERY 12 HOURS SCHEDULED
Status: DISCONTINUED | OUTPATIENT
Start: 2021-09-24 | End: 2021-09-27 | Stop reason: HOSPADM

## 2021-09-24 RX ORDER — ATENOLOL 25 MG/1
25 TABLET ORAL DAILY
Status: DISCONTINUED | OUTPATIENT
Start: 2021-09-24 | End: 2021-09-27 | Stop reason: HOSPADM

## 2021-09-24 RX ORDER — ASPIRIN 81 MG/1
81 TABLET, CHEWABLE ORAL DAILY
Status: DISCONTINUED | OUTPATIENT
Start: 2021-09-24 | End: 2021-09-27 | Stop reason: HOSPADM

## 2021-09-24 RX ORDER — LISINOPRIL 2.5 MG/1
2.5 TABLET ORAL DAILY
Status: DISCONTINUED | OUTPATIENT
Start: 2021-09-24 | End: 2021-09-27 | Stop reason: HOSPADM

## 2021-09-24 RX ADMIN — SODIUM CHLORIDE, PRESERVATIVE FREE 10 ML: 5 INJECTION INTRAVENOUS at 20:28

## 2021-09-24 RX ADMIN — MORPHINE SULFATE 4 MG: 2 INJECTION, SOLUTION INTRAMUSCULAR; INTRAVENOUS at 13:39

## 2021-09-24 RX ADMIN — MAGNESIUM CITRATE 296 ML: 1.75 LIQUID ORAL at 13:41

## 2021-09-24 RX ADMIN — DEXAMETHASONE SODIUM PHOSPHATE 10 MG: 10 INJECTION, SOLUTION INTRAMUSCULAR; INTRAVENOUS at 14:36

## 2021-09-24 RX ADMIN — OXYCODONE HYDROCHLORIDE AND ACETAMINOPHEN 2 TABLET: 5; 325 TABLET ORAL at 20:00

## 2021-09-24 RX ADMIN — SODIUM CHLORIDE: 9 INJECTION, SOLUTION INTRAVENOUS at 20:28

## 2021-09-24 RX ADMIN — ENOXAPARIN SODIUM 40 MG: 40 INJECTION SUBCUTANEOUS at 17:40

## 2021-09-24 ASSESSMENT — ENCOUNTER SYMPTOMS
COLOR CHANGE: 0
WHEEZING: 0
BACK PAIN: 1
EYE DISCHARGE: 0
DIARRHEA: 0
CONSTIPATION: 0
TROUBLE SWALLOWING: 0
COUGH: 0
EYE REDNESS: 0
SORE THROAT: 0
SINUS PRESSURE: 0
VOMITING: 0
NAUSEA: 0
SHORTNESS OF BREATH: 0
FACIAL SWELLING: 0
CHEST TIGHTNESS: 0
EYE PAIN: 0
BLOOD IN STOOL: 0
ABDOMINAL PAIN: 0
RHINORRHEA: 0

## 2021-09-24 ASSESSMENT — PAIN SCALES - GENERAL
PAINLEVEL_OUTOF10: 5
PAINLEVEL_OUTOF10: 7
PAINLEVEL_OUTOF10: 8

## 2021-09-24 NOTE — TELEPHONE ENCOUNTER
Spoke with patient's daughter and wife today. There is no release on file to talk with his Daughter. I let them know that. They were very upset that the patient is unable to get himself up into a sitting position and is in an extreme amount of pain. The patient does have sensation in his extremities and is not paralyzed just has difficulty moving. I spoke with Dr Gallo Nicole and he instructed that they take the patient to the ER.     I called and spoke with the patient's wife and let her know to try to get to [facility] [place] [facility].

## 2021-09-24 NOTE — ED NOTES
Mode of arrival (squad #, walk in, police, etc) : Squad         Chief complaint(s): Back pain         Arrival Note (brief scenario, treatment PTA, etc). : Pt arrived by squad. Pt had back surgery 5 days ago. Pt has since been unable to sit up or move without excruciating pain. Pt family is unable to lift him and help him. Pt called the surgeon and was advised to come to the ER to see the surgeon and set him up with a facility that can help care of him while he recovers. No new injury noted, pt has no numbness or  Tingling. Pt denies urinary issues but states he has had trouble with bowel movements since the surgery. C= \"Have you ever felt that you should Cut down on your drinking? \"  No  A= \"Have people Annoyed you by criticizing your drinking? \"  No  G= \"Have you ever felt bad or Guilty about your drinking? \"  No  E= \"Have you ever had a drink as an Eye-opener first thing in the morning to steady your nerves or to help a hangover? \"  No      Deferred []      Reason for deferring: N/A    *If yes to two or more: probable alcohol abuse. Beth Lewis RN  09/24/21 5830

## 2021-09-24 NOTE — H&P
History and Physical        CHIEF COMPLAINT:  Constipation poor mobilityand low back pain    HISTORY OF PRESENT ILLNESS:      The patient is a [de-identified] y.o. male  who presents with     S/p PLIF Monday Tuesday patient reported ready for dc and want to go home    Poor mobility at home low back pain inability of pt and wife to take care of home  Resulting in readmission today    Patient unagreeable to home care on discharge    Reports legs continue to function better than pre-operatively    Past Medical History:    Past Medical History:   Diagnosis Date    Anemia     Anesthesia     woke up eary during surgery x1 , heard saw & felt the pressure.  Arthritis 7/15/2013    ASHD (arteriosclerotic heart disease) 7/15/2013    Asthma     CAD (coronary artery disease)     has cardiac stent x 4.    Cerebral artery occlusion with cerebral infarction (HCC)     Chronic kidney disease     stage 3    COPD ([facility] obstructive [facility] disease) [address] [address]     COVID-19 vaccine series completed     Julio Cesar Caicedo 1/26/2021, 2/23/2021    Degenerative joint disease 7/15/2013    Diabetes mellitus ([facility] [address]     ED (erectile dysfunction) 7/15/2013    Full dentures     Gout     History of carpal tunnel syndrome 07/15/2013    had surgery    History of colon polyps 7/15/2013    History of heart attack     silent one, patient was unaware    History of hemorrhoids 7/15/2013    History of TIA (transient ischemic attack) 7/15/2013    Hypercholesteremia 7/15/2013    Hypertension     Hyperuricemia 7/15/2013    Leukocytosis     Renal cyst     Sciatic nerve pain     4th and 5th lumbar vertebrae causing pinched nerve    Sleep apnea 07/15/2013    on cpap at .     Wears glasses     for reading       Past Surgical History:    Past Surgical History:   Procedure Laterality Date    BACK SURGERY      BLEPHAROPLASTY Bilateral 04/02/2012    CARDIAC SURGERY      CARPAL TUNNEL RELEASE Right 11/2002    CATARACT REMOVAL WITH IMPLANT Left 03/02/2012  CATARACT REMOVAL WITH IMPLANT Right 03/05/2012    COLONOSCOPY  05/21/08    POLYPECTOMY   Zenaida Wilhelm 336 x1 stent,1998 x2 stents,2009 x1,    ENDOSCOPY, COLON, DIAGNOSTIC      EYE SURGERY      cataracts, eyelids.  JOINT REPLACEMENT Right 07/12/11     RT TOTAL SHOULDER REPLACEMENT    JOINT REPLACEMENT Right 1995    rt. hip replacement    JOINT REPLACEMENT Left 01/23/2018    ELLE    KNEE ARTHROPLASTY Left 04/04/2017    LUMBAR FUSION N/A 9/20/2021    L3-L5 POSTERIOR LUMBAR DECOMPRESSSION & INSTRUMENTED FUSION performed by Danny Pizarro MD at [facility] Left 1/23/2018    LEFT TOTAL HIP ARTHROPLASTY MINIMALLY INVASIVE ASI WITH BIOMET SYSTEM & GPS SPRAY APPLICATION performed by Daniel Tracey MD at [facility] [address] ARTHROSCOPY Right 05/2002 repair    7-12-11 right shoulder reverse replacement.  SHOULDER SURGERY Left 02/24/2002    shoulder resurfacing    TOTAL KNEE ARTHROPLASTY Left 4/4/2017    KNEE TOTAL ARTHROPLASTY performed by Daniel Tracey MD at [address] [facility]       Medications Prior to Admission:   Prior to Admission medications    Medication Sig Start Date End Date Taking? Authorizing Provider   oxyCODONE-acetaminophen (PERCOCET) 5-325 MG per tablet Take 1 tablet by mouth every 6 hours as needed for Pain for up to 7 days. Intended supply: 7 days.  Take lowest dose possible to manage pain 9/21/21 9/28/21 Yes Danny Pizarro MD   allopurinol (ZYLOPRIM) 100 MG tablet TAKE 1 TABLET DAILY 9/13/21  Yes Lo Howard MD   nicotine polacrilex (COMMIT) 4 MG lozenge Take 4 mg by mouth as needed for Smoking cessation   Yes Historical Provider, MD   rosuvastatin (CRESTOR) 10 MG tablet TAKE 1 TABLET DAILY 8/2/21  Yes Lo Howard MD   lisinopril (PRINIVIL;ZESTRIL) 2.5 MG tablet TAKE 1 TABLET DAILY 4/12/21  Yes Lo Howard MD   atenolol (TENORMIN) 25 MG tablet TAKE 1 TABLET DAILY 12/16/20  Yes Rhoda Hsieh MD Fluticasone-Salmeterol 232-14 MCG/ACT AEPB Inhale 1 puff into the lungs 2 times daily  20  Yes Historical Provider, MD   aspirin (ASPIRIN 81) 81 MG chewable tablet Take 81 mg by mouth daily    Yes Historical Provider, MD   albuterol sulfate HFA (VENTOLIN HFA) 108 (90 Base) MCG/ACT inhaler 2 puffs as needed   Yes Historical Provider, MD   FISH OIL Take 1 capsule by mouth 2 times daily    Yes Historical Provider, MD   therapeutic multivitamin-minerals (THERAGRAN-M) tablet Take 1 tablet by mouth daily. OTC   Yes Shay Castaneda MD   nitroGLYCERIN (NITROSTAT) 0.4 MG SL tablet Place 0.4 mg under the tongue every 5 minutes as needed for Chest pain up to max of 3 total doses. If no relief after 1 dose, call 911.     Historical Provider, MD   CPAP Machine MISC --- ---    Historical Provider, MD    Scheduled Meds:   sodium chloride flush  5-40 mL IntraVENous 2 times per day    enoxaparin  40 mg SubCUTAneous Daily     Continuous Infusions:   sodium chloride       PRN Meds:.sodium chloride flush, sodium chloride, acetaminophen, ondansetron **OR** ondansetron      Allergies:  Pcn [penicillins] and Ceclor [cefaclor]    Social History:   Social History     Occupational History    Not on file   Tobacco Use    Smoking status: Former Smoker     Packs/day: 2.00     Years: 35.00     Pack years: 70.00     Quit date:      Years since quittin.7    Smokeless tobacco: Former User     Types: Chew    Tobacco comment: 3/30/21: NICOTINE LOZENGES - current use   Vaping Use    Vaping Use: Never used   Substance and Sexual Activity    Alcohol use: No     Alcohol/week: 0.0 standard drinks     Comment: rarely    Drug use: No    Sexual activity: Not on file        Family History:  Family History   Problem Relation Age of Onset    Alzheimer's Disease Mother     Heart Disease Mother     Emphysema Father          REVIEW OF SYSTEMS:  Gen: Negative for nausea, vomiting, diarrhea, fever, chills, night sweats  Heart: Negative for HTN, palpitations, chest pain  Lungs: Negative for wheezes, asthma or SOB  GI: Negative for nausea, vomiting  Endo: Negative for diabetes  Heme: Negative for DVT       PHYSICAL EXAM:  Patient Vitals for the past 24 hrs:   BP Temp Temp src Pulse Resp SpO2 Height Weight   09/24/21 1455 (!) 158/95 98.2 °F (36.8 °C) -- 67 18 97 % -- --   09/24/21 1233 (!) 172/96 97.6 °F (36.4 °C) Oral 68 17 97 % 5' 10\" (1.778 m) 190 lb (86.2 kg)     Gen: alert and oriented  Head: normorcephalic, atraumatic  Neck: supple  Heart: RRR  Lungs: No audible wheezes  Abdomen: soft  Pelvis: stable  Extremity ambulatory  per nursing stand by assistance to stand to transfer to commode     DATA:  CBC:   Lab Results   Component Value Date    WBC 11.5 09/24/2021    HGB 12.5 09/24/2021     09/24/2021     04/19/2012     BMP:    Lab Results   Component Value Date     09/24/2021    K 4.4 09/24/2021    CL 99 09/24/2021    CO2 24 09/24/2021    BUN 22 09/24/2021    CREATININE 1.15 09/24/2021    CALCIUM 9.1 09/24/2021    GLUCOSE 164 09/24/2021    GLUCOSE 143 02/03/2012     PT/INR:  No results found for: PROTIME, INR  Troponin:  No results found for: [address] [facility]    Radiology: na    ASSESSMENT: Active Problems:    Back pain    Drug-induced constipation    Difficulty walking  Resolved Problems:    * No resolved hospital problems.  *       PLAN:  1)  Admit  eval pt ot  Likely dc to Saima Cervantes MD

## 2021-09-24 NOTE — ED PROVIDER NOTES
[address] ED  eMERGENCY dEPARTMENT eNCOUnter      Pt Name: Kim Winslow  MRN: [id number]  [place] 1940  Date of evaluation: 9/24/21      CHIEF COMPLAINT       Chief Complaint   Patient presents with    Back Pain         HISTORY OF PRESENT ILLNESS    Kim Winslow is a [de-identified] y.o. male who presents complaining of back pain. Patient states that 4 days ago he had back surgery and was stayed overnight was feeling pretty good when he was discharged on Tuesday. Patient states since then he has been taking Percocets for his pain and states that it is not helping. Patient states that he really can't get up and move around can't do the things that he needs to do. Patient is stating that he has had no new numbness tingling or weakness since surgery. Patient had no fevers vomiting or diarrhea. Patient has had some constipation since surgery. Patient thinks that he is not okay at home and needs to go to rehab. REVIEW OF SYSTEMS       Review of Systems   Constitutional: Negative for activity change, appetite change, chills, diaphoresis and fever. HENT: Negative for congestion, ear pain, facial swelling, nosebleeds, rhinorrhea, sinus pressure, sore throat and trouble swallowing. Eyes: Negative for pain, discharge and redness. Respiratory: Negative for cough, chest tightness, shortness of breath and wheezing. Cardiovascular: Negative for chest pain, palpitations and leg swelling. Gastrointestinal: Negative for abdominal pain, blood in stool, constipation, diarrhea, nausea and vomiting. Genitourinary: Negative for difficulty urinating, dysuria, flank pain, frequency, genital sores and hematuria. Musculoskeletal: Positive for back pain. Negative for arthralgias, gait problem, joint swelling, myalgias and neck pain. Skin: Negative for color change, pallor, rash and wound.    Neurological: Negative for dizziness, tremors, seizures, syncope, speech difficulty, weakness, numbness and headaches. Psychiatric/Behavioral: Negative for confusion, decreased concentration, hallucinations, self-injury, sleep disturbance and suicidal ideas. PAST MEDICAL HISTORY     Past Medical History:   Diagnosis Date    Anemia     Anesthesia     woke up eary during surgery x1 , heard saw & felt the pressure.  Arthritis 7/15/2013    ASHD (arteriosclerotic heart disease) 7/15/2013    CAD (coronary artery disease)     has cardiac stent x 4.    Chronic kidney disease     stage 3    COPD (chronic obstructive pulmonary disease) ([facility] [address]     COVID-19 vaccine series completed     Alejandro Schumacher 1/26/2021, 2/23/2021    Degenerative joint disease 7/15/2013    ED (erectile dysfunction) 7/15/2013    Full dentures     Gout     History of carpal tunnel syndrome 07/15/2013    had surgery    History of colon polyps 7/15/2013    History of heart attack     silent one, patient was unaware    History of hemorrhoids 7/15/2013    History of TIA (transient ischemic attack) 7/15/2013    Hypercholesteremia 7/15/2013    Hypertension     Hyperuricemia 7/15/2013    Leukocytosis     Renal cyst     Sciatic nerve pain     4th and 5th lumbar vertebrae causing pinched nerve    Sleep apnea 07/15/2013    on cpap at [facility].  Wears glasses     for reading       SURGICAL HISTORY       Past Surgical History:   Procedure Laterality Date    BLEPHAROPLASTY Bilateral 04/02/2012    CARPAL TUNNEL RELEASE Right 11/2002    CATARACT REMOVAL WITH IMPLANT Left 03/02/2012    CATARACT REMOVAL WITH IMPLANT Right 03/05/2012    COLONOSCOPY  05/21/08    POLYPECTOMY   Zenaida Wilhelm 336 x1 stent,1998 x2 stents,2009 x1,    EYE SURGERY      cataracts, eyelids.     JOINT REPLACEMENT Right 07/12/11     RT TOTAL SHOULDER REPLACEMENT    JOINT REPLACEMENT Right 1995    rt. hip replacement    JOINT REPLACEMENT Left 01/23/2018    ELLE    KNEE ARTHROPLASTY Left 04/04/2017    LUMBAR FUSION N/A 9/20/2021    L3-L5 POSTERIOR LUMBAR DECOMPRESSSION & INSTRUMENTED FUSION performed by Ranulfo Arevalo MD at [facility] Left 1/23/2018    LEFT TOTAL HIP ARTHROPLASTY MINIMALLY INVASIVE ASI WITH BIOMET SYSTEM & GPS SPRAY APPLICATION performed by Janene Rm MD at [facility] [address] ARTHROSCOPY Right 05/2002 repair    7-12-11 right shoulder reverse replacement.  SHOULDER SURGERY Left 02/24/2002    shoulder resurfacing    TOTAL KNEE ARTHROPLASTY Left 4/4/2017    KNEE TOTAL ARTHROPLASTY performed by Janene Rm MD at [address]       Previous Medications    ALBUTEROL SULFATE HFA (VENTOLIN HFA) 108 (90 BASE) MCG/ACT INHALER    2 puffs as needed    ALLOPURINOL (ZYLOPRIM) 100 MG TABLET    TAKE 1 TABLET DAILY    ASPIRIN (ASPIRIN 81) 81 MG CHEWABLE TABLET    Take 81 mg by mouth daily     ATENOLOL (TENORMIN) 25 MG TABLET    TAKE 1 TABLET DAILY    CPAP MACHINE MISC    --- ---    FISH OIL    Take by mouth 2 times daily     FLUTICASONE-SALMETEROL 232-14 MCG/ACT AEPB    2 times daily     LISINOPRIL (PRINIVIL;ZESTRIL) 2.5 MG TABLET    TAKE 1 TABLET DAILY    NICOTINE POLACRILEX (COMMIT) 4 MG LOZENGE    Take 4 mg by mouth as needed for Smoking cessation    NITROGLYCERIN (NITROSTAT) 0.4 MG SL TABLET    Place 0.4 mg under the tongue every 5 minutes as needed for Chest pain up to max of 3 total doses. If no relief after 1 dose, call 911. OXYCODONE-ACETAMINOPHEN (PERCOCET) 5-325 MG PER TABLET    Take 1 tablet by mouth every 6 hours as needed for Pain for up to 7 days. Intended supply: 7 days. Take lowest dose possible to manage pain    ROSUVASTATIN (CRESTOR) 10 MG TABLET    TAKE 1 TABLET DAILY    THERAPEUTIC MULTIVITAMIN-MINERALS (THERAGRAN-M) TABLET    Take 1 tablet by mouth daily. OTC       ALLERGIES     is allergic to pcn [penicillins] and ceclor [cefaclor]. SOCIAL HISTORY      reports that he quit smoking about 19 years ago. He has a 70.00 pack-year smoking history.  He has quit using normal.         Judgment: Judgment normal.         DIAGNOSTIC RESULTS     RADIOLOGY:All plain film, CT,MRI, and formal ultrasound images (except ED bedside ultrasound) are read by the radiologist and the interpretations are directly viewed by the emergency physician. No results found. LABS: All lab results were reviewed by myself, and all abnormals are listed below. Labs Reviewed   CBC WITH AUTO DIFFERENTIAL - Abnormal; Notable for the following components:       Result Value    WBC 11.5 (*)     RBC 4.15 (*)     Hemoglobin 12.5 (*)     Hematocrit 37.7 (*)     Seg Neutrophils 77 (*)     Lymphocytes 10 (*)     Monocytes 10 (*)     All other components within normal limits   BASIC METABOLIC PANEL - Abnormal; Notable for the following components:    Glucose 164 (*)     Sodium 134 (*)     All other components within normal limits         MEDICAL DECISION MAKING:     We'll check some basic labs and then speak with his surgeon mostly patiently to be admitted for possible rehab placement. EMERGENCY DEPARTMENT COURSE:   Vitals:    Vitals:    09/24/21 1233   BP: (!) 172/96   Pulse: 68   Resp: 17   Temp: 97.6 °F (36.4 °C)   TempSrc: Oral   SpO2: 97%   Weight: 190 lb (86.2 kg)   Height: 5' 10\" (1.778 m)       The patient was given the following medications while in the emergency department:  Orders Placed This Encounter   Medications    morphine (PF) injection 4 mg    magnesium citrate solution 296 mL    dexamethasone (PF) (DECADRON) injection 10 mg       -------------------------  1:14 PM EDT  Discussed the case with Dr. Griselda Rahman who will come down and see the patient after his procedure but thinks that maybe he is is having a lot of constipation and wanted me to try some magnesium citrate to see if we can have him have a bowel movement and then he will evaluate him for possible admission.   I did speak with Dr. Atif robles for the PCP who is willing to admit the patient if that is what is determined is necessary. 2:08 PM EDT  Patient will be admitted for pain control. Dr. Bacilio Munson also requested 10 of Decadron. CONSULTS:  IP CONSULT TO ORTHOPEDIC SURGERY  IP CONSULT TO PRIMARY CARE PROVIDER    PROCEDURES:  None    FINAL IMPRESSION      1. Acute exacerbation of chronic low back pain          DISPOSITION/PLAN   DISPOSITION Admitted 09/24/2021 02:07:49 PM      PATIENT REFERREDTO:  No follow-up provider specified.     DISCHARGEMEDICATIONS:  New Prescriptions    No medications on file       (Please note that portions of this note were completed with a voice recognition program.  Efforts were made to edit thedictations but occasionally words are mis-transcribed.)    Anibal Chao MD  Attending Emergency Physician                        Anibal Chao MD  09/24/21 1933

## 2021-09-24 NOTE — PROGRESS NOTES
bilaterally  Heart: regular rate and rhythm, S1, S2 normal, no murmur, click, rub or gallop  Abdomen: soft, non-tender; bowel sounds normal; no masses,  no organomegaly  Extremities: extremities normal, atraumatic, no cyanosis or edema  Neurologic: Mental status: Alert, oriented, thought content appropriate    Prophylaxis:   DVT with  [x] lovenox        [] heparin        [] Scd        [] none:     Radiology:  No results found. Assessment :   1. Intractable pain/s/p lumbar laminectomy/control pain  2. Constipation/dulcolax and mag citrate     Plan:   1.   Continue present care  2.  see order    Patient Active Problem List:     Chronic obstructive pulmonary disease (HCC)     Leukocytosis     Anemia in stage 3 chronic kidney disease (HCC)     ASHD (arteriosclerotic heart disease)     History of TIA (transient ischemic attack)     History of hemorrhoids     Arthritis     History of carpal tunnel syndrome     ALEX on CPAP     ED (erectile dysfunction)     History of colon polyps     CKD (chronic kidney disease) stage 3, GFR 30-59 ml/min (HCC)     CKD (chronic kidney disease), stage III (HCC)     Renal cyst     Essential hypertension     Allergic rhinitis     Chronic idiopathic gout of multiple sites     Mixed hyperlipidemia     Primary osteoarthritis of left hip     Dependence on other enabling machines and devices     Hypertensive retinopathy     Type 2 diabetes mellitus with stage 3 chronic kidney disease, without long-term current use of insulin ([address]     Spinal stenosis of lumbar region with neurogenic claudication     Lumbar radicular pain     Back pain     Drug-induced constipation     Difficulty walking      Anticipated Disposition upon discharge: [] Home                                                                         [] Home with Home Health                                                                         [] Shar [facility]berg [] [address]      Patient is admitted as inpatient status because of co-morbidities listed above, severity of signs and symptoms as outlined, requirement for current medical therapies and most importantly because of direct risk to patient if care not provided in a hospital setting.           Gloria Tracey MD, MD  Rounding Hospitalist

## 2021-09-24 NOTE — PLAN OF CARE
Problem: Falls - Risk of:  Goal: Will remain free from falls  Description: Will remain free from falls  Outcome: Ongoing  Goal: Absence of physical injury  Description: Absence of physical injury  Outcome: Ongoing     Problem:  Bowel/Gastric:  Goal: Ability to achieve a regular elimination pattern will improve  Description: Ability to achieve a regular elimination pattern will improve  Outcome: Ongoing

## 2021-09-24 NOTE — PROGRESS NOTES
Pt arrived to floor via stretcher from ED and was transfered to bed. Vitals taken Admission and assessment complete. No distress noted. See doc flowsheet and admission navigator for details. POC and education initiated and reviewed with patient. Call light within reach, and pt educated on its use. Bed in lowest position, and locked. Side rails up x 2. Denied further questions or needs at this time. Will continue to monitor.

## 2021-09-25 PROCEDURE — 6370000000 HC RX 637 (ALT 250 FOR IP): Performed by: FAMILY MEDICINE

## 2021-09-25 PROCEDURE — 2580000003 HC RX 258: Performed by: FAMILY MEDICINE

## 2021-09-25 PROCEDURE — 6360000002 HC RX W HCPCS: Performed by: ORTHOPAEDIC SURGERY

## 2021-09-25 PROCEDURE — 99024 POSTOP FOLLOW-UP VISIT: CPT | Performed by: ORTHOPAEDIC SURGERY

## 2021-09-25 PROCEDURE — 1200000000 HC SEMI PRIVATE

## 2021-09-25 PROCEDURE — 97162 PT EVAL MOD COMPLEX 30 MIN: CPT

## 2021-09-25 PROCEDURE — 6370000000 HC RX 637 (ALT 250 FOR IP): Performed by: ORTHOPAEDIC SURGERY

## 2021-09-25 PROCEDURE — 97530 THERAPEUTIC ACTIVITIES: CPT

## 2021-09-25 RX ORDER — OXYCODONE HYDROCHLORIDE AND ACETAMINOPHEN 5; 325 MG/1; MG/1
1 TABLET ORAL EVERY 4 HOURS PRN
Status: DISCONTINUED | OUTPATIENT
Start: 2021-09-25 | End: 2021-09-27 | Stop reason: HOSPADM

## 2021-09-25 RX ORDER — OXYCODONE HYDROCHLORIDE AND ACETAMINOPHEN 5; 325 MG/1; MG/1
1 TABLET ORAL EVERY 6 HOURS PRN
Qty: 28 TABLET | Refills: 0 | Status: SHIPPED | OUTPATIENT
Start: 2021-09-25 | End: 2021-10-02

## 2021-09-25 RX ADMIN — ENOXAPARIN SODIUM 40 MG: 40 INJECTION SUBCUTANEOUS at 08:09

## 2021-09-25 RX ADMIN — MULTIPLE VITAMINS W/ MINERALS TAB 1 TABLET: TAB at 08:09

## 2021-09-25 RX ADMIN — ROSUVASTATIN CALCIUM 10 MG: 10 TABLET, FILM COATED ORAL at 17:42

## 2021-09-25 RX ADMIN — OXYCODONE HYDROCHLORIDE AND ACETAMINOPHEN 2 TABLET: 5; 325 TABLET ORAL at 14:27

## 2021-09-25 RX ADMIN — ASPIRIN 81 MG: 81 TABLET, CHEWABLE ORAL at 08:08

## 2021-09-25 RX ADMIN — SODIUM CHLORIDE: 9 INJECTION, SOLUTION INTRAVENOUS at 08:20

## 2021-09-25 RX ADMIN — OXYCODONE HYDROCHLORIDE AND ACETAMINOPHEN 2 TABLET: 5; 325 TABLET ORAL at 08:09

## 2021-09-25 RX ADMIN — ALLOPURINOL 100 MG: 100 TABLET ORAL at 08:10

## 2021-09-25 RX ADMIN — LISINOPRIL 2.5 MG: 2.5 TABLET ORAL at 08:11

## 2021-09-25 RX ADMIN — SODIUM CHLORIDE: 9 INJECTION, SOLUTION INTRAVENOUS at 22:03

## 2021-09-25 RX ADMIN — OXYCODONE HYDROCHLORIDE AND ACETAMINOPHEN 1 TABLET: 5; 325 TABLET ORAL at 21:26

## 2021-09-25 RX ADMIN — ATENOLOL 25 MG: 25 TABLET ORAL at 08:08

## 2021-09-25 ASSESSMENT — PAIN DESCRIPTION - ORIENTATION
ORIENTATION: LOWER
ORIENTATION: LOWER

## 2021-09-25 ASSESSMENT — PAIN SCALES - GENERAL
PAINLEVEL_OUTOF10: 4
PAINLEVEL_OUTOF10: 0
PAINLEVEL_OUTOF10: 4
PAINLEVEL_OUTOF10: 4
PAINLEVEL_OUTOF10: 6
PAINLEVEL_OUTOF10: 5
PAINLEVEL_OUTOF10: 5

## 2021-09-25 ASSESSMENT — PAIN DESCRIPTION - DESCRIPTORS
DESCRIPTORS: ACHING
DESCRIPTORS: ACHING

## 2021-09-25 ASSESSMENT — PAIN DESCRIPTION - PAIN TYPE
TYPE: ACUTE PAIN;SURGICAL PAIN
TYPE: ACUTE PAIN;SURGICAL PAIN

## 2021-09-25 ASSESSMENT — PAIN - FUNCTIONAL ASSESSMENT: PAIN_FUNCTIONAL_ASSESSMENT: PREVENTS OR INTERFERES SOME ACTIVE ACTIVITIES AND ADLS

## 2021-09-25 ASSESSMENT — PAIN DESCRIPTION - FREQUENCY: FREQUENCY: CONTINUOUS

## 2021-09-25 ASSESSMENT — PAIN DESCRIPTION - LOCATION
LOCATION: BACK
LOCATION: BACK

## 2021-09-25 ASSESSMENT — PAIN DESCRIPTION - ONSET: ONSET: ON-GOING

## 2021-09-25 ASSESSMENT — PAIN DESCRIPTION - PROGRESSION: CLINICAL_PROGRESSION: NOT CHANGED

## 2021-09-25 NOTE — PROGRESS NOTES
Surgical Progress Note    POD:      Patient doing fairly well  Vitals:    21 1317   BP: 95/68   Pulse: 70   Resp: 16   Temp: 97.5 °F (36.4 °C)   SpO2: 96%      Temp (24hrs), Av.5 °F (36.4 °C), Min:97.5 °F (36.4 °C), Max:97.5 °F (36.4 °C)       Pain Control Improved  No unusual nausea    Exam: walking with pt  Large BM yesterday        Lungs:  No respiratory distress    Labs reviewed:  Labs:  WBC/Hgb/Hct/Plts:  11.5/12.5/37.7/281 (1255)  BUN/Cr/glu/ALT/AST/amyl/lip:  /.15/--/--/--/--/-- (1255)     Na/K/Cl/CO2:  134/4.4/99/24 (1255)    I/O last 3 completed shifts:   In: 0 [P.O.:600]  Out: 850 [Urine:850]    Assessment:    Patient Active Problem List   Diagnosis    Chronic obstructive pulmonary disease (HCC)    Leukocytosis    Anemia in stage 3 chronic kidney disease ([address]    ASHD (arteriosclerotic heart disease)    History of TIA (transient ischemic attack)    History of hemorrhoids    Arthritis    History of carpal tunnel syndrome    ALEX on CPAP    ED (erectile dysfunction)    History of colon polyps    CKD (chronic kidney disease) stage 3, GFR 30-59 ml/min (HCC)    CKD (chronic kidney disease), stage III (HCC)    Renal cyst    Essential hypertension    Allergic rhinitis    Chronic idiopathic gout of multiple sites    Mixed hyperlipidemia    Primary osteoarthritis of left hip    Dependence on other enabling machines and devices    Hypertensive retinopathy    Type 2 diabetes mellitus with stage 3 chronic kidney disease, without long-term current use of insulin ([address]    Spinal stenosis of lumbar region with neurogenic claudication    Lumbar radicular pain    Back pain    Drug-induced constipation    Difficulty walking       Plan:  See my orders  PT  ecf placement    Bailey Ralph MD MD  2021 5:11 PM

## 2021-09-25 NOTE — PROGRESS NOTES
[facility]    Progress Note    9/25/2021    12:38 PM    Name:   Alayna Meehan  MRN:     [id number]     Acct:      [de-identified]   Room:   2057/2057-01   Day:  1  Admit Date:  9/24/2021 12:29 PM    PCP:   Phillip Kulkarni MD  Code Status:  Full Code    Subjective:     C/C:   Chief Complaint   Patient presents with    Back Pain   25-year-old man with recent laminectomy presented with back pain  Interval History Status: improved. Afebrile  BP high  Respiratory status stable on room air        Review of Systems:     Constitutional:  negative for chills, fevers, sweats  Respiratory:  negative for cough, dyspnea on exertion, shortness of breath, wheezing  Cardiovascular:  negative for chest pain, chest pressure/discomfort, lower extremity edema, palpitations  Gastrointestinal:  negative for abdominal pain, constipation, diarrhea, nausea, vomiting  Neurological:  negative for dizziness, headache    Medications: Allergies:     Allergies   Allergen Reactions    Pcn [Penicillins] Itching    Ceclor [Cefaclor] Itching and Rash       Current Meds:   Scheduled Meds:    sodium chloride flush  5-40 mL IntraVENous 2 times per day    enoxaparin  40 mg SubCUTAneous Daily    allopurinol  100 mg Oral Daily    aspirin  81 mg Oral Daily    atenolol  25 mg Oral Daily    budesonide-formoterol  2 puff Inhalation BID    lisinopril  2.5 mg Oral Daily    rosuvastatin  10 mg Oral Daily    therapeutic multivitamin-minerals  1 tablet Oral Daily    bisacodyl  10 mg Rectal Once     Continuous Infusions:    sodium chloride      sodium chloride 75 mL/hr at 09/25/21 0820     PRN Meds: sodium chloride flush, sodium chloride, acetaminophen, ondansetron **OR** ondansetron, albuterol sulfate HFA, nitroGLYCERIN, oxyCODONE-acetaminophen    Data:     Past Medical History:   has a past medical history of Anemia, Anesthesia, Arthritis, ASHD (arteriosclerotic heart disease), Asthma, AMMONIA, AMYLASE, LIPASE, LACTATE, CHOL, HDL, LDLCHOLESTEROL, CHOLHDLRATIO, TRIG, VLDL, MCM42RQ, PHENYTOIN, PHENYF, URICACID, POCGLU in the last 72 hours. ABG:No results found for: POCPH, PHART, PH, POCPCO2, UZS6BMB, PCO2, POCPO2, PO2ART, PO2, POCHCO3, DKU8LRJ, HCO3, NBEA, PBEA, BEART, BE, THGBART, THB, DBZ5LXQ, HTKU6GHP, F5VUKDXA, O2SAT, FIO2  Lab Results   Component Value Date/Time    SPECIAL NOT REPORTED 01/09/2018 08:00 AM     Lab Results   Component Value Date/Time    CULTURE NO GROWTH 01/09/2018 08:00 AM    CULTURE  01/09/2018 08:00 AM     Performed at [address] [place] [address], [address] [place] [address] [phone]       Radiology:  No results found. Physical Examination:        General appearance:  alert, cooperative and no distress  Mental Status:  oriented to person, place and time and normal affect  Lungs:  clear to auscultation bilaterally, normal effort  Heart:  regular rate and rhythm, no murmur  Abdomen:  soft, nontender, nondistended, normal bowel sounds,   Extremities:  no edema, redness, tenderness in the calves  Skin:  no gross lesions, rashes, induration    Assessment:        Hospital Problems         Last Modified POA    Back pain 9/24/2021 Yes    Drug-induced constipation 9/24/2021 Yes    Difficulty walking 9/24/2021 Yes          Plan:        1.  back pain, recent laminectomy, Percocet p.r.n. for pain control. Physical therapy  2.  hypertension-  Lisinopril 2.5 mg. Atenolol 25 mg  3.  hyperlipidemia-  Crestor  4.  constipation-   received suppository  5. SARAH- improving. will DC IV fluids  6. COPD- Symbicort  7.   Lovenox for DVT prophylaxis  8. Kathleen Conklin MD  9/25/2021  12:38 PM

## 2021-09-25 NOTE — FLOWSHEET NOTE
Left message on [facility] voicemail        09/25/21 1001   Encounter Summary   Contact [facility] Completed   Routine   Intervention Contacted support as requested per patient/family request   Who?   (Darrell Strickland 71 [phone])   Why?   (To [employer] )   At Request Of   (Pt at time of registration )

## 2021-09-25 NOTE — CARE COORDINATION
Writer discussed d/c plan with the pt stated he wanted a snf in [place]. Writer offered choice pt chose [facility]. Writer made referral via text. The pt was her one night 09/20th-09/21th thrn 09/24th-09/25th. he still needs one more night in the hospital

## 2021-09-25 NOTE — PLAN OF CARE
Problem: Falls - Risk of:  Goal: Will remain free from falls  Description: Will remain free from falls  9/25/2021 0438 by Jhonatan Hsieh RN  Outcome: Ongoing  Note: Patient remains free of falls and injuries throughout shift. Bed remains in the lowest position, wheels locked, call light and bedside table are within reach. 9/25/2021 0437 by Jhonatan Hsieh RN  Note: Patient remains free of falls and injuries throughout shift. Bed remains in the lowest position, wheels locked, call light and bedside table are within reach. 9/24/2021 1844 by Cecile Haskins RN  Outcome: Ongoing  Goal: Absence of physical injury  Description: Absence of physical injury  9/25/2021 0438 by hJonatan Hsieh RN  Outcome: Ongoing  9/24/2021 1844 by Cecile Haskins RN  Outcome: Ongoing     Problem: Bowel/Gastric:  Goal: Ability to achieve a regular elimination pattern will improve  Description: Ability to achieve a regular elimination pattern will improve  9/25/2021 0438 by Jhonatan Hsieh RN  Outcome: Ongoing  9/24/2021 1844 by Cecile Haskins RN  Outcome: Ongoing     Problem: Pain:  Goal: Pain level will decrease  Description: Pain level will decrease  Outcome: Ongoing  Goal: Control of acute pain  Description: Control of acute pain  Outcome: Ongoing  Note: Assess the location, characteristics, onset, duration, frequency, quality, and severity of pain. Encourage immediate report of pain. Use appropriate pain scale to rate pain. Manage pain using nonpharmacologic/pharmacologic interventions.    Goal: Control of chronic pain  Description: Control of chronic pain  Outcome: Ongoing

## 2021-09-25 NOTE — PLAN OF CARE
Problem: Falls - Risk of:  Goal: Will remain free from falls  Description: Will remain free from falls  9/25/2021 1400 by Arturo Lizama RN  Outcome: Met This Shift  Note: Patient is alert and oriented and remains free from falls this shift. Call light is within reach and patient is using appropriately. Family at bedside. RN will continue to monitor. Problem: Pain:  Description: Pain management should include both nonpharmacologic and pharmacologic interventions. Goal: Control of acute pain  Description: Control of acute pain  9/25/2021 1400 by Arturo Lizama RN  Outcome: Ongoing  Note: Adequate pain control maintained this shift. Medicated per PRN orders. See MAR.

## 2021-09-25 NOTE — PROGRESS NOTES
Physical Therapy        Physical Therapy Cancel Note      DATE: 2021    NAME: Sakshi Bowles  MRN: [id number]   : 1940      Patient not seen this date for Physical Therapy due to: 21 11:36 pt eating lunch, PT to check back after finished with meal      Electronically signed by Hubert Mulligan PT on 2021 at 12:02 PM

## 2021-09-25 NOTE — CARE COORDINATION
CASE MANAGEMENT NOTE:    Admission Date:  9/24/2021 Mariaa Mcwilliams is a [de-identified] y.o.  male    Admitted for : Back pain [M54.9]  Acute exacerbation of chronic low back pain [M54.5, G89.29]    Met with:  Patient    PCP:  [place] Galvez                                Insurance:  Medicare      Is patient alert and oriented at time of discussion:  Yes    Current Residence/ Living Arrangements:  independently at home w/ Wife            Current Services PTA:  No    Does patient go to outpatient dialysis: No  If yes, location and chair time: NA    Is patient agreeable to VNS: No    Freedom of choice provided:  Yes    List of [address] provided: No    VNS chosen:  No, Needs SNF    DME:  straight cane and walker,SC    Home Oxygen: NA    Nebulizer: No    CPAP/BIPAP: Yes, CPAP    Supplier: [facility]    Potential Assistance Needed: No    SNF needed: Yes    Freedom of choice and list provided: Yes, LSW following for [address]:  [address]       Does Patient want to use MEDS to BEDS? No    Is patient currently receiving oral anticoagulation therapy? No    Is the Patient an [facility] with Readmission Risk Score greater than 14%? No  If yes, pt needs a follow up appointment made within 7 days. Family Members/Caregivers that pt would like involved in their care:    Yes    If yes, list name here:  Wife, Julianne Marrero, Daughter, Kelly De Leon    Transportation Provider:  Patient             Discharge Plan:  9/25/21, [place], Medicare Pt. Lives in 1 story home w/ Wife Julianne Marrero. DME walker, cane, SC, CPAP,Pt. is S/P PLIF on 9/20 went home on 9/21, Weak, Wife, could not care for him. Needs SNF, LSW following for [facility],Needs 1 more night stay.  PT/OT,Dewey Header 13%, Dagoberto will need signed/completed, will follow//KB                  Electronically signed by: Gregg Fischer RN on 9/25/2021 at 10:53 AM

## 2021-09-25 NOTE — DISCHARGE INSTR - COC
Continuity of Care Form    Patient Name: Shira Hyde   :  1940  MRN:  [id number]    Admit date:  2021  Discharge date:  21    Code Status Order: Full Code   Advance Directives:      Admitting Physician:  Sydnee Key MD  PCP: Shay Castaneda MD    Discharging Nurse: [facility] Unit/Room#: 2057/2057-01  Discharging Unit Phone Number: [phone]    Emergency Contact:   Extended Emergency Contact Information  Primary Emergency Contact: Wendie Mccray  Address: [place] [address] [address] [place] [address] Phone: [phone]  Work Phone: [phone]  Mobile Phone: [phone]  Relation: Spouse  Hearing or visual needs: None  Other needs: None  Preferred language: English   needed? No  Secondary Emergency Contact: Cyndee Wheeler  Home Phone: [phone]  Relation: Child   needed? No    Past Surgical History:  Past Surgical History:   Procedure Laterality Date    BACK SURGERY      BLEPHAROPLASTY Bilateral 2012    CARDIAC SURGERY      CARPAL TUNNEL RELEASE Right 2002    CATARACT REMOVAL WITH IMPLANT Left 2012    CATARACT REMOVAL WITH IMPLANT Right 2012    COLONOSCOPY  08    POLYPECTOMY    CORONARY ANGIOPLASTY WITH STENT PLACEMENT  1994 x1 stent,1998 x2 stents,2009 x1,    ENDOSCOPY, COLON, DIAGNOSTIC      EYE SURGERY      cataracts, eyelids.     JOINT REPLACEMENT Right 11     RT TOTAL SHOULDER REPLACEMENT    JOINT REPLACEMENT Right     rt. hip replacement    JOINT REPLACEMENT Left 2018    ELLE    KNEE ARTHROPLASTY Left 2017    LUMBAR FUSION N/A 2021    L3-L5 POSTERIOR LUMBAR DECOMPRESSSION & INSTRUMENTED FUSION performed by Sydnee Key MD at [facility] Left 2018    LEFT TOTAL HIP ARTHROPLASTY MINIMALLY INVASIVE ASI WITH BIOMET SYSTEM & GPS SPRAY APPLICATION performed by Ramo Patino MD at [address] [place] [address],[address] ARTHROSCOPY Right 05/2002 repair    7-12-11 right shoulder reverse replacement.     SHOULDER SURGERY Left 02/24/2002    shoulder resurfacing    TOTAL KNEE ARTHROPLASTY Left 4/4/2017    KNEE TOTAL ARTHROPLASTY performed by Francisco Young MD at [address]       Immunization History:   Immunization History   Administered Date(s) Administered    COVID-19, Moderna, PF, 100mcg/0.5mL 01/26/2021, 02/23/2021    Influenza 10/25/2013    Influenza Virus Vaccine 10/05/2010, 09/20/2011, 09/14/2012, 09/30/2014, 10/23/2015, 10/17/2016, 10/16/2017, 10/01/2018    Influenza, High Dose (Fluzone 65 yrs and older) 10/03/2018    Influenza, Eugena Efrain, IM, (6 mo and older Fluzone, Flulaval, Fluarix and 3 yrs and older Afluria) 10/25/2016    Influenza, Quadv, IM, PF (6 mo and older Fluzone, Flulaval, Fluarix, and 3 yrs and older Afluria) 10/19/2017    Influenza, Quadv, adjuvanted, 65 yrs +, IM, PF (Fluad) 10/07/2020    Influenza, Triv, inactivated, subunit, adjuvanted, IM (Fluad 65 yrs and older) 10/22/2019    Pneumococcal Conjugate 13-valent (Ezloelr13) 03/11/2016    Pneumococcal Polysaccharide (Vaualmtqq54) 04/12/2010, 04/05/2017    Tetanus 05/22/2014    Zoster Live (Zostavax) 09/19/2013       Active Problems:  Patient Active Problem List   Diagnosis Code    Chronic obstructive pulmonary disease (HCC) J44.9    Leukocytosis D72.829    Anemia in stage 3 chronic kidney disease (HCC) N18.30, D63.1    ASHD (arteriosclerotic heart disease) I25.10    History of TIA (transient ischemic attack) Z86.73    History of hemorrhoids Z87.19    Arthritis M19.90    History of carpal tunnel syndrome Z86.69    ALEX on CPAP G47.33, Z99.89    ED (erectile dysfunction) N52.9    History of colon polyps Z86.010    CKD (chronic kidney disease) stage 3, GFR 30-59 ml/min ([facility]) N18.30    CKD (chronic kidney disease), stage III (HCC) N18.30    Renal cyst N28.1    Essential hypertension I10    Allergic rhinitis J30.9    Chronic idiopathic gout of multiple sites M1A.09X0    Mixed hyperlipidemia E78.2    Primary osteoarthritis of left hip M16.12    Dependence on other enabling machines and devices Z99.89    Hypertensive retinopathy H35.039    Type 2 diabetes mellitus with stage 3 chronic kidney disease, without long-term current use of insulin (HCC) E11.22, N18.30    Spinal stenosis of lumbar region with neurogenic claudication M48.062    Lumbar radicular pain M54.16    Back pain M54.9    Drug-induced constipation K59.03    Difficulty walking R26.2       Isolation/Infection:   Isolation            No Isolation          Patient Infection Status       None to display            Nurse Assessment:  Last Vital Signs: BP (!) 165/92   Pulse 77   Temp 97.5 °F (36.4 °C) (Oral)   Resp 17   Ht 5' 10\" (1.778 m)   Wt 190 lb (86.2 kg)   SpO2 95%   BMI 27.26 kg/m²     Last documented pain score (0-10 scale): Pain Level: 0  Last Weight:   Wt Readings from Last 1 Encounters:   09/24/21 190 lb (86.2 kg)     Mental Status:  oriented and alert    IV Access:  - None    Nursing Mobility/ADLs:  Walking   Assisted  Transfer  Assisted  Bathing  Assisted  Dressing  Assisted  Toileting  Assisted  Feeding  Independent  Med Admin  Assisted  Med Delivery   whole    Wound Care Documentation and Therapy:        Elimination:  Continence:   · Bowel: Yes  · Bladder: Yes  Urinary Catheter: None   Colostomy/Ileostomy/Ileal Conduit: No           Intake/Output Summary (Last 24 hours) at 9/25/2021 1101  Last data filed at 9/25/2021 0816  Gross per 24 hour   Intake 240 ml   Output 850 ml   Net -610 ml     I/O last 3 completed shifts:  In: -   Out: 600 [Urine:600]    Safety Concerns:      At Risk for Falls    Impairments/Disabilities:      None    Nutrition Therapy:  Current Nutrition Therapy:   - Oral Diet:  General    Routes of Feeding: Oral  Liquids: No Restrictions  Daily Fluid Restriction: no  Last Modified Barium Swallow with Video (Video Swallowing Test): not done    Treatments at the Time of Hospital Discharge:   Respiratory Treatments: SEE MAR  Oxygen Therapy:  is not on home oxygen therapy. Ventilator:    - No ventilator support    Rehab Therapies: Physical Therapy and Occupational Therapy  Weight Bearing Status/Restrictions: No weight bearing restirctions  Other Medical Equipment (for information only, NOT a DME order):  cane and walker  Other Treatments: Skilled Nursing Assessment Per Protocol. Medication Education & Monitoring. Pt. Is S/P PLIF on 9/20/21 w/ Dr. Asuncion Shelton. Patient's personal belongings (please select all that are sent with patient):  None    RN SIGNATURE:  Electronically signed by Nathaniel Hobbs RN on 9/27/21 at 9:42 AM EDT    CASE MANAGEMENT/SOCIAL WORK SECTION    Inpatient Status Date: ***    Readmission Risk Assessment Score:  Readmission Risk              Risk of Unplanned Readmission:  13           Discharging to Facility/ Agency    Mildred Cooper [address] [place] [address] Phone [phone]   Fax [phone]  · Evening fax [phone]     Dialysis Facility (if applicable)   · Name:  · Address:  · Dialysis Schedule:  · Phone:  · Fax:    / signature: Electronically signed by Yamileth Fu RN on 9/25/21 at 11:01 AM EDT    PHYSICIAN SECTION    Prognosis: Good    Condition at Discharge: Stable    Rehab Potential (if transferring to Rehab): Good    Recommended Labs or Other Treatments After Discharge: na    Physician Certification: I certify the above information and transfer of Khushi Kurtz  is necessary for the continuing treatment of the diagnosis listed and that he requires formerly [facility] for less 30 days.      Update Admission H&P: No change in H&P    PHYSICIAN SIGNATURE:  Electronically signed by Uli Babcock MD on 9/25/21 at 5:12 PM EDT

## 2021-09-26 PROCEDURE — 1200000000 HC SEMI PRIVATE

## 2021-09-26 PROCEDURE — 6370000000 HC RX 637 (ALT 250 FOR IP): Performed by: FAMILY MEDICINE

## 2021-09-26 PROCEDURE — 99024 POSTOP FOLLOW-UP VISIT: CPT | Performed by: ORTHOPAEDIC SURGERY

## 2021-09-26 PROCEDURE — 6370000000 HC RX 637 (ALT 250 FOR IP): Performed by: ORTHOPAEDIC SURGERY

## 2021-09-26 PROCEDURE — 97110 THERAPEUTIC EXERCISES: CPT

## 2021-09-26 PROCEDURE — 97116 GAIT TRAINING THERAPY: CPT

## 2021-09-26 PROCEDURE — 6360000002 HC RX W HCPCS: Performed by: ORTHOPAEDIC SURGERY

## 2021-09-26 PROCEDURE — 2580000003 HC RX 258: Performed by: ORTHOPAEDIC SURGERY

## 2021-09-26 RX ADMIN — ENOXAPARIN SODIUM 40 MG: 40 INJECTION SUBCUTANEOUS at 08:11

## 2021-09-26 RX ADMIN — SODIUM CHLORIDE, PRESERVATIVE FREE 10 ML: 5 INJECTION INTRAVENOUS at 21:20

## 2021-09-26 RX ADMIN — SODIUM CHLORIDE, PRESERVATIVE FREE 10 ML: 5 INJECTION INTRAVENOUS at 08:17

## 2021-09-26 RX ADMIN — ATENOLOL 25 MG: 25 TABLET ORAL at 08:10

## 2021-09-26 RX ADMIN — LISINOPRIL 2.5 MG: 2.5 TABLET ORAL at 08:12

## 2021-09-26 RX ADMIN — ROSUVASTATIN CALCIUM 10 MG: 10 TABLET, FILM COATED ORAL at 08:17

## 2021-09-26 RX ADMIN — OXYCODONE HYDROCHLORIDE AND ACETAMINOPHEN 1 TABLET: 5; 325 TABLET ORAL at 07:36

## 2021-09-26 RX ADMIN — OXYCODONE HYDROCHLORIDE AND ACETAMINOPHEN 1 TABLET: 5; 325 TABLET ORAL at 21:29

## 2021-09-26 RX ADMIN — ALLOPURINOL 100 MG: 100 TABLET ORAL at 08:12

## 2021-09-26 RX ADMIN — OXYCODONE HYDROCHLORIDE AND ACETAMINOPHEN 1 TABLET: 5; 325 TABLET ORAL at 17:25

## 2021-09-26 RX ADMIN — ASPIRIN 81 MG: 81 TABLET, CHEWABLE ORAL at 08:10

## 2021-09-26 RX ADMIN — MULTIPLE VITAMINS W/ MINERALS TAB 1 TABLET: TAB at 08:10

## 2021-09-26 ASSESSMENT — PAIN SCALES - GENERAL
PAINLEVEL_OUTOF10: 8
PAINLEVEL_OUTOF10: 4
PAINLEVEL_OUTOF10: 5
PAINLEVEL_OUTOF10: 7
PAINLEVEL_OUTOF10: 7
PAINLEVEL_OUTOF10: 4
PAINLEVEL_OUTOF10: 7

## 2021-09-26 ASSESSMENT — PAIN DESCRIPTION - DESCRIPTORS
DESCRIPTORS: ACHING;SHARP
DESCRIPTORS: ACHING;DISCOMFORT

## 2021-09-26 ASSESSMENT — PAIN DESCRIPTION - PROGRESSION
CLINICAL_PROGRESSION: GRADUALLY IMPROVING
CLINICAL_PROGRESSION: NOT CHANGED
CLINICAL_PROGRESSION: GRADUALLY IMPROVING

## 2021-09-26 ASSESSMENT — PAIN DESCRIPTION - PAIN TYPE
TYPE: ACUTE PAIN;SURGICAL PAIN
TYPE: SURGICAL PAIN;ACUTE PAIN
TYPE: ACUTE PAIN

## 2021-09-26 ASSESSMENT — PAIN DESCRIPTION - ORIENTATION
ORIENTATION: LOWER

## 2021-09-26 ASSESSMENT — PAIN DESCRIPTION - LOCATION
LOCATION: BACK

## 2021-09-26 ASSESSMENT — PAIN DESCRIPTION - ONSET
ONSET: ON-GOING
ONSET: GRADUAL

## 2021-09-26 ASSESSMENT — PAIN - FUNCTIONAL ASSESSMENT
PAIN_FUNCTIONAL_ASSESSMENT: ACTIVITIES ARE NOT PREVENTED
PAIN_FUNCTIONAL_ASSESSMENT: ACTIVITIES ARE NOT PREVENTED

## 2021-09-26 ASSESSMENT — PAIN DESCRIPTION - FREQUENCY
FREQUENCY: INTERMITTENT
FREQUENCY: INTERMITTENT

## 2021-09-26 NOTE — PLAN OF CARE
Problem: Falls - Risk of:  Goal: Will remain free from falls  Description: Will remain free from falls  9/26/2021 1348 by Butch Yancey RN  Outcome: Ongoing  Pt. Will not exhibit any falls this shift, all safety measures in place, gripper socks, fall sign posted, bed in lowest position, personal items within reach, pain addressed, and toileted every 2 hours or urinal within reach. Problem: Pain:  Goal: Pain level will decrease  Description: Pain level will decrease  9/26/2021 1348 by Butch Yancey RN  Outcome: Ongoing   Pt. Will be managed with PO pain meds as per MD order. Problem: Skin Integrity:  Goal: Will show no infection signs and symptoms  Description: Will show no infection signs and symptoms  Outcome: Ongoing   Pt. Will show no signs of infection and remained afebrile, dressing site, with no redness, no purulent drainage, no swelling.

## 2021-09-26 NOTE — PROGRESS NOTES
Surgical Progress Note    POD:      Patient doing well  Vitals:    21 0726   BP: (!) 160/82   Pulse: 67   Resp: 16   Temp: 98.1 °F (36.7 °C)   SpO2: 96%      Temp (24hrs), Av.8 °F (36.6 °C), Min:97.5 °F (36.4 °C), Max:98.1 °F (36.7 °C)       Pain Control good  No unusual nausea    Exam: doing well        Lungs:  No respiratory distress    Labs reviewed:  Labs:  WBC/Hgb/Hct/Plts:  11.5/12.5/37.7/281 ( 125)  BUN/Cr/glu/ALT/AST/amyl/lip:  22/1.15/--/--/--/--/-- (1255)     Na/K/Cl/CO2:  134/4.4/99/24 (1255)    I/O last 3 completed shifts:   In: 600 [P.O.:600]  Out: 65 [Urine:1700]    Assessment:    Patient Active Problem List   Diagnosis    Chronic obstructive pulmonary disease (HCC)    Leukocytosis    Anemia in stage 3 chronic kidney disease ([address]    ASHD (arteriosclerotic heart disease)    History of TIA (transient ischemic attack)    History of hemorrhoids    Arthritis    History of carpal tunnel syndrome    ALEX on CPAP    ED (erectile dysfunction)    History of colon polyps    CKD (chronic kidney disease) stage 3, GFR 30-59 ml/min (HCC)    CKD (chronic kidney disease), stage III (HCC)    Renal cyst    Essential hypertension    Allergic rhinitis    Chronic idiopathic gout of multiple sites    Mixed hyperlipidemia    Primary osteoarthritis of left hip    Dependence on other enabling machines and devices    Hypertensive retinopathy    Type 2 diabetes mellitus with stage 3 chronic kidney disease, without long-term current use of insulin ([address]    Spinal stenosis of lumbar region with neurogenic claudication    Lumbar radicular pain    Back pain    Drug-induced constipation    Difficulty walking       Plan:  See my orders  ecf when able  Hayley Sánchez MD MD  2021 9:33 AM

## 2021-09-26 NOTE — PROGRESS NOTES
[facility]    Progress Note    9/26/2021    10:18 AM    Name:   Keena Roberts  MRN:     [id number]     Acct:      [de-identified]   Room:   2057/2057-01   Day:  2  Admit Date:  9/24/2021 12:29 PM    PCP:   Abhay Torres MD  Code Status:  Full Code    Subjective:     C/C:   Chief Complaint   Patient presents with    Back Pain   73-year-old man with recent laminectomy presented with back pain  Patient states that he is doing well and denies any new complaints      Interval History Status: improved. Afebrile  BP high  Respiratory status stable on room air        Review of Systems:     Constitutional:  negative for chills, fevers, sweats  Respiratory:  negative for cough, dyspnea on exertion, shortness of breath, wheezing  Cardiovascular:  negative for chest pain, chest pressure/discomfort, lower extremity edema, palpitations  Gastrointestinal:  negative for abdominal pain, constipation, diarrhea, nausea, vomiting  Neurological:  negative for dizziness, headache    Medications: Allergies:     Allergies   Allergen Reactions    Pcn [Penicillins] Itching    Ceclor [Cefaclor] Itching and Rash       Current Meds:   Scheduled Meds:    sodium chloride flush  5-40 mL IntraVENous 2 times per day    enoxaparin  40 mg SubCUTAneous Daily    allopurinol  100 mg Oral Daily    aspirin  81 mg Oral Daily    atenolol  25 mg Oral Daily    budesonide-formoterol  2 puff Inhalation BID    lisinopril  2.5 mg Oral Daily    rosuvastatin  10 mg Oral Daily    therapeutic multivitamin-minerals  1 tablet Oral Daily    bisacodyl  10 mg Rectal Once     Continuous Infusions:    sodium chloride      sodium chloride 75 mL/hr at 09/25/21 2203     PRN Meds: oxyCODONE-acetaminophen, sodium chloride flush, sodium chloride, acetaminophen, ondansetron **OR** ondansetron, albuterol sulfate HFA, nitroGLYCERIN    Data:     Past Medical History:   has a past medical history of Anemia, Anesthesia, Arthritis, ASHD (arteriosclerotic heart disease), Asthma, CAD (coronary artery disease), Cerebral artery occlusion with cerebral infarction ([facility] [address] Chronic kidney disease, COPD (chronic obstructive pulmonary disease) ([facility] [address] COVID-19 vaccine series completed, Degenerative joint disease, Diabetes mellitus ([facility] [address] ED (erectile dysfunction), Full dentures, Gout, History of carpal tunnel syndrome, History of colon polyps, History of heart attack, History of hemorrhoids, History of TIA (transient ischemic attack), Hypercholesteremia, Hypertension, Hyperuricemia, Leukocytosis, Renal cyst, Sciatic nerve pain, Sleep apnea, and Wears glasses. Social History:   reports that he quit smoking about 19 years ago. He has a 70.00 pack-year smoking history. He has quit using smokeless tobacco.  His smokeless tobacco use included chew. He reports that he does not drink alcohol and does not use drugs. Family History:   Family History   Problem Relation Age of Onset    Alzheimer's Disease Mother     Heart Disease Mother     Emphysema Father        Vitals:  BP (!) 160/82   Pulse 67   Temp 98.1 °F (36.7 °C) (Oral)   Resp 16   Ht 5' 10\" (1.778 m)   Wt 190 lb (86.2 kg)   SpO2 96%   BMI 27.26 kg/m²   Temp (24hrs), Av.8 °F (36.6 °C), Min:97.5 °F (36.4 °C), Max:98.1 °F (36.7 °C)    No results for input(s): POCGLU in the last 72 hours. I/O (24Hr):     Intake/Output Summary (Last 24 hours) at 2021 1018  Last data filed at 2021 0659  Gross per 24 hour   Intake 180 ml   Output 1450 ml   Net -1270 ml       Labs:  Hematology:  Recent Labs     21  1255   WBC 11.5*   RBC 4.15*   HGB 12.5*   HCT 37.7*   MCV 90.8   MCH 30.0   MCHC 33.1   RDW 13.4      MPV 9.4     Chemistry:  Recent Labs     21  1255   *   K 4.4   CL 99   CO2 24   GLUCOSE 164*   BUN 22   CREATININE 1.15   ANIONGAP 11   LABGLOM >60   GFRAA >60   CALCIUM 9.1   No results for input(s): PROT, LABALBU, LABA1C, A0IANFY, U7PDKRL, FT4, TSH, AST, ALT, LDH, GGT, ALKPHOS, LABGGT, BILITOT, BILIDIR, AMMONIA, AMYLASE, LIPASE, LACTATE, CHOL, HDL, LDLCHOLESTEROL, CHOLHDLRATIO, TRIG, VLDL, GSK31DE, PHENYTOIN, PHENYF, URICACID, POCGLU in the last 72 hours. ABG:No results found for: POCPH, PHART, PH, POCPCO2, VHF2OWD, PCO2, POCPO2, PO2ART, PO2, POCHCO3, MKF6VDX, HCO3, NBEA, PBEA, BEART, BE, THGBART, THB, ONU7PDV, SDVN6QLE, T8EDCIQZ, O2SAT, FIO2  Lab Results   Component Value Date/Time    SPECIAL NOT REPORTED 01/09/2018 08:00 AM     Lab Results   Component Value Date/Time    CULTURE NO GROWTH 01/09/2018 08:00 AM    CULTURE  01/09/2018 08:00 AM     Performed at [address] [place] [address] [phone]       Radiology:  No results found. Physical Examination:        General appearance:  alert, cooperative and no distress  Mental Status:  oriented to person, place and time and normal affect  Lungs:  clear to auscultation bilaterally, normal effort  Heart:  regular rate and rhythm, no murmur  Abdomen:  soft, nontender, nondistended, normal bowel sounds,   Extremities:  no edema, redness, tenderness in the calves  Skin:  no gross lesions, rashes, induration    Assessment:        Hospital Problems         Last Modified POA    Back pain 9/24/2021 Yes    Drug-induced constipation 9/24/2021 Yes    Difficulty walking 9/24/2021 Yes          Plan:        1.  back pain, recent laminectomy, Percocet p.r.n. for pain control. Physical therapy  2.  hypertension-  Lisinopril 2.5 mg. Atenolol 25 mg  3.  hyperlipidemia-  Crestor  4.  constipation-   received suppository  5. SARAH- improving. will DC IV fluids. Labs tomorrow  6. COPD- Symbicort  7.   Lovenox for DVT prophylaxis  8. Ronal Matute MD  9/26/2021  10:18 AM

## 2021-09-26 NOTE — PLAN OF CARE
Problem: Falls - Risk of:  Goal: Will remain free from falls  Description: Will remain free from falls  Outcome: Ongoing  Note: Patient remains free of falls and injuries throughout shift. Bed remains in the lowest position, wheels locked, call light and bedside table are within reach. Goal: Absence of physical injury  Description: Absence of physical injury  Outcome: Ongoing     Problem: Bowel/Gastric:  Goal: Ability to achieve a regular elimination pattern will improve  Description: Ability to achieve a regular elimination pattern will improve  Outcome: Ongoing     Problem: Pain:  Goal: Pain level will decrease  Description: Pain level will decrease  Outcome: Ongoing  Goal: Control of acute pain  Description: Control of acute pain  Outcome: Ongoing  Note: Assess the location, characteristics, onset, duration, frequency, quality, and severity of pain. Encourage immediate report of pain. Use appropriate pain scale to rate pain. Manage pain using nonpharmacologic/pharmacologic interventions.    Goal: Control of chronic pain  Description: Control of chronic pain  Outcome: Ongoing     Problem: Musculor/Skeletal Functional Status  Goal: Highest potential functional level  Outcome: Ongoing  Goal: Absence of falls  Outcome: Ongoing

## 2021-09-27 VITALS
DIASTOLIC BLOOD PRESSURE: 79 MMHG | WEIGHT: 190 LBS | TEMPERATURE: 99 F | RESPIRATION RATE: 20 BRPM | HEART RATE: 65 BPM | HEIGHT: 70 IN | SYSTOLIC BLOOD PRESSURE: 152 MMHG | BODY MASS INDEX: 27.2 KG/M2 | OXYGEN SATURATION: 91 %

## 2021-09-27 LAB
ABSOLUTE EOS #: 0.12 K/UL (ref 0–0.4)
ABSOLUTE IMMATURE GRANULOCYTE: ABNORMAL K/UL (ref 0–0.3)
ABSOLUTE LYMPH #: 2.83 K/UL (ref 1–4.8)
ABSOLUTE MONO #: 0.49 K/UL (ref 0.1–1.3)
ALBUMIN SERPL-MCNC: 3.2 G/DL (ref 3.5–5.2)
ALBUMIN/GLOBULIN RATIO: ABNORMAL (ref 1–2.5)
ALP BLD-CCNC: 73 U/L (ref 40–129)
ALT SERPL-CCNC: 36 U/L (ref 5–41)
ANION GAP SERPL CALCULATED.3IONS-SCNC: 12 MMOL/L (ref 9–17)
AST SERPL-CCNC: 32 U/L
BASOPHILS # BLD: 0 % (ref 0–2)
BASOPHILS ABSOLUTE: 0 K/UL (ref 0–0.2)
BILIRUB SERPL-MCNC: 0.7 MG/DL (ref 0.3–1.2)
BUN BLDV-MCNC: 23 MG/DL (ref 8–23)
BUN/CREAT BLD: ABNORMAL (ref 9–20)
CALCIUM SERPL-MCNC: 8.6 MG/DL (ref 8.6–10.4)
CHLORIDE BLD-SCNC: 105 MMOL/L (ref 98–107)
CO2: 21 MMOL/L (ref 20–31)
CREAT SERPL-MCNC: 1.23 MG/DL (ref 0.7–1.2)
DIFFERENTIAL TYPE: ABNORMAL
EOSINOPHILS RELATIVE PERCENT: 1 % (ref 0–4)
GFR AFRICAN AMERICAN: >60 ML/MIN
GFR NON-AFRICAN AMERICAN: 57 ML/MIN
GFR SERPL CREATININE-BSD FRML MDRD: ABNORMAL ML/MIN/{1.73_M2}
GFR SERPL CREATININE-BSD FRML MDRD: ABNORMAL ML/MIN/{1.73_M2}
GLUCOSE BLD-MCNC: 95 MG/DL (ref 70–99)
HCT VFR BLD CALC: 34.6 % (ref 41–53)
HEMOGLOBIN: 11.4 G/DL (ref 13.5–17.5)
IMMATURE GRANULOCYTES: ABNORMAL %
LYMPHOCYTES # BLD: 23 % (ref 24–44)
MCH RBC QN AUTO: 30.1 PG (ref 26–34)
MCHC RBC AUTO-ENTMCNC: 33 G/DL (ref 31–37)
MCV RBC AUTO: 91.2 FL (ref 80–100)
MONOCYTES # BLD: 4 % (ref 1–7)
MORPHOLOGY: ABNORMAL
MORPHOLOGY: ABNORMAL
NRBC AUTOMATED: ABNORMAL PER 100 WBC
PDW BLD-RTO: 13.7 % (ref 11.5–14.9)
PLATELET # BLD: 325 K/UL (ref 150–450)
PLATELET ESTIMATE: ABNORMAL
PMV BLD AUTO: 9.1 FL (ref 6–12)
POTASSIUM SERPL-SCNC: 4.1 MMOL/L (ref 3.7–5.3)
RBC # BLD: 3.8 M/UL (ref 4.5–5.9)
RBC # BLD: ABNORMAL 10*6/UL
SARS-COV-2, RAPID: NOT DETECTED
SEG NEUTROPHILS: 72 % (ref 36–66)
SEGMENTED NEUTROPHILS ABSOLUTE COUNT: 8.86 K/UL (ref 1.3–9.1)
SODIUM BLD-SCNC: 138 MMOL/L (ref 135–144)
SPECIMEN DESCRIPTION: NORMAL
TOTAL PROTEIN: 5.9 G/DL (ref 6.4–8.3)
WBC # BLD: 12.3 K/UL (ref 3.5–11)
WBC # BLD: ABNORMAL 10*3/UL

## 2021-09-27 PROCEDURE — 97535 SELF CARE MNGMENT TRAINING: CPT

## 2021-09-27 PROCEDURE — 97116 GAIT TRAINING THERAPY: CPT

## 2021-09-27 PROCEDURE — 87635 SARS-COV-2 COVID-19 AMP PRB: CPT

## 2021-09-27 PROCEDURE — 85025 COMPLETE CBC W/AUTO DIFF WBC: CPT

## 2021-09-27 PROCEDURE — 99024 POSTOP FOLLOW-UP VISIT: CPT | Performed by: ORTHOPAEDIC SURGERY

## 2021-09-27 PROCEDURE — 97110 THERAPEUTIC EXERCISES: CPT

## 2021-09-27 PROCEDURE — 2580000003 HC RX 258: Performed by: ORTHOPAEDIC SURGERY

## 2021-09-27 PROCEDURE — 6360000002 HC RX W HCPCS: Performed by: ORTHOPAEDIC SURGERY

## 2021-09-27 PROCEDURE — 6370000000 HC RX 637 (ALT 250 FOR IP): Performed by: FAMILY MEDICINE

## 2021-09-27 PROCEDURE — 80053 COMPREHEN METABOLIC PANEL: CPT

## 2021-09-27 PROCEDURE — 97165 OT EVAL LOW COMPLEX 30 MIN: CPT

## 2021-09-27 PROCEDURE — 36415 COLL VENOUS BLD VENIPUNCTURE: CPT

## 2021-09-27 PROCEDURE — 94640 AIRWAY INHALATION TREATMENT: CPT

## 2021-09-27 PROCEDURE — 6370000000 HC RX 637 (ALT 250 FOR IP): Performed by: ORTHOPAEDIC SURGERY

## 2021-09-27 PROCEDURE — 94761 N-INVAS EAR/PLS OXIMETRY MLT: CPT

## 2021-09-27 RX ADMIN — ATENOLOL 25 MG: 25 TABLET ORAL at 08:08

## 2021-09-27 RX ADMIN — BUDESONIDE AND FORMOTEROL FUMARATE DIHYDRATE 2 PUFF: 80; 4.5 AEROSOL RESPIRATORY (INHALATION) at 08:22

## 2021-09-27 RX ADMIN — LISINOPRIL 2.5 MG: 2.5 TABLET ORAL at 08:08

## 2021-09-27 RX ADMIN — ASPIRIN 81 MG: 81 TABLET, CHEWABLE ORAL at 08:08

## 2021-09-27 RX ADMIN — OXYCODONE HYDROCHLORIDE AND ACETAMINOPHEN 1 TABLET: 5; 325 TABLET ORAL at 07:11

## 2021-09-27 RX ADMIN — ALLOPURINOL 100 MG: 100 TABLET ORAL at 08:08

## 2021-09-27 RX ADMIN — OXYCODONE HYDROCHLORIDE AND ACETAMINOPHEN 1 TABLET: 5; 325 TABLET ORAL at 15:16

## 2021-09-27 RX ADMIN — MULTIPLE VITAMINS W/ MINERALS TAB 1 TABLET: TAB at 08:08

## 2021-09-27 RX ADMIN — ROSUVASTATIN CALCIUM 10 MG: 10 TABLET, FILM COATED ORAL at 08:08

## 2021-09-27 RX ADMIN — SODIUM CHLORIDE, PRESERVATIVE FREE 10 ML: 5 INJECTION INTRAVENOUS at 08:09

## 2021-09-27 RX ADMIN — ENOXAPARIN SODIUM 40 MG: 40 INJECTION SUBCUTANEOUS at 08:08

## 2021-09-27 ASSESSMENT — PAIN SCALES - GENERAL
PAINLEVEL_OUTOF10: 5
PAINLEVEL_OUTOF10: 5
PAINLEVEL_OUTOF10: 4
PAINLEVEL_OUTOF10: 3
PAINLEVEL_OUTOF10: 3
PAINLEVEL_OUTOF10: 4

## 2021-09-27 ASSESSMENT — PAIN DESCRIPTION - PAIN TYPE
TYPE: SURGICAL PAIN;ACUTE PAIN
TYPE: ACUTE PAIN;SURGICAL PAIN
TYPE: SURGICAL PAIN;ACUTE PAIN

## 2021-09-27 ASSESSMENT — PAIN DESCRIPTION - LOCATION
LOCATION: BACK

## 2021-09-27 ASSESSMENT — PAIN DESCRIPTION - ORIENTATION
ORIENTATION: LOWER

## 2021-09-27 ASSESSMENT — PAIN - FUNCTIONAL ASSESSMENT: PAIN_FUNCTIONAL_ASSESSMENT: ACTIVITIES ARE NOT PREVENTED

## 2021-09-27 ASSESSMENT — PAIN DESCRIPTION - ONSET: ONSET: GRADUAL

## 2021-09-27 ASSESSMENT — PAIN DESCRIPTION - DESCRIPTORS: DESCRIPTORS: ACHING

## 2021-09-27 ASSESSMENT — PAIN DESCRIPTION - FREQUENCY: FREQUENCY: INTERMITTENT

## 2021-09-27 NOTE — PROGRESS NOTES
[address] [facility]    Occupational Therapy Evaluation  Date: 21  Patient Name: Nidhi Huffman       Room: [facility]/9566-93  MRN: [id number]  Account: [de-identified]   : 1940  ([de-identified] y.o.) Gender: male     Discharge Recommendations:  Further Occupational Therapy is recommended upon facility discharge. OT Equipment Recommendations  Equipment Needed: Yes  Mobility Devices: ADL Assistive Devices    Referring Practitioner: Dr Jero Betts  Diagnosis: Back Pain  Additional Pertinent Hx: Initial Back Surgery 21 - went home but unable to care for self    Treatment Diagnosis: Altered ability to care for self  Past Medical History:  has a past medical history of Anemia, Anesthesia, Arthritis, ASHD (arteriosclerotic heart disease), Asthma, CAD (coronary artery disease), Cerebral artery occlusion with cerebral infarction ([address] Chronic kidney disease, COPD (chronic obstructive pulmonary disease) ([address] COVID-19 vaccine series completed, Degenerative joint disease, Diabetes mellitus ([address] ED (erectile dysfunction), Full dentures, Gout, History of carpal tunnel syndrome, History of colon polyps, History of heart attack, History of hemorrhoids, History of TIA (transient ischemic attack), Hypercholesteremia, Hypertension, Hyperuricemia, Leukocytosis, Renal cyst, Sciatic nerve pain, Sleep apnea, and Wears glasses. Past Surgical History:   has a past surgical history that includes Coronary angioplasty with stent (1994 x1 stent,1998 x2 stents,2009 x1,); Colonoscopy (08); Shoulder arthroscopy (Right, 2002 repair); Carpal tunnel release (Right, 2002); shoulder surgery (Left, 2002); Cataract removal with implant (Left, 2012); Cataract removal with implant (Right, 2012); eye surgery; Blepharoplasty (Bilateral, 2012); Knee Arthroplasty (Left, 2017);  Total knee arthroplasty (Left, 2017); pr total hip arthroplasty (Left, 2018); joint replacement (Right, 11 ); joint replacement (Right, 1995); joint replacement (Left, 01/23/2018); lumbar fusion (N/A, 9/20/2021); back surgery; Endoscopy, colon, diagnostic; and Cardiac surgery. Restrictions  Restrictions/Precautions: Surgical Protocols, Fall Risk, General Precautions  Implants present? : Metal implants  Other position/activity restrictions: Up with Assist; Spinal Precautions No excessive Bending, Lifting, or Twisting of spine  Required Braces or Orthoses?: No     Vitals  Temp: 99 °F (37.2 °C)  Pulse: 65  Resp: 20  BP: (!) 152/79  Height: 5' 10\" (177.8 cm)  Weight: 190 lb (86.2 kg)  BMI (Calculated): 27.3  Oxygen Therapy  SpO2: 91 %  Pulse Oximeter Device Mode: Intermittent  Pulse Oximeter Device Location: Finger  O2 Device: None (Room air)  Level of Consciousness: Alert (0)    Subjective  Subjective: Feeling better - walked with P.T. Needs assist for Lowerbody/foot level care tasks  Overall Orientation Status: Within Normal Limits  Vision  Vision: Impaired  Vision Exceptions: Wears glasses for distance  Hearing  Hearing: Within functional limits  Social/Functional History  Lives With: Spouse  Type of Home: House  Home Layout: One level, Laundry in basement  Home Access: Stairs to enter with rails  Entrance Stairs - Number of Steps: 3  Entrance Stairs - Rails: Both  Bathroom Shower/Tub: Tub/Shower unit, Curtain, Shower chair without back  [employer]: Standard  Bathroom Equipment: Grab bars in shower, Grab bars around toilet  Bathroom Accessibility: Walker accessible  Home Equipment: Rolling walker, Adalid, [address], [address] Help From: Family  ADL Assistance: Independent  Homemaking Assistance: Needs assistance  Homemaking Responsibilities: No  Ambulation Assistance: Independent  Transfer Assistance: Independent  Active : Yes  Mode of Transportation: Car  Occupation: Retired  Type of occupation: [employer]  IADL Comments: pt sleeps in a flat bed - has a recliner.  pt states he attempted sleeping in recliner but awoke with inc back pain  Additional Comments: Spouse is retired and able to A with light work and assistance. Family and patient reports having increased difficulty with mobility as well as increased pain with mobility. Family reports having to bring over some of their strogner family members to help transfer and mobilize patient. Family complains that the pt is requiring assistance that they cannot regularly provide    Pain Assessment  Pain Assessment: 0-10  Pain Level: 4  Pain Type: Acute pain, Surgical pain  Pain Location: Back  Pain Orientation: Lower    Objective      Cognition  Overall Cognitive Status: WFL  Cognition Comment: Discussed Modified care techniques with a good understanding of same       ADL  Feeding: Independent  Grooming: Setup  UE Bathing: Minimal assistance  LE Bathing: Moderate assistance  UE Dressing: Contact guard assistance  LE Dressing: Maximum assistance  Toileting: Moderate assistance    UE Function  Hand Dominance  Hand Dominance: Right        LUE Strength  Gross LUE Strength: WFL  L Hand General: 5/5  Left Hand Strength -  (lbs)  Handle Setting 2: 60# (norms 52-85# )  LUE Tone: Normotonic     LUE AROM (degrees)  LUE AROM : WFL     Left Hand AROM (degrees)  Left Hand AROM: WFL  RUE Strength  Gross RUE Strength: WFL  R Hand General: 5/5   Right Hand Strength -  (lbs)  Handle Setting 2: 68# (norms 52-85# )  RUE Tone: Normotonic     RUE AROM (degrees)  RUE AROM : WFL     Right Hand AROM (degrees)  Right Hand AROM: WFL    Fine Motor Skills  Coordination  Movements Are Fluid And Coordinated:  Yes                           Mobility          Balance  Sitting Balance: Modified independent                      Assessment  Performance deficits / Impairments: Decreased functional mobility , Decreased safe awareness, Decreased ADL status, Decreased endurance, Decreased posture  Treatment Diagnosis: Altered ability to care for self  Prognosis: Good  Decision Making: Medium Complexity  History:  Moderate Chart Review  Exam: 5 areas of altered performance  REQUIRES OT FOLLOW UP: Yes  Discharge Recommendations: Patient would benefit from continued therapy after discharge  Activity Tolerance: Patient limited by fatigue         Functional Outcome Measures  AM-PAC Daily Activity Inpatient   How much help for putting on and taking off regular lower body clothing?: A Lot  How much help for Bathing?: A Lot  How much help for Toileting?: A Little  How much help for putting on and taking off regular upper body clothing?: A Little  How much help for taking care of personal grooming?: None  How much help for eating meals?: None  AM-[facility] Inpatient Daily Activity Raw Score: 18  AM-PAC Inpatient ADL T-Scale Score : 38.66  ADL Inpatient CMS 0-100% Score: 46.65  ADL Inpatient CMS G-Code Modifier : CK       Goals  Patient Goals   Patient goals : Be able to care for self at home  Short term goals  Time Frame for Short term goals: 2-5 days  Short term goal 1: Tolerate 10-25 mintues of self care activity without increased pain, needing Min A for back protection techniques  Short term goal 2: D/V understanding of Back Program strategies with application to care needs  Short term goal 3: D/V understandiongof MOdified care techiques and use of devices to support safe care performance  Short term goal 4: Particiapte in care using safe techiques for self care and functional mobility tasks    Plan  Safety Devices  Safety Devices in place: Yes  Type of devices: Left in chair, Call light within reach     Plan  Times per week: 3-5 sessions  Times per day: Daily  Current Treatment Recommendations: Functional Mobility Training, Endurance Training, Patient/Caregiver Education & Training, Self-Care / ADL, Safety Education & Training, Pain Management, Home Management Training  OT Education  OT Education: OT Role, Plan of Care, Energy Conservation, Precautions, ADL Adaptive Strategies, Transfer Training    OT Equipment Recommendations  Equipment Needed: Yes  Mobility Devices: ADL Assistive Devices  OT Individual Minutes  Time In: 0930  Time Out: 3412  Minutes: 85    Electronically signed by Eliseo Arauz OT on 9/27/21 at 2:52 PM EDT

## 2021-09-27 NOTE — PROGRESS NOTES
Surgical Progress Note    POD:      Patient doing well  Vitals:    21 1344   BP: (!) 152/79   Pulse: 65   Resp: 20   Temp: 99 °F (37.2 °C)   SpO2: 91%      Temp (24hrs), Av.3 °F (36.8 °C), Min:97.9 °F (36.6 °C), Max:99 °F (37.2 °C)       Pain Control good  No unusual nausea    Exam: much improved post treating constipation        Lungs:  No respiratory distress    Labs reviewed:  Labs:  WBC/Hgb/Hct/Plts:  12.3/11.4/34.6/325 ( 5613)  BUN/Cr/glu/ALT/AST/amyl/lip:  23/1.23/--/36/32/--/-- ( 6095)     Na/K/Cl/CO2:  138/4.1/105/21 ( 0615)    I/O last 3 completed shifts:  In: -   Out: 1075 [Urine:1075]    Assessment:    Patient Active Problem List   Diagnosis    Chronic obstructive pulmonary disease (HCC)    Leukocytosis    Anemia in stage 3 chronic kidney disease (HCC)    ASHD (arteriosclerotic heart disease)    History of TIA (transient ischemic attack)    History of hemorrhoids    Arthritis    History of carpal tunnel syndrome    ALEX on CPAP    ED (erectile dysfunction)    History of colon polyps    CKD (chronic kidney disease) stage 3, GFR 30-59 ml/min (HCC)    CKD (chronic kidney disease), stage III (HCC)    Renal cyst    Essential hypertension    Allergic rhinitis    Chronic idiopathic gout of multiple sites    Mixed hyperlipidemia    Primary osteoarthritis of left hip    Dependence on other enabling machines and devices    Hypertensive retinopathy    Type 2 diabetes mellitus with stage 3 chronic kidney disease, without long-term current use of insulin ([address]    Spinal stenosis of lumbar region with neurogenic claudication    Lumbar radicular pain    Back pain    Drug-induced constipation    Difficulty walking       Plan:  See my orders  To ecf today  Bartolo Hirsch MD MD  2021 1:48 PM

## 2021-09-27 NOTE — PLAN OF CARE
Problem: Falls - Risk of:  Goal: Will remain free from falls  Description: Will remain free from falls  Outcome: Ongoing  Note: Patient remains free of falls and injuries throughout shift. Bed remains in the lowest position, wheels locked, call light and bedside table are within reach. Goal: Absence of physical injury  Description: Absence of physical injury  Outcome: Ongoing     Problem: Bowel/Gastric:  Goal: Ability to achieve a regular elimination pattern will improve  Description: Ability to achieve a regular elimination pattern will improve  Outcome: Ongoing     Problem: Pain:  Goal: Pain level will decrease  Description: Pain level will decrease  Outcome: Ongoing  Goal: Control of acute pain  Description: Control of acute pain  Outcome: Ongoing  Note: Assess the location, characteristics, onset, duration, frequency, quality, and severity of pain. Encourage immediate report of pain. Use appropriate pain scale to rate pain. Manage pain using nonpharmacologic/pharmacologic interventions.    Goal: Control of chronic pain  Description: Control of chronic pain  Outcome: Ongoing     Problem: Musculor/Skeletal Functional Status  Goal: Highest potential functional level  Outcome: Ongoing  Goal: Absence of falls  Outcome: Ongoing     Problem: Skin Integrity:  Goal: Will show no infection signs and symptoms  Description: Will show no infection signs and symptoms  Outcome: Ongoing  Goal: Absence of new skin breakdown  Description: Absence of new skin breakdown  Outcome: Ongoing

## 2021-09-27 NOTE — PLAN OF CARE
Pt. Discharged via stretcher accompanied by family to Carla Pedroza, Awake and oriented x3, No c/o pain, no respiratory distress, VSS, Dressing CDI, Pt. And family verbalized understanding of POC. IV removed,  Report given to Carla Pedroza by [place]. No further needs at this time.

## 2021-09-27 NOTE — CARE COORDINATION
AIDAN talked to pt who confirmed [facility] for next step. Talked to Jennifer Asher in admissions and she accepted pt today 9/27/2021. AIDAN set up transport for 4pm and pt will need a negative covid test before discharge. 7000 Completed. AIDAN Intern Sylvie.

## 2021-09-27 NOTE — PROGRESS NOTES
Physical Therapy  [address]   Physical Therapy Progress Note    Date: 21  Patient Name: Beth Mcnamara       Room: 3294/6457-00  MRN: [id number]   Account: [de-identified]   : 1940  ([de-identified] y.o.) Gender: male           Past Medical History:  has a past medical history of Anemia, Anesthesia, Arthritis, ASHD (arteriosclerotic heart disease), Asthma, CAD (coronary artery disease), Cerebral artery occlusion with cerebral infarction ([facility] [address] Chronic kidney disease, COPD (chronic obstructive pulmonary disease) ([facility] [address] COVID-19 vaccine series completed, Degenerative joint disease, Diabetes mellitus ([facility] [address] ED (erectile dysfunction), Full dentures, Gout, History of carpal tunnel syndrome, History of colon polyps, History of heart attack, History of hemorrhoids, History of TIA (transient ischemic attack), Hypercholesteremia, Hypertension, Hyperuricemia, Leukocytosis, Renal cyst, Sciatic nerve pain, Sleep apnea, and Wears glasses. Past Surgical History:   has a past surgical history that includes Coronary angioplasty with stent (1994 x1 stent,1998 x2 stents,2009 x1,); Colonoscopy (08); Shoulder arthroscopy (Right, 2002 repair); Carpal tunnel release (Right, 2002); shoulder surgery (Left, 2002); Cataract removal with implant (Left, 2012); Cataract removal with implant (Right, 2012); eye surgery; Blepharoplasty (Bilateral, 2012); Knee Arthroplasty (Left, 2017); Total knee arthroplasty (Left, 2017); pr total hip arthroplasty (Left, 2018); joint replacement (Right, 11 ); joint replacement (Right, ); joint replacement (Left, 2018); lumbar fusion (N/A, 2021); back surgery; Endoscopy, colon, diagnostic; and Cardiac surgery. Additional Pertinent Hx: pt os a [de-identified] y.o. male who is s/p L3-L5 POSTERIOR LUMBAR DECOMPRESSSION & INSTRUMENTED FUSION on 21 by Dr. Gisel Tang and was D/C'd from the hospital the following day.  Pt presents to ER on Fair;+ (with RW)  Standing - Dynamic: Fair (with RW)  Comments: Standing balance assessed with RW. EXERCISES    Other exercises?: Yes  Other exercises 1: (B) LE seated ex x 10           Activity Tolerance: Patient Tolerated treatment well, Patient limited by pain  PT Equipment Recommendations  Equipment Needed: No  Other: Pt has RW at home, RW is recommended to be used at all times      Current Treatment Recommendations: Strengthening, Balance Training, Functional Mobility Training, Transfer Training, Gait Training, Stair training, Pain Management    Conditions Requiring Skilled Therapeutic Intervention  Body structures, Functions, Activity limitations: Decreased functional mobility ; Decreased strength;Decreased safe awareness;Decreased balance; Increased pain  Assessment: pt requires supervision-CGA for bed mobility, CGA for transfers with RW, Amb of 50'x2 with RW, and MinAx1 for stair negotiation. Pt would benefit from continued PT services to address deficits in BLE strength, balance, and functional mobility and to progress pt towards prior level of indepence. At this time pt is lacking adequate physical support and assistance at home per patient and family. Treatment Diagnosis: Impaired mobility secondary to acute exacerbation of chronic low back pain  Prognosis: Good  History: s/p L3-L5 POSTERIOR LUMBAR DECOMPRESSSION & INSTRUMENTED FUSION on 9/20/21  REQUIRES PT FOLLOW UP: Yes  Treatment Initiated : Bed mobility (log roll technique), Transfer and gait training with RW, Stairs  Discharge Recommendations: Continue to assess pending progress; Patient would benefit from continued therapy after discharge    Goals  Short term goals  Time Frame for Short term goals: 3 days  Short term goal 1: pt to improve BLE strength by 1/2 manual muscle grade to improve safety with functional mobility.    Short term goal 2: pt to demo good technique for maximal spinal protection during all bed mobility Mod I on flattened bed w/o use of bed rails to decrease burden of care  Short term goal 3: pt to amb 76' with RW and supervision to improve safety with amb for household distances  Short term goal 4: pt to negotiate 3 steps with Bilateral Hand rails with SBA to allow for safe home access  Short term goal 5: pt to demo good technique with all transfers using RW Mod I to improve safety with functional mobility  Short term goal 6: pt to improve standing balance to Fair + with RW to improve safety with functional mobility.  pt is at risk for falls, Pt advised to use RW at all times       09/27/21 0937   PT Individual Minutes   Time In 0905   Time Out 0937   Minutes 32       Electronically signed by Tho Garcia PTA on 9/27/21 at 9:38 AM EDT

## 2021-09-27 NOTE — PROGRESS NOTES
and cooperative  Skin: Skin color, texture, turgor normal. No rashes or lesions  Lungs: clear to auscultation bilaterally  Heart: regular rate and rhythm, S1, S2 normal, no murmur, click, rub or gallop  Abdomen: soft, non-tender; bowel sounds normal; no masses,  no organomegaly  Extremities: extremities normal, atraumatic, no cyanosis or edema  Neurologic: Mental status: Alert, oriented, thought content appropriate    Prophylaxis:   DVT with  [x] lovenox        [] heparin        [] Scd        [] none:     Radiology:  No results found. Assessment :   1. S/p laminectomy for pain control/  2. costipation/better     Plan:   1. Continue present care  2.   Continue therapy    Patient Active Problem List:     Chronic obstructive pulmonary disease (HCC)     Leukocytosis     Anemia in stage 3 chronic kidney disease (HCC)     ASHD (arteriosclerotic heart disease)     History of TIA (transient ischemic attack)     History of hemorrhoids     Arthritis     History of carpal tunnel syndrome     ALEX on CPAP     ED (erectile dysfunction)     History of colon polyps     CKD (chronic kidney disease) stage 3, GFR 30-59 ml/min (HCC)     CKD (chronic kidney disease), stage III (HCC)     Renal cyst     Essential hypertension     Allergic rhinitis     Chronic idiopathic gout of multiple sites     Mixed hyperlipidemia     Primary osteoarthritis of left hip     Dependence on other enabling machines and devices     Hypertensive retinopathy     Type 2 diabetes mellitus with stage 3 chronic kidney disease, without long-term current use of insulin ([address]     Spinal stenosis of lumbar region with neurogenic claudication     Lumbar radicular pain     Back pain     Drug-induced constipation     Difficulty walking      Anticipated Disposition upon discharge: [] Home                                                                         [] Home with Home Health                                                                         [] Skilled Nursing Facility                                                                         [] [address]      Patient is admitted as inpatient status because of co-morbidities listed above, severity of signs and symptoms as outlined, requirement for current medical therapies and most importantly because of direct risk to patient if care not provided in a hospital setting.           Gil Ward MD, MD  Rounding Hospitalist

## 2021-09-28 NOTE — PROGRESS NOTES
Physician Progress Note      PATIENT:               Leonie Morin  CSN #:                  [id number]  :                       1940  ADMIT DATE:       2021 12:29 PM  [address] DATE:        2021 4:10 PM  RESPONDING  PROVIDER #:        Nadira Holloway MD          QUERY TEXT:    Pt admitted with back pain s/p L3-5 posterior decompression and fusion on   2021. If possible, please document in progress notes and discharge   summary the relationship, if any, between patient's back pain and recent   surgery. The medical record reflects the following:  Risk Factors: advanced age of [de-identified];  lumbar decompression and fusion on 21   for spinal stenosis with neurogenic claudication  Clinical Indicators: c/o back pain in the setting of above  Treatment: admission, 10mg IV decadron in ED, 4 mg IV Morphine x 1 on ,   PT/OT, Percocet changed to 1 tab every 4 hours prn from 2 tabs every 6 hours   prn  Options provided:  -- Acute postoperative pain due to lumbar decompression and fusion  -- Back pain unrelated to OR for lumbar decompression and fusion  -- Other - I will add my own diagnosis  -- Disagree - Not applicable / Not valid  -- Disagree - Clinically unable to determine / Unknown  -- Refer to Clinical Documentation Reviewer    PROVIDER RESPONSE TEXT:    This patient has acute postoperative pain associated with lumbar decompression   and fusion.     Query created by: Desirae Perez on 2021 4:31 PM      Electronically signed by:  Nadira Holloway MD 2021 6:56 AM

## 2021-09-28 NOTE — DISCHARGE SUMMARY
these medications    allopurinol 100 MG tablet  Commonly known as: ZYLOPRIM  TAKE 1 TABLET DAILY     Aspirin 81 81 MG chewable tablet  Generic drug: aspirin     atenolol 25 MG tablet  Commonly known as: TENORMIN  TAKE 1 TABLET DAILY     CPAP Machine Misc     FISH OIL     Fluticasone-Salmeterol 232-14 MCG/ACT Aepb     lisinopril 2.5 MG tablet  Commonly known as: PRINIVIL;ZESTRIL  TAKE 1 TABLET DAILY     nitroGLYCERIN 0.4 MG SL tablet  Commonly known as: NITROSTAT     oxyCODONE-acetaminophen 5-325 MG per tablet  Commonly known as: Percocet  Take 1 tablet by mouth every 6 hours as needed for Pain for up to 7 days. Intended supply: 7 days. Take lowest dose possible to manage pain     rosuvastatin 10 MG tablet  Commonly known as: CRESTOR  TAKE 1 TABLET DAILY     therapeutic multivitamin-minerals tablet     Ventolin  (90 Base) MCG/ACT inhaler  Generic drug: albuterol sulfate HFA        STOP taking these medications    nicotine polacrilex 4 MG lozenge  Commonly known as: COMMIT           Where to Get Your Medications      You can get these medications from any pharmacy    Bring a paper prescription for each of these medications  · oxyCODONE-acetaminophen 5-325 MG per tablet          Discharge Condition  Stable       Activity on Discharge  As tolerated       Discharge Disposition:  [place]    Discharge Instructions  See Orders    Follow-Up Scheduled    No follow-up provider specified.     Electronically signed by Myke Chapa MD on 9/28/2021 at 6:56 AM

## 2021-09-28 NOTE — PROGRESS NOTES
Physician Progress Note      PATIENT:               Rica Joyner  CSN #:                  [id number]  :                       1940  ADMIT DATE:       2021 12:29 PM  [address] [place] DATE:        2021 4:10 PM  RESPONDING  PROVIDER #:        Manda Ambrocio MD          QUERY TEXT:    Patient admitted with back pain and constipation s/p PLIF, noted to have SARAH   documented in  internal medicine progress note. Pt also noted to have CKD   Stage III documented in [address]. If possible, please document in progress notes   the clinical indicators to support the diagnosis of SARAH or document if SARAH has   been ruled out after study    The medical record reflects the following:  Risk Factors: Advanced age, Hypertension, Diabetic. recent OR  Clinical Indicators: BUN/CR/GFR: 22/1.15/>60 on  and 23/1.23/57 on ;    patient with GFR 40-50s consistently dating back to  per values in [facility];   most recently:  BUN/CR/GFR:  28/1.56/43 on 2021  Treatment: Pt was receiving 0.9% NS at 75 ml/ hr which have been discontinued   as of , monitoring BMP  Options provided:  -- Acute kidney injury evidenced by, Please document evidence as well as   baseline creatinine, if known. -- Acute kidney injury ruled out after study and baseline CKD 3 confirmed  -- Other - I will add my own diagnosis  -- Disagree - Not applicable / Not valid  -- Disagree - Clinically unable to determine / Unknown  -- Refer to Clinical Documentation Reviewer    PROVIDER RESPONSE TEXT:    Provider disagreed with this query.   pre renal azotemia    Query created by: Qamar Bunch on 2021 5:00 PM      Electronically signed by:  Manda Ambrocio MD 2021 9:09 PM

## 2021-09-29 ENCOUNTER — HOSPITAL ENCOUNTER (OUTPATIENT)
Age: 81
Setting detail: SPECIMEN
Discharge: HOME | End: 2021-09-29
Payer: MEDICARE

## 2021-09-29 LAB
ANION GAP SERPL CALCULATED.3IONS-SCNC: 16 MMOL/L (ref 9–17)
BUN BLDV-MCNC: 28 MG/DL (ref 8–23)
BUN/CREAT BLD: ABNORMAL (ref 9–20)
CALCIUM SERPL-MCNC: 9.2 MG/DL (ref 8.6–10.4)
CHLORIDE BLD-SCNC: 104 MMOL/L (ref 98–107)
CO2: 20 MMOL/L (ref 20–31)
CREAT SERPL-MCNC: 1.41 MG/DL (ref 0.7–1.2)
GFR AFRICAN AMERICAN: 59 ML/MIN
GFR NON-AFRICAN AMERICAN: 48 ML/MIN
GFR SERPL CREATININE-BSD FRML MDRD: ABNORMAL ML/MIN/{1.73_M2}
GFR SERPL CREATININE-BSD FRML MDRD: ABNORMAL ML/MIN/{1.73_M2}
GLUCOSE BLD-MCNC: 76 MG/DL (ref 70–99)
HCT VFR BLD CALC: 37 % (ref 40.7–50.3)
HEMOGLOBIN: 12 G/DL (ref 13–17)
MCH RBC QN AUTO: 29.7 PG (ref 25.2–33.5)
MCHC RBC AUTO-ENTMCNC: 32.4 G/DL (ref 28.4–34.8)
MCV RBC AUTO: 91.6 FL (ref 82.6–102.9)
NRBC AUTOMATED: 0 PER 100 WBC
PDW BLD-RTO: 13.4 % (ref 11.8–14.4)
PLATELET # BLD: 392 K/UL (ref 138–453)
PMV BLD AUTO: 11.3 FL (ref 8.1–13.5)
POTASSIUM SERPL-SCNC: 4.1 MMOL/L (ref 3.7–5.3)
RBC # BLD: 4.04 M/UL (ref 4.21–5.77)
SODIUM BLD-SCNC: 140 MMOL/L (ref 135–144)
WBC # BLD: 17.2 K/UL (ref 3.5–11.3)

## 2021-09-29 PROCEDURE — 85027 COMPLETE CBC AUTOMATED: CPT

## 2021-09-29 PROCEDURE — P9603 ONE-WAY ALLOW PRORATED MILES: HCPCS

## 2021-09-29 PROCEDURE — 36415 COLL VENOUS BLD VENIPUNCTURE: CPT

## 2021-09-29 PROCEDURE — 80048 BASIC METABOLIC PNL TOTAL CA: CPT

## 2021-09-30 ENCOUNTER — HOSPITAL ENCOUNTER (OUTPATIENT)
Age: 81
Setting detail: SPECIMEN
Discharge: HOME | End: 2021-09-30
Payer: MEDICARE

## 2021-09-30 LAB
BILIRUBIN URINE: NEGATIVE
COLOR: YELLOW
COMMENT UA: ABNORMAL
GLUCOSE URINE: NEGATIVE
KETONES, URINE: NEGATIVE
LEUKOCYTE ESTERASE, URINE: NEGATIVE
NITRITE, URINE: NEGATIVE
PH UA: 5 (ref 5–8)
PROTEIN UA: NEGATIVE
SPECIFIC GRAVITY UA: 1.03 (ref 1–1.03)
TURBIDITY: CLEAR
URINE HGB: NEGATIVE
UROBILINOGEN, URINE: NORMAL

## 2021-09-30 PROCEDURE — 81003 URINALYSIS AUTO W/O SCOPE: CPT

## 2021-09-30 PROCEDURE — 87077 CULTURE AEROBIC IDENTIFY: CPT

## 2021-09-30 PROCEDURE — 87086 URINE CULTURE/COLONY COUNT: CPT

## 2021-09-30 PROCEDURE — 87186 SC STD MICRODIL/AGAR DIL: CPT

## 2021-10-01 ENCOUNTER — HOSPITAL ENCOUNTER (OUTPATIENT)
Age: 81
Setting detail: SPECIMEN
Discharge: HOME | End: 2021-10-01
Payer: MEDICARE

## 2021-10-01 ENCOUNTER — TELEPHONE (OUTPATIENT)
Dept: ORTHOPEDIC SURGERY | Age: 81
End: 2021-10-01

## 2021-10-01 LAB
HCT VFR BLD CALC: 36.9 % (ref 40.7–50.3)
HEMOGLOBIN: 11.9 G/DL (ref 13–17)
MCH RBC QN AUTO: 29.8 PG (ref 25.2–33.5)
MCHC RBC AUTO-ENTMCNC: 32.2 G/DL (ref 28.4–34.8)
MCV RBC AUTO: 92.3 FL (ref 82.6–102.9)
NRBC AUTOMATED: 0 PER 100 WBC
PDW BLD-RTO: 13.3 % (ref 11.8–14.4)
PLATELET # BLD: 406 K/UL (ref 138–453)
PMV BLD AUTO: 11.3 FL (ref 8.1–13.5)
RBC # BLD: 4 M/UL (ref 4.21–5.77)
WBC # BLD: 17.5 K/UL (ref 3.5–11.3)

## 2021-10-01 PROCEDURE — P9603 ONE-WAY ALLOW PRORATED MILES: HCPCS

## 2021-10-01 PROCEDURE — 36415 COLL VENOUS BLD VENIPUNCTURE: CPT

## 2021-10-01 PROCEDURE — 85027 COMPLETE CBC AUTOMATED: CPT

## 2021-10-01 NOTE — TELEPHONE ENCOUNTER
Franca Gutierrez calling to see if you want to put patient on medication due to Urine lab & Bloodwork results. Patient has post op visit on 10/5 with you. WBC 17. 5High   3.5 - 11.3 k/uL Final 10/01/2021  7:45  [address]   RBC 4.00Low   4.21 - 5.77 m/uL Final 10/01/2021  7:45  [address]   Hemoglobin 11.9Low   13.0 - 17.0 g/dL Final 10/01/2021  7:45  [address]   Hematocrit 36.9Low   40.7 - 50.3 % Final 10/01/2021  7:45 AM [facility] IT Up, UA 1.031High   1.005 - 1.030 Final 09/30/2021 11:56 AM - [address] [facility]

## 2021-10-02 LAB
CULTURE: ABNORMAL
Lab: ABNORMAL
SPECIMEN DESCRIPTION: ABNORMAL

## 2021-10-05 ENCOUNTER — OFFICE VISIT (OUTPATIENT)
Dept: ORTHOPEDIC SURGERY | Age: 81
End: 2021-10-05

## 2021-10-05 ENCOUNTER — HOSPITAL ENCOUNTER (OUTPATIENT)
Age: 81
Setting detail: SPECIMEN
Discharge: HOME | End: 2021-10-05
Payer: MEDICARE

## 2021-10-05 VITALS — RESPIRATION RATE: 16 BRPM | BODY MASS INDEX: 27.2 KG/M2 | HEIGHT: 70 IN | WEIGHT: 190 LBS

## 2021-10-05 DIAGNOSIS — M51.36 DEGENERATIVE DISC DISEASE, LUMBAR: ICD-10-CM

## 2021-10-05 DIAGNOSIS — M48.062 LUMBAR STENOSIS WITH NEUROGENIC CLAUDICATION: ICD-10-CM

## 2021-10-05 DIAGNOSIS — M54.16 LUMBAR RADICULAR PAIN: Primary | ICD-10-CM

## 2021-10-05 DIAGNOSIS — M41.9 SCOLIOSIS OF LUMBAR SPINE, UNSPECIFIED SCOLIOSIS TYPE: ICD-10-CM

## 2021-10-05 LAB
HCT VFR BLD CALC: 40.6 % (ref 40.7–50.3)
HEMOGLOBIN: 12.7 G/DL (ref 13–17)
MCH RBC QN AUTO: 29.7 PG (ref 25.2–33.5)
MCHC RBC AUTO-ENTMCNC: 31.3 G/DL (ref 28.4–34.8)
MCV RBC AUTO: 95.1 FL (ref 82.6–102.9)
NRBC AUTOMATED: 0 PER 100 WBC
PDW BLD-RTO: 13.6 % (ref 11.8–14.4)
PLATELET # BLD: 388 K/UL (ref 138–453)
PMV BLD AUTO: 11.8 FL (ref 8.1–13.5)
RBC # BLD: 4.27 M/UL (ref 4.21–5.77)
WBC # BLD: 12.9 K/UL (ref 3.5–11.3)

## 2021-10-05 PROCEDURE — P9603 ONE-WAY ALLOW PRORATED MILES: HCPCS

## 2021-10-05 PROCEDURE — 85027 COMPLETE CBC AUTOMATED: CPT

## 2021-10-05 PROCEDURE — 36415 COLL VENOUS BLD VENIPUNCTURE: CPT

## 2021-10-05 PROCEDURE — 99024 POSTOP FOLLOW-UP VISIT: CPT | Performed by: ORTHOPAEDIC SURGERY

## 2021-10-05 RX ORDER — ATORVASTATIN CALCIUM 20 MG/1
20 TABLET, FILM COATED ORAL DAILY
COMMUNITY
End: 2022-01-06 | Stop reason: ALTCHOICE

## 2021-10-05 RX ORDER — SENNA PLUS 8.6 MG/1
1 TABLET ORAL DAILY
COMMUNITY
End: 2022-07-11

## 2021-10-05 NOTE — PROGRESS NOTES
Patient ID: Eusebio Nguyen is a [de-identified] y.o. male    Chief Compliant:  No chief complaint on file. Diagnostic imaging:    AP lateral lumbar spine status post L3-5 PLIF graft hardware stable  Assessment and Plan:  1. Lumbar radicular pain    2. Scoliosis of lumbar spine, unspecified scoliosis type    3. Degenerative disc disease, lumbar    4. Lumbar stenosis with neurogenic claudication      2 weeks post PLIF with some left leg pain, moderate improvement in his ability to walk. Follow up 4 weeks    HPI:  This is a [de-identified] y.o. male who presents to the clinic today 2 weeks post L3-5 PLIF 9/20/21. Patient is recovering well post PLIF with mild left leg pain    His ability to walk has moderately improved compared to preoperatively     Patient is ambulating via walker    Review of Systems   All other systems reviewed and are negative. Past History:    Current Outpatient Medications:     allopurinol (ZYLOPRIM) 100 MG tablet, TAKE 1 TABLET DAILY, Disp: 90 tablet, Rfl: 1    nitroGLYCERIN (NITROSTAT) 0.4 MG SL tablet, Place 0.4 mg under the tongue every 5 minutes as needed for Chest pain up to max of 3 total doses.  If no relief after 1 dose, call 911., Disp: , Rfl:     rosuvastatin (CRESTOR) 10 MG tablet, TAKE 1 TABLET DAILY, Disp: 90 tablet, Rfl: 1    lisinopril (PRINIVIL;ZESTRIL) 2.5 MG tablet, TAKE 1 TABLET DAILY, Disp: 90 tablet, Rfl: 1    atenolol (TENORMIN) 25 MG tablet, TAKE 1 TABLET DAILY, Disp: 90 tablet, Rfl: 3    Fluticasone-Salmeterol 232-14 MCG/ACT AEPB, Inhale 1 puff into the lungs 2 times daily , Disp: , Rfl:     aspirin (ASPIRIN 81) 81 MG chewable tablet, Take 81 mg by mouth daily , Disp: , Rfl:     CPAP Machine MISC, --- ---, Disp: , Rfl:     albuterol sulfate HFA (VENTOLIN HFA) 108 (90 Base) MCG/ACT inhaler, 2 puffs as needed, Disp: , Rfl:     FISH OIL, Take 1 capsule by mouth 2 times daily , Disp: , Rfl:     therapeutic multivitamin-minerals (THERAGRAN-M) tablet, Take 1 tablet by mouth daily. OTC, Disp: , Rfl:   Allergies   Allergen Reactions    Pcn [Penicillins] Itching    Ceclor [Cefaclor] Itching and Rash     Social History     Socioeconomic History    Marital status:      Spouse name: Not on file    Number of children: Not on file    Years of education: Not on file    Highest education level: Not on file   Occupational History    Not on file   Tobacco Use    Smoking status: Former Smoker     Packs/day: 2.00     Years: 35.00     Pack years: 70.00     Quit date:      Years since quittin.7    Smokeless tobacco: Former User     Types: Chew    Tobacco comment: 3/30/21: NICOTINE LOZENGES - current use   Vaping Use    Vaping Use: Never used   Substance and Sexual Activity    Alcohol use: No     Alcohol/week: 0.0 standard drinks     Comment: rarely    Drug use: No    Sexual activity: Not on file   Other Topics Concern    Not on file   Social History Narrative    Not on file     Social Determinants of Health     Financial Resource Strain: Low Risk     Difficulty of Paying Living Expenses: Not hard at all   Food Insecurity: No Food Insecurity    Worried About [address] in the Last Year: Never true    [facility] in the Last Year: Never true   Transportation Needs: No Transportation Needs    Lack of Transportation (Medical): No    Lack of Transportation (Non-Medical):  No   Physical Activity:     Days of Exercise per Week:     Minutes of Exercise per Session:    Stress:     Feeling of Stress :    Social Connections:     Frequency of Communication with Friends and Family:     Frequency of Social Gatherings with Friends and Family:     Attends Worship Services:     Active Member of Clubs or Organizations:     Attends Club or Organization Meetings:     Marital Status:    Intimate Partner Violence:     Fear of Current or Ex-Partner:     Emotionally Abused:     Physically Abused:     Sexually Abused:      Past Medical History:   Diagnosis Date Mikey Olsen, 10/5/21     Please note that this chart was generated using voice recognition Dragon dictation software. Although every effort was made to ensure the accuracy of this automated transcription, some errors in transcription may have occurred.

## 2021-10-08 ENCOUNTER — CARE COORDINATION (OUTPATIENT)
Dept: CARE COORDINATION | Age: 81
End: 2021-10-08

## 2021-10-08 NOTE — CARE COORDINATION
Per [employer] Email:    o             Facility Discharging From: [facility]             Discharge Date:  10/8/2021  o             Services at Discharge  ? Agency or facility name if [facility] penitentiary/ILF, etc:  [address]  o             Is Patient Agreeable to Calls? yes     [de-identified] y/o male admitted to SNF s/p  L3-L5 posterior lumbar decompression and fusion on 9/20 due to spinal stenosis with neurogenic claudication. Pt discharged home after surgery but then returned to acute setting due to pain and increased physical assistance. PMH: DM2, COPD, ALEX, CKD stage 3, retinopathy, OA  ULS: Lives in a one story home with 3 JUANY with bilat handrails. DME include a cane, RW, crutches, reacher, toilet riser, shower bench and grab bars around toilet and in the shower.             Lois Gan RN, [facility]  Skilled Inpatient Care Coordinator  C: [phone]

## 2021-10-11 ENCOUNTER — TELEPHONE (OUTPATIENT)
Dept: ORTHOPEDIC SURGERY | Age: 81
End: 2021-10-11

## 2021-10-11 RX ORDER — LISINOPRIL 2.5 MG/1
TABLET ORAL
Qty: 90 TABLET | Refills: 1 | Status: SHIPPED | OUTPATIENT
Start: 2021-10-11 | End: 2022-04-07

## 2021-10-11 NOTE — TELEPHONE ENCOUNTER
Alicja Sanders from [facility] is advising they will be resuming home care of [facility] Litter and paperwork will be faxed over. Please contact her if any questions to [phone]. Thank you.

## 2021-10-12 ENCOUNTER — CARE COORDINATION (OUTPATIENT)
Dept: CASE MANAGEMENT | Age: 81
End: 2021-10-12

## 2021-10-12 DIAGNOSIS — M54.40 ACUTE LOW BACK PAIN WITH SCIATICA, SCIATICA LATERALITY UNSPECIFIED, UNSPECIFIED BACK PAIN LATERALITY: Primary | ICD-10-CM

## 2021-10-12 PROCEDURE — 1111F DSCHRG MED/CURRENT MED MERGE: CPT | Performed by: FAMILY MEDICINE

## 2021-10-12 NOTE — CARE COORDINATION
current, reviewed and needs to be updated, not on file; education provided, not on file, patient declined education, decision maker updated and referral to internal ACP facilitator. Was this a readmission? No  Patient stated reason for admission: back pain  Patients top risk factors for readmission: medical condition-back pain    Care Transition Nurse (CTN) contacted the Pt by telephone to perform post hospital discharge assessment. Verified name and  with Pt as identifiers. Provided introduction to self, and explanation of the CTN role. CTN reviewed discharge instructions, medical action plan and red flags with pt who verbalized understanding. PT given an opportunity to ask questions and does not have any further questions or concerns at this time. Were discharge instructions available to patient? Yes. Reviewed appropriate site of care based on symptoms and resources available to patient including: PCP. The pt agrees to contact the PCP office for questions related to their healthcare. Medication reconciliation was performed with pt, who verbalizes understanding of administration of home medications. Advised obtaining a 90-day supply of all daily and as-needed medications. Covid Risk Education     Educated patient about risk for severe COVID-19 due to risk factors according to CDC guidelines. CTN reviewed discharge instructions, medical action plan and red flag symptoms with the patient who verbalized understanding. Discussed COVID vaccination status: Yes. Education provided on COVID-19 vaccination as appropriate. Discussed exposure protocols and quarantine with CDC Guidelines. Patient was given an opportunity to verbalize any questions and concerns and agrees to contact CTN or health care provider for questions related to their healthcare. Reviewed and educated patient on any new and changed medications related to discharge diagnosis. Was patient discharged with a pulse oximeter?  No Discussed and confirmed pulse oximeter discharge instructions and when to notify provider or seek emergency care. CTN provided contact information. Plan for follow-up call in 5-7 days based on severity of symptoms and risk factors.   Plan for next call: referral to ambulatory care [address] [place] [address] Av. Transitions 24 Hour Call    Post Acute Services: [address] [facility] [place] (Comment: Darwin Zeng )  Care Transitions Interventions         Follow Up  Future Appointments   Date Time Provider Kaye Kelly   10/13/2021  1:00 PM SCHEDULE, [facility]   11/2/2021  9:40 AM MD VANESSA Rashid [facility]   1/6/2022 11:30 AM MD Maricel Jones   7/13/2022  9:00 AM MD VANESSA Verdin [facility] GHULAM FriasN, RN   [facility] Nurse  [phone]

## 2021-10-13 ENCOUNTER — NURSE ONLY (OUTPATIENT)
Dept: FAMILY MEDICINE CLINIC | Age: 81
End: 2021-10-13
Payer: MEDICARE

## 2021-10-13 VITALS — TEMPERATURE: 97.3 F

## 2021-10-13 DIAGNOSIS — Z23 NEEDS FLU SHOT: Primary | ICD-10-CM

## 2021-10-13 PROCEDURE — 90694 VACC AIIV4 NO PRSRV 0.5ML IM: CPT | Performed by: NURSE PRACTITIONER

## 2021-10-13 PROCEDURE — G0008 ADMIN INFLUENZA VIRUS VAC: HCPCS | Performed by: NURSE PRACTITIONER

## 2021-11-02 ENCOUNTER — OFFICE VISIT (OUTPATIENT)
Dept: ORTHOPEDIC SURGERY | Age: 81
End: 2021-11-02

## 2021-11-02 VITALS — BODY MASS INDEX: 27.2 KG/M2 | HEIGHT: 70 IN | RESPIRATION RATE: 16 BRPM | WEIGHT: 190 LBS

## 2021-11-02 DIAGNOSIS — M54.16 LUMBAR RADICULAR PAIN: Primary | ICD-10-CM

## 2021-11-02 PROCEDURE — 99024 POSTOP FOLLOW-UP VISIT: CPT | Performed by: ORTHOPAEDIC SURGERY
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
## 2022-01-03 ENCOUNTER — HOSPITAL ENCOUNTER (OUTPATIENT)
Age: 82
Discharge: HOME | End: 2022-01-03
Payer: MEDICARE

## 2022-01-03 DIAGNOSIS — E13.22 SECONDARY DIABETES MELLITUS WITH STAGE 3 CHRONIC KIDNEY DISEASE (HCC): ICD-10-CM

## 2022-01-03 DIAGNOSIS — N28.1 RENAL CYST: ICD-10-CM

## 2022-01-03 DIAGNOSIS — I12.9 BENIGN HYPERTENSION WITH CKD (CHRONIC KIDNEY DISEASE) STAGE III (HCC): ICD-10-CM

## 2022-01-03 DIAGNOSIS — N18.30 SECONDARY DIABETES MELLITUS WITH STAGE 3 CHRONIC KIDNEY DISEASE (HCC): ICD-10-CM

## 2022-01-03 DIAGNOSIS — N18.31 STAGE 3A CHRONIC KIDNEY DISEASE (HCC): ICD-10-CM

## 2022-01-03 DIAGNOSIS — N18.30 BENIGN HYPERTENSION WITH CKD (CHRONIC KIDNEY DISEASE) STAGE III (HCC): ICD-10-CM

## 2022-01-03 LAB
-: ABNORMAL
ABSOLUTE EOS #: 0.2 K/UL (ref 0–0.4)
ABSOLUTE IMMATURE GRANULOCYTE: ABNORMAL K/UL (ref 0–0.3)
ABSOLUTE LYMPH #: 2.1 K/UL (ref 1–4.8)
ABSOLUTE MONO #: 0.8 K/UL (ref 0.1–1.3)
AMORPHOUS: ABNORMAL
ANION GAP SERPL CALCULATED.3IONS-SCNC: 13 MMOL/L (ref 9–17)
BACTERIA: ABNORMAL
BASOPHILS # BLD: 0 % (ref 0–2)
BASOPHILS ABSOLUTE: 0 K/UL (ref 0–0.2)
BILIRUBIN URINE: NEGATIVE
BUN BLDV-MCNC: 28 MG/DL (ref 8–23)
BUN/CREAT BLD: ABNORMAL (ref 9–20)
CALCIUM SERPL-MCNC: 9.9 MG/DL (ref 8.6–10.4)
CASTS UA: ABNORMAL /LPF
CHLORIDE BLD-SCNC: 107 MMOL/L (ref 98–107)
CO2: 23 MMOL/L (ref 20–31)
COLOR: YELLOW
COMMENT UA: ABNORMAL
CREAT SERPL-MCNC: 1.69 MG/DL (ref 0.7–1.2)
CRYSTALS, UA: ABNORMAL /HPF
DIFFERENTIAL TYPE: ABNORMAL
EOSINOPHILS RELATIVE PERCENT: 2 % (ref 0–4)
EPITHELIAL CELLS UA: ABNORMAL /HPF
GFR AFRICAN AMERICAN: 48 ML/MIN
GFR NON-AFRICAN AMERICAN: 39 ML/MIN
GFR SERPL CREATININE-BSD FRML MDRD: ABNORMAL ML/MIN/{1.73_M2}
GFR SERPL CREATININE-BSD FRML MDRD: ABNORMAL ML/MIN/{1.73_M2}
GLUCOSE BLD-MCNC: 129 MG/DL (ref 70–99)
GLUCOSE URINE: NEGATIVE
HCT VFR BLD CALC: 43.2 % (ref 41–53)
HEMOGLOBIN: 14 G/DL (ref 13.5–17.5)
IMMATURE GRANULOCYTES: ABNORMAL %
KETONES, URINE: NEGATIVE
LEUKOCYTE ESTERASE, URINE: NEGATIVE
LYMPHOCYTES # BLD: 22 % (ref 24–44)
MAGNESIUM: 2.4 MG/DL (ref 1.6–2.6)
MCH RBC QN AUTO: 29.8 PG (ref 26–34)
MCHC RBC AUTO-ENTMCNC: 32.6 G/DL (ref 31–37)
MCV RBC AUTO: 91.6 FL (ref 80–100)
MONOCYTES # BLD: 8 % (ref 1–7)
MUCUS: ABNORMAL
NITRITE, URINE: NEGATIVE
NRBC AUTOMATED: ABNORMAL PER 100 WBC
OTHER OBSERVATIONS UA: ABNORMAL
PDW BLD-RTO: 14.4 % (ref 11.5–14.9)
PH UA: 5.5 (ref 5–8)
PHOSPHORUS: 4.1 MG/DL (ref 2.5–4.5)
PLATELET # BLD: 232 K/UL (ref 150–450)
PLATELET ESTIMATE: ABNORMAL
PMV BLD AUTO: 10.4 FL (ref 6–12)
POTASSIUM SERPL-SCNC: 4.5 MMOL/L (ref 3.7–5.3)
PROTEIN UA: NEGATIVE
RBC # BLD: 4.71 M/UL (ref 4.5–5.9)
RBC # BLD: ABNORMAL 10*6/UL
RBC UA: ABNORMAL /HPF
RENAL EPITHELIAL, UA: ABNORMAL /HPF
SEG NEUTROPHILS: 68 % (ref 36–66)
SEGMENTED NEUTROPHILS ABSOLUTE COUNT: 6.3 K/UL (ref 1.3–9.1)
SODIUM BLD-SCNC: 143 MMOL/L (ref 135–144)
SPECIFIC GRAVITY UA: 1.02 (ref 1–1.03)
TRICHOMONAS: ABNORMAL
TURBIDITY: CLEAR
URINE HGB: NEGATIVE
UROBILINOGEN, URINE: NORMAL
WBC # BLD: 9.4 K/UL (ref 3.5–11)
WBC # BLD: ABNORMAL 10*3/UL
WBC UA: ABNORMAL /HPF
YEAST: ABNORMAL

## 2022-01-03 PROCEDURE — 84100 ASSAY OF PHOSPHORUS: CPT

## 2022-01-03 PROCEDURE — 85025 COMPLETE CBC W/AUTO DIFF WBC: CPT

## 2022-01-03 PROCEDURE — 80048 BASIC METABOLIC PNL TOTAL CA: CPT

## 2022-01-03 PROCEDURE — 36415 COLL VENOUS BLD VENIPUNCTURE: CPT

## 2022-01-03 PROCEDURE — 87086 URINE CULTURE/COLONY COUNT: CPT

## 2022-01-03 PROCEDURE — 83735 ASSAY OF MAGNESIUM: CPT

## 2022-01-03 PROCEDURE — 81001 URINALYSIS AUTO W/SCOPE: CPT

## 2022-01-04 LAB
CULTURE: NO GROWTH
Lab: NORMAL
SPECIMEN DESCRIPTION: NORMAL

## 2022-01-06 ENCOUNTER — OFFICE VISIT (OUTPATIENT)
Dept: FAMILY MEDICINE CLINIC | Age: 82
End: 2022-01-06
Payer: MEDICARE

## 2022-01-06 VITALS
BODY MASS INDEX: 27 KG/M2 | DIASTOLIC BLOOD PRESSURE: 68 MMHG | SYSTOLIC BLOOD PRESSURE: 120 MMHG | RESPIRATION RATE: 16 BRPM | TEMPERATURE: 97.2 F | WEIGHT: 188.2 LBS | HEART RATE: 60 BPM

## 2022-01-06 DIAGNOSIS — J44.9 CHRONIC OBSTRUCTIVE PULMONARY DISEASE, UNSPECIFIED COPD TYPE (HCC): ICD-10-CM

## 2022-01-06 DIAGNOSIS — N18.30 TYPE 2 DIABETES MELLITUS WITH STAGE 3 CHRONIC KIDNEY DISEASE, WITHOUT LONG-TERM CURRENT USE OF INSULIN, UNSPECIFIED WHETHER STAGE 3A OR 3B CKD (HCC): Primary | ICD-10-CM

## 2022-01-06 DIAGNOSIS — E78.2 MIXED HYPERLIPIDEMIA: ICD-10-CM

## 2022-01-06 DIAGNOSIS — I10 ESSENTIAL HYPERTENSION: ICD-10-CM

## 2022-01-06 DIAGNOSIS — E11.22 TYPE 2 DIABETES MELLITUS WITH STAGE 3 CHRONIC KIDNEY DISEASE, WITHOUT LONG-TERM CURRENT USE OF INSULIN, UNSPECIFIED WHETHER STAGE 3A OR 3B CKD (HCC): Primary | ICD-10-CM

## 2022-01-06 DIAGNOSIS — M1A.09X0 IDIOPATHIC CHRONIC GOUT OF MULTIPLE SITES WITHOUT TOPHUS: ICD-10-CM

## 2022-01-06 LAB — HBA1C MFR BLD: 6 %

## 2022-01-06 PROCEDURE — G8427 DOCREV CUR MEDS BY ELIG CLIN: HCPCS | Performed by: FAMILY MEDICINE

## 2022-01-06 PROCEDURE — G8417 CALC BMI ABV UP PARAM F/U: HCPCS | Performed by: FAMILY MEDICINE

## 2022-01-06 PROCEDURE — 3023F SPIROM DOC REV: CPT | Performed by: FAMILY MEDICINE

## 2022-01-06 PROCEDURE — 99214 OFFICE O/P EST MOD 30 MIN: CPT | Performed by: FAMILY MEDICINE

## 2022-01-06 PROCEDURE — 83036 HEMOGLOBIN GLYCOSYLATED A1C: CPT | Performed by: FAMILY MEDICINE

## 2022-01-06 PROCEDURE — 4040F PNEUMOC VAC/ADMIN/RCVD: CPT | Performed by: FAMILY MEDICINE

## 2022-01-06 PROCEDURE — 1123F ACP DISCUSS/DSCN MKR DOCD: CPT | Performed by: FAMILY MEDICINE

## 2022-01-06 PROCEDURE — G8484 FLU IMMUNIZE NO ADMIN: HCPCS | Performed by: FAMILY MEDICINE

## 2022-01-06 PROCEDURE — 1036F TOBACCO NON-USER: CPT | Performed by: FAMILY MEDICINE

## 2022-01-06 ASSESSMENT — ENCOUNTER SYMPTOMS
ABDOMINAL PAIN: 0
BLOOD IN STOOL: 0
SHORTNESS OF BREATH: 0
CHEST TIGHTNESS: 0

## 2022-01-06 NOTE — PROGRESS NOTES
Subjective:      Patient ID: Baron Bailey is a [de-identified] y.o. male. Chronic Disease Visit Information    BP Readings from Last 3 Encounters:   09/27/21 (!) 152/79   09/21/21 120/77   09/20/21 (!) 98/55          Hemoglobin A1C (%)   Date Value   08/06/2021 6.4   04/02/2021 6.2   11/27/2020 5.8     Microalb/Crt. Ratio (mcg/mg creat)   Date Value   06/01/2021 CANNOT BE CALCULATED     LDL Cholesterol (mg/dL)   Date Value   08/09/2021 70     HDL (mg/dL)   Date Value   08/09/2021 42     BUN (mg/dL)   Date Value   01/03/2022 28 (H)     CREATININE (mg/dL)   Date Value   01/03/2022 1.69 (H)     Glucose (mg/dL)   Date Value   01/03/2022 129 (H)   02/03/2012 143 (H)            Have you changed or started any medications since your last visit including any over-the-counter medicines, vitamins, or herbal medicines? no   Are you having any side effects from any of your medications? -  no  Have you stopped taking any of your medications? Is so, why? -  no    Have you seen any other physician or provider since your last visit? Yes - Records Obtained  Have you had any other diagnostic tests since your last visit? Yes - Records Obtained  Have you been seen in the emergency room and/or had an admission to a hospital since we last saw you? Yes - Records Obtained  Have you had your annual diabetic retinal (eye) exam? No  Have you had your routine dental cleaning in the past 6 months? no    Have you activated your [employer] account? If not, what are your barriers?  Yes     Patient Care Team:  Estrellita Fenton MD as PCP - General (Family Medicine)  Estrellita Fenton MD as PCP - [facility]  Ludivina Avina MD as Consulting Physician (Nephrology)  Adrianna Meredith MD as Consulting Physician (Ophthalmology)  Hubert Tineo MD (Pulmonology)  Kaci Swift MD as Consulting Physician (Orthopedic Surgery)  Herve Malone MD as Consulting Physician (Cardiology)  Yvon Burns MD as Consulting Physician (Gastroenterology) Medical History Review  Past Medical, Family, and Social History reviewed and does contribute to the patient presenting condition    Health Maintenance   Topic Date Due    DTaP/Tdap/Td vaccine (1 - Tdap) Never done    Shingles Vaccine (2 of 3) 11/14/2013    Depression Screen  06/03/2022    Annual Wellness Visit (AWV)  06/04/2022    Lipid screen  08/09/2022    Potassium monitoring  01/03/2023    Creatinine monitoring  01/03/2023    Flu vaccine  Completed    Pneumococcal 65+ years Vaccine  Completed    COVID-19 Vaccine  Completed    Hepatitis A vaccine  Aged Out    Hib vaccine  Aged Out    Meningococcal (ACWY) vaccine  Aged Out     HPI  Patient is a 81-year-old white male who presents for diabetes, hypertension, hyperlipidemia, gout. He also has a history of COPD which is managed by his pulmonologist.  He states that he is taking and tolerating his routine medications. His hemoglobin A1c today has improved to 6.0. He denies any fever, chills, chest pain, abdominal pain, shortness of breath. He has a good appetite and remains active. Review of Systems   Constitutional: Negative for chills and fever. HENT: Negative for congestion. Respiratory: Negative for chest tightness and shortness of breath. Cardiovascular: Negative for chest pain. Gastrointestinal: Negative for abdominal pain and blood in stool. Genitourinary: Negative for dysuria and hematuria. Skin: Negative for rash. Neurological: Negative for dizziness. Psychiatric/Behavioral: Negative for confusion and dysphoric mood. Objective:   Physical Exam  Vitals and nursing note reviewed. Constitutional:       General: He is not in acute distress. Appearance: He is well-developed. HENT:      Head: Normocephalic and atraumatic.       Right Ear: Tympanic membrane, ear canal and external ear normal.      Left Ear: Tympanic membrane, ear canal and external ear normal.      Nose: Nose normal.      Mouth/Throat: Mouth: Mucous membranes are moist.      Pharynx: Oropharynx is clear. Eyes:      General: No scleral icterus. Right eye: No discharge. Left eye: No discharge. Conjunctiva/sclera: Conjunctivae normal.   Cardiovascular:      Rate and Rhythm: Normal rate and regular rhythm. Heart sounds: Normal heart sounds. Pulmonary:      Effort: Pulmonary effort is normal. No respiratory distress. Breath sounds: Normal breath sounds. No wheezing. Abdominal:      General: There is no distension. Palpations: Abdomen is soft. Tenderness: There is no abdominal tenderness. Musculoskeletal:      Cervical back: Neck supple. Skin:     General: Skin is warm and dry. Findings: No rash. Neurological:      Mental Status: He is alert and oriented to person, place, and time. Psychiatric:         Mood and Affect: Mood normal.         Behavior: Behavior normal.         Assessment:      1. Type 2 diabetes mellitus with stage 3 chronic kidney disease, without long-term current use of insulin, unspecified whether stage 3a or 3b CKD (HCC)    - POCT glycosylated hemoglobin (Hb A1C)  - ALT; Future  - AST; Future  - Lipid Panel; Future  Continue current management  2. Essential hypertension    - ALT; Future  - AST; Future  - Lipid Panel; Future  Continue current management  3. Mixed hyperlipidemia    - ALT; Future  - AST; Future  - Lipid Panel; Future  Continue current management  4. Idiopathic chronic gout of multiple sites without tophus    - Uric Acid; Future  Continue current management  5.  Chronic obstructive pulmonary disease, unspecified COPD type ([facility] [address]  Continue management by patient's pulmonologist          Plan:      Orders Placed This Encounter   Procedures    ALT     Standing Status:   Future     Standing Expiration Date:   1/6/2023    AST     Standing Status:   Future     Standing Expiration Date:   1/6/2023    Lipid Panel     Standing Status:   Future     Standing Expiration Date: 1/6/2023     Order Specific Question:   Is Patient Fasting?/# of Hours     Answer:    Fast 8-10 hours    Uric Acid     Standing Status:   Future     Standing Expiration Date:   1/6/2023    POCT glycosylated hemoglobin (Hb A1C)         Continue routine medications  Follow-up in 6 months or sooner if needed

## 2022-01-07 ENCOUNTER — HOSPITAL ENCOUNTER (OUTPATIENT)
Age: 82
Discharge: HOME | End: 2022-01-07
Payer: MEDICARE

## 2022-01-07 DIAGNOSIS — E11.22 TYPE 2 DIABETES MELLITUS WITH STAGE 3 CHRONIC KIDNEY DISEASE, WITHOUT LONG-TERM CURRENT USE OF INSULIN, UNSPECIFIED WHETHER STAGE 3A OR 3B CKD (HCC): ICD-10-CM

## 2022-01-07 DIAGNOSIS — E78.2 MIXED HYPERLIPIDEMIA: ICD-10-CM

## 2022-01-07 DIAGNOSIS — N18.30 TYPE 2 DIABETES MELLITUS WITH STAGE 3 CHRONIC KIDNEY DISEASE, WITHOUT LONG-TERM CURRENT USE OF INSULIN, UNSPECIFIED WHETHER STAGE 3A OR 3B CKD (HCC): ICD-10-CM

## 2022-01-07 DIAGNOSIS — I10 ESSENTIAL HYPERTENSION: ICD-10-CM

## 2022-01-07 DIAGNOSIS — M1A.09X0 IDIOPATHIC CHRONIC GOUT OF MULTIPLE SITES WITHOUT TOPHUS: ICD-10-CM

## 2022-01-07 LAB
ALT SERPL-CCNC: 18 U/L (ref 5–41)
AST SERPL-CCNC: 24 U/L
CHOLESTEROL/HDL RATIO: 2.8
CHOLESTEROL: 124 MG/DL
HDLC SERPL-MCNC: 45 MG/DL
LDL CHOLESTEROL: 60 MG/DL (ref 0–130)
TRIGL SERPL-MCNC: 95 MG/DL
URIC ACID: 6 MG/DL (ref 3.4–7)
VLDLC SERPL CALC-MCNC: NORMAL MG/DL (ref 1–30)

## 2022-01-07 PROCEDURE — 84450 TRANSFERASE (AST) (SGOT): CPT

## 2022-01-07 PROCEDURE — 36415 COLL VENOUS BLD VENIPUNCTURE: CPT

## 2022-01-07 PROCEDURE — 84460 ALANINE AMINO (ALT) (SGPT): CPT

## 2022-01-07 PROCEDURE — 84550 ASSAY OF BLOOD/URIC ACID: CPT

## 2022-01-07 PROCEDURE — 80061 LIPID PANEL: CPT

## 2022-02-10 ENCOUNTER — OFFICE VISIT (OUTPATIENT)
Dept: ORTHOPEDIC SURGERY | Age: 82
End: 2022-02-10
Payer: MEDICARE

## 2022-02-10 VITALS — HEIGHT: 70 IN | WEIGHT: 189 LBS | RESPIRATION RATE: 14 BRPM | BODY MASS INDEX: 27.06 KG/M2

## 2022-02-10 DIAGNOSIS — M54.16 LUMBAR RADICULAR PAIN: Primary | ICD-10-CM

## 2022-02-10 DIAGNOSIS — M41.9 SCOLIOSIS OF LUMBAR SPINE, UNSPECIFIED SCOLIOSIS TYPE: ICD-10-CM

## 2022-02-10 DIAGNOSIS — M48.062 LUMBAR STENOSIS WITH NEUROGENIC CLAUDICATION: ICD-10-CM

## 2022-02-10 DIAGNOSIS — M54.50 ACUTE LOW BACK PAIN, UNSPECIFIED BACK PAIN LATERALITY, UNSPECIFIED WHETHER SCIATICA PRESENT: ICD-10-CM

## 2022-02-10 DIAGNOSIS — M51.36 DEGENERATIVE DISC DISEASE, LUMBAR: ICD-10-CM

## 2022-02-10 PROCEDURE — G8417 CALC BMI ABV UP PARAM F/U: HCPCS | Performed by: ORTHOPAEDIC SURGERY

## 2022-02-10 PROCEDURE — 4040F PNEUMOC VAC/ADMIN/RCVD: CPT | Performed by: ORTHOPAEDIC SURGERY

## 2022-02-10 PROCEDURE — 99213 OFFICE O/P EST LOW 20 MIN: CPT | Performed by: ORTHOPAEDIC SURGERY

## 2022-02-10 PROCEDURE — 1036F TOBACCO NON-USER: CPT | Performed by: ORTHOPAEDIC SURGERY

## 2022-02-10 PROCEDURE — 1123F ACP DISCUSS/DSCN MKR DOCD: CPT | Performed by: ORTHOPAEDIC SURGERY

## 2022-02-10 PROCEDURE — G8428 CUR MEDS NOT DOCUMENT: HCPCS | Performed by: ORTHOPAEDIC SURGERY

## 2022-02-10 PROCEDURE — G8484 FLU IMMUNIZE NO ADMIN: HCPCS | Performed by: ORTHOPAEDIC SURGERY

## 2022-02-10 RX ORDER — METOPROLOL SUCCINATE 25 MG/1
25 TABLET, EXTENDED RELEASE ORAL DAILY
COMMUNITY

## 2022-02-10 NOTE — PROGRESS NOTES
into the lungs 2 times daily , Disp: , Rfl:     aspirin (ASPIRIN 81) 81 MG chewable tablet, Take 81 mg by mouth daily , Disp: , Rfl:     CPAP Machine MISC, --- ---, Disp: , Rfl:     albuterol sulfate HFA (VENTOLIN HFA) 108 (90 Base) MCG/ACT inhaler, 2 puffs as needed, Disp: , Rfl:     FISH OIL, Take 1 capsule by mouth 2 times daily , Disp: , Rfl:     therapeutic multivitamin-minerals (THERAGRAN-M) tablet, Take 1 tablet by mouth daily. OTC, Disp: , Rfl:   Allergies   Allergen Reactions    Pcn [Penicillins] Itching    Ceclor [Cefaclor] Itching and Rash     Social History     Socioeconomic History    Marital status:      Spouse name: Not on file    Number of children: Not on file    Years of education: Not on file    Highest education level: Not on file   Occupational History    Not on file   Tobacco Use    Smoking status: Former Smoker     Packs/day: 2.00     Years: 35.00     Pack years: 70.00     Quit date:      Years since quittin.1    Smokeless tobacco: Former User     Types: Chew    Tobacco comment: 3/30/21: NICOTINE LOZENGES - current use   Vaping Use    Vaping Use: Never used   Substance and Sexual Activity    Alcohol use: No     Alcohol/week: 0.0 standard drinks     Comment: rarely    Drug use: No    Sexual activity: Not on file   Other Topics Concern    Not on file   Social History Narrative    Not on file     Social Determinants of Health     Financial Resource Strain: Low Risk     Difficulty of Paying Living Expenses: Not hard at all   Food Insecurity: No Food Insecurity    Worried About [address] in the Last Year: Never true    [facility] in the Last Year: Never true   Transportation Needs: No Transportation Needs    Lack of Transportation (Medical): No    Lack of Transportation (Non-Medical):  No   Physical Activity:     Days of Exercise per Week: Not on file    Minutes of Exercise per Session: Not on file   Stress:     Feeling of Stress : Not on file   Social Connections:     Frequency of Communication with Friends and Family: Not on file    Frequency of Social Gatherings with Friends and Family: Not on file    Attends [employer] Services: Not on file    Active Member of Clubs or Organizations: Not on file    Attends Club or Organization Meetings: Not on file    Marital Status: Not on file   Intimate Partner Violence:     Fear of Current or Ex-Partner: Not on file    Emotionally Abused: Not on file    Physically Abused: Not on file    Sexually Abused: Not on file   Housing Stability:     Unable to Pay for Housing in the Last Year: Not on file    Number of [place] in the Last Year: Not on file    Unstable Housing in the Last Year: Not on file     Past Medical History:   Diagnosis Date    Anemia     Anesthesia     woke up eary during surgery x1 , heard saw & felt the pressure.  Arthritis 7/15/2013    ASHD (arteriosclerotic heart disease) 7/15/2013    Asthma     CAD (coronary artery disease)     has cardiac stent x 4.    Cerebral artery occlusion with cerebral infarction (HCC)     Chronic kidney disease     stage 3    COPD (chronic obstructive pulmonary disease) ([facility] [address]     COVID-19 vaccine series completed     Chantale Brian 1/26/2021, 2/23/2021    Degenerative joint disease 7/15/2013    Diabetes mellitus ([facility] [address]     ED (erectile dysfunction) 7/15/2013    Full dentures     Gout     History of carpal tunnel syndrome 07/15/2013    had surgery    History of colon polyps 7/15/2013    History of heart attack     silent one, patient was unaware    History of hemorrhoids 7/15/2013    History of TIA (transient ischemic attack) 7/15/2013    Hypercholesteremia 7/15/2013    Hypertension     Hyperuricemia 7/15/2013    Leukocytosis     Renal cyst     Sciatic nerve pain     4th and 5th lumbar vertebrae causing pinched nerve    Sleep apnea 07/15/2013    on cpap at .     Wears glasses     for reading     Past Surgical History:   Procedure No sensory deficit. Psychiatric:         Behavior: Behavior normal.         Thought Content: Thought content normal.        Provider Attestation:  Joseluis Rangel, personally performed the services described in this documentation. All medical record entries made by the scribe were at my direction and in my presence. I have reviewed the chart and discharge instructions and agree that the records reflect my personal performance and is accurate and complete. Maggi Moses MD 2/10/22     Scribe Attestation:  By signing my name below, Alejandro Cuevas attgrace that this documentation has been prepared under the direction and in the presence of Dr. Janelle White. Electronically signed: Mikey Jean Baptiste, 2/10/22     Please note that this chart was generated using voice recognition Dragon dictation software. Although every effort was made to ensure the accuracy of this automated transcription, some errors in transcription may have occurred.

## 2022-02-22 RX ORDER — ALLOPURINOL 100 MG/1
TABLET ORAL
Qty: 90 TABLET | Refills: 1 | Status: SHIPPED | OUTPATIENT
Start: 2022-02-22 | End: 2022-08-22

## 2022-04-04 RX ORDER — ROSUVASTATIN CALCIUM 10 MG/1
TABLET, COATED ORAL
Qty: 90 TABLET | Refills: 1 | Status: SHIPPED | OUTPATIENT
Start: 2022-04-04 | End: 2022-10-03

## 2022-04-04 RX ORDER — ROSUVASTATIN CALCIUM 10 MG/1
TABLET, COATED ORAL
Qty: 14 TABLET | Refills: 0 | Status: SHIPPED | OUTPATIENT
Start: 2022-04-04 | End: 2022-04-04 | Stop reason: SDUPTHER

## 2022-04-07 RX ORDER — LISINOPRIL 2.5 MG/1
TABLET ORAL
Qty: 90 TABLET | Refills: 1 | Status: SHIPPED | OUTPATIENT
Start: 2022-04-07 | End: 2022-10-06

## 2022-05-09 ENCOUNTER — HOSPITAL ENCOUNTER (OUTPATIENT)
Age: 82
Discharge: HOME | End: 2022-05-09
Payer: MEDICARE

## 2022-05-09 DIAGNOSIS — N28.1 RENAL CYST: ICD-10-CM

## 2022-05-09 DIAGNOSIS — N18.32 STAGE 3B CHRONIC KIDNEY DISEASE (HCC): ICD-10-CM

## 2022-05-09 DIAGNOSIS — N18.30 SECONDARY DIABETES MELLITUS WITH STAGE 3 CHRONIC KIDNEY DISEASE (HCC): ICD-10-CM

## 2022-05-09 DIAGNOSIS — E13.22 SECONDARY DIABETES MELLITUS WITH STAGE 3 CHRONIC KIDNEY DISEASE (HCC): ICD-10-CM

## 2022-05-09 DIAGNOSIS — I12.9 BENIGN HYPERTENSION WITH CKD (CHRONIC KIDNEY DISEASE) STAGE III (HCC): ICD-10-CM

## 2022-05-09 DIAGNOSIS — N18.30 BENIGN HYPERTENSION WITH CKD (CHRONIC KIDNEY DISEASE) STAGE III (HCC): ICD-10-CM

## 2022-05-09 LAB
ABSOLUTE EOS #: 0.4 K/UL (ref 0–0.4)
ABSOLUTE LYMPH #: 2.1 K/UL (ref 1–4.8)
ABSOLUTE MONO #: 0.7 K/UL (ref 0.1–1.3)
AMORPHOUS: ABNORMAL
ANION GAP SERPL CALCULATED.3IONS-SCNC: 12 MMOL/L (ref 9–17)
BACTERIA: ABNORMAL
BASOPHILS # BLD: 1 % (ref 0–2)
BASOPHILS ABSOLUTE: 0.1 K/UL (ref 0–0.2)
BILIRUBIN URINE: NEGATIVE
BUN BLDV-MCNC: 26 MG/DL (ref 8–23)
CALCIUM SERPL-MCNC: 9.7 MG/DL (ref 8.6–10.4)
CHLORIDE BLD-SCNC: 107 MMOL/L (ref 98–107)
CO2: 23 MMOL/L (ref 20–31)
COLOR: YELLOW
CREAT SERPL-MCNC: 1.62 MG/DL (ref 0.7–1.2)
EOSINOPHILS RELATIVE PERCENT: 4 % (ref 0–4)
EPITHELIAL CELLS UA: ABNORMAL /HPF
GFR AFRICAN AMERICAN: 50 ML/MIN
GFR NON-AFRICAN AMERICAN: 41 ML/MIN
GFR SERPL CREATININE-BSD FRML MDRD: ABNORMAL ML/MIN/{1.73_M2}
GLUCOSE BLD-MCNC: 133 MG/DL (ref 70–99)
GLUCOSE URINE: NEGATIVE
HCT VFR BLD CALC: 41.6 % (ref 41–53)
HEMOGLOBIN: 13.7 G/DL (ref 13.5–17.5)
KETONES, URINE: NEGATIVE
LEUKOCYTE ESTERASE, URINE: NEGATIVE
LYMPHOCYTES # BLD: 25 % (ref 24–44)
MAGNESIUM: 2.2 MG/DL (ref 1.6–2.6)
MCH RBC QN AUTO: 29.9 PG (ref 26–34)
MCHC RBC AUTO-ENTMCNC: 33 G/DL (ref 31–37)
MCV RBC AUTO: 90.7 FL (ref 80–100)
MONOCYTES # BLD: 8 % (ref 1–7)
NITRITE, URINE: NEGATIVE
PDW BLD-RTO: 14.6 % (ref 11.5–14.9)
PH UA: 5.5 (ref 5–8)
PHOSPHORUS: 3.1 MG/DL (ref 2.5–4.5)
PLATELET # BLD: 217 K/UL (ref 150–450)
PMV BLD AUTO: 9.4 FL (ref 6–12)
POTASSIUM SERPL-SCNC: 4.5 MMOL/L (ref 3.7–5.3)
PROTEIN UA: NEGATIVE
RBC # BLD: 4.59 M/UL (ref 4.5–5.9)
RBC UA: ABNORMAL /HPF
SEG NEUTROPHILS: 62 % (ref 36–66)
SEGMENTED NEUTROPHILS ABSOLUTE COUNT: 5.4 K/UL (ref 1.3–9.1)
SODIUM BLD-SCNC: 142 MMOL/L (ref 135–144)
SPECIFIC GRAVITY UA: 1.02 (ref 1–1.03)
TURBIDITY: CLEAR
URINE HGB: NEGATIVE
UROBILINOGEN, URINE: NORMAL
WBC # BLD: 8.6 K/UL (ref 3.5–11)
WBC UA: ABNORMAL /HPF

## 2022-05-09 PROCEDURE — 83735 ASSAY OF MAGNESIUM: CPT

## 2022-05-09 PROCEDURE — 80048 BASIC METABOLIC PNL TOTAL CA: CPT

## 2022-05-09 PROCEDURE — 85025 COMPLETE CBC W/AUTO DIFF WBC: CPT

## 2022-05-09 PROCEDURE — 36415 COLL VENOUS BLD VENIPUNCTURE: CPT

## 2022-05-09 PROCEDURE — 84100 ASSAY OF PHOSPHORUS: CPT

## 2022-05-09 PROCEDURE — 81001 URINALYSIS AUTO W/SCOPE: CPT

## 2022-05-16 PROBLEM — N18.30 CHRONIC RENAL DISEASE, STAGE III (HCC): Status: ACTIVE | Noted: 2022-05-16

## 2022-07-11 ENCOUNTER — OFFICE VISIT (OUTPATIENT)
Dept: FAMILY MEDICINE CLINIC | Age: 82
End: 2022-07-11
Payer: MEDICARE

## 2022-07-11 VITALS
TEMPERATURE: 97.3 F | WEIGHT: 198.4 LBS | HEART RATE: 60 BPM | DIASTOLIC BLOOD PRESSURE: 60 MMHG | SYSTOLIC BLOOD PRESSURE: 92 MMHG | BODY MASS INDEX: 28.47 KG/M2 | RESPIRATION RATE: 16 BRPM

## 2022-07-11 DIAGNOSIS — L98.9 SKIN LESION: ICD-10-CM

## 2022-07-11 DIAGNOSIS — I10 ESSENTIAL HYPERTENSION: ICD-10-CM

## 2022-07-11 DIAGNOSIS — M1A.09X0 IDIOPATHIC CHRONIC GOUT OF MULTIPLE SITES WITHOUT TOPHUS: ICD-10-CM

## 2022-07-11 DIAGNOSIS — N18.30 TYPE 2 DIABETES MELLITUS WITH STAGE 3 CHRONIC KIDNEY DISEASE, WITHOUT LONG-TERM CURRENT USE OF INSULIN, UNSPECIFIED WHETHER STAGE 3A OR 3B CKD (HCC): Primary | ICD-10-CM

## 2022-07-11 DIAGNOSIS — E78.2 MIXED HYPERLIPIDEMIA: ICD-10-CM

## 2022-07-11 DIAGNOSIS — E11.22 TYPE 2 DIABETES MELLITUS WITH STAGE 3 CHRONIC KIDNEY DISEASE, WITHOUT LONG-TERM CURRENT USE OF INSULIN, UNSPECIFIED WHETHER STAGE 3A OR 3B CKD (HCC): Primary | ICD-10-CM

## 2022-07-11 PROCEDURE — 1123F ACP DISCUSS/DSCN MKR DOCD: CPT | Performed by: FAMILY MEDICINE

## 2022-07-11 PROCEDURE — G8417 CALC BMI ABV UP PARAM F/U: HCPCS | Performed by: FAMILY MEDICINE

## 2022-07-11 PROCEDURE — 99214 OFFICE O/P EST MOD 30 MIN: CPT | Performed by: FAMILY MEDICINE

## 2022-07-11 PROCEDURE — 1036F TOBACCO NON-USER: CPT | Performed by: FAMILY MEDICINE

## 2022-07-11 PROCEDURE — G8427 DOCREV CUR MEDS BY ELIG CLIN: HCPCS | Performed by: FAMILY MEDICINE

## 2022-07-11 PROCEDURE — 3044F HG A1C LEVEL LT 7.0%: CPT | Performed by: FAMILY MEDICINE

## 2022-07-11 SDOH — ECONOMIC STABILITY: FOOD INSECURITY: WITHIN THE PAST 12 MONTHS, THE FOOD YOU BOUGHT JUST DIDN'T LAST AND YOU DIDN'T HAVE MONEY TO GET MORE.: NEVER TRUE

## 2022-07-11 SDOH — ECONOMIC STABILITY: FOOD INSECURITY: WITHIN THE PAST 12 MONTHS, YOU WORRIED THAT YOUR FOOD WOULD RUN OUT BEFORE YOU GOT MONEY TO BUY MORE.: NEVER TRUE

## 2022-07-11 ASSESSMENT — PATIENT HEALTH QUESTIONNAIRE - PHQ9
SUM OF ALL RESPONSES TO PHQ9 QUESTIONS 1 & 2: 0
2. FEELING DOWN, DEPRESSED OR HOPELESS: 0
SUM OF ALL RESPONSES TO PHQ QUESTIONS 1-9: 0
1. LITTLE INTEREST OR PLEASURE IN DOING THINGS: 0

## 2022-07-11 ASSESSMENT — SOCIAL DETERMINANTS OF HEALTH (SDOH): HOW HARD IS IT FOR YOU TO PAY FOR THE VERY BASICS LIKE FOOD, HOUSING, MEDICAL CARE, AND HEATING?: NOT HARD AT ALL

## 2022-07-11 ASSESSMENT — ENCOUNTER SYMPTOMS
ABDOMINAL PAIN: 0
SHORTNESS OF BREATH: 0
BLOOD IN STOOL: 0
CHEST TIGHTNESS: 0

## 2022-07-11 ASSESSMENT — LIFESTYLE VARIABLES: HOW OFTEN DO YOU HAVE A DRINK CONTAINING ALCOHOL: NEVER

## 2022-07-11 NOTE — PROGRESS NOTES
Subjective:      Patient ID: Isabel Ramirez is a 81 y.o. male. HPI   Chronic Disease Visit Information    BP Readings from Last 3 Encounters:   07/11/22 92/60   05/16/22 120/82   01/13/22 (!) 140/78          Hemoglobin A1C (%)   Date Value   01/06/2022 6.0   08/06/2021 6.4   04/02/2021 6.2     Microalb/Crt. Ratio (mcg/mg creat)   Date Value   06/01/2021 CANNOT BE CALCULATED     LDL Cholesterol (mg/dL)   Date Value   01/07/2022 60     HDL (mg/dL)   Date Value   01/07/2022 45     BUN (mg/dL)   Date Value   05/09/2022 26 (H)     CREATININE (mg/dL)   Date Value   05/09/2022 1.62 (H)     Glucose (mg/dL)   Date Value   05/09/2022 133 (H)   02/03/2012 143 (H)            Have you changed or started any medications since your last visit including any over-the-counter medicines, vitamins, or herbal medicines? no   Are you having any side effects from any of your medications? -  no  Have you stopped taking any of your medications? Is so, why? -  no    Have you seen any other physician or provider since your last visit? Yes - Records Requested  Have you had any other diagnostic tests since your last visit? Yes - Records Requested dr Rory Young june  Have you been seen in the emergency room and/or had an admission to a hospital since we last saw you? No  Have you had your annual diabetic retinal (eye) exam? No  Have you had your routine dental cleaning in the past 6 months? No-dentures    Have you activated your [employer] account? If not, what are your barriers?  Yes     Patient Care Team:  Huy Dias MD as PCP - General (Family Medicine)  Huy Dias MD as PCP - [address] [place] [address] [facility]  Stevie Rai MD as Consulting Physician (Nephrology)  Nasim Moreland MD as Consulting Physician (Ophthalmology)  Priscilla Heath MD (Pulmonology)  Gladys Stoddard MD as Consulting Physician (Orthopedic Surgery)  Stanton Salazar MD as Consulting Physician (Cardiology)  Byron Howard MD as Consulting Physician (Gastroenterology) Medical History Review  Past Medical, Family, and Social History reviewed and does contribute to the patient presenting condition    Health Maintenance   Topic Date Due    DTaP/Tdap/Td vaccine (1 - Tdap) Never done    Shingles vaccine (2 of 3) 11/14/2013    Depression Screen  06/03/2022    Annual Wellness Visit (AWV)  06/04/2022    Flu vaccine (1) 09/01/2022    Lipids  01/07/2023    Pneumococcal 65+ years Vaccine  Completed    COVID-19 Vaccine  Completed    Hepatitis A vaccine  Aged Out    Hib vaccine  Aged Out    Meningococcal (ACWY) vaccine  Aged Out     Patient is an 81-year-old white male who presents for diabetes, hypertension, hyperlipidemia, gout. He also states for the past 4 months he has had skin lesion on his left forearm that has gradually increased in size. Patient states that he is taking and tolerating his routine medication. He denies any fever, chills, chest pain, abdominal pain, shortness of breath. He has a good appetite and remains active. Review of Systems   Constitutional: Negative for chills and fever. HENT: Negative for congestion. Respiratory: Negative for chest tightness and shortness of breath. Cardiovascular: Negative for chest pain. Gastrointestinal: Negative for abdominal pain and blood in stool. Genitourinary: Negative for dysuria and hematuria. Skin: Negative for rash. Neurological: Negative for dizziness. Psychiatric/Behavioral: Negative for confusion and dysphoric mood. Objective:   Physical Exam  Vitals and nursing note reviewed. Constitutional:       General: He is not in acute distress. Appearance: He is well-developed. HENT:      Head: Normocephalic and atraumatic. Right Ear: Tympanic membrane, ear canal and external ear normal.      Left Ear: Tympanic membrane, ear canal and external ear normal.      Nose: Nose normal.      Mouth/Throat:      Mouth: Mucous membranes are moist.      Pharynx: Oropharynx is clear.    Eyes: General: No scleral icterus. Right eye: No discharge. Left eye: No discharge. Conjunctiva/sclera: Conjunctivae normal.   Cardiovascular:      Rate and Rhythm: Normal rate and regular rhythm. Heart sounds: Normal heart sounds. Pulmonary:      Effort: Pulmonary effort is normal. No respiratory distress. Breath sounds: Normal breath sounds. No wheezing. Abdominal:      General: There is no distension. Palpations: Abdomen is soft. Tenderness: There is no abdominal tenderness. Musculoskeletal:      Cervical back: Neck supple. Skin:     General: Skin is warm and dry. Findings: Lesion (On dorsum of left forearm.) present. No rash. Neurological:      Mental Status: He is alert and oriented to person, place, and time. Psychiatric:         Mood and Affect: Mood normal.         Behavior: Behavior normal.         Assessment:       Diagnosis Orders   1. Type 2 diabetes mellitus with stage 3 chronic kidney disease, without long-term current use of insulin, unspecified whether stage 3a or 3b CKD (HCC)  Lipid Panel    Hemoglobin A1C   2. Essential hypertension  ALT    AST    Lipid Panel   3. Mixed hyperlipidemia  ALT    AST    Lipid Panel   4. Idiopathic chronic gout of multiple sites without tophus  Uric Acid   5. Skin lesion             Plan:      Orders Placed This Encounter   Procedures    ALT     Standing Status:   Future     Standing Expiration Date:   7/11/2023    AST     Standing Status:   Future     Standing Expiration Date:   7/11/2023    Lipid Panel     Standing Status:   Future     Standing Expiration Date:   7/11/2023     Order Specific Question:   Is Patient Fasting?/# of Hours     Answer:    Fast 8-10 hours    Hemoglobin A1C     Standing Status:   Future     Standing Expiration Date:   7/11/2023    Uric Acid     Standing Status:   Future     Standing Expiration Date:   7/11/2023      Patient follow-up with his dermatologist for skin lesion on left forearm   Continue routine medications  Follow-up in 6 months or sooner if needed

## 2022-07-12 ENCOUNTER — HOSPITAL ENCOUNTER (OUTPATIENT)
Age: 82
Discharge: HOME | End: 2022-07-12
Payer: MEDICARE

## 2022-07-12 ENCOUNTER — TELEPHONE (OUTPATIENT)
Dept: FAMILY MEDICINE CLINIC | Age: 82
End: 2022-07-12

## 2022-07-12 DIAGNOSIS — N18.30 TYPE 2 DIABETES MELLITUS WITH STAGE 3 CHRONIC KIDNEY DISEASE, WITHOUT LONG-TERM CURRENT USE OF INSULIN, UNSPECIFIED WHETHER STAGE 3A OR 3B CKD (HCC): ICD-10-CM

## 2022-07-12 DIAGNOSIS — E78.2 MIXED HYPERLIPIDEMIA: ICD-10-CM

## 2022-07-12 DIAGNOSIS — M1A.09X0 IDIOPATHIC CHRONIC GOUT OF MULTIPLE SITES WITHOUT TOPHUS: ICD-10-CM

## 2022-07-12 DIAGNOSIS — E11.22 TYPE 2 DIABETES MELLITUS WITH STAGE 3 CHRONIC KIDNEY DISEASE, WITHOUT LONG-TERM CURRENT USE OF INSULIN, UNSPECIFIED WHETHER STAGE 3A OR 3B CKD (HCC): ICD-10-CM

## 2022-07-12 DIAGNOSIS — I10 ESSENTIAL HYPERTENSION: ICD-10-CM

## 2022-07-12 LAB
ALT SERPL-CCNC: 27 U/L (ref 5–41)
AST SERPL-CCNC: 32 U/L
CHOLESTEROL/HDL RATIO: 3.4
CHOLESTEROL: 147 MG/DL
ESTIMATED AVERAGE GLUCOSE: 154 MG/DL
HBA1C MFR BLD: 7 % (ref 4–6)
HDLC SERPL-MCNC: 43 MG/DL
LDL CHOLESTEROL: 73 MG/DL (ref 0–130)
TRIGL SERPL-MCNC: 154 MG/DL
URIC ACID: 6.9 MG/DL (ref 3.4–7)

## 2022-07-12 PROCEDURE — 80061 LIPID PANEL: CPT

## 2022-07-12 PROCEDURE — 84460 ALANINE AMINO (ALT) (SGPT): CPT

## 2022-07-12 PROCEDURE — 84450 TRANSFERASE (AST) (SGOT): CPT

## 2022-07-12 PROCEDURE — 36415 COLL VENOUS BLD VENIPUNCTURE: CPT

## 2022-07-12 PROCEDURE — 83036 HEMOGLOBIN GLYCOSYLATED A1C: CPT

## 2022-07-12 PROCEDURE — 84550 ASSAY OF BLOOD/URIC ACID: CPT

## 2022-07-12 RX ORDER — METFORMIN HYDROCHLORIDE 500 MG/1
500 TABLET, EXTENDED RELEASE ORAL
Qty: 90 TABLET | Refills: 1 | Status: SHIPPED | OUTPATIENT
Start: 2022-07-12 | End: 2022-07-12 | Stop reason: ALTCHOICE

## 2022-07-12 NOTE — TELEPHONE ENCOUNTER
Message conveyed to the patient who says you had discontinued the Metformin in the past.  Do you still want him to take the new prescription? Please advise.

## 2022-07-12 NOTE — TELEPHONE ENCOUNTER
----- Message from Mateus Burgess MD sent at 7/12/2022 12:45 PM EDT -----  Patient's hemoglobin A1c has increased to 7.0. Started on metformin  mg daily. Patient is to follow-up in 3 to 4 months.

## 2022-07-13 ENCOUNTER — OFFICE VISIT (OUTPATIENT)
Dept: ORTHOPEDIC SURGERY | Age: 82
End: 2022-07-13
Payer: MEDICARE

## 2022-07-13 DIAGNOSIS — M25.551 BILATERAL HIP PAIN: Primary | ICD-10-CM

## 2022-07-13 DIAGNOSIS — M25.552 BILATERAL HIP PAIN: Primary | ICD-10-CM

## 2022-07-13 PROCEDURE — 99213 OFFICE O/P EST LOW 20 MIN: CPT | Performed by: ORTHOPAEDIC SURGERY

## 2022-07-13 PROCEDURE — G8428 CUR MEDS NOT DOCUMENT: HCPCS | Performed by: ORTHOPAEDIC SURGERY

## 2022-07-13 PROCEDURE — G8417 CALC BMI ABV UP PARAM F/U: HCPCS | Performed by: ORTHOPAEDIC SURGERY

## 2022-07-13 PROCEDURE — 1036F TOBACCO NON-USER: CPT | Performed by: ORTHOPAEDIC SURGERY

## 2022-07-13 PROCEDURE — 1123F ACP DISCUSS/DSCN MKR DOCD: CPT | Performed by: ORTHOPAEDIC SURGERY

## 2022-07-13 NOTE — PROGRESS NOTES
Dru Pack M.D.            [address] [place] [address]., [address] [facility],[address], [facility] [address].           Dept Phone: [phone]           Dept Fax:  [phone] [address]          Dept Phone: [phone]           Dept Fax:  [phone]      Chief Compliant:  No chief complaint on file. History of Present Illness: This is a 81 y.o. male who presents to the clinic today for evaluation / follow up of bilateral total hips. Patient's first hip on the right side was performed 1995. His left side was performed in 2013. Patient does not really have any complaints at all regarding his hips. He did have lumbar decompression surgery per Dr. Chris Gould and he still kind of recovering from this as well. Overall however he is doing well in regards to his hips. Review of Systems   Constitutional: Negative for fever, chills, sweats. Eyes: Negative for changes in vision, or pain. HENT: Negative for ear ache, epistaxis, or sore throat. Respiratory/Cardio: Negative for Chest pain, palpitations, SOB, or cough. Gastrointestinal: Negative for abdominal pain, N/V/D. Genitourinary: Negative for dysuria, frequency, urgency, or hematuria. Neurological: Negative for headache, numbness, or weakness. Integumentary: Negative for rash, itching, laceration, or abrasion. Musculoskeletal: Positive for No chief complaint on file. Physical Exam:  Constitutional: Patient is oriented to person, place, and time. Patient appears well-developed and well nourished. HENT: Negative otherwise noted  Head: Normocephalic and Atraumatic  Nose: Normal  Eyes: Conjunctivae and EOM are normal  Neck: Normal range of motion Neck supple. Respiratory/Cardio: Effort normal. No respiratory distress.   Musculoskeletal: Lamination of the patient's right hip notes no pain on motion with flexion internal ex rotation. No trochanteric findings no active strength findings. Examination of the left hip is completely unremarkable completely pain-free no trochanteric findings with good strength. Neurological: Patient is alert and oriented to person, place, and time. Normal strenght. No sensory deficit. Skin: Skin is warm and dry  Psychiatric: Behavior is normal. Thought content normal.  Nursing note and vitals reviewed. Labs and Imaging:     XR taken today:  XR PELVIS (1-2 VIEWS)    Result Date: 7/13/2022  X-rays taken today reviewed by me show AP of the pelvis. Patient status post bilateral total hips. Patient's right hip obviously is much further aged he has some modest femoral head E centricity from superior polyethylene wear but stable since x-rays taken in 2020. There is no evidence for loosening or lucency of the components. Left total hip remains totally unchanged and in good alignment position. Orders Placed This Encounter   Procedures    XR PELVIS (1-2 VIEWS)     Standing Status:   Future     Number of Occurrences:   1     Standing Expiration Date:   7/13/2023       Assessment and Plan:  Status post bilateral total hip        This is a 81 y.o. male who presents to the clinic today for evaluation / follow up of status post bilateral total hips/right 1995/left 2013.      Past History:    Current Outpatient Medications:     SITagliptin (JANUVIA) 50 MG tablet, Take 1 tablet by mouth daily, Disp: 90 tablet, Rfl: 1    lisinopril (PRINIVIL;ZESTRIL) 2.5 MG tablet, TAKE 1 TABLET DAILY, Disp: 90 tablet, Rfl: 1    rosuvastatin (CRESTOR) 10 MG tablet, TAKE 1 TABLET DAILY, Disp: 90 tablet, Rfl: 1    allopurinol (ZYLOPRIM) 100 MG tablet, TAKE 1 TABLET DAILY, Disp: 90 tablet, Rfl: 1    metoprolol succinate (TOPROL XL) 25 MG extended release tablet, Take 25 mg by mouth daily, Disp: , Rfl:     nitroGLYCERIN (NITROSTAT) 0.4 MG SL tablet, Place 0.4 mg under the tongue every 5 minutes as needed for Chest pain up to max of 3 total doses. If no relief after 1 dose, call 911. , Disp: , Rfl:     Fluticasone-Salmeterol 232-14 MCG/ACT AEPB, Inhale 1 puff into the lungs 2 times daily , Disp: , Rfl:     aspirin (ASPIRIN 81) 81 MG chewable tablet, Take 81 mg by mouth daily , Disp: , Rfl:     CPAP Machine MISC, --- ---, Disp: , Rfl:     albuterol sulfate HFA (VENTOLIN HFA) 108 (90 Base) MCG/ACT inhaler, 2 puffs as needed, Disp: , Rfl:     FISH OIL, Take 1 capsule by mouth 2 times daily , Disp: , Rfl:     therapeutic multivitamin-minerals (THERAGRAN-M) tablet, Take 1 tablet by mouth daily. OTC, Disp: , Rfl:   Allergies   Allergen Reactions    Pcn [Penicillins] Itching    Ceclor [Cefaclor] Itching and Rash     Social History     Socioeconomic History    Marital status:      Spouse name: Not on file    Number of children: Not on file    Years of education: Not on file    Highest education level: Not on file   Occupational History    Not on file   Tobacco Use    Smoking status: Former Smoker     Packs/day: 2.00     Years: 35.00     Pack years: 70.00     Quit date:      Years since quittin.5    Smokeless tobacco: Former User     Types: Chew    Tobacco comment: 3/30/21: NICOTINE LOZENGES - current use   Vaping Use    Vaping Use: Never used   Substance and Sexual Activity    Alcohol use: No     Alcohol/week: 0.0 standard drinks     Comment: rarely    Drug use: No    Sexual activity: Not on file   Other Topics Concern    Not on file   Social History Narrative    Not on file     Social Determinants of Health     Financial Resource Strain: Low Risk     Difficulty of Paying Living Expenses: Not hard at all   Food Insecurity: No Food Insecurity    Worried About [address] in the Last Year: Never true    [address] [employer] in the Last Year: Never true   Transportation Needs:     Lack of Transportation (Medical):  Not on file    Lack of Transportation (Non-Medical): Not on file   Physical Activity:     Days of Exercise per Week: Not on file    Minutes of Exercise per Session: Not on file   Stress:     Feeling of Stress : Not on file   Social Connections:     Frequency of Communication with Friends and Family: Not on file    Frequency of Social Gatherings with Friends and Family: Not on file    Attends Restorationist Services: Not on file    Active Member of [employer] or Organizations: Not on file    Attends Club or Organization Meetings: Not on file    Marital Status: Not on file   Intimate Partner Violence:     Fear of Current or Ex-Partner: Not on file    Emotionally Abused: Not on file    Physically Abused: Not on file    Sexually Abused: Not on file   Housing Stability:     Unable to Pay for Housing in the Last Year: Not on file    Number of [place] in the Last Year: Not on file    Unstable Housing in the Last Year: Not on file     Past Medical History:   Diagnosis Date    Anemia     Anesthesia     woke up eary during surgery x1 , heard saw & felt the pressure.     Arthritis 7/15/2013    ASHD (arteriosclerotic heart disease) 7/15/2013    Asthma     CAD (coronary artery disease)     has cardiac stent x 4.    Cerebral artery occlusion with cerebral infarction (HCC)     Chronic kidney disease     stage 3    COPD (chronic obstructive pulmonary disease) ([facility] [address]     COVID-19 vaccine series completed     [facility] 1/26/2021, 2/23/2021    Degenerative joint disease 7/15/2013    Diabetes mellitus ([facility] [address] [facility]     ED (erectile dysfunction) 7/15/2013    Full dentures     Gout     History of carpal tunnel syndrome 07/15/2013    had surgery    History of colon polyps 7/15/2013    History of heart attack     silent one, patient was unaware    History of hemorrhoids 7/15/2013    History of TIA (transient ischemic attack) 7/15/2013    Hypercholesteremia 7/15/2013    Hypertension     Hyperuricemia 7/15/2013    Leukocytosis     Renal cyst     Sciatic nerve pain     4th and 5th lumbar vertebrae causing pinched nerve    Sleep apnea 07/15/2013    on cpap at .  Wears glasses     for reading     Past Surgical History:   Procedure Laterality Date    BACK SURGERY      BLEPHAROPLASTY Bilateral 04/02/2012    CARDIAC SURGERY      CARPAL TUNNEL RELEASE Right 11/2002    CATARACT REMOVAL WITH IMPLANT Left 03/02/2012    CATARACT REMOVAL WITH IMPLANT Right 03/05/2012    COLONOSCOPY  05/21/08    POLYPECTOMY    CORONARY ANGIOPLASTY WITH STENT PLACEMENT  1994 x1 stent,1998 x2 stents,2009 x1,    ENDOSCOPY, COLON, DIAGNOSTIC      EYE SURGERY      cataracts, eyelids.  JOINT REPLACEMENT Right 07/12/11     RT TOTAL SHOULDER REPLACEMENT    JOINT REPLACEMENT Right 1995    rt. hip replacement    JOINT REPLACEMENT Left 01/23/2018    [facility]    KNEE ARTHROPLASTY Left 04/04/2017    LUMBAR FUSION N/A 9/20/2021    L3-L5 POSTERIOR LUMBAR DECOMPRESSSION & INSTRUMENTED FUSION performed by Kaleb Feliciano MD at [facility] Left 1/23/2018    LEFT TOTAL HIP ARTHROPLASTY MINIMALLY INVASIVE ASI WITH BIOMET SYSTEM & GPS SPRAY APPLICATION performed by Gabby Hutton MD at [address] [place] [address] ARTHROSCOPY Right 05/2002 repair    7-12-11 right shoulder reverse replacement.  SHOULDER SURGERY Left 02/24/2002    shoulder resurfacing    TOTAL KNEE ARTHROPLASTY Left 4/4/2017    KNEE TOTAL ARTHROPLASTY performed by Gabby Hutton MD at [facility] History   Problem Relation Age of Onset    Alzheimer's Disease Mother     Heart Disease Mother     Emphysema Father    Plan  Patient overall is doing extremely well especially considering his right hip is 33years old continue to follow the patient on annual basis for enhanced polyethylene wear of thus far has been very stable call with any problems prior to that time      Provider Attestation:  Gabby NI, personally performed the services described in this documentation.  All medical record entries made by the scribe were at my direction and in my presence. I have reviewed the chart and discharge instructions and agree that the records reflect my personal performance and is accurate and complete. Elif Krueger MD. 07/13/22      Please note that this chart was generated using voice recognition Dragon dictation software. Although every effort was made to ensure the accuracy of this automated transcription, some errors in transcription may have occurred.

## 2022-08-22 RX ORDER — ALLOPURINOL 100 MG/1
TABLET ORAL
Qty: 90 TABLET | Refills: 1 | Status: SHIPPED | OUTPATIENT
Start: 2022-08-22

## 2022-10-03 RX ORDER — ROSUVASTATIN CALCIUM 10 MG/1
TABLET, COATED ORAL
Qty: 90 TABLET | Refills: 1 | Status: SHIPPED | OUTPATIENT
Start: 2022-10-03

## 2022-10-06 ENCOUNTER — IMMUNIZATION (OUTPATIENT)
Dept: FAMILY MEDICINE CLINIC | Age: 82
End: 2022-10-06
Payer: MEDICARE

## 2022-10-06 VITALS — OXYGEN SATURATION: 97 % | TEMPERATURE: 97.3 F | HEART RATE: 65 BPM

## 2022-10-06 DIAGNOSIS — Z23 NEEDS FLU SHOT: Primary | ICD-10-CM

## 2022-10-06 PROCEDURE — G0008 ADMIN INFLUENZA VIRUS VAC: HCPCS | Performed by: FAMILY MEDICINE

## 2022-10-06 PROCEDURE — 90694 VACC AIIV4 NO PRSRV 0.5ML IM: CPT | Performed by: FAMILY MEDICINE

## 2022-10-06 RX ORDER — LISINOPRIL 2.5 MG/1
TABLET ORAL
Qty: 90 TABLET | Refills: 1 | Status: SHIPPED | OUTPATIENT
Start: 2022-10-06

## 2022-10-27 ENCOUNTER — HOSPITAL ENCOUNTER (OUTPATIENT)
Age: 82
Discharge: HOME | End: 2022-10-27
Payer: MEDICARE

## 2022-10-27 DIAGNOSIS — E13.22 SECONDARY DIABETES MELLITUS WITH STAGE 3 CHRONIC KIDNEY DISEASE (HCC): ICD-10-CM

## 2022-10-27 DIAGNOSIS — N18.32 STAGE 3B CHRONIC KIDNEY DISEASE (HCC): ICD-10-CM

## 2022-10-27 DIAGNOSIS — N18.32 ANEMIA IN STAGE 3B CHRONIC KIDNEY DISEASE (HCC): ICD-10-CM

## 2022-10-27 DIAGNOSIS — N18.30 BENIGN HYPERTENSION WITH CKD (CHRONIC KIDNEY DISEASE) STAGE III (HCC): ICD-10-CM

## 2022-10-27 DIAGNOSIS — N18.30 SECONDARY DIABETES MELLITUS WITH STAGE 3 CHRONIC KIDNEY DISEASE (HCC): ICD-10-CM

## 2022-10-27 DIAGNOSIS — I12.9 BENIGN HYPERTENSION WITH CKD (CHRONIC KIDNEY DISEASE) STAGE III (HCC): ICD-10-CM

## 2022-10-27 DIAGNOSIS — D63.1 ANEMIA IN STAGE 3B CHRONIC KIDNEY DISEASE (HCC): ICD-10-CM

## 2022-10-27 LAB
ABSOLUTE EOS #: 0.3 K/UL (ref 0–0.4)
ABSOLUTE LYMPH #: 2.3 K/UL (ref 1–4.8)
ABSOLUTE MONO #: 1 K/UL (ref 0.1–1.3)
ANION GAP SERPL CALCULATED.3IONS-SCNC: 12 MMOL/L (ref 9–17)
BACTERIA: ABNORMAL
BASOPHILS # BLD: 0 % (ref 0–2)
BASOPHILS ABSOLUTE: 0 K/UL (ref 0–0.2)
BILIRUBIN URINE: NEGATIVE
BUN BLDV-MCNC: 27 MG/DL (ref 8–23)
CALCIUM SERPL-MCNC: 9.7 MG/DL (ref 8.6–10.4)
CASTS UA: ABNORMAL /LPF
CHLORIDE BLD-SCNC: 104 MMOL/L (ref 98–107)
CO2: 25 MMOL/L (ref 20–31)
COLOR: YELLOW
CREAT SERPL-MCNC: 1.62 MG/DL (ref 0.7–1.2)
EOSINOPHILS RELATIVE PERCENT: 3 % (ref 0–4)
EPITHELIAL CELLS UA: ABNORMAL /HPF
GFR SERPL CREATININE-BSD FRML MDRD: 42 ML/MIN/1.73M2
GLUCOSE BLD-MCNC: 86 MG/DL (ref 70–99)
GLUCOSE URINE: NEGATIVE
HCT VFR BLD CALC: 43.7 % (ref 41–53)
HEMOGLOBIN: 14.3 G/DL (ref 13.5–17.5)
KETONES, URINE: ABNORMAL
LEUKOCYTE ESTERASE, URINE: ABNORMAL
LYMPHOCYTES # BLD: 19 % (ref 24–44)
MAGNESIUM: 2.3 MG/DL (ref 1.6–2.6)
MCH RBC QN AUTO: 30.8 PG (ref 26–34)
MCHC RBC AUTO-ENTMCNC: 32.8 G/DL (ref 31–37)
MCV RBC AUTO: 94.1 FL (ref 80–100)
MONOCYTES # BLD: 8 % (ref 1–7)
NITRITE, URINE: NEGATIVE
PDW BLD-RTO: 14.1 % (ref 11.5–14.9)
PH UA: 5.5 (ref 5–8)
PHOSPHORUS: 3.2 MG/DL (ref 2.5–4.5)
PLATELET # BLD: 244 K/UL (ref 150–450)
PMV BLD AUTO: 10.2 FL (ref 6–12)
POTASSIUM SERPL-SCNC: 4.3 MMOL/L (ref 3.7–5.3)
PROTEIN UA: NEGATIVE
RBC # BLD: 4.65 M/UL (ref 4.5–5.9)
RBC UA: ABNORMAL /HPF
SEG NEUTROPHILS: 70 % (ref 36–66)
SEGMENTED NEUTROPHILS ABSOLUTE COUNT: 8.7 K/UL (ref 1.3–9.1)
SODIUM BLD-SCNC: 141 MMOL/L (ref 135–144)
SPECIFIC GRAVITY UA: 1.02 (ref 1–1.03)
TURBIDITY: CLEAR
URINE HGB: NEGATIVE
UROBILINOGEN, URINE: NORMAL
WBC # BLD: 12.4 K/UL (ref 3.5–11)
WBC UA: ABNORMAL /HPF

## 2022-10-27 PROCEDURE — 83735 ASSAY OF MAGNESIUM: CPT

## 2022-10-27 PROCEDURE — 85025 COMPLETE CBC W/AUTO DIFF WBC: CPT

## 2022-10-27 PROCEDURE — 36415 COLL VENOUS BLD VENIPUNCTURE: CPT

## 2022-10-27 PROCEDURE — 84100 ASSAY OF PHOSPHORUS: CPT

## 2022-10-27 PROCEDURE — 80048 BASIC METABOLIC PNL TOTAL CA: CPT

## 2022-10-27 PROCEDURE — 81001 URINALYSIS AUTO W/SCOPE: CPT

## 2022-11-01 ENCOUNTER — OFFICE VISIT (OUTPATIENT)
Dept: ORTHOPEDIC SURGERY | Age: 82
End: 2022-11-01
Payer: MEDICARE

## 2022-11-01 VITALS — BODY MASS INDEX: 28.35 KG/M2 | HEIGHT: 70 IN | RESPIRATION RATE: 12 BRPM | WEIGHT: 198 LBS

## 2022-11-01 DIAGNOSIS — M41.9 SCOLIOSIS OF LUMBAR SPINE, UNSPECIFIED SCOLIOSIS TYPE: Primary | ICD-10-CM

## 2022-11-01 DIAGNOSIS — M51.36 DEGENERATIVE DISC DISEASE, LUMBAR: ICD-10-CM

## 2022-11-01 DIAGNOSIS — M54.16 LUMBAR RADICULAR PAIN: ICD-10-CM

## 2022-11-01 PROCEDURE — 1123F ACP DISCUSS/DSCN MKR DOCD: CPT | Performed by: ORTHOPAEDIC SURGERY

## 2022-11-01 PROCEDURE — G8417 CALC BMI ABV UP PARAM F/U: HCPCS | Performed by: ORTHOPAEDIC SURGERY

## 2022-11-01 PROCEDURE — G8427 DOCREV CUR MEDS BY ELIG CLIN: HCPCS | Performed by: ORTHOPAEDIC SURGERY

## 2022-11-01 PROCEDURE — 99213 OFFICE O/P EST LOW 20 MIN: CPT | Performed by: ORTHOPAEDIC SURGERY

## 2022-11-01 PROCEDURE — G8484 FLU IMMUNIZE NO ADMIN: HCPCS | Performed by: ORTHOPAEDIC SURGERY

## 2022-11-01 PROCEDURE — 1036F TOBACCO NON-USER: CPT | Performed by: ORTHOPAEDIC SURGERY

## 2022-11-01 NOTE — PROGRESS NOTES
Patient ID: Marycarmen Gonzalez is a 81 y.o. male    Chief Compliant:  Chief Complaint   Patient presents with    Back Pain        Diagnostic imaging:        Assessment and Plan:  1. Scoliosis of lumbar spine, unspecified scoliosis type    2. Degenerative disc disease, lumbar    3. Lumbar radicular pain        1 year post L3-5 PLIF, excellent outcome    Follow up prn    HPI:  This is a 81 y.o. male who presents to the clinic today for 1 year post L3-5 PLIF 9/20/21. Patient has recovered well post operatively and has continued relief of pain. Significant improvement in his function compared to preoperatively. Review of Systems   All other systems reviewed and are negative. Past History:    Current Outpatient Medications:     lisinopril (PRINIVIL;ZESTRIL) 2.5 MG tablet, TAKE 1 TABLET DAILY, Disp: 90 tablet, Rfl: 1    rosuvastatin (CRESTOR) 10 MG tablet, TAKE 1 TABLET DAILY, Disp: 90 tablet, Rfl: 1    allopurinol (ZYLOPRIM) 100 MG tablet, TAKE 1 TABLET DAILY, Disp: 90 tablet, Rfl: 1    SITagliptin (JANUVIA) 50 MG tablet, Take 1 tablet by mouth daily, Disp: 90 tablet, Rfl: 1    metoprolol succinate (TOPROL XL) 25 MG extended release tablet, Take 25 mg by mouth daily, Disp: , Rfl:     nitroGLYCERIN (NITROSTAT) 0.4 MG SL tablet, Place 0.4 mg under the tongue every 5 minutes as needed for Chest pain up to max of 3 total doses. If no relief after 1 dose, call 911. , Disp: , Rfl:     Fluticasone-Salmeterol 232-14 MCG/ACT AEPB, Inhale 1 puff into the lungs 2 times daily , Disp: , Rfl:     aspirin 81 MG chewable tablet, Take 81 mg by mouth daily , Disp: , Rfl:     CPAP Machine MISC, --- ---, Disp: , Rfl:     albuterol sulfate HFA (PROVENTIL;VENTOLIN;PROAIR) 108 (90 Base) MCG/ACT inhaler, 2 puffs as needed, Disp: , Rfl:     FISH OIL, Take 1 capsule by mouth 2 times daily , Disp: , Rfl:     therapeutic multivitamin-minerals (THERAGRAN-M) tablet, Take 1 tablet by mouth daily.  OTC, Disp: , Rfl:   Allergies   Allergen Reactions    Pcn [Penicillins] Itching    Ceclor [Cefaclor] Itching and Rash     Social History     Socioeconomic History    Marital status:      Spouse name: Not on file    Number of children: Not on file    Years of education: Not on file    Highest education level: Not on file   Occupational History    Not on file   Tobacco Use    Smoking status: Former     Packs/day: 2.00     Years: 35.00     Pack years: 70.00     Types: Cigarettes     Quit date:      Years since quittin.8    Smokeless tobacco: Former     Types: Chew    Tobacco comments:     3/30/21: NICOTINE LOZENGES - current use   Vaping Use    Vaping Use: Never used   Substance and Sexual Activity    Alcohol use: No     Alcohol/week: 0.0 standard drinks     Comment: rarely    Drug use: No    Sexual activity: Not on file   Other Topics Concern    Not on file   Social History Narrative    Not on file     Social Determinants of Health     Financial Resource Strain: Low Risk     Difficulty of Paying Living Expenses: Not hard at all   Food Insecurity: No Food Insecurity    Worried About Running Out of Food in the Last Year: Never true    Ran Out of Food in the Last Year: Never true   Transportation Needs: Not on file   Physical Activity: Not on file   Stress: Not on file   Social Connections: Not on file   Intimate Partner Violence: Not on file   Housing Stability: Not on file     Past Medical History:   Diagnosis Date    Anemia     Anesthesia     woke up eary during surgery x1 , heard saw & felt the pressure. Arthritis 7/15/2013    ASHD (arteriosclerotic heart disease) 7/15/2013    Asthma     CAD (coronary artery disease)     has cardiac stent x 4.     Cerebral artery occlusion with cerebral infarction [facility])     Chronic kidney disease     stage 3    COPD (chronic obstructive pulmonary disease) ([facility] [address]     COVID-19 vaccine series completed     Moderna 2021, 2021    Degenerative joint disease 7/15/2013    Diabetes mellitus [facility])     ED (erectile dysfunction) 7/15/2013    Full dentures     Gout     History of carpal tunnel syndrome 07/15/2013    had surgery    History of colon polyps 7/15/2013    History of heart attack     silent one, patient was unaware    History of hemorrhoids 7/15/2013    History of TIA (transient ischemic attack) 7/15/2013    Hypercholesteremia 7/15/2013    Hypertension     Hyperuricemia 7/15/2013    Leukocytosis     Renal cyst     Sciatic nerve pain     4th and 5th lumbar vertebrae causing pinched nerve    Sleep apnea 07/15/2013    on cpap at . Wears glasses     for reading     Past Surgical History:   Procedure Laterality Date    BACK SURGERY      BLEPHAROPLASTY Bilateral 04/02/2012    CARDIAC SURGERY      CARPAL TUNNEL RELEASE Right 11/2002    CATARACT REMOVAL WITH IMPLANT Left 03/02/2012    CATARACT REMOVAL WITH IMPLANT Right 03/05/2012    COLONOSCOPY  05/21/08    POLYPECTOMY    CORONARY ANGIOPLASTY WITH STENT PLACEMENT  1994 x1 stent,1998 x2 stents,2009 x1,    ENDOSCOPY, COLON, DIAGNOSTIC      EYE SURGERY      cataracts, eyelids. JOINT REPLACEMENT Right 07/12/11     RT TOTAL SHOULDER REPLACEMENT    JOINT REPLACEMENT Right 1995    rt. hip replacement    JOINT REPLACEMENT Left 01/23/2018    [facility]    KNEE ARTHROPLASTY Left 04/04/2017    LUMBAR FUSION N/A 9/20/2021    L3-L5 POSTERIOR LUMBAR DECOMPRESSSION & INSTRUMENTED FUSION performed by Malcolm James MD at [address] [facility] Left 1/23/2018    LEFT TOTAL HIP ARTHROPLASTY MINIMALLY INVASIVE ASI WITH BIOMET SYSTEM & GPS SPRAY APPLICATION performed by Aleyda Wylie MD at [address] [place] [address] ARTHROSCOPY Right 05/2002 repair    7-12-11 right shoulder reverse replacement.     SHOULDER SURGERY Left 02/24/2002    shoulder resurfacing    TOTAL KNEE ARTHROPLASTY Left 4/4/2017    KNEE TOTAL ARTHROPLASTY performed by Aleyda Wylie MD at [address] [place] [address] Mindi Gottlieb     Family History   Problem Relation Age of Onset    Alzheimer's Disease Mother     Heart Disease Mother Emphysema Father         Physical Exam:  Vitals signs and nursing note reviewed. Constitutional:       Appearance: well-developed. HENT:      Head: Normocephalic and atraumatic. Nose: Nose normal.   Eyes:      Conjunctiva/sclera: Conjunctivae normal.   Neck:      Musculoskeletal: Normal range of motion and neck supple. Pulmonary:      Effort: Pulmonary effort is normal. No respiratory distress. Musculoskeletal:      Comments: Normal gait     Skin:     General: Skin is warm and dry. Neurological:      Mental Status: Alert and oriented to person, place, and time. Sensory: No sensory deficit. Psychiatric:         Behavior: Behavior normal.         Thought Content: Thought content normal.        Provider Attestation:  Kassie Rangel, personally performed the services described in this documentation. All medical record entries made by the scribe were at my direction and in my presence. I have reviewed the chart and discharge instructions and agree that the records reflect my personal performance and is accurate and complete. Keeley Moss MD 11/1/22       Scribe Attestation:  By signing my name below, Christa Cortez, attest that this documentation has been prepared under the direction and in the presence of Dr. Alessia Sagastume. Electronically signed: Mikey Winston, 11/1/22     Please note that this chart was generated using voice recognition Dragon dictation software. Although every effort was made to ensure the accuracy of this automated transcription, some errors in transcription may have occurred.

## 2023-01-10 ENCOUNTER — OFFICE VISIT (OUTPATIENT)
Dept: FAMILY MEDICINE CLINIC | Age: 83
End: 2023-01-10
Payer: MEDICARE

## 2023-01-10 VITALS
DIASTOLIC BLOOD PRESSURE: 82 MMHG | HEART RATE: 64 BPM | RESPIRATION RATE: 12 BRPM | SYSTOLIC BLOOD PRESSURE: 116 MMHG | BODY MASS INDEX: 27.84 KG/M2 | WEIGHT: 194 LBS

## 2023-01-10 DIAGNOSIS — J44.9 CHRONIC OBSTRUCTIVE PULMONARY DISEASE, UNSPECIFIED COPD TYPE (HCC): ICD-10-CM

## 2023-01-10 DIAGNOSIS — N18.30 TYPE 2 DIABETES MELLITUS WITH STAGE 3 CHRONIC KIDNEY DISEASE, WITHOUT LONG-TERM CURRENT USE OF INSULIN, UNSPECIFIED WHETHER STAGE 3A OR 3B CKD (HCC): Primary | ICD-10-CM

## 2023-01-10 DIAGNOSIS — E78.2 MIXED HYPERLIPIDEMIA: ICD-10-CM

## 2023-01-10 DIAGNOSIS — I10 ESSENTIAL HYPERTENSION: ICD-10-CM

## 2023-01-10 DIAGNOSIS — E11.22 TYPE 2 DIABETES MELLITUS WITH STAGE 3 CHRONIC KIDNEY DISEASE, WITHOUT LONG-TERM CURRENT USE OF INSULIN, UNSPECIFIED WHETHER STAGE 3A OR 3B CKD (HCC): Primary | ICD-10-CM

## 2023-01-10 DIAGNOSIS — M1A.09X0 IDIOPATHIC CHRONIC GOUT OF MULTIPLE SITES WITHOUT TOPHUS: ICD-10-CM

## 2023-01-10 LAB — HBA1C MFR BLD: 5.8 %

## 2023-01-10 PROCEDURE — G8427 DOCREV CUR MEDS BY ELIG CLIN: HCPCS | Performed by: FAMILY MEDICINE

## 2023-01-10 PROCEDURE — 3074F SYST BP LT 130 MM HG: CPT | Performed by: FAMILY MEDICINE

## 2023-01-10 PROCEDURE — 3023F SPIROM DOC REV: CPT | Performed by: FAMILY MEDICINE

## 2023-01-10 PROCEDURE — 1036F TOBACCO NON-USER: CPT | Performed by: FAMILY MEDICINE

## 2023-01-10 PROCEDURE — 3079F DIAST BP 80-89 MM HG: CPT | Performed by: FAMILY MEDICINE

## 2023-01-10 PROCEDURE — G8484 FLU IMMUNIZE NO ADMIN: HCPCS | Performed by: FAMILY MEDICINE

## 2023-01-10 PROCEDURE — 1123F ACP DISCUSS/DSCN MKR DOCD: CPT | Performed by: FAMILY MEDICINE

## 2023-01-10 PROCEDURE — G8417 CALC BMI ABV UP PARAM F/U: HCPCS | Performed by: FAMILY MEDICINE

## 2023-01-10 PROCEDURE — 83036 HEMOGLOBIN GLYCOSYLATED A1C: CPT | Performed by: FAMILY MEDICINE

## 2023-01-10 PROCEDURE — 3044F HG A1C LEVEL LT 7.0%: CPT | Performed by: FAMILY MEDICINE

## 2023-01-10 PROCEDURE — 99214 OFFICE O/P EST MOD 30 MIN: CPT | Performed by: FAMILY MEDICINE

## 2023-01-10 ASSESSMENT — PATIENT HEALTH QUESTIONNAIRE - PHQ9
2. FEELING DOWN, DEPRESSED OR HOPELESS: 0
SUM OF ALL RESPONSES TO PHQ QUESTIONS 1-9: 0
1. LITTLE INTEREST OR PLEASURE IN DOING THINGS: 0
SUM OF ALL RESPONSES TO PHQ QUESTIONS 1-9: 0
SUM OF ALL RESPONSES TO PHQ9 QUESTIONS 1 & 2: 0
SUM OF ALL RESPONSES TO PHQ QUESTIONS 1-9: 0
SUM OF ALL RESPONSES TO PHQ QUESTIONS 1-9: 0

## 2023-01-10 ASSESSMENT — ENCOUNTER SYMPTOMS
SHORTNESS OF BREATH: 0
BLOOD IN STOOL: 0
CHEST TIGHTNESS: 0
ABDOMINAL PAIN: 0

## 2023-01-10 NOTE — PROGRESS NOTES
Subjective:      Patient ID: Huber Nunes is a 81 y.o. male. HPIChronic Disease Visit Information    BP Readings from Last 3 Encounters:   01/10/23 116/82   11/07/22 130/78   07/11/22 92/60          Hemoglobin A1C (%)   Date Value   07/12/2022 7.0 (H)   01/06/2022 6.0   08/06/2021 6.4     Microalb/Crt. Ratio (mcg/mg creat)   Date Value   06/01/2021 CANNOT BE CALCULATED     LDL Cholesterol (mg/dL)   Date Value   07/12/2022 73     HDL (mg/dL)   Date Value   07/12/2022 43     BUN (mg/dL)   Date Value   10/27/2022 27 (H)     Creatinine (mg/dL)   Date Value   10/27/2022 1.62 (H)     Glucose (mg/dL)   Date Value   10/27/2022 86   02/03/2012 143 (H)            Have you changed or started any medications since your last visit including any over-the-counter medicines, vitamins, or herbal medicines? no   Are you having any side effects from any of your medications? -  no  Have you stopped taking any of your medications? Is so, why? -  no    Have you seen any other physician or provider since your last visit? Yes - Records Obtained  Have you had any other diagnostic tests since your last visit? No  Have you been seen in the emergency room and/or had an admission to a hospital since we last saw you? No  Have you had your annual diabetic retinal (eye) exam? No  Have you had your routine dental cleaning in the past 6 months? No-dentures    Have you activated your [employer] account? If not, what are your barriers?  Yes     Patient Care Team:  Ary Ron MD as PCP - General (Family Medicine)  [facility] Ron [facility]  Camille Garcia MD as Consulting Physician (Nephrology)  Shadia Cummings MD as Consulting Physician (Ophthalmology)  Chaim Pozo MD (Pulmonology)  Marsha Camacho MD as Consulting Physician (Orthopedic Surgery)  Cinthya Sanchez MD as Consulting Physician (Cardiology)  Steve Stone MD as Consulting Physician (Gastroenterology)         Medical History Review  Past Medical, Family, and Social History reviewed and does contribute to the patient presenting condition    Health Maintenance   Topic Date Due    DTaP/Tdap/Td vaccine (1 - Tdap) Never done    Shingles vaccine (2 of 3) 11/14/2013    Annual Wellness Visit (AWV)  06/04/2022    Depression Screen  07/11/2023    Lipids  07/12/2023    Flu vaccine  Completed    Pneumococcal 65+ years Vaccine  Completed    COVID-19 Vaccine  Completed    Hepatitis A vaccine  Aged Out    Hib vaccine  Aged Out    Meningococcal (ACWY) vaccine  Aged Out   Patient is an 52-year-old white male who presents for diabetes, hypertension, hyperlipidemia, gout. He also has a history of COPD. He states that he is taking and tolerating his routine medications. His hemoglobin A1c today was 5.8. He denies any fever, chills, chest pain, abdominal pain, shortness of breath. He has a good appetite and remains active. Review of Systems   Constitutional:  Negative for chills and fever. HENT:  Negative for congestion. Respiratory:  Negative for chest tightness and shortness of breath. Cardiovascular:  Negative for chest pain. Gastrointestinal:  Negative for abdominal pain and blood in stool. Genitourinary:  Negative for dysuria and hematuria. Skin:  Negative for rash. Neurological:  Negative for dizziness. Psychiatric/Behavioral:  Negative for confusion and dysphoric mood. Objective:   Physical Exam  Vitals and nursing note reviewed. Constitutional:       General: He is not in acute distress. Appearance: He is well-developed. HENT:      Head: Normocephalic and atraumatic. Right Ear: Tympanic membrane, ear canal and external ear normal.      Left Ear: Tympanic membrane, ear canal and external ear normal.      Nose: Nose normal.      Mouth/Throat:      Mouth: Mucous membranes are moist.      Pharynx: Oropharynx is clear. Eyes:      General: No scleral icterus. Right eye: No discharge. Left eye: No discharge. Conjunctiva/sclera: Conjunctivae normal.   Cardiovascular:      Rate and Rhythm: Normal rate and regular rhythm. Heart sounds: Normal heart sounds. Pulmonary:      Effort: Pulmonary effort is normal. No respiratory distress. Breath sounds: Normal breath sounds. No wheezing. Abdominal:      General: There is no distension. Palpations: Abdomen is soft. Tenderness: There is no abdominal tenderness. Musculoskeletal:      Cervical back: Neck supple. Skin:     General: Skin is warm and dry. Findings: No rash. Neurological:      Mental Status: He is alert and oriented to person, place, and time. Psychiatric:         Mood and Affect: Mood normal.         Behavior: Behavior normal.       Assessment:      1. Type 2 diabetes mellitus with stage 3 chronic kidney disease, without long-term current use of insulin, unspecified whether stage 3a or 3b CKD (HCC)    - POCT glycosylated hemoglobin (Hb A1C)  - Lipid Panel; Future  - ALT; Future  - AST; Future  Follow diabetic diet  2. Essential hypertension    - Lipid Panel; Future  - ALT; Future  - AST; Future  Follow low-sodium diet  3. Mixed hyperlipidemia    - Lipid Panel; Future  - ALT; Future  - AST; Future  Follow low-fat low-cholesterol diet  4. Idiopathic chronic gout of multiple sites without tophus    - Uric Acid; Future    5. Chronic obstructive pulmonary disease, unspecified COPD type ([facility] [address]  Continue management by patient's pulmonologist        Plan:      Orders Placed This Encounter   Procedures    Lipid Panel     Standing Status:   Future     Standing Expiration Date:   1/10/2024     Order Specific Question:   Is Patient Fasting?/# of Hours     Answer:    Fast 8-10 hours    Uric Acid     Standing Status:   Future     Standing Expiration Date:   1/10/2024    ALT     Standing Status:   Future     Standing Expiration Date:   1/10/2024    AST     Standing Status:   Future     Standing Expiration Date:   1/10/2024    POCT glycosylated hemoglobin (Hb A1C)           Continue routine medications  Follow-up in 6 months or sooner if needed

## 2023-01-11 ENCOUNTER — HOSPITAL ENCOUNTER (OUTPATIENT)
Age: 83
Discharge: HOME | End: 2023-01-11
Payer: MEDICARE

## 2023-01-11 DIAGNOSIS — M1A.09X0 IDIOPATHIC CHRONIC GOUT OF MULTIPLE SITES WITHOUT TOPHUS: ICD-10-CM

## 2023-01-11 DIAGNOSIS — N18.30 TYPE 2 DIABETES MELLITUS WITH STAGE 3 CHRONIC KIDNEY DISEASE, WITHOUT LONG-TERM CURRENT USE OF INSULIN, UNSPECIFIED WHETHER STAGE 3A OR 3B CKD (HCC): ICD-10-CM

## 2023-01-11 DIAGNOSIS — I10 ESSENTIAL HYPERTENSION: ICD-10-CM

## 2023-01-11 DIAGNOSIS — E11.22 TYPE 2 DIABETES MELLITUS WITH STAGE 3 CHRONIC KIDNEY DISEASE, WITHOUT LONG-TERM CURRENT USE OF INSULIN, UNSPECIFIED WHETHER STAGE 3A OR 3B CKD (HCC): ICD-10-CM

## 2023-01-11 DIAGNOSIS — E78.2 MIXED HYPERLIPIDEMIA: ICD-10-CM

## 2023-01-11 LAB
ALT SERPL-CCNC: 30 U/L (ref 5–41)
AST SERPL-CCNC: 28 U/L
CHOLESTEROL/HDL RATIO: 3
CHOLESTEROL: 135 MG/DL
HDLC SERPL-MCNC: 45 MG/DL
LDL CHOLESTEROL: 67 MG/DL (ref 0–130)
TRIGL SERPL-MCNC: 116 MG/DL
URIC ACID: 5.9 MG/DL (ref 3.4–7)

## 2023-01-11 PROCEDURE — 84550 ASSAY OF BLOOD/URIC ACID: CPT

## 2023-01-11 PROCEDURE — 36415 COLL VENOUS BLD VENIPUNCTURE: CPT

## 2023-01-11 PROCEDURE — 84460 ALANINE AMINO (ALT) (SGPT): CPT

## 2023-01-11 PROCEDURE — 80061 LIPID PANEL: CPT

## 2023-01-11 PROCEDURE — 84450 TRANSFERASE (AST) (SGOT): CPT

## 2023-01-16 DIAGNOSIS — E11.22 TYPE 2 DIABETES MELLITUS WITH STAGE 3 CHRONIC KIDNEY DISEASE, WITHOUT LONG-TERM CURRENT USE OF INSULIN, UNSPECIFIED WHETHER STAGE 3A OR 3B CKD (HCC): Primary | ICD-10-CM

## 2023-01-16 DIAGNOSIS — N18.30 TYPE 2 DIABETES MELLITUS WITH STAGE 3 CHRONIC KIDNEY DISEASE, WITHOUT LONG-TERM CURRENT USE OF INSULIN, UNSPECIFIED WHETHER STAGE 3A OR 3B CKD (HCC): Primary | ICD-10-CM

## 2023-01-16 DIAGNOSIS — E78.2 MIXED HYPERLIPIDEMIA: ICD-10-CM

## 2023-01-16 RX ORDER — ROSUVASTATIN CALCIUM 10 MG/1
TABLET, COATED ORAL
Qty: 90 TABLET | Refills: 1 | Status: SHIPPED | OUTPATIENT
Start: 2023-01-16

## 2023-01-18 DIAGNOSIS — E78.2 MIXED HYPERLIPIDEMIA: ICD-10-CM

## 2023-01-18 RX ORDER — ROSUVASTATIN CALCIUM 10 MG/1
TABLET, COATED ORAL
Qty: 14 TABLET | OUTPATIENT
Start: 2023-01-18

## 2023-04-03 SDOH — ECONOMIC STABILITY: FOOD INSECURITY: WITHIN THE PAST 12 MONTHS, THE FOOD YOU BOUGHT JUST DIDN'T LAST AND YOU DIDN'T HAVE MONEY TO GET MORE.: NEVER TRUE

## 2023-04-03 SDOH — ECONOMIC STABILITY: HOUSING INSECURITY
IN THE LAST 12 MONTHS, WAS THERE A TIME WHEN YOU DID NOT HAVE A STEADY PLACE TO SLEEP OR SLEPT IN A SHELTER (INCLUDING NOW)?: NO

## 2023-04-03 SDOH — ECONOMIC STABILITY: INCOME INSECURITY: HOW HARD IS IT FOR YOU TO PAY FOR THE VERY BASICS LIKE FOOD, HOUSING, MEDICAL CARE, AND HEATING?: NOT HARD AT ALL

## 2023-04-03 SDOH — ECONOMIC STABILITY: FOOD INSECURITY: WITHIN THE PAST 12 MONTHS, YOU WORRIED THAT YOUR FOOD WOULD RUN OUT BEFORE YOU GOT MONEY TO BUY MORE.: NEVER TRUE

## 2023-04-06 ENCOUNTER — OFFICE VISIT (OUTPATIENT)
Dept: FAMILY MEDICINE CLINIC | Age: 83
End: 2023-04-06

## 2023-04-06 VITALS
TEMPERATURE: 97.8 F | SYSTOLIC BLOOD PRESSURE: 138 MMHG | HEART RATE: 56 BPM | WEIGHT: 200.8 LBS | HEIGHT: 71 IN | DIASTOLIC BLOOD PRESSURE: 80 MMHG | RESPIRATION RATE: 16 BRPM | BODY MASS INDEX: 28.11 KG/M2

## 2023-04-06 DIAGNOSIS — Z00.00 MEDICARE ANNUAL WELLNESS VISIT, SUBSEQUENT: Primary | ICD-10-CM

## 2023-04-06 ASSESSMENT — PATIENT HEALTH QUESTIONNAIRE - PHQ9
SUM OF ALL RESPONSES TO PHQ QUESTIONS 1-9: 0
1. LITTLE INTEREST OR PLEASURE IN DOING THINGS: 0
SUM OF ALL RESPONSES TO PHQ QUESTIONS 1-9: 0
SUM OF ALL RESPONSES TO PHQ9 QUESTIONS 1 & 2: 0
SUM OF ALL RESPONSES TO PHQ QUESTIONS 1-9: 0
2. FEELING DOWN, DEPRESSED OR HOPELESS: 0
SUM OF ALL RESPONSES TO PHQ QUESTIONS 1-9: 0

## 2023-04-06 ASSESSMENT — LIFESTYLE VARIABLES
HOW OFTEN DO YOU HAVE A DRINK CONTAINING ALCOHOL: MONTHLY OR LESS
HOW MANY STANDARD DRINKS CONTAINING ALCOHOL DO YOU HAVE ON A TYPICAL DAY: 3 OR 4

## 2023-04-06 NOTE — PROGRESS NOTES
Medicare Annual Wellness Visit    Hazel Subramanian is here for Medicare AW    Assessment & Plan    Recommendations for Preventive Services Due: see orders and patient instructions/AVS.  Recommended screening schedule for the next 5-10 years is provided to the patient in written form: see Patient Instructions/AVS.     No follow-ups on file. VIS for shingles and TDAP provided. Subjective       Patient's complete Health Risk Assessment and screening values have been reviewed and are found in Flowsheets. The following problems were reviewed today and where indicated follow up appointments were made and/or referrals ordered. Positive Risk Factor Screenings with Interventions:    Fall Risk:  Do you feel unsteady or are you worried about falling? : (!) yes  2 or more falls in past year?: no  Fall with injury in past year?: no     Interventions:    See AVS for additional education material              Weight and Activity:  Physical Activity: Inactive    Days of Exercise per Week: 0 days    Minutes of Exercise per Session: 0 min     On average, how many days per week do you engage in moderate to strenuous exercise (like a brisk walk)?: 0 days  Have you lost any weight without trying in the past 3 months?: (!) Yes  Body mass index: (!) 28      Inactivity Interventions:  See AVS for additional education material    Unintentional Weight Loss Interventions:  Patient comments: patient states he hasn't lost weight. Vision Screen:  Do you have difficulty driving, watching TV, or doing any of your daily activities because of your eyesight?: No  Have you had an eye exam within the past year?: (!) No  No results found. Interventions:   Patient encouraged to make appointment with their eye specialist                      Objective   Vitals:    04/06/23 1146   BP: 138/80   Pulse: 56   Resp: 16   Temp: 97.8 °F (36.6 °C)   Weight: 200 lb 12.8 oz (91.1 kg)   Height: 5' 11\" (1.803 m)      Body mass index is 28.01 kg/m².

## 2023-04-06 NOTE — PATIENT INSTRUCTIONS
rooms well lit? Can you reach a lamp without getting out of bed? Are floor surfaces well maintained? Shag rugs, high-pile carpeting, tile floors, and polished wood floors can be particularly slippery. Stairs should always have handrails and be carpeted or fitted with a non-skid tread. Is your telephone easily reachable. Is the cord safely tucked away? Room by Room   According to the Association of Aging, bathrooms and xiomy are the two most potentially hazardous rooms in your home. In the Kitchen    Be sure your stove is in proper working order and always make sure burners and the oven are off before you go out or go to sleep. Keep pots on the back burners, turn handles away from the front of the stove, and keep stove clean and free of grease build-up. Kitchen ventilation systems and range exhausts should be working properly. Keep flammable objects such as towels and pot holders away from the cooking area except when in use. Make sure kitchen curtains are tied back. Move cords and appliances away from the sink and hot surfaces. If extension cords are needed, install wiring guides so they do not hang over the sink, range, or working areas. Look for coffee pots, kettles and toaster ovens with automatic shut-offs. Keep a mop handy in the kitchen so you can wipe up spills instantly. You should also have a small fire extinguisher. Arrange your kitchen with frequently used items on lower shelves to avoid the need to stand on a stepstool to reach them. Make sure countertops are well-lit to avoid injuries while cutting and preparing food. In the Bathroom    Use a non-slip mat or decals in the tub and shower, since wet, soapy tile or porcelain surfaces are extremely slippery. Make sure bathroom rugs are non-skid or tape them firmly to the floor. Bathtubs should have at least one, preferably two, grab bars, firmly attached to structural supports in the wall.  (Do not use built-in soap

## 2023-04-24 ENCOUNTER — HOSPITAL ENCOUNTER (OUTPATIENT)
Age: 83
Discharge: HOME | End: 2023-04-24
Payer: MEDICARE

## 2023-04-24 DIAGNOSIS — N18.30 BENIGN HYPERTENSION WITH CKD (CHRONIC KIDNEY DISEASE) STAGE III (HCC): ICD-10-CM

## 2023-04-24 DIAGNOSIS — N18.32 STAGE 3B CHRONIC KIDNEY DISEASE (HCC): ICD-10-CM

## 2023-04-24 DIAGNOSIS — I12.9 BENIGN HYPERTENSION WITH CKD (CHRONIC KIDNEY DISEASE) STAGE III (HCC): ICD-10-CM

## 2023-04-24 DIAGNOSIS — N28.1 RENAL CYST: ICD-10-CM

## 2023-04-24 DIAGNOSIS — N18.30 SECONDARY DIABETES MELLITUS WITH STAGE 3 CHRONIC KIDNEY DISEASE (HCC): ICD-10-CM

## 2023-04-24 DIAGNOSIS — E13.22 SECONDARY DIABETES MELLITUS WITH STAGE 3 CHRONIC KIDNEY DISEASE (HCC): ICD-10-CM

## 2023-04-24 LAB
ABSOLUTE EOS #: 0.5 K/UL (ref 0–0.4)
ABSOLUTE LYMPH #: 1.9 K/UL (ref 1–4.8)
ABSOLUTE MONO #: 0.7 K/UL (ref 0.1–1.3)
ANION GAP SERPL CALCULATED.3IONS-SCNC: 12 MMOL/L (ref 9–17)
BACTERIA: NORMAL
BASOPHILS # BLD: 0 % (ref 0–2)
BASOPHILS ABSOLUTE: 0 K/UL (ref 0–0.2)
BILIRUBIN URINE: NEGATIVE
BUN SERPL-MCNC: 30 MG/DL (ref 8–23)
CALCIUM SERPL-MCNC: 9.5 MG/DL (ref 8.6–10.4)
CASTS UA: NORMAL /LPF
CHLORIDE SERPL-SCNC: 106 MMOL/L (ref 98–107)
CO2 SERPL-SCNC: 24 MMOL/L (ref 20–31)
COLOR: YELLOW
CREAT SERPL-MCNC: 1.47 MG/DL (ref 0.7–1.2)
EOSINOPHILS RELATIVE PERCENT: 5 % (ref 0–4)
EPITHELIAL CELLS UA: NORMAL /HPF
GFR SERPL CREATININE-BSD FRML MDRD: 47 ML/MIN/1.73M2
GLUCOSE SERPL-MCNC: 184 MG/DL (ref 70–99)
GLUCOSE UR STRIP.AUTO-MCNC: NEGATIVE MG/DL
HCT VFR BLD AUTO: 43.6 % (ref 41–53)
HGB BLD-MCNC: 14.5 G/DL (ref 13.5–17.5)
KETONES UR STRIP.AUTO-MCNC: NEGATIVE MG/DL
LEUKOCYTE ESTERASE UR QL STRIP.AUTO: NEGATIVE
LYMPHOCYTES # BLD: 19 % (ref 24–44)
MAGNESIUM SERPL-MCNC: 2.3 MG/DL (ref 1.6–2.6)
MCH RBC QN AUTO: 30.8 PG (ref 26–34)
MCHC RBC AUTO-ENTMCNC: 33.2 G/DL (ref 31–37)
MCV RBC AUTO: 92.8 FL (ref 80–100)
MONOCYTES # BLD: 7 % (ref 1–7)
NITRITE UR QL STRIP.AUTO: NEGATIVE
PDW BLD-RTO: 13.8 % (ref 11.5–14.9)
PHOSPHATE SERPL-MCNC: 3.2 MG/DL (ref 2.5–4.5)
PLATELET # BLD AUTO: 205 K/UL (ref 150–450)
PMV BLD AUTO: 10.6 FL (ref 6–12)
POTASSIUM SERPL-SCNC: 4.3 MMOL/L (ref 3.7–5.3)
PROT UR STRIP.AUTO-MCNC: 5 MG/DL (ref 5–8)
PROT UR STRIP.AUTO-MCNC: NEGATIVE MG/DL
RBC # BLD: 4.7 M/UL (ref 4.5–5.9)
RBC CLUMPS #/AREA URNS AUTO: NORMAL /HPF
SEG NEUTROPHILS: 69 % (ref 36–66)
SEGMENTED NEUTROPHILS ABSOLUTE COUNT: 7.1 K/UL (ref 1.3–9.1)
SODIUM SERPL-SCNC: 142 MMOL/L (ref 135–144)
SPECIFIC GRAVITY UA: 1.02 (ref 1–1.03)
TURBIDITY: CLEAR
URINE HGB: NEGATIVE
UROBILINOGEN, URINE: NORMAL
WBC # BLD AUTO: 10.2 K/UL (ref 3.5–11)
WBC UA: NORMAL /HPF

## 2023-04-24 PROCEDURE — 84100 ASSAY OF PHOSPHORUS: CPT

## 2023-04-24 PROCEDURE — 85025 COMPLETE CBC W/AUTO DIFF WBC: CPT

## 2023-04-24 PROCEDURE — 80048 BASIC METABOLIC PNL TOTAL CA: CPT

## 2023-04-24 PROCEDURE — 83735 ASSAY OF MAGNESIUM: CPT

## 2023-04-24 PROCEDURE — 81001 URINALYSIS AUTO W/SCOPE: CPT

## 2023-04-24 PROCEDURE — 36415 COLL VENOUS BLD VENIPUNCTURE: CPT

## 2023-05-16 RX ORDER — ALLOPURINOL 100 MG/1
100 TABLET ORAL DAILY
Qty: 90 TABLET | Refills: 1 | Status: SHIPPED | OUTPATIENT
Start: 2023-05-16

## 2023-05-19 DIAGNOSIS — E11.22 TYPE 2 DIABETES MELLITUS WITH STAGE 3 CHRONIC KIDNEY DISEASE, WITHOUT LONG-TERM CURRENT USE OF INSULIN, UNSPECIFIED WHETHER STAGE 3A OR 3B CKD (HCC): ICD-10-CM

## 2023-05-19 DIAGNOSIS — N18.30 TYPE 2 DIABETES MELLITUS WITH STAGE 3 CHRONIC KIDNEY DISEASE, WITHOUT LONG-TERM CURRENT USE OF INSULIN, UNSPECIFIED WHETHER STAGE 3A OR 3B CKD (HCC): ICD-10-CM

## 2023-05-19 DIAGNOSIS — E78.2 MIXED HYPERLIPIDEMIA: ICD-10-CM

## 2023-05-22 RX ORDER — ROSUVASTATIN CALCIUM 10 MG/1
TABLET, COATED ORAL
Qty: 90 TABLET | Refills: 1 | Status: SHIPPED | OUTPATIENT
Start: 2023-05-22

## 2023-05-22 RX ORDER — SITAGLIPTIN 50 MG/1
TABLET, FILM COATED ORAL
Qty: 90 TABLET | Refills: 1 | Status: SHIPPED | OUTPATIENT
Start: 2023-05-22

## 2023-06-25 NOTE — PROGRESS NOTES
Clinical Pharmacy - Warfarin Dosing Consult     Pharmacy has been consulted to manage this patient s warfarin therapy.  Indication: DVT/ PE Treatment;Atrial Fibrillation  Therapy Goal: Other - see comments (INR 1.5-2)  Provider/Team: Liver Transplant Surgery  OP Anticoag Clinic: [facility]  Warfarin Prior to Admission: Yes  Warfarin PTA Regimen: 3 mg M/W/F/Sa, 2 mg ROW  Significant drug interactions: Prednisone, Bactrim  Dose Comments: Recently off tube feeds    INR   Date Value Ref Range Status   06/25/2023 1.09 0.85 - 1.15 Final   06/24/2023 1.12 0.85 - 1.15 Final       Recommend warfarin 3 mg today.  Pharmacy will monitor Damion Quinones daily and order warfarin doses to achieve specified goal.      Please contact pharmacy as soon as possible if the warfarin needs to be held for a procedure or if the warfarin goals change.      Alexander Mata, PharmD  PGY1 Pharmacist Resident   Medication History completed:    No changes to medication list at this encounter. Medications Confirmed with [facility] and [facility].      Thank you,  Rayo Rhodes, PharmD, BCPS  [phone]

## 2023-07-11 ENCOUNTER — OFFICE VISIT (OUTPATIENT)
Dept: FAMILY MEDICINE CLINIC | Age: 83
End: 2023-07-11
Payer: MEDICARE

## 2023-07-11 VITALS
HEIGHT: 70 IN | SYSTOLIC BLOOD PRESSURE: 136 MMHG | DIASTOLIC BLOOD PRESSURE: 82 MMHG | BODY MASS INDEX: 28.4 KG/M2 | OXYGEN SATURATION: 96 % | HEART RATE: 72 BPM | WEIGHT: 198.4 LBS

## 2023-07-11 DIAGNOSIS — E78.2 MIXED HYPERLIPIDEMIA: ICD-10-CM

## 2023-07-11 DIAGNOSIS — I10 ESSENTIAL HYPERTENSION: ICD-10-CM

## 2023-07-11 DIAGNOSIS — H61.23 BILATERAL IMPACTED CERUMEN: ICD-10-CM

## 2023-07-11 DIAGNOSIS — N18.30 TYPE 2 DIABETES MELLITUS WITH STAGE 3 CHRONIC KIDNEY DISEASE, WITHOUT LONG-TERM CURRENT USE OF INSULIN, UNSPECIFIED WHETHER STAGE 3A OR 3B CKD (HCC): Primary | ICD-10-CM

## 2023-07-11 DIAGNOSIS — E11.22 TYPE 2 DIABETES MELLITUS WITH STAGE 3 CHRONIC KIDNEY DISEASE, WITHOUT LONG-TERM CURRENT USE OF INSULIN, UNSPECIFIED WHETHER STAGE 3A OR 3B CKD (HCC): Primary | ICD-10-CM

## 2023-07-11 DIAGNOSIS — Z12.5 SCREENING PSA (PROSTATE SPECIFIC ANTIGEN): ICD-10-CM

## 2023-07-11 LAB — HBA1C MFR BLD: 6.3 %

## 2023-07-11 PROCEDURE — 69210 REMOVE IMPACTED EAR WAX UNI: CPT | Performed by: FAMILY MEDICINE

## 2023-07-11 PROCEDURE — 83037 HB GLYCOSYLATED A1C HOME DEV: CPT | Performed by: FAMILY MEDICINE

## 2023-07-11 PROCEDURE — 99214 OFFICE O/P EST MOD 30 MIN: CPT | Performed by: FAMILY MEDICINE

## 2023-07-11 PROCEDURE — G8427 DOCREV CUR MEDS BY ELIG CLIN: HCPCS | Performed by: FAMILY MEDICINE

## 2023-07-11 PROCEDURE — 3075F SYST BP GE 130 - 139MM HG: CPT | Performed by: FAMILY MEDICINE

## 2023-07-11 PROCEDURE — 1036F TOBACCO NON-USER: CPT | Performed by: FAMILY MEDICINE

## 2023-07-11 PROCEDURE — G8417 CALC BMI ABV UP PARAM F/U: HCPCS | Performed by: FAMILY MEDICINE

## 2023-07-11 PROCEDURE — 1123F ACP DISCUSS/DSCN MKR DOCD: CPT | Performed by: FAMILY MEDICINE

## 2023-07-11 PROCEDURE — 3044F HG A1C LEVEL LT 7.0%: CPT | Performed by: FAMILY MEDICINE

## 2023-07-11 PROCEDURE — 3079F DIAST BP 80-89 MM HG: CPT | Performed by: FAMILY MEDICINE

## 2023-07-11 ASSESSMENT — ENCOUNTER SYMPTOMS
CHEST TIGHTNESS: 0
ABDOMINAL PAIN: 0
BLOOD IN STOOL: 0
SHORTNESS OF BREATH: 0

## 2023-07-11 NOTE — PROGRESS NOTES
Subjective:      Patient ID: Monie Ibanez is a 81 y.o. male. Visit Information    Have you changed or started any medications since your last visit including any over-the-counter medicines, vitamins, or herbal medicines? no   Are you having any side effects from any of your medications? -  no  Have you stopped taking any of your medications? Is so, why? -  no    Have you seen any other physician or provider since your last visit? Yes Pulmonology and Cardiology   Have you had any other diagnostic tests since your last visit? No  Have you been seen in the emergency room and/or had an admission to a hospital since we last saw you? No  Have you had your routine dental cleaning in the past 6 months? no    Have you activated your [employer] account? If not, what are your barriers?  Yes     Patient Care Team:  Rajiv Freitas MD as PCP - General (Family Medicine)  Rajiv Freitas MD as PCP - Empaneled Provider  Jose Morrison MD as Consulting Physician (Orthopedic Surgery)  Brayden Carbajal MD as Consulting Physician (Cardiology)  Harriet Perry MD as Consulting Physician (Gastroenterology)  Vashti Ty MD as Surgeon (Orthopedic Surgery)  Gaurav Beal MD as Consulting Physician (Pulmonology)  BHAVYA Melara - CNP as Nurse Practitioner (Nurse Practitioner Family)  Tash Sharma OD (Optometry)  Clair Pacheco MD (Dermatology)  Ethel Shoemaker MD as Consulting Physician (Oncology)  Joshua Khalil MD as Consulting Physician (Nephrology)    Medical History Review  Past Medical, Family, and Social History reviewed and does contribute to the patient presenting condition    Health Maintenance   Topic Date Due    DTaP/Tdap/Td vaccine (1 - Tdap) Never done    Shingles vaccine (2 of 3) 11/14/2013    Flu vaccine (1) 08/01/2023    Lipids  01/11/2024    Depression Screen  04/06/2024    Annual Wellness Visit (AWV)  04/06/2024    Pneumococcal 65+ years Vaccine  Completed    COVID-19 Vaccine  Completed

## 2023-07-12 ENCOUNTER — HOSPITAL ENCOUNTER (OUTPATIENT)
Age: 83
Discharge: HOME | End: 2023-07-12
Payer: MEDICARE

## 2023-07-12 DIAGNOSIS — Z12.5 SCREENING PSA (PROSTATE SPECIFIC ANTIGEN): ICD-10-CM

## 2023-07-12 DIAGNOSIS — E11.22 TYPE 2 DIABETES MELLITUS WITH STAGE 3 CHRONIC KIDNEY DISEASE, WITHOUT LONG-TERM CURRENT USE OF INSULIN, UNSPECIFIED WHETHER STAGE 3A OR 3B CKD (HCC): ICD-10-CM

## 2023-07-12 DIAGNOSIS — I10 ESSENTIAL HYPERTENSION: ICD-10-CM

## 2023-07-12 DIAGNOSIS — N18.30 TYPE 2 DIABETES MELLITUS WITH STAGE 3 CHRONIC KIDNEY DISEASE, WITHOUT LONG-TERM CURRENT USE OF INSULIN, UNSPECIFIED WHETHER STAGE 3A OR 3B CKD (HCC): ICD-10-CM

## 2023-07-12 DIAGNOSIS — E78.2 MIXED HYPERLIPIDEMIA: ICD-10-CM

## 2023-07-12 LAB
ALBUMIN SERPL-MCNC: 4.3 G/DL (ref 3.5–5.2)
ALP SERPL-CCNC: 72 U/L (ref 40–129)
ALT SERPL-CCNC: 23 U/L (ref 5–41)
ANION GAP SERPL CALCULATED.3IONS-SCNC: 9 MMOL/L (ref 9–17)
AST SERPL-CCNC: 31 U/L
BILIRUB SERPL-MCNC: 1.1 MG/DL (ref 0.3–1.2)
BUN SERPL-MCNC: 21 MG/DL (ref 8–23)
CALCIUM SERPL-MCNC: 9.7 MG/DL (ref 8.6–10.4)
CHLORIDE SERPL-SCNC: 106 MMOL/L (ref 98–107)
CHOLEST SERPL-MCNC: 132 MG/DL
CHOLESTEROL/HDL RATIO: 2.8
CO2 SERPL-SCNC: 25 MMOL/L (ref 20–31)
CREAT SERPL-MCNC: 1.7 MG/DL (ref 0.7–1.2)
GFR SERPL CREATININE-BSD FRML MDRD: 40 ML/MIN/1.73M2
GLUCOSE SERPL-MCNC: 123 MG/DL (ref 70–99)
HDLC SERPL-MCNC: 47 MG/DL
LDLC SERPL CALC-MCNC: 61 MG/DL (ref 0–130)
POTASSIUM SERPL-SCNC: 4.4 MMOL/L (ref 3.7–5.3)
PROT SERPL-MCNC: 6.8 G/DL (ref 6.4–8.3)
PSA SERPL-MCNC: 2.07 NG/ML
SODIUM SERPL-SCNC: 140 MMOL/L (ref 135–144)
TRIGL SERPL-MCNC: 120 MG/DL

## 2023-07-12 PROCEDURE — 36415 COLL VENOUS BLD VENIPUNCTURE: CPT

## 2023-07-12 PROCEDURE — 80053 COMPREHEN METABOLIC PANEL: CPT

## 2023-07-12 PROCEDURE — G0103 PSA SCREENING: HCPCS

## 2023-07-12 PROCEDURE — 80061 LIPID PANEL: CPT

## 2023-07-17 ENCOUNTER — OFFICE VISIT (OUTPATIENT)
Dept: ORTHOPEDIC SURGERY | Age: 83
End: 2023-07-17

## 2023-07-17 ENCOUNTER — TELEPHONE (OUTPATIENT)
Dept: ORTHOPEDIC SURGERY | Age: 83
End: 2023-07-17

## 2023-07-17 ENCOUNTER — HOSPITAL ENCOUNTER (OUTPATIENT)
Age: 83
Discharge: HOME | End: 2023-07-17
Payer: MEDICARE

## 2023-07-17 DIAGNOSIS — Z96.641 HISTORY OF TOTAL RIGHT HIP REPLACEMENT: Primary | ICD-10-CM

## 2023-07-17 DIAGNOSIS — Z96.641 HISTORY OF TOTAL RIGHT HIP REPLACEMENT: ICD-10-CM

## 2023-07-17 LAB
CRP SERPL HS-MCNC: <3 MG/L (ref 0–5)
ERYTHROCYTE [SEDIMENTATION RATE] IN BLOOD BY PHOTOMETRIC METHOD: 7 MM/HR (ref 0–20)

## 2023-07-17 PROCEDURE — 85652 RBC SED RATE AUTOMATED: CPT

## 2023-07-17 PROCEDURE — 86140 C-REACTIVE PROTEIN: CPT

## 2023-07-17 PROCEDURE — 36415 COLL VENOUS BLD VENIPUNCTURE: CPT

## 2023-07-17 NOTE — TELEPHONE ENCOUNTER
----- Message from James Pagan MD sent at 7/17/2023  3:09 PM EDT -----  Sed rate is only 7 very unlikely to be infectious process going  ----- Message -----  From: Deirdre Crump Incoming Lab Results From "[employer]  Sent: 7/17/2023   2:53 PM EDT  To:  James Pagan MD

## 2023-07-17 NOTE — PROGRESS NOTES
drinks     Comment: rarely    Drug use: No    Sexual activity: Not on file   Other Topics Concern    Not on file   Social History Narrative    Not on file     Social Determinants of Health     Financial Resource Strain: Not on file   Food Insecurity: Not on file   Transportation Needs: Not on file   Physical Activity: Inactive    Days of Exercise per Week: 0 days    Minutes of Exercise per Session: 0 min   Stress: Not on file   Social Connections: Not on file   Intimate Partner Violence: Not on file   Housing Stability: Not on file     Past Medical History:   Diagnosis Date    Anemia     Anesthesia     woke up eary during surgery x1 , heard saw & felt the pressure. Arthritis 7/15/2013    ASHD (arteriosclerotic heart disease) 7/15/2013    Asthma     CAD (coronary artery disease)     has cardiac stent x 4. Cerebral artery occlusion with cerebral infarction [facility])     Chronic kidney disease     stage 3    COPD (chronic obstructive pulmonary disease) ([address] )     COVID-19 vaccine series completed     Moderna 1/26/2021, 2/23/2021    Degenerative joint disease 7/15/2013    Diabetes mellitus ([address] )     ED (erectile dysfunction) 7/15/2013    Full dentures     Gout     History of carpal tunnel syndrome 07/15/2013    had surgery    History of colon polyps 7/15/2013    History of heart attack     silent one, patient was unaware    History of hemorrhoids 7/15/2013    History of TIA (transient ischemic attack) 7/15/2013    Hypercholesteremia 7/15/2013    Hypertension     Hyperuricemia 7/15/2013    Leukocytosis     Renal cyst     Sciatic nerve pain     4th and 5th lumbar vertebrae causing pinched nerve    Sleep apnea 07/15/2013    on cpap at .     Wears glasses     for reading     Past Surgical History:   Procedure Laterality Date    BACK SURGERY      BLEPHAROPLASTY Bilateral 04/02/2012    CARDIAC SURGERY      CARPAL TUNNEL RELEASE Right 11/2002    CATARACT REMOVAL WITH IMPLANT Left 03/02/2012    CATARACT REMOVAL WITH IMPLANT

## 2023-07-21 ENCOUNTER — HOSPITAL ENCOUNTER (OUTPATIENT)
Dept: CT IMAGING | Age: 83
End: 2023-07-21
Attending: ORTHOPAEDIC SURGERY
Payer: MEDICARE

## 2023-07-21 DIAGNOSIS — Z96.641 HISTORY OF TOTAL RIGHT HIP REPLACEMENT: ICD-10-CM

## 2023-07-21 PROCEDURE — 72192 CT PELVIS W/O DYE: CPT

## 2023-07-26 ENCOUNTER — TELEPHONE (OUTPATIENT)
Dept: ORTHOPEDIC SURGERY | Age: 83
End: 2023-07-26

## 2023-07-26 NOTE — TELEPHONE ENCOUNTER
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 Would like to recheck the patient back here in the next 4 to 6 weeks to see if he still painful and determine whether we might need to do a polyethylene exchange or not for this patient  ----- Message -----  From: Deirdre Crump Incoming Radiant Results From [employer]  Sent: 7/24/2023   3:32 PM EDT  To:  Mendoza Carpenter MD

## 2023-09-25 ENCOUNTER — OFFICE VISIT (OUTPATIENT)
Dept: ORTHOPEDIC SURGERY | Age: 83
End: 2023-09-25
Payer: MEDICARE

## 2023-09-25 DIAGNOSIS — Z96.641 HISTORY OF TOTAL RIGHT HIP REPLACEMENT: Primary | ICD-10-CM

## 2023-09-25 PROCEDURE — G8417 CALC BMI ABV UP PARAM F/U: HCPCS | Performed by: ORTHOPAEDIC SURGERY

## 2023-09-25 PROCEDURE — 99213 OFFICE O/P EST LOW 20 MIN: CPT | Performed by: ORTHOPAEDIC SURGERY

## 2023-09-25 PROCEDURE — 1036F TOBACCO NON-USER: CPT | Performed by: ORTHOPAEDIC SURGERY

## 2023-09-25 PROCEDURE — 1123F ACP DISCUSS/DSCN MKR DOCD: CPT | Performed by: ORTHOPAEDIC SURGERY

## 2023-09-25 PROCEDURE — G8428 CUR MEDS NOT DOCUMENT: HCPCS | Performed by: ORTHOPAEDIC SURGERY

## 2023-09-25 NOTE — PROGRESS NOTES
SURGERY      CARPAL TUNNEL RELEASE Right 11/2002    CATARACT REMOVAL WITH IMPLANT Left 03/02/2012    CATARACT REMOVAL WITH IMPLANT Right 03/05/2012    COLONOSCOPY  05/21/08    POLYPECTOMY    CORONARY ANGIOPLASTY WITH STENT PLACEMENT  1994 x1 stent,1998 x2 stents,2009 x1,    ENDOSCOPY, COLON, DIAGNOSTIC      EYE SURGERY      cataracts, eyelids. JOINT REPLACEMENT Right 07/12/11     RT TOTAL SHOULDER REPLACEMENT    JOINT REPLACEMENT Right 1995    rt. hip replacement    JOINT REPLACEMENT Left 01/23/2018    ELLE    KNEE ARTHROPLASTY Left 04/04/2017    LUMBAR FUSION N/A 9/20/2021    L3-L5 POSTERIOR LUMBAR DECOMPRESSSION & INSTRUMENTED FUSION performed by Jud Flores MD at [address] [facility] ACETBLR/PROX FEM PROSTC AGRFT/ALGRFT Left 1/23/2018    LEFT TOTAL HIP ARTHROPLASTY MINIMALLY INVASIVE ASI WITH BIOMET SYSTEM & GPS SPRAY APPLICATION performed by Jorge Luis Ha MD at [address] [facility] ARTHROSCOPY Right 05/2002 repair    7-12-11 right shoulder reverse replacement. SHOULDER SURGERY Left 02/24/2002    shoulder resurfacing    TOTAL KNEE ARTHROPLASTY Left 4/4/2017    KNEE TOTAL ARTHROPLASTY performed by Jorge Luis Ha MD at [address] History   Problem Relation Age of Onset    Alzheimer's Disease Mother     Heart Disease Mother     Emphysema Father    Plan  I informed Shirley Pinto as well as wife that based on the CT findings that he obviously has some polyethylene wear which would be anticipated for 20 years but there is no gross evidence for loosening of the acetabular or femoral components.   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

## 2023-09-26 RX ORDER — LISINOPRIL 2.5 MG/1
2.5 TABLET ORAL DAILY
Qty: 90 TABLET | Refills: 1 | Status: SHIPPED | OUTPATIENT
Start: 2023-09-26

## 2023-10-11 ENCOUNTER — NURSE ONLY (OUTPATIENT)
Dept: FAMILY MEDICINE CLINIC | Age: 83
End: 2023-10-11
Payer: MEDICARE

## 2023-10-11 VITALS — RESPIRATION RATE: 16 BRPM | TEMPERATURE: 97.1 F | HEART RATE: 72 BPM

## 2023-10-11 DIAGNOSIS — Z23 NEEDS FLU SHOT: Primary | ICD-10-CM

## 2023-10-11 PROCEDURE — G0008 ADMIN INFLUENZA VIRUS VAC: HCPCS | Performed by: FAMILY MEDICINE

## 2023-10-11 PROCEDURE — 90694 VACC AIIV4 NO PRSRV 0.5ML IM: CPT | Performed by: FAMILY MEDICINE

## 2023-10-17 ENCOUNTER — OFFICE VISIT (OUTPATIENT)
Dept: FAMILY MEDICINE CLINIC | Age: 83
End: 2023-10-17
Payer: MEDICARE

## 2023-10-17 ENCOUNTER — HOSPITAL ENCOUNTER (OUTPATIENT)
Age: 83
Setting detail: SPECIMEN
Discharge: HOME | End: 2023-10-17

## 2023-10-17 ENCOUNTER — TELEPHONE (OUTPATIENT)
Dept: FAMILY MEDICINE CLINIC | Age: 83
End: 2023-10-17

## 2023-10-17 VITALS
DIASTOLIC BLOOD PRESSURE: 71 MMHG | SYSTOLIC BLOOD PRESSURE: 111 MMHG | WEIGHT: 190 LBS | OXYGEN SATURATION: 99 % | HEART RATE: 78 BPM | HEIGHT: 70 IN | TEMPERATURE: 98.6 F | BODY MASS INDEX: 27.2 KG/M2

## 2023-10-17 DIAGNOSIS — J06.9 VIRAL URI WITH COUGH: ICD-10-CM

## 2023-10-17 DIAGNOSIS — J06.9 VIRAL URI WITH COUGH: Primary | ICD-10-CM

## 2023-10-17 PROCEDURE — 3074F SYST BP LT 130 MM HG: CPT

## 2023-10-17 PROCEDURE — G8427 DOCREV CUR MEDS BY ELIG CLIN: HCPCS

## 2023-10-17 PROCEDURE — G8417 CALC BMI ABV UP PARAM F/U: HCPCS

## 2023-10-17 PROCEDURE — 1036F TOBACCO NON-USER: CPT

## 2023-10-17 PROCEDURE — 1123F ACP DISCUSS/DSCN MKR DOCD: CPT

## 2023-10-17 PROCEDURE — 99213 OFFICE O/P EST LOW 20 MIN: CPT

## 2023-10-17 PROCEDURE — G8484 FLU IMMUNIZE NO ADMIN: HCPCS

## 2023-10-17 PROCEDURE — 3078F DIAST BP <80 MM HG: CPT

## 2023-10-17 RX ORDER — FLUTICASONE PROPIONATE 50 MCG
1 SPRAY, SUSPENSION (ML) NASAL DAILY
Qty: 32 G | Refills: 1 | Status: SHIPPED | OUTPATIENT
Start: 2023-10-17

## 2023-10-17 RX ORDER — BENZONATATE 100 MG/1
100 CAPSULE ORAL 3 TIMES DAILY PRN
Qty: 30 CAPSULE | Refills: 0 | Status: SHIPPED | OUTPATIENT
Start: 2023-10-17 | End: 2023-10-27

## 2023-10-17 ASSESSMENT — ENCOUNTER SYMPTOMS
COUGH: 1
SORE THROAT: 0
DIARRHEA: 0
CONSTIPATION: 0
SHORTNESS OF BREATH: 1
EYE REDNESS: 0
RHINORRHEA: 1
EYE PAIN: 0

## 2023-10-17 NOTE — TELEPHONE ENCOUNTER
JULIA Tafoya (wife) called. She states since yesterday Liam Weston has been coughing, fatigue, no energy, sore throat, headache, achy. Breathing a little shallow, has COPD. Has not tested for Covid. I told her to get him to either ER if bad, or [facility] to be evaluated and treated. She agreed.

## 2023-10-17 NOTE — PROGRESS NOTES
[address] [facility], [place], [facility] Klaus Erickson [address] [address]  Dept: [phone]  Dept Fax: [phone]    Jacky Rojas is a 81 y.o. male who presents to the urgent care today for his medical conditions/complaints as notedbelow. Jacky Rojas is c/o of Cough (Cough fever 100.0 congestion headache sx started yesterday pt did get  flu shot on Wednesday  )      HPI:     Patient presents to the walk in clinic for evaluation of a cough. Patient states that he received the flu shot last week    Cough  This is a new problem. The current episode started yesterday. The problem has been gradually worsening. The problem occurs constantly. The cough is Productive of sputum. Associated symptoms include a fever, myalgias, nasal congestion, postnasal drip, rhinorrhea and shortness of breath. Pertinent negatives include no chest pain, chills, ear congestion, ear pain, eye redness, headaches, rash or sore throat. Nothing aggravates the symptoms. He has tried a beta-agonist inhaler and steroid inhaler for the symptoms. His past medical history is significant for bronchitis and COPD. There is no history of pneumonia. Past Medical History:   Diagnosis Date    Anemia     Anesthesia     woke up eary during surgery x1 , heard saw & felt the pressure. Arthritis 7/15/2013    ASHD (arteriosclerotic heart disease) 7/15/2013    Asthma     CAD (coronary artery disease)     has cardiac stent x 4.     Cerebral artery occlusion with cerebral infarction [facility])     Chronic kidney disease     stage 3    COPD (chronic obstructive pulmonary disease) ([address] [facility])     COVID-19 vaccine series completed     [facility] 1/26/2021, 2/23/2021    Degenerative joint disease 7/15/2013    Diabetes mellitus [facility])     ED (erectile dysfunction) 7/15/2013    Full dentures     Gout     History of carpal tunnel syndrome 07/15/2013    had surgery    History of colon polyps

## 2023-10-18 LAB
SARS-COV-2 RNA RESP QL NAA+PROBE: ABNORMAL
SARS-COV-2 RNA RESP QL NAA+PROBE: DETECTED
SOURCE: ABNORMAL

## 2023-10-31 DIAGNOSIS — E11.22 TYPE 2 DIABETES MELLITUS WITH STAGE 3 CHRONIC KIDNEY DISEASE, WITHOUT LONG-TERM CURRENT USE OF INSULIN, UNSPECIFIED WHETHER STAGE 3A OR 3B CKD (HCC): ICD-10-CM

## 2023-10-31 DIAGNOSIS — E78.2 MIXED HYPERLIPIDEMIA: ICD-10-CM

## 2023-10-31 DIAGNOSIS — N18.30 TYPE 2 DIABETES MELLITUS WITH STAGE 3 CHRONIC KIDNEY DISEASE, WITHOUT LONG-TERM CURRENT USE OF INSULIN, UNSPECIFIED WHETHER STAGE 3A OR 3B CKD (HCC): ICD-10-CM

## 2023-11-01 RX ORDER — SITAGLIPTIN 50 MG/1
TABLET, FILM COATED ORAL
Qty: 90 TABLET | Refills: 1 | Status: SHIPPED | OUTPATIENT
Start: 2023-11-01

## 2023-11-01 RX ORDER — ROSUVASTATIN CALCIUM 10 MG/1
TABLET, COATED ORAL
Qty: 90 TABLET | Refills: 1 | Status: SHIPPED | OUTPATIENT
Start: 2023-11-01

## 2023-11-08 ENCOUNTER — HOSPITAL ENCOUNTER (OUTPATIENT)
Age: 83
Discharge: HOME | End: 2023-11-08
Payer: MEDICARE

## 2023-11-08 ENCOUNTER — HOSPITAL ENCOUNTER (OUTPATIENT)
Age: 83
Discharge: HOME | End: 2023-11-10
Payer: MEDICARE

## 2023-11-08 ENCOUNTER — HOSPITAL ENCOUNTER (OUTPATIENT)
Dept: GENERAL RADIOLOGY | Age: 83
Discharge: HOME | End: 2023-11-10
Payer: MEDICARE

## 2023-11-08 DIAGNOSIS — J44.9 CHRONIC OBSTRUCTIVE PULMONARY DISEASE, UNSPECIFIED COPD TYPE (HCC): ICD-10-CM

## 2023-11-08 LAB
BNP SERPL-MCNC: 91 PG/ML
D DIMER PPP FEU-MCNC: 0.98 UG/ML FEU (ref 0–0.59)
ERYTHROCYTE [DISTWIDTH] IN BLOOD BY AUTOMATED COUNT: 14.2 % (ref 11.5–14.9)
HCT VFR BLD AUTO: 43.2 % (ref 41–53)
HGB BLD-MCNC: 14 G/DL (ref 13.5–17.5)
MCH RBC QN AUTO: 31 PG (ref 26–34)
MCHC RBC AUTO-ENTMCNC: 32.5 G/DL (ref 31–37)
MCV RBC AUTO: 95.2 FL (ref 80–100)
PLATELET # BLD AUTO: 212 K/UL (ref 150–450)
PMV BLD AUTO: 11.2 FL (ref 6–12)
RBC # BLD AUTO: 4.53 M/UL (ref 4.5–5.9)
WBC OTHER # BLD: 12.8 K/UL (ref 3.5–11)

## 2023-11-08 PROCEDURE — 85027 COMPLETE CBC AUTOMATED: CPT

## 2023-11-08 PROCEDURE — 83880 ASSAY OF NATRIURETIC PEPTIDE: CPT

## 2023-11-08 PROCEDURE — 85379 FIBRIN DEGRADATION QUANT: CPT

## 2023-11-08 PROCEDURE — 71046 X-RAY EXAM CHEST 2 VIEWS: CPT

## 2023-11-08 PROCEDURE — 36415 COLL VENOUS BLD VENIPUNCTURE: CPT

## 2023-11-15 ENCOUNTER — HOSPITAL ENCOUNTER (OUTPATIENT)
Dept: CT IMAGING | Age: 83
Discharge: HOME | End: 2023-11-17
Attending: INTERNAL MEDICINE
Payer: MEDICARE

## 2023-11-15 DIAGNOSIS — J44.9 CHRONIC OBSTRUCTIVE PULMONARY DISEASE, UNSPECIFIED COPD TYPE (HCC): ICD-10-CM

## 2023-11-15 DIAGNOSIS — R06.02 SOB (SHORTNESS OF BREATH): ICD-10-CM

## 2023-11-15 DIAGNOSIS — R79.89 ELEVATED D-DIMER: ICD-10-CM

## 2023-11-15 LAB
EGFR, POC: >60 ML/MIN/1.73M2
POC CREATININE: 1.2 MG/DL (ref 0.51–1.19)

## 2023-11-15 PROCEDURE — 82565 ASSAY OF CREATININE: CPT

## 2023-11-15 PROCEDURE — 71260 CT THORAX DX C+: CPT

## 2023-11-15 PROCEDURE — 2580000003 HC RX 258: Performed by: INTERNAL MEDICINE

## 2023-11-15 PROCEDURE — 6360000004 HC RX CONTRAST MEDICATION: Performed by: INTERNAL MEDICINE

## 2023-11-15 RX ORDER — SODIUM CHLORIDE 0.9 % (FLUSH) 0.9 %
10 SYRINGE (ML) INJECTION PRN
Status: DISCONTINUED | OUTPATIENT
Start: 2023-11-15 | End: 2023-11-18 | Stop reason: HOSPADM

## 2023-11-15 RX ORDER — 0.9 % SODIUM CHLORIDE 0.9 %
100 INTRAVENOUS SOLUTION INTRAVENOUS ONCE
Status: COMPLETED | OUTPATIENT
Start: 2023-11-15 | End: 2023-11-15

## 2023-11-15 RX ADMIN — SODIUM CHLORIDE 100 ML: 9 INJECTION, SOLUTION INTRAVENOUS at 12:01

## 2023-11-15 RX ADMIN — IOPAMIDOL 75 ML: 755 INJECTION, SOLUTION INTRAVENOUS at 12:01

## 2023-11-15 RX ADMIN — SODIUM CHLORIDE, PRESERVATIVE FREE 10 ML: 5 INJECTION INTRAVENOUS at 12:01

## 2023-11-16 ENCOUNTER — HOSPITAL ENCOUNTER (OUTPATIENT)
Age: 83
Discharge: HOME | End: 2023-11-16
Payer: MEDICARE

## 2023-11-16 DIAGNOSIS — N18.30 SECONDARY DIABETES MELLITUS WITH STAGE 3 CHRONIC KIDNEY DISEASE (HCC): ICD-10-CM

## 2023-11-16 DIAGNOSIS — N28.1 RENAL CYST: ICD-10-CM

## 2023-11-16 DIAGNOSIS — N18.30 BENIGN HYPERTENSION WITH CKD (CHRONIC KIDNEY DISEASE) STAGE III (HCC): ICD-10-CM

## 2023-11-16 DIAGNOSIS — I12.9 BENIGN HYPERTENSION WITH CKD (CHRONIC KIDNEY DISEASE) STAGE III (HCC): ICD-10-CM

## 2023-11-16 DIAGNOSIS — N18.31 STAGE 3A CHRONIC KIDNEY DISEASE (HCC): ICD-10-CM

## 2023-11-16 DIAGNOSIS — E13.22 SECONDARY DIABETES MELLITUS WITH STAGE 3 CHRONIC KIDNEY DISEASE (HCC): ICD-10-CM

## 2023-11-16 LAB
ANION GAP SERPL CALCULATED.3IONS-SCNC: 11 MMOL/L (ref 9–17)
BACTERIA URNS QL MICRO: ABNORMAL
BASOPHILS # BLD: 0.1 K/UL (ref 0–0.2)
BASOPHILS NFR BLD: 0 % (ref 0–2)
BILIRUB UR QL STRIP: NEGATIVE
BUN SERPL-MCNC: 20 MG/DL (ref 8–23)
CALCIUM SERPL-MCNC: 10.1 MG/DL (ref 8.6–10.4)
CASTS #/AREA URNS LPF: ABNORMAL /LPF
CHLORIDE SERPL-SCNC: 105 MMOL/L (ref 98–107)
CLARITY UR: CLEAR
CO2 SERPL-SCNC: 27 MMOL/L (ref 20–31)
COLOR UR: YELLOW
CREAT SERPL-MCNC: 1.6 MG/DL (ref 0.7–1.2)
EOSINOPHIL # BLD: 0.5 K/UL (ref 0–0.4)
EOSINOPHILS RELATIVE PERCENT: 4 % (ref 0–4)
EPI CELLS #/AREA URNS HPF: ABNORMAL /HPF
ERYTHROCYTE [DISTWIDTH] IN BLOOD BY AUTOMATED COUNT: 14.4 % (ref 11.5–14.9)
GFR SERPL CREATININE-BSD FRML MDRD: 43 ML/MIN/1.73M2
GLUCOSE SERPL-MCNC: 90 MG/DL (ref 70–99)
GLUCOSE UR STRIP-MCNC: NEGATIVE MG/DL
HCT VFR BLD AUTO: 46.9 % (ref 41–53)
HGB BLD-MCNC: 15.3 G/DL (ref 13.5–17.5)
HGB UR QL STRIP.AUTO: NEGATIVE
KETONES UR STRIP-MCNC: NEGATIVE MG/DL
LEUKOCYTE ESTERASE UR QL STRIP: NEGATIVE
LYMPHOCYTES NFR BLD: 2.5 K/UL (ref 1–4.8)
LYMPHOCYTES RELATIVE PERCENT: 19 % (ref 24–44)
MAGNESIUM SERPL-MCNC: 2.4 MG/DL (ref 1.6–2.6)
MCH RBC QN AUTO: 31.3 PG (ref 26–34)
MCHC RBC AUTO-ENTMCNC: 32.7 G/DL (ref 31–37)
MCV RBC AUTO: 95.8 FL (ref 80–100)
MONOCYTES NFR BLD: 1.2 K/UL (ref 0.1–1.3)
MONOCYTES NFR BLD: 9 % (ref 1–7)
NEUTROPHILS NFR BLD: 68 % (ref 36–66)
NEUTS SEG NFR BLD: 8.9 K/UL (ref 1.3–9.1)
NITRITE UR QL STRIP: NEGATIVE
PH UR STRIP: 5 [PH] (ref 5–8)
PHOSPHATE SERPL-MCNC: 3.3 MG/DL (ref 2.5–4.5)
PLATELET # BLD AUTO: 222 K/UL (ref 150–450)
PMV BLD AUTO: 10.4 FL (ref 6–12)
POTASSIUM SERPL-SCNC: 4.5 MMOL/L (ref 3.7–5.3)
PROT UR STRIP-MCNC: NEGATIVE MG/DL
RBC # BLD AUTO: 4.9 M/UL (ref 4.5–5.9)
RBC #/AREA URNS HPF: ABNORMAL /HPF
SODIUM SERPL-SCNC: 143 MMOL/L (ref 135–144)
SP GR UR STRIP: 1.03 (ref 1–1.03)
UROBILINOGEN UR STRIP-ACNC: NORMAL EU/DL (ref 0–1)
WBC #/AREA URNS HPF: ABNORMAL /HPF
WBC OTHER # BLD: 13.2 K/UL (ref 3.5–11)

## 2023-11-16 PROCEDURE — 85025 COMPLETE CBC W/AUTO DIFF WBC: CPT

## 2023-11-16 PROCEDURE — 83735 ASSAY OF MAGNESIUM: CPT

## 2023-11-16 PROCEDURE — 84100 ASSAY OF PHOSPHORUS: CPT

## 2023-11-16 PROCEDURE — 81001 URINALYSIS AUTO W/SCOPE: CPT

## 2023-11-16 PROCEDURE — 36415 COLL VENOUS BLD VENIPUNCTURE: CPT

## 2023-11-16 PROCEDURE — 80048 BASIC METABOLIC PNL TOTAL CA: CPT

## 2023-11-22 PROBLEM — R53.1 WEAKNESS: Status: ACTIVE | Noted: 2021-09-27

## 2023-11-22 PROBLEM — J44.9 CHRONIC OBSTRUCTIVE PULMONARY DISEASE, UNSPECIFIED (HCC): Status: ACTIVE | Noted: 2021-09-27

## 2023-11-22 PROBLEM — Z96.1 PRESENCE OF INTRAOCULAR LENS: Status: ACTIVE | Noted: 2021-09-27

## 2023-11-22 PROBLEM — Z96.652 PRESENCE OF LEFT ARTIFICIAL KNEE JOINT: Status: ACTIVE | Noted: 2021-09-27

## 2023-11-22 PROBLEM — D72.829 ELEVATED WHITE BLOOD CELL COUNT, UNSPECIFIED: Status: ACTIVE | Noted: 2021-09-27

## 2023-11-22 PROBLEM — K63.5 POLYP OF COLON: Status: ACTIVE | Noted: 2021-09-27

## 2023-11-22 PROBLEM — M54.40 LUMBAGO WITH SCIATICA, UNSPECIFIED SIDE: Status: ACTIVE | Noted: 2021-09-27

## 2023-11-22 PROBLEM — N18.9 ANEMIA IN CHRONIC KIDNEY DISEASE: Status: ACTIVE | Noted: 2021-09-27

## 2023-11-22 PROBLEM — Z96.611 PRESENCE OF RIGHT ARTIFICIAL SHOULDER JOINT: Status: ACTIVE | Noted: 2021-09-27

## 2023-11-22 PROBLEM — Z95.5 PRESENCE OF CORONARY ANGIOPLASTY IMPLANT AND GRAFT: Status: ACTIVE | Noted: 2021-09-27

## 2023-11-22 PROBLEM — N17.9 ACUTE KIDNEY FAILURE, UNSPECIFIED (HCC): Status: ACTIVE | Noted: 2021-09-27

## 2023-11-22 PROBLEM — I25.2 OLD MYOCARDIAL INFARCTION: Status: ACTIVE | Noted: 2021-09-27

## 2023-11-22 PROBLEM — D63.1 ANEMIA IN CHRONIC KIDNEY DISEASE: Status: ACTIVE | Noted: 2021-09-27

## 2023-11-22 PROBLEM — E78.00 PURE HYPERCHOLESTEROLEMIA, UNSPECIFIED: Status: ACTIVE | Noted: 2017-02-24

## 2024-01-16 ENCOUNTER — OFFICE VISIT (OUTPATIENT)
Dept: FAMILY MEDICINE CLINIC | Age: 84
End: 2024-01-16
Payer: MEDICARE

## 2024-01-16 VITALS
HEART RATE: 64 BPM | SYSTOLIC BLOOD PRESSURE: 104 MMHG | BODY MASS INDEX: 26.54 KG/M2 | WEIGHT: 185 LBS | DIASTOLIC BLOOD PRESSURE: 62 MMHG | RESPIRATION RATE: 16 BRPM

## 2024-01-16 DIAGNOSIS — E78.2 MIXED HYPERLIPIDEMIA: ICD-10-CM

## 2024-01-16 DIAGNOSIS — N18.30 TYPE 2 DIABETES MELLITUS WITH STAGE 3 CHRONIC KIDNEY DISEASE, WITHOUT LONG-TERM CURRENT USE OF INSULIN, UNSPECIFIED WHETHER STAGE 3A OR 3B CKD (HCC): Primary | Chronic | ICD-10-CM

## 2024-01-16 DIAGNOSIS — M1A.09X0 IDIOPATHIC CHRONIC GOUT OF MULTIPLE SITES WITHOUT TOPHUS: ICD-10-CM

## 2024-01-16 DIAGNOSIS — J44.9 CHRONIC OBSTRUCTIVE PULMONARY DISEASE, UNSPECIFIED COPD TYPE (HCC): ICD-10-CM

## 2024-01-16 DIAGNOSIS — I10 ESSENTIAL HYPERTENSION: ICD-10-CM

## 2024-01-16 DIAGNOSIS — E11.22 TYPE 2 DIABETES MELLITUS WITH STAGE 3 CHRONIC KIDNEY DISEASE, WITHOUT LONG-TERM CURRENT USE OF INSULIN, UNSPECIFIED WHETHER STAGE 3A OR 3B CKD (HCC): Primary | Chronic | ICD-10-CM

## 2024-01-16 DIAGNOSIS — N18.32 STAGE 3B CHRONIC KIDNEY DISEASE (HCC): ICD-10-CM

## 2024-01-16 LAB — HBA1C MFR BLD: 6 %

## 2024-01-16 PROCEDURE — 1036F TOBACCO NON-USER: CPT | Performed by: FAMILY MEDICINE

## 2024-01-16 PROCEDURE — G8484 FLU IMMUNIZE NO ADMIN: HCPCS | Performed by: FAMILY MEDICINE

## 2024-01-16 PROCEDURE — G8417 CALC BMI ABV UP PARAM F/U: HCPCS | Performed by: FAMILY MEDICINE

## 2024-01-16 PROCEDURE — 3074F SYST BP LT 130 MM HG: CPT | Performed by: FAMILY MEDICINE

## 2024-01-16 PROCEDURE — 1123F ACP DISCUSS/DSCN MKR DOCD: CPT | Performed by: FAMILY MEDICINE

## 2024-01-16 PROCEDURE — G8427 DOCREV CUR MEDS BY ELIG CLIN: HCPCS | Performed by: FAMILY MEDICINE

## 2024-01-16 PROCEDURE — 99214 OFFICE O/P EST MOD 30 MIN: CPT | Performed by: FAMILY MEDICINE

## 2024-01-16 PROCEDURE — 83036 HEMOGLOBIN GLYCOSYLATED A1C: CPT | Performed by: FAMILY MEDICINE

## 2024-01-16 PROCEDURE — 3023F SPIROM DOC REV: CPT | Performed by: FAMILY MEDICINE

## 2024-01-16 PROCEDURE — 3078F DIAST BP <80 MM HG: CPT | Performed by: FAMILY MEDICINE

## 2024-01-16 PROCEDURE — 3044F HG A1C LEVEL LT 7.0%: CPT | Performed by: FAMILY MEDICINE

## 2024-01-16 SDOH — ECONOMIC STABILITY: FOOD INSECURITY: WITHIN THE PAST 12 MONTHS, YOU WORRIED THAT YOUR FOOD WOULD RUN OUT BEFORE YOU GOT MONEY TO BUY MORE.: NEVER TRUE

## 2024-01-16 SDOH — ECONOMIC STABILITY: FOOD INSECURITY: WITHIN THE PAST 12 MONTHS, THE FOOD YOU BOUGHT JUST DIDN'T LAST AND YOU DIDN'T HAVE MONEY TO GET MORE.: NEVER TRUE

## 2024-01-16 SDOH — ECONOMIC STABILITY: INCOME INSECURITY: HOW HARD IS IT FOR YOU TO PAY FOR THE VERY BASICS LIKE FOOD, HOUSING, MEDICAL CARE, AND HEATING?: NOT HARD AT ALL

## 2024-01-16 ASSESSMENT — PATIENT HEALTH QUESTIONNAIRE - PHQ9
1. LITTLE INTEREST OR PLEASURE IN DOING THINGS: 0
2. FEELING DOWN, DEPRESSED OR HOPELESS: 0
SUM OF ALL RESPONSES TO PHQ QUESTIONS 1-9: 0
SUM OF ALL RESPONSES TO PHQ9 QUESTIONS 1 & 2: 0

## 2024-01-16 ASSESSMENT — ENCOUNTER SYMPTOMS
ABDOMINAL PAIN: 0
BLOOD IN STOOL: 0
CHEST TIGHTNESS: 0
SHORTNESS OF BREATH: 0

## 2024-01-16 NOTE — PROGRESS NOTES
Subjective:      Patient ID: Ger Mccray is a 83 y.o. male.    HPI  Chronic Disease Visit Information    BP Readings from Last 3 Encounters:   01/16/24 104/62   11/22/23 102/72   10/17/23 111/71          Hemoglobin A1C (%)   Date Value   07/11/2023 6.3 (A)   01/10/2023 5.8   07/12/2022 7.0 (H)     LDL Cholesterol (mg/dL)   Date Value   07/12/2023 61     HDL (mg/dL)   Date Value   07/12/2023 47     BUN (mg/dL)   Date Value   11/16/2023 20     Creatinine (mg/dL)   Date Value   11/16/2023 1.6 (H)     Glucose (mg/dL)   Date Value   11/16/2023 90   02/03/2012 143 (H)            Have you changed or started any medications since your last visit including any over-the-counter medicines, vitamins, or herbal medicines? no   Are you having any side effects from any of your medications? -  no  Have you stopped taking any of your medications? Is so, why? -  yes - ALLOPURINOL-PT STATES IT DOESN'T WORK    Have you seen any other physician or provider since your last visit? Yes - Records Obtained  Have you had any other diagnostic tests since your last visit? Yes - Records Obtained  Have you been seen in the emergency room and/or had an admission to a hospital since we last saw you? No  Have you had your annual diabetic retinal (eye) exam? Yes - Records Obtained  Have you had your routine dental cleaning in the past 6 months?  DENTURES-NO    Have you activated your [employer] account? If not, what are your barriers? Yes     Patient Care Team:  Alin Hayes MD as PCP - General (Family Medicine)  Alin Hayes MD as PCP - Empaneled Provider  Warner Duke MD as Consulting Physician (Orthopedic Surgery)  Nathan West MD as Consulting Physician (Cardiology)  Eneida Limon MD as Consulting Physician (Gastroenterology)  Amauri Rangel MD as Surgeon (Orthopedic Surgery)  Franklyn Bauer MD as Consulting Physician (Pulmonology)  Radha Nelson APRN - CNP as Nurse Practitioner (Nurse Practitioner

## 2024-01-22 ENCOUNTER — HOSPITAL ENCOUNTER (OUTPATIENT)
Age: 84
Discharge: HOME | End: 2024-01-22
Payer: MEDICARE

## 2024-01-22 DIAGNOSIS — I10 ESSENTIAL HYPERTENSION: ICD-10-CM

## 2024-01-22 DIAGNOSIS — M1A.09X0 IDIOPATHIC CHRONIC GOUT OF MULTIPLE SITES WITHOUT TOPHUS: ICD-10-CM

## 2024-01-22 DIAGNOSIS — E11.22 TYPE 2 DIABETES MELLITUS WITH STAGE 3 CHRONIC KIDNEY DISEASE, WITHOUT LONG-TERM CURRENT USE OF INSULIN, UNSPECIFIED WHETHER STAGE 3A OR 3B CKD (HCC): Chronic | ICD-10-CM

## 2024-01-22 DIAGNOSIS — N18.30 TYPE 2 DIABETES MELLITUS WITH STAGE 3 CHRONIC KIDNEY DISEASE, WITHOUT LONG-TERM CURRENT USE OF INSULIN, UNSPECIFIED WHETHER STAGE 3A OR 3B CKD (HCC): Chronic | ICD-10-CM

## 2024-01-22 DIAGNOSIS — M1A.00X0 IDIOPATHIC CHRONIC GOUT WITHOUT TOPHUS, UNSPECIFIED SITE: Primary | ICD-10-CM

## 2024-01-22 DIAGNOSIS — E78.2 MIXED HYPERLIPIDEMIA: ICD-10-CM

## 2024-01-22 LAB
ALT SERPL-CCNC: 22 U/L (ref 5–41)
AST SERPL-CCNC: 27 U/L
CHOLEST SERPL-MCNC: 139 MG/DL
CHOLESTEROL/HDL RATIO: 3.5
HDLC SERPL-MCNC: 40 MG/DL
LDLC SERPL CALC-MCNC: 66 MG/DL (ref 0–130)
TRIGL SERPL-MCNC: 164 MG/DL
URATE SERPL-MCNC: 8.2 MG/DL (ref 3.4–7)

## 2024-01-22 PROCEDURE — 84450 TRANSFERASE (AST) (SGOT): CPT

## 2024-01-22 PROCEDURE — 84460 ALANINE AMINO (ALT) (SGPT): CPT

## 2024-01-22 PROCEDURE — 80061 LIPID PANEL: CPT

## 2024-01-22 PROCEDURE — 36415 COLL VENOUS BLD VENIPUNCTURE: CPT

## 2024-01-22 PROCEDURE — 84550 ASSAY OF BLOOD/URIC ACID: CPT

## 2024-01-22 RX ORDER — ALLOPURINOL 100 MG/1
100 TABLET ORAL DAILY
Qty: 90 TABLET | Refills: 1 | Status: SHIPPED | OUTPATIENT
Start: 2024-01-22

## 2024-02-12 RX ORDER — LISINOPRIL 2.5 MG/1
2.5 TABLET ORAL DAILY
Qty: 90 TABLET | Refills: 1 | Status: SHIPPED | OUTPATIENT
Start: 2024-02-12

## 2024-03-27 ENCOUNTER — OFFICE VISIT (OUTPATIENT)
Dept: ORTHOPEDIC SURGERY | Age: 84
End: 2024-03-27
Payer: MEDICARE

## 2024-03-27 DIAGNOSIS — N18.30 TYPE 2 DIABETES MELLITUS WITH STAGE 3 CHRONIC KIDNEY DISEASE, WITHOUT LONG-TERM CURRENT USE OF INSULIN, UNSPECIFIED WHETHER STAGE 3A OR 3B CKD (HCC): ICD-10-CM

## 2024-03-27 DIAGNOSIS — E11.22 TYPE 2 DIABETES MELLITUS WITH STAGE 3 CHRONIC KIDNEY DISEASE, WITHOUT LONG-TERM CURRENT USE OF INSULIN, UNSPECIFIED WHETHER STAGE 3A OR 3B CKD (HCC): ICD-10-CM

## 2024-03-27 DIAGNOSIS — Z96.641 HISTORY OF TOTAL RIGHT HIP REPLACEMENT: Primary | ICD-10-CM

## 2024-03-27 PROCEDURE — G8427 DOCREV CUR MEDS BY ELIG CLIN: HCPCS | Performed by: ORTHOPAEDIC SURGERY

## 2024-03-27 PROCEDURE — G8484 FLU IMMUNIZE NO ADMIN: HCPCS | Performed by: ORTHOPAEDIC SURGERY

## 2024-03-27 PROCEDURE — 1123F ACP DISCUSS/DSCN MKR DOCD: CPT | Performed by: ORTHOPAEDIC SURGERY

## 2024-03-27 PROCEDURE — 99213 OFFICE O/P EST LOW 20 MIN: CPT | Performed by: ORTHOPAEDIC SURGERY

## 2024-03-27 PROCEDURE — G8417 CALC BMI ABV UP PARAM F/U: HCPCS | Performed by: ORTHOPAEDIC SURGERY

## 2024-03-27 PROCEDURE — 1036F TOBACCO NON-USER: CPT | Performed by: ORTHOPAEDIC SURGERY

## 2024-03-27 NOTE — PROGRESS NOTES
[facility]                   Warner Duke M.D.            [address], [address]               [place], [place] [address]           Dept Phone: [phone]           Dept Fax:  [phone] [address] [facility]                       [address]           [place], [place] [address]          Dept Phone: [phone]           Dept Fax:  [phone]      Chief Compliant:  Chief Complaint   Patient presents with    Pain     H/o ELLE         History of Present Illness:  This is a 84 y.o. male who presents to the clinic today for evaluation / follow up of right total hip.  Patient is nearly 29 years status post right total hip done by me.  He is also had a left total hip is doing well we have been following the patient on a regular basis for polyethylene wear of the right hip.  He did have a CT scan in the past which did show some retroacetabular lucency but no evidence for loosening.  Patient describes some posterior buttock pain but he has had a recent L3 L5 lumbar decompression and instrumentation did fusion.  He biggest complaint is he when he lays down on the sofa for a long period time he gets pain down the back and going down his leg he really does not describe any groin pain and when he is up and walking he has no major complaints..       Review of Systems   Constitutional: Negative for fever, chills, sweats.   Eyes: Negative for changes in vision, or pain.   HENT: Negative for ear ache, epistaxis, or sore throat.  Respiratory/Cardio: Negative for Chest pain, palpitations, SOB, or cough.  Gastrointestinal: Negative for abdominal pain, N/V/D.   Genitourinary: Negative for dysuria, frequency, urgency, or hematuria.   Neurological: Negative for headache, numbness, or weakness.   Integumentary: Negative for rash, itching, laceration, or abrasion.   Musculoskeletal: Positive for Pain (H/o ELLE )       Physical Exam:  Constitutional: Patient is oriented to person, place,

## 2024-03-28 DIAGNOSIS — E78.2 MIXED HYPERLIPIDEMIA: ICD-10-CM

## 2024-03-28 RX ORDER — SITAGLIPTIN 50 MG/1
TABLET, FILM COATED ORAL
Qty: 90 TABLET | Refills: 1 | Status: SHIPPED | OUTPATIENT
Start: 2024-03-28

## 2024-03-28 RX ORDER — ROSUVASTATIN CALCIUM 10 MG/1
TABLET, COATED ORAL
Qty: 90 TABLET | Refills: 1 | Status: SHIPPED | OUTPATIENT
Start: 2024-03-28

## 2024-04-04 ENCOUNTER — HOSPITAL ENCOUNTER (INPATIENT)
Age: 84
LOS: 2 days | Discharge: HOME HEALTH | DRG: 312 | End: 2024-04-06
Attending: EMERGENCY MEDICINE | Admitting: FAMILY MEDICINE
Payer: MEDICARE

## 2024-04-04 ENCOUNTER — APPOINTMENT (OUTPATIENT)
Dept: CT IMAGING | Age: 84
DRG: 312 | End: 2024-04-04
Payer: MEDICARE

## 2024-04-04 ENCOUNTER — APPOINTMENT (OUTPATIENT)
Dept: GENERAL RADIOLOGY | Age: 84
DRG: 312 | End: 2024-04-04
Payer: MEDICARE

## 2024-04-04 DIAGNOSIS — M25.462 EFFUSION OF LEFT KNEE: ICD-10-CM

## 2024-04-04 DIAGNOSIS — R55 SYNCOPE AND COLLAPSE: Primary | ICD-10-CM

## 2024-04-04 DIAGNOSIS — M54.40 LOW BACK PAIN WITH SCIATICA, SCIATICA LATERALITY UNSPECIFIED, UNSPECIFIED BACK PAIN LATERALITY, UNSPECIFIED CHRONICITY: ICD-10-CM

## 2024-04-04 LAB
ALBUMIN SERPL-MCNC: 4.5 G/DL (ref 3.5–5.2)
ALP SERPL-CCNC: 77 U/L (ref 40–129)
ALT SERPL-CCNC: 30 U/L (ref 5–41)
ANION GAP SERPL CALCULATED.3IONS-SCNC: 14 MMOL/L (ref 9–17)
AST SERPL-CCNC: 54 U/L
BASOPHILS # BLD: 0.1 K/UL (ref 0–0.2)
BASOPHILS NFR BLD: 1 % (ref 0–2)
BILIRUB SERPL-MCNC: 1.2 MG/DL (ref 0.3–1.2)
BUN SERPL-MCNC: 26 MG/DL (ref 8–23)
CALCIUM SERPL-MCNC: 9.3 MG/DL (ref 8.6–10.4)
CHLORIDE SERPL-SCNC: 107 MMOL/L (ref 98–107)
CO2 SERPL-SCNC: 22 MMOL/L (ref 20–31)
CREAT SERPL-MCNC: 1.7 MG/DL (ref 0.7–1.2)
EOSINOPHIL # BLD: 0.2 K/UL (ref 0–0.4)
EOSINOPHILS RELATIVE PERCENT: 1 % (ref 0–4)
ERYTHROCYTE [DISTWIDTH] IN BLOOD BY AUTOMATED COUNT: 14.1 % (ref 11.5–14.9)
GFR SERPL CREATININE-BSD FRML MDRD: 40 ML/MIN/1.73M2
GLUCOSE SERPL-MCNC: 118 MG/DL (ref 70–99)
HCT VFR BLD AUTO: 47.9 % (ref 41–53)
HGB BLD-MCNC: 15.3 G/DL (ref 13.5–17.5)
LYMPHOCYTES NFR BLD: 1.8 K/UL (ref 1–4.8)
LYMPHOCYTES RELATIVE PERCENT: 13 % (ref 24–44)
MCH RBC QN AUTO: 30.3 PG (ref 26–34)
MCHC RBC AUTO-ENTMCNC: 31.9 G/DL (ref 31–37)
MCV RBC AUTO: 95 FL (ref 80–100)
MONOCYTES NFR BLD: 1.2 K/UL (ref 0.1–1.3)
MONOCYTES NFR BLD: 9 % (ref 1–7)
NEUTROPHILS NFR BLD: 76 % (ref 36–66)
NEUTS SEG NFR BLD: 10.3 K/UL (ref 1.3–9.1)
PLATELET # BLD AUTO: 253 K/UL (ref 150–450)
PLATELET # BLD AUTO: ABNORMAL K/UL (ref 150–450)
PMV BLD AUTO: 9.5 FL (ref 6–12)
POTASSIUM SERPL-SCNC: 4.1 MMOL/L (ref 3.7–5.3)
PROT SERPL-MCNC: 7.4 G/DL (ref 6.4–8.3)
RBC # BLD AUTO: 5.04 M/UL (ref 4.5–5.9)
SODIUM SERPL-SCNC: 143 MMOL/L (ref 135–144)
TROPONIN I SERPL HS-MCNC: 19 NG/L (ref 0–22)
TROPONIN I SERPL HS-MCNC: 21 NG/L (ref 0–22)
WBC OTHER # BLD: 13.5 K/UL (ref 3.5–11)

## 2024-04-04 PROCEDURE — 80053 COMPREHEN METABOLIC PANEL: CPT

## 2024-04-04 PROCEDURE — 73700 CT LOWER EXTREMITY W/O DYE: CPT

## 2024-04-04 PROCEDURE — 84484 ASSAY OF TROPONIN QUANT: CPT

## 2024-04-04 PROCEDURE — 71045 X-RAY EXAM CHEST 1 VIEW: CPT

## 2024-04-04 PROCEDURE — 96374 THER/PROPH/DIAG INJ IV PUSH: CPT

## 2024-04-04 PROCEDURE — 73562 X-RAY EXAM OF KNEE 3: CPT

## 2024-04-04 PROCEDURE — 6360000002 HC RX W HCPCS: Performed by: PHYSICIAN ASSISTANT

## 2024-04-04 PROCEDURE — 99285 EMERGENCY DEPT VISIT HI MDM: CPT

## 2024-04-04 PROCEDURE — 1200000000 HC SEMI PRIVATE

## 2024-04-04 PROCEDURE — 85025 COMPLETE CBC W/AUTO DIFF WBC: CPT

## 2024-04-04 PROCEDURE — 70450 CT HEAD/BRAIN W/O DYE: CPT

## 2024-04-04 PROCEDURE — 72125 CT NECK SPINE W/O DYE: CPT

## 2024-04-04 PROCEDURE — 73610 X-RAY EXAM OF ANKLE: CPT

## 2024-04-04 PROCEDURE — 85049 AUTOMATED PLATELET COUNT: CPT

## 2024-04-04 PROCEDURE — 93005 ELECTROCARDIOGRAM TRACING: CPT

## 2024-04-04 PROCEDURE — 36415 COLL VENOUS BLD VENIPUNCTURE: CPT

## 2024-04-04 PROCEDURE — 73630 X-RAY EXAM OF FOOT: CPT

## 2024-04-04 PROCEDURE — 96375 TX/PRO/DX INJ NEW DRUG ADDON: CPT

## 2024-04-04 RX ORDER — MORPHINE SULFATE 4 MG/ML
4 INJECTION, SOLUTION INTRAMUSCULAR; INTRAVENOUS ONCE
Status: COMPLETED | OUTPATIENT
Start: 2024-04-04 | End: 2024-04-04

## 2024-04-04 RX ORDER — DIPHENHYDRAMINE HYDROCHLORIDE 50 MG/ML
25 INJECTION INTRAMUSCULAR; INTRAVENOUS ONCE
Status: COMPLETED | OUTPATIENT
Start: 2024-04-04 | End: 2024-04-04

## 2024-04-04 RX ADMIN — DIPHENHYDRAMINE HYDROCHLORIDE 25 MG: 50 INJECTION, SOLUTION INTRAMUSCULAR; INTRAVENOUS at 19:20

## 2024-04-04 RX ADMIN — MORPHINE SULFATE 4 MG: 4 INJECTION, SOLUTION INTRAMUSCULAR; INTRAVENOUS at 18:59

## 2024-04-04 ASSESSMENT — LIFESTYLE VARIABLES
HOW OFTEN DO YOU HAVE A DRINK CONTAINING ALCOHOL: NEVER
HOW MANY STANDARD DRINKS CONTAINING ALCOHOL DO YOU HAVE ON A TYPICAL DAY: PATIENT DOES NOT DRINK

## 2024-04-04 ASSESSMENT — VISUAL ACUITY: OU: 1

## 2024-04-04 ASSESSMENT — PAIN DESCRIPTION - ORIENTATION
ORIENTATION: LEFT
ORIENTATION: LEFT

## 2024-04-04 ASSESSMENT — PAIN DESCRIPTION - LOCATION
LOCATION: KNEE
LOCATION: KNEE

## 2024-04-04 ASSESSMENT — PAIN SCALES - GENERAL
PAINLEVEL_OUTOF10: 8
PAINLEVEL_OUTOF10: 7
PAINLEVEL_OUTOF10: 7

## 2024-04-04 ASSESSMENT — PAIN - FUNCTIONAL ASSESSMENT: PAIN_FUNCTIONAL_ASSESSMENT: 0-10

## 2024-04-04 ASSESSMENT — PAIN DESCRIPTION - DESCRIPTORS
DESCRIPTORS: ACHING
DESCRIPTORS: ACHING

## 2024-04-04 ASSESSMENT — PAIN DESCRIPTION - PAIN TYPE: TYPE: ACUTE PAIN

## 2024-04-04 NOTE — ED PROVIDER NOTES
EMERGENCY DEPARTMENT ENCOUNTER    Pt Name: Ger Mccray  MRN: [id number]  Birthdate 1940  Date of evaluation: 4/4/24  CHIEF COMPLAINT       Chief Complaint   Patient presents with    Fall    Knee Pain     Pt fell getting up to bathroom last night did not hit head but states his left knee is bothering him      HISTORY OF PRESENT ILLNESS   Presents to the ED for evaluation of knee pain, syncope.  Pt states he had diarrhea in the middle of the night.  Pt reports having syncopal episode while on the toilet.  States he fell off of toilet and landed on left knee.  He is unsure if he was standing up off of the toilet when he had the event.  States he will sometimes get dizzy when he goes from sitting to standing.  Currently, pt reports left knee, ankle and foot pain.  He is unable to bear full weight.  Denies headache, dizziness, lightheadedness, vision changes, cp, sob, neck pain, back pain, abd pain, vomiting,  nausea, bloody stools, numbness. He is not on AC.  No other complaints.     The history is provided by the patient, a relative and the spouse.           PASTMEDICAL HISTORY     Past Medical History:   Diagnosis Date    Anemia     Anesthesia     woke up eary during surgery x1 , heard saw & felt the pressure.    Arthritis 07/15/2013    ASHD (arteriosclerotic heart disease) 07/15/2013    Asthma     CAD (coronary artery disease)     has cardiac stent x 4.    Cerebral artery occlusion with cerebral infarction (HCC)     Chronic kidney disease     stage 3    Chronic obstructive pulmonary disease, unspecified (HCC) 09/27/2021    COPD (chronic obstructive pulmonary disease) (HCC)     COVID-19 vaccine series completed     Moderna 1/26/2021, 2/23/2021    Degenerative joint disease 07/15/2013    Diabetes mellitus (HCC)     ED (erectile dysfunction) 07/15/2013    Full dentures     Gout     History of carpal tunnel syndrome 07/15/2013    had surgery    History of colon polyps 07/15/2013    History of heart attack      dissection, AAA, UTI, CVA, intracranial hemorrhage       3)  Treatment and Disposition    Syncope early this morning while on the toilet.  Not on ac. Denies hitting head or loc.  Having left knee, ankle and foot pain.  Denies cp, sob, headache, numbness.   On examination, left knee swelling noted.  He has no neurological deficits.  He is well appearing, pleasant. No distress.    Imaging of the knee, ankle and foot are unremarkable. No acute fractures.   Ct head and neck also unremarkable for acute injury.   Trops are 21 and 19.   Elevated bun, creatinine.  Hx of CKD at baseline.   Slight leukocytosis.   Dr. Lester reviewed EKG.     Discussed case with Dr. Lester who is recommend admission due to syncope, age and hx of CAD, CKD.  Pt and family are agreeable with the plan.     I discussed case with Dr. Villasenor who accepted admission. Will admit to med surg floor with telemetry.           ED Course as of 04/04/24 2032   Thu Apr 04, 2024 1813 I interpreted the twelve-lead EKG, sinus rhythm with first-degree AV block 65 bpm, LA interval 216, , QTc 432.  No ST elevations or depressions [WM]   1920 Pt developed rash after getting benadryl. Itchy.  No oral swelling, throat swelling, chest pain, shortness of breath. Benadryl ordered.  [AP]      ED Course User Index  [AP] Abigail Purcell, PRABHA  [WM] Uri Lester MD       Patient repeat assessment:  pain improved.  No chest pain, shortness of breath.  Unable to bear full weight on left leg.      Disposition discussion with patient/family, Shared Decision Making:  pt agreeable to admission for syncope.     MIPS:      PROCEDURES:    Procedures      DATA FOR LAB AND RADIOLOGY TESTS ORDERED BELOW ARE REVIEWED BY THE ED CLINICIAN:    RADIOLOGY: All x-rays, CT, MRI, and formal ultrasound images (except ED bedside ultrasound) are read by the radiologist, see reports below, unless otherwise noted in MDM or here.  Reports below are reviewed by myself.  CT KNEE LEFT

## 2024-04-04 NOTE — ED PROVIDER NOTES
eMERGENCY dEPARTMENT eNCOUnter   Independent Attestation     Pt Name: Ger Mccray  MRN: [id number]  Birthdate 1940  Date of evaluation: 4/4/24     Ger Mccray is a 84 y.o. male with CC: Fall and Knee Pain (Pt fell getting up to bathroom last night did not hit head but states his left knee is bothering him )      Based on the medical record the care appears appropriate.  I was personally available for consultation in the Emergency Department.    Uri Lester MD  Attending Emergency Physician                  Uri Lester MD  04/04/24 1920

## 2024-04-05 LAB
ANION GAP SERPL CALCULATED.3IONS-SCNC: 13 MMOL/L (ref 9–17)
BASOPHILS # BLD: 0 K/UL (ref 0–0.2)
BASOPHILS NFR BLD: 0 % (ref 0–2)
BUN SERPL-MCNC: 25 MG/DL (ref 8–23)
CALCIUM SERPL-MCNC: 8.5 MG/DL (ref 8.6–10.4)
CHLORIDE SERPL-SCNC: 108 MMOL/L (ref 98–107)
CO2 SERPL-SCNC: 19 MMOL/L (ref 20–31)
CREAT SERPL-MCNC: 1.5 MG/DL (ref 0.7–1.2)
EKG ATRIAL RATE: 65 BPM
EKG P AXIS: 61 DEGREES
EKG P-R INTERVAL: 216 MS
EKG Q-T INTERVAL: 416 MS
EKG QRS DURATION: 102 MS
EKG QTC CALCULATION (BAZETT): 432 MS
EKG R AXIS: 4 DEGREES
EKG T AXIS: 81 DEGREES
EKG VENTRICULAR RATE: 65 BPM
EOSINOPHIL # BLD: 0.4 K/UL (ref 0–0.4)
EOSINOPHILS RELATIVE PERCENT: 4 % (ref 0–4)
ERYTHROCYTE [DISTWIDTH] IN BLOOD BY AUTOMATED COUNT: 14.4 % (ref 11.5–14.9)
GFR SERPL CREATININE-BSD FRML MDRD: 46 ML/MIN/1.73M2
GLUCOSE BLD-MCNC: 105 MG/DL (ref 75–110)
GLUCOSE BLD-MCNC: 118 MG/DL (ref 75–110)
GLUCOSE BLD-MCNC: 131 MG/DL (ref 75–110)
GLUCOSE BLD-MCNC: 147 MG/DL (ref 75–110)
GLUCOSE BLD-MCNC: 159 MG/DL (ref 75–110)
GLUCOSE SERPL-MCNC: 114 MG/DL (ref 70–99)
HCT VFR BLD AUTO: 40.5 % (ref 41–53)
HGB BLD-MCNC: 13.2 G/DL (ref 13.5–17.5)
LYMPHOCYTES NFR BLD: 2.1 K/UL (ref 1–4.8)
LYMPHOCYTES RELATIVE PERCENT: 19 % (ref 24–44)
MCH RBC QN AUTO: 30.3 PG (ref 26–34)
MCHC RBC AUTO-ENTMCNC: 32.5 G/DL (ref 31–37)
MCV RBC AUTO: 93.1 FL (ref 80–100)
MONOCYTES NFR BLD: 1.2 K/UL (ref 0.1–1.3)
MONOCYTES NFR BLD: 10 % (ref 1–7)
NEUTROPHILS NFR BLD: 67 % (ref 36–66)
NEUTS SEG NFR BLD: 7.7 K/UL (ref 1.3–9.1)
PLATELET # BLD AUTO: 191 K/UL (ref 150–450)
PMV BLD AUTO: 10.9 FL (ref 6–12)
POTASSIUM SERPL-SCNC: 3.9 MMOL/L (ref 3.7–5.3)
RBC # BLD AUTO: 4.35 M/UL (ref 4.5–5.9)
SODIUM SERPL-SCNC: 140 MMOL/L (ref 135–144)
WBC OTHER # BLD: 11.5 K/UL (ref 3.5–11)

## 2024-04-05 PROCEDURE — 82947 ASSAY GLUCOSE BLOOD QUANT: CPT

## 2024-04-05 PROCEDURE — 80048 BASIC METABOLIC PNL TOTAL CA: CPT

## 2024-04-05 PROCEDURE — 36415 COLL VENOUS BLD VENIPUNCTURE: CPT

## 2024-04-05 PROCEDURE — 1200000000 HC SEMI PRIVATE

## 2024-04-05 PROCEDURE — 6370000000 HC RX 637 (ALT 250 FOR IP): Performed by: FAMILY MEDICINE

## 2024-04-05 PROCEDURE — 85025 COMPLETE CBC W/AUTO DIFF WBC: CPT

## 2024-04-05 RX ORDER — ALLOPURINOL 100 MG/1
100 TABLET ORAL DAILY
Status: DISCONTINUED | OUTPATIENT
Start: 2024-04-05 | End: 2024-04-06 | Stop reason: HOSPADM

## 2024-04-05 RX ORDER — NITROGLYCERIN 0.4 MG/1
0.4 TABLET SUBLINGUAL EVERY 5 MIN PRN
Status: DISCONTINUED | OUTPATIENT
Start: 2024-04-05 | End: 2024-04-06 | Stop reason: HOSPADM

## 2024-04-05 RX ORDER — ROSUVASTATIN CALCIUM 20 MG/1
10 TABLET, COATED ORAL DAILY
Status: DISCONTINUED | OUTPATIENT
Start: 2024-04-05 | End: 2024-04-06 | Stop reason: HOSPADM

## 2024-04-05 RX ORDER — ALBUTEROL SULFATE 90 UG/1
2 AEROSOL, METERED RESPIRATORY (INHALATION) EVERY 4 HOURS PRN
Status: DISCONTINUED | OUTPATIENT
Start: 2024-04-05 | End: 2024-04-06 | Stop reason: HOSPADM

## 2024-04-05 RX ORDER — ACETAMINOPHEN 500 MG
500 TABLET ORAL EVERY 6 HOURS PRN
Status: DISCONTINUED | OUTPATIENT
Start: 2024-04-05 | End: 2024-04-06 | Stop reason: HOSPADM

## 2024-04-05 RX ORDER — INSULIN LISPRO 100 [IU]/ML
0-4 INJECTION, SOLUTION INTRAVENOUS; SUBCUTANEOUS NIGHTLY
Status: DISCONTINUED | OUTPATIENT
Start: 2024-04-05 | End: 2024-04-06 | Stop reason: HOSPADM

## 2024-04-05 RX ORDER — ASPIRIN 81 MG/1
81 TABLET, CHEWABLE ORAL DAILY
Status: DISCONTINUED | OUTPATIENT
Start: 2024-04-05 | End: 2024-04-06 | Stop reason: HOSPADM

## 2024-04-05 RX ORDER — M-VIT,TX,IRON,MINS/CALC/FOLIC 27MG-0.4MG
1 TABLET ORAL DAILY
Status: DISCONTINUED | OUTPATIENT
Start: 2024-04-05 | End: 2024-04-06 | Stop reason: HOSPADM

## 2024-04-05 RX ORDER — LISINOPRIL 5 MG/1
2.5 TABLET ORAL DAILY
Status: DISCONTINUED | OUTPATIENT
Start: 2024-04-05 | End: 2024-04-06 | Stop reason: HOSPADM

## 2024-04-05 RX ORDER — DEXTROSE MONOHYDRATE 100 MG/ML
INJECTION, SOLUTION INTRAVENOUS CONTINUOUS PRN
Status: DISCONTINUED | OUTPATIENT
Start: 2024-04-05 | End: 2024-04-06 | Stop reason: HOSPADM

## 2024-04-05 RX ORDER — LANOLIN ALCOHOL/MO/W.PET/CERES
3 CREAM (GRAM) TOPICAL NIGHTLY PRN
Status: DISCONTINUED | OUTPATIENT
Start: 2024-04-05 | End: 2024-04-06 | Stop reason: HOSPADM

## 2024-04-05 RX ORDER — METOPROLOL SUCCINATE 25 MG/1
25 TABLET, EXTENDED RELEASE ORAL DAILY
Status: DISCONTINUED | OUTPATIENT
Start: 2024-04-05 | End: 2024-04-06 | Stop reason: HOSPADM

## 2024-04-05 RX ORDER — INSULIN LISPRO 100 [IU]/ML
0-4 INJECTION, SOLUTION INTRAVENOUS; SUBCUTANEOUS
Status: DISCONTINUED | OUTPATIENT
Start: 2024-04-05 | End: 2024-04-06 | Stop reason: HOSPADM

## 2024-04-05 RX ORDER — OXYCODONE HYDROCHLORIDE AND ACETAMINOPHEN 5; 325 MG/1; MG/1
1 TABLET ORAL 2 TIMES DAILY
Status: DISCONTINUED | OUTPATIENT
Start: 2024-04-06 | End: 2024-04-06

## 2024-04-05 RX ADMIN — LISINOPRIL 2.5 MG: 5 TABLET ORAL at 09:03

## 2024-04-05 RX ADMIN — ROSUVASTATIN CALCIUM 10 MG: 20 TABLET, FILM COATED ORAL at 09:04

## 2024-04-05 RX ADMIN — ASPIRIN 81 MG: 81 TABLET, CHEWABLE ORAL at 09:04

## 2024-04-05 RX ADMIN — METOPROLOL SUCCINATE 25 MG: 25 TABLET, EXTENDED RELEASE ORAL at 09:04

## 2024-04-05 RX ADMIN — Medication 1 TABLET: at 09:03

## 2024-04-05 RX ADMIN — ALLOPURINOL 100 MG: 100 TABLET ORAL at 09:03

## 2024-04-05 ASSESSMENT — PAIN SCALES - GENERAL: PAINLEVEL_OUTOF10: 5

## 2024-04-05 NOTE — PLAN OF CARE
Problem: Discharge Planning  Goal: Discharge to home or other facility with appropriate resources  Outcome: Progressing  Flowsheets (Taken 4/4/2024 2326)  Discharge to home or other facility with appropriate resources: Identify barriers to discharge with patient and caregiver     Problem: Pain  Goal: Verbalizes/displays adequate comfort level or baseline comfort level  Outcome: Progressing  Flowsheets (Taken 4/4/2024 2331)  Verbalizes/displays adequate comfort level or baseline comfort level:   Encourage patient to monitor pain and request assistance   Administer analgesics based on type and severity of pain and evaluate response     Problem: Safety - Adult  Goal: Free from fall injury  Outcome: Progressing

## 2024-04-05 NOTE — PLAN OF CARE
Problem: Discharge Planning  Goal: Discharge to home or other facility with appropriate resources  Outcome: Progressing  Flowsheets (Taken 4/5/2024 0800)  Discharge to home or other facility with appropriate resources:   Identify barriers to discharge with patient and caregiver   Arrange for needed discharge resources and transportation as appropriate       Problem: Safety - Adult  Goal: Free from fall injury  Outcome: Progressing  Flowsheets (Taken 4/5/2024 1157)  Free From Fall Injury: Instruct family/caregiver on patient safety     Problem: Chronic Conditions and Co-morbidities  Goal: Patient's chronic conditions and co-morbidity symptoms are monitored and maintained or improved  Outcome: Progressing  Flowsheets (Taken 4/5/2024 0800)  Care Plan - Patient's Chronic Conditions and Co-Morbidity Symptoms are Monitored and Maintained or Improved: Monitor and assess patient's chronic conditions and comorbid symptoms for stability, deterioration, or improvement

## 2024-04-05 NOTE — H&P
Hospital Medicine  History and Physical                                                                                                                                                 Prior    Patient:  Ger Mccray  MRN: [id number]                                                                                                                                                                     CHIEF COMPLAINT:  fall    History Obtained From:  patient  PCP: Alin Hayes MD    HISTORY OF PRESENT ILLNESS:   The patient is a 84 y.o. male who presents with h/o of fall, pt  says he was in toilet and had some diarrhea and tried to stand , pt felt dizzy and his knee buckled going down to floor,  Pt has h/o of cad and s/p ngioplasty, doing fairly well, being followed by cardio  Denies any chest pain. No sob.      Past Medical History:        Diagnosis Date    Anemia     Anesthesia     woke up eary during surgery x1 , heard saw & felt the pressure.    Arthritis 07/15/2013    ASHD (arteriosclerotic heart disease) 07/15/2013    Asthma     CAD (coronary artery disease)     has cardiac stent x 4.    Cerebral artery occlusion with cerebral infarction (HCC)     Chronic kidney disease     stage 3    Chronic obstructive pulmonary disease, unspecified (HCC) 09/27/2021    COPD (chronic obstructive pulmonary disease) (HCC)     COVID-19 vaccine series completed     Moderna 1/26/2021, 2/23/2021    Degenerative joint disease 07/15/2013    Diabetes mellitus (HCC)     ED (erectile dysfunction) 07/15/2013    Full dentures     Gout     History of carpal tunnel syndrome 07/15/2013    had surgery    History of colon polyps 07/15/2013    History of heart attack     silent one, patient was unaware    History of hemorrhoids 07/15/2013    History of TIA (transient ischemic attack) 07/15/2013    Hypercholesteremia 07/15/2013    Hypertension     Hyperuricemia 07/15/2013    Leukocytosis     Renal cyst     Sciatic nerve pain     4th and 5th  disc disease is most prominent and moderate at C5-C6 and C6-C7.  Multilevel facet arthropathy is most prominent and advanced on the left at C3-C4 and C4-C5. SOFT TISSUES: There is no prevertebral soft tissue swelling.     1. No acute cervical spine fracture. 2. Old non unified C2 spinous process fracture. 3. Moderate multilevel degenerative disc disease and advanced multilevel facet arthropathy in the spine. 4. Kyphosis of the spine which could be related to patient positioning, degenerative changes or muscle spasm.     CT KNEE LEFT WO CONTRAST    Result Date: 4/4/2024  EXAMINATION: CT OF THE LEFT KNEE WITHOUT CONTRAST 4/4/2024 5:10 pm TECHNIQUE: CT of the left knee was performed without the administration of intravenous contrast.  Multiplanar reformatted images are provided for review. Automated exposure control, iterative reconstruction, and/or weight based adjustment of the mA/kV was utilized to reduce the radiation dose to as low as reasonably achievable. COMPARISON: Radiographs HISTORY ORDERING SYSTEM PROVIDED HISTORY: fall TECHNOLOGIST PROVIDED HISTORY: fall Decision Support Exception - unselect if not a suspected or confirmed emergency medical condition->Emergency Medical Condition (MA) Reason for Exam: fall lt knee pain Additional signs and symptoms: pt states he fell and loss consciousness c/o lt knee pain denies head or neck pain FINDINGS: Bones: Total knee arthroplasty is in place.  The components appear to be well seated.  Large amount of streak artifact.  Osteopenia.  Old fracture of the medial femoral epicondyle.  No definite fracture or dislocation.  Multiple spurs at the tibia-fibular joint. Soft Tissue: Dense joint effusion.  No definite fat fluid level.  No subcutaneous hematoma.  Musculature is grossly intact.  Vascular calcifications. Joint: Replaced     1. Total arthroplasty appears to be well seated. 2. No definite fracture but there is a hemorrhagic joint effusion     CT HEAD WO

## 2024-04-05 NOTE — PROGRESS NOTES
Comprehensive Nutrition Assessment    Type and Reason for Visit:  Positive Nutrition Screen (wt loss, poor appetite)    Nutrition Recommendations/Plan:   Will provide 5 carbohydrate choices per tray  Will add ensure High protein twice daily     Malnutrition Assessment:  Malnutrition Status:  At risk for malnutrition (Comment) (04/05/24 0738)    Context:  Acute Illness     Findings of the 6 clinical characteristics of malnutrition:  Energy Intake:  Mild decrease in energy intake (Comment)  Weight Loss:   (8% wt loss over 1 year)     Body Fat Loss:  Unable to assess     Muscle Mass Loss:  Unable to assess    Fluid Accumulation:  No significant fluid accumulation     Strength:  Not Performed    Nutrition Assessment:    Pt was admitted due to syncope. Observed lunch tray consumed less than 25%. Pt states he has no appetite. Review of wt history shows 16# wt loss over the past year. Pt is down 3# over the past 5 months.    Nutrition Related Findings:    no edema, Labs: Glu 114, Meds: Reviewed, PMH: PMH: COPD, CKD, DM Wound Type: None       Current Nutrition Intake & Therapies:    Average Meal Intake: 1-25%     ADULT DIET; Regular; 5 carb choices (75 gm/meal)    Anthropometric Measures:  Height: 177.8 cm (5' 10\")  Ideal Body Weight (IBW): 166 lbs (75 kg)    Admission Body Weight: 83.5 kg (184 lb)  Current Body Weight: 83.5 kg (184 lb), 110.8 % IBW. Weight Source: Not Specified  Current BMI (kg/m2): 26.4  Usual Body Weight: 90.7 kg (200 lb) (4/23)  % Weight Change (Calculated): -8                    BMI Categories: Overweight (BMI 25.0-29.9)    Estimated Daily Nutrient Needs:  Energy Requirements Based On: Formula  Weight Used for Energy Requirements: Admission  Energy (kcal/day): Ozan x 1.1-1.2= 8001-4399 kcal  Weight Used for Protein Requirements: Admission  Protein (g/day): 1.2g/kg= 100 g     Fluid (ml/day): minimum of 1500 ml    Nutrition Diagnosis:   Predicted inadequate energy intake related to  (poor

## 2024-04-05 NOTE — CONSULTS
Date:   4/5/2024  Patient name: Ger Mccray  Date of admission:  4/4/2024  3:34 PM  MRN:   [id number]  YOB: 1940  PCP: Alin Hayes MD    Reason for Admission: Syncope and collapse [R55]  Effusion of left knee [M25.462]    Cardiology consult       Referring physician: Dr Virgil Gomez    Impression    Admission 4/4/2024 episodes of syncope while in the toilet  CAD, coronary intervention  Hypertension  Hyperlipidemia  Emphysema  CKD  History of gout        History of present illness    84-year-old  male who has a past medical history of coronary artery disease, coronary interventions x 3, hypertension, hyperlipidemia, COPD, multiple surgeries including bilateral hip replacement, shoulder surgery and left knee replacement had a syncopal episode when he was in the bathroom.  He said he was having diarrhea and he went to bathroom and then when he got up he passed.  There was no chest pain or palpitation or dizziness prior to the black.  He lost consciousness for very short time when he got up he was not having any headache or nausea or vomiting.  He had a similar episode about 5 years ago and he had diarrhea at that time also and he passed out in the bathroom.  He sustained an injury to his left knee. Normally he is independent in his activities of daily living and drives car he has not had any episode of chest pain or paroxysmal nocturnal dyspnea.  No ankle edema    Blood pressure on admission 132/76, heart rate 88, oxygen saturation 100%  ECG on admission showed no ischemic changes, sinus rhythm, no arrhythmia  Normal cardiac marker      Lab work on admission  Sodium 143, potassium 4.1, BUN 26, creatinine 1.7, GFR 40, calcium 9.3  High-sensitivity troponin 19, albumin 4.5  WBC 13.5, hemoglobin 15.3, platelets 253, neutrophils 76    Current evaluation    Elderly male he was resting on bed he was getting ready to have his dinner  Awake and alert  No obvious sign of cardiopulmonary  hours.  Lipids: No results for input(s): \"CHOL\", \"HDL\" in the last 72 hours.    Invalid input(s): \"LDLCALCU\"  INR: No results for input(s): \"INR\" in the last 72 hours.    Objective:   Vitals: /72   Pulse 64   Temp 98.4 °F (36.9 °C)   Resp 16   Ht 1.778 m (5' 10\")   Wt 83.5 kg (184 lb)   SpO2 93%   BMI 26.40 kg/m²   General appearance: alert and cooperative with exam  HEENT: Head: Normal, normocephalic, atraumatic.  Neck: no JVD and supple, symmetrical, trachea midline  Lungs: clear to auscultation bilaterally  Heart: regular rate and rhythm  Abdomen:  Soft bowel sounds present  Extremities:  Left knee edema and tenderness noted  Neurologic: Mental status: Alert, oriented, thought content appropriate    Assessment / Acute Cardiac Problems:   Syncope most likely vasovagal  No ischemic ECG changes normal high-sensitivity troponin  No history of exertional angina  Previous history of CAD, stent placement x 3  No sign of cardiopulmonary decompensation    Patient Active Problem List:     Chronic obstructive pulmonary disease, unspecified (HCC)     Elevated white blood cell count, unspecified     Anemia in chronic kidney disease     Atherosclerotic heart disease of native coronary artery without angina pectoris     History of TIA (transient ischemic attack)     History of hemorrhoids     Polyosteoarthritis, unspecified     History of carpal tunnel syndrome     Obstructive sleep apnea (adult) (pediatric)     Male erectile dysfunction, unspecified     History of colon polyps     Hypertensive chronic kidney disease with stage 1 through stage 4 chronic kidney disease, or unspecified chronic kidney disease     Renal cyst     Essential hypertension     Allergic rhinitis     Idiopathic chronic gout, unspecified site, without tophus (tophi)     Pure hypercholesterolemia, unspecified     Unilateral primary osteoarthritis, left hip     Dependence on other enabling machines and devices     Hypertensive retinopathy,

## 2024-04-05 NOTE — PROGRESS NOTES
Pharmacy Medication History Note      List of current medications patient is taking is complete.     Source of information: patient, dispense report, OARRS    Changes made to medication list:  Medications flagged for removal (include reason, ex. noncompliance):  Advair - last filled in 2023    Medications removed (include reason, ex. therapy complete or physician discontinued):  None     Medications added/doses adjusted:  None     Other notes (ex. Recent course of antibiotics, Coumadin dosing):  OARRS negative     Denies use of other OTC or herbal medications.      Allergies clarified    Medication list provided to the patient: no  Medication education provided to the patient: none    Prior to Admission Medications   Prescriptions Last Dose Informant Patient Reported? Taking?   CPAP Machine MISC   Yes No   Sig: --- ---   Fluticasone-Salmeterol 232-14 MCG/ACT AEPB Not Taking  Yes No   Sig: Inhale 1 puff into the lungs 2 times daily    Patient not taking: Reported on 2024   JANUVIA 50 MG tablet   No No   Sig: TAKE 1 TABLET BY MOUTH DAILY   albuterol sulfate HFA (PROVENTIL;VENTOLIN;PROAIR) 108 (90 Base) MCG/ACT inhaler   Yes No   Si puffs as needed   allopurinol (ZYLOPRIM) 100 MG tablet   No No   Sig: Take 1 tablet by mouth daily   aspirin 81 MG chewable tablet   Yes No   Sig: Take 1 tablet by mouth daily   fluticasone (FLONASE) 50 MCG/ACT nasal spray   No No   Si spray by Each Nostril route daily   lisinopril (PRINIVIL;ZESTRIL) 2.5 MG tablet   No No   Sig: TAKE 1 TABLET BY MOUTH DAILY   metoprolol succinate (TOPROL XL) 25 MG extended release tablet   Yes No   Sig: Take 1 tablet by mouth daily   nitroGLYCERIN (NITROSTAT) 0.4 MG SL tablet   Yes No   Sig: Place 1 tablet under the tongue every 5 minutes as needed for Chest pain up to max of 3 total doses. If no relief after 1 dose, call 911.   rosuvastatin (CRESTOR) 10 MG tablet   No No   Sig: TAKE 1 TABLET BY MOUTH DAILY   therapeutic

## 2024-04-05 NOTE — ACP (ADVANCE CARE PLANNING)
Advance Care Planning     Advance Care Planning Activator (Inpatient)  Conversation Note      Date of ACP Conversation: 4/5/2024     Conversation Conducted with: Patient with Decision Making Capacity    ACP Activator: Pamella Gracia RN        Health Care Decision Maker:     Current Designated Health Care Decision Maker:     Primary Decision Maker: Wendie Mccray - Spouse - [phone]    Secondary Decision Maker: Jessie Quevedo - Child - [phone]  Click here to complete Healthcare Decision Makers including section of the Healthcare Decision Maker Relationship (ie \"Primary\")  Today we documented Decision Maker(s) consistent with Legal Next of Kin hierarchy.    Care Preferences    Ventilation:  \"If you were in your present state of health and suddenly became very ill and were unable to breathe on your own, what would your preference be about the use of a ventilator (breathing machine) if it were available to you?\"      Would the patient desire the use of ventilator (breathing machine)?: yes    \"If your health worsens and it becomes clear that your chance of recovery is unlikely, what would your preference be about the use of a ventilator (breathing machine) if it were available to you?\"     Would the patient desire the use of ventilator (breathing machine)?: No      Resuscitation  \"CPR works best to restart the heart when there is a sudden event, like a heart attack, in someone who is otherwise healthy. Unfortunately, CPR does not typically restart the heart for people who have serious health conditions or who are very sick.\"    \"In the event your heart stopped as a result of an underlying serious health condition, would you want attempts to be made to restart your heart (answer \"yes\" for attempt to resuscitate) or would you prefer a natural death (answer \"no\" for do not attempt to resuscitate)?\" yes       [] Yes   [] No   Educated Patient / Decision Maker regarding differences between Advance Directives and portable

## 2024-04-05 NOTE — PROGRESS NOTES
Patient admitted to room 2049 from ED in [facility]. VSS. Patient oriented to room and call light. Bed locked in lowest position with call light within reach. Admission questions completed. Assessment to follow.

## 2024-04-05 NOTE — CARE COORDINATION
Case Management Assessment  Initial Evaluation    Date/Time of Evaluation: 4/5/2024 12:46 PM  Assessment Completed by: Pamella Gracia RN    If patient is discharged prior to next notation, then this note serves as note for discharge by case management.    Patient Name: Ger Mccray                   YOB: 1940  Diagnosis: Syncope and collapse [R55]  Effusion of left knee [M25.462]                   Date / Time: 4/4/2024  3:34 PM    Patient Admission Status: Inpatient   Readmission Risk (Low < 19, Mod (19-27), High > 27): Readmission Risk Score: 11.7    Current PCP: Alin Hayes MD  PCP verified by CM? Yes    Chart Reviewed: Yes      History Provided by: Patient  Patient Orientation: Alert and Oriented    Patient Cognition: Alert    Hospitalization in the last 30 days (Readmission):  No    If yes, Readmission Assessment in CM Navigator will be completed.    Advance Directives:      Code Status: Prior   Patient's Primary Decision Maker is: Legal Next of Kin    Primary Decision Maker: Wendie Mccray - Spouse - [phone]    Secondary Decision Maker: Jessie Quevedo - Child - [phone]    Discharge Planning:    Patient lives with: Spouse/Significant Other Type of Home: House  Primary Care Giver: Self  Patient Support Systems include: Spouse/Significant Other, Children   Current Financial resources: Medicare  Current community resources: None  Current services prior to admission: C-pap, Durable Medical Equipment            Current DME: Walker, Cane, Shower Chair (CPAP thru [facility])            Type of Home Care services:  None    ADLS  Prior functional level: Independent in ADLs/IADLs  Current functional level: Independent in ADLs/IADLs    PT AM-PAC:   /24  OT AM-PAC:   /24    Family can provide assistance at DC: Yes  Would you like Case Management to discuss the discharge plan with any other family members/significant others, and if so, who? No  Plans to Return to Present Housing: Yes  Other

## 2024-04-06 VITALS
TEMPERATURE: 97.9 F | BODY MASS INDEX: 26.34 KG/M2 | RESPIRATION RATE: 18 BRPM | DIASTOLIC BLOOD PRESSURE: 75 MMHG | OXYGEN SATURATION: 92 % | HEIGHT: 70 IN | WEIGHT: 184 LBS | HEART RATE: 61 BPM | SYSTOLIC BLOOD PRESSURE: 128 MMHG

## 2024-04-06 LAB
GLUCOSE BLD-MCNC: 116 MG/DL (ref 75–110)
GLUCOSE BLD-MCNC: 125 MG/DL (ref 75–110)
GLUCOSE BLD-MCNC: 162 MG/DL (ref 75–110)

## 2024-04-06 PROCEDURE — 97162 PT EVAL MOD COMPLEX 30 MIN: CPT

## 2024-04-06 PROCEDURE — 97530 THERAPEUTIC ACTIVITIES: CPT

## 2024-04-06 PROCEDURE — 82947 ASSAY GLUCOSE BLOOD QUANT: CPT

## 2024-04-06 PROCEDURE — 97166 OT EVAL MOD COMPLEX 45 MIN: CPT

## 2024-04-06 PROCEDURE — 97116 GAIT TRAINING THERAPY: CPT

## 2024-04-06 PROCEDURE — 97110 THERAPEUTIC EXERCISES: CPT

## 2024-04-06 PROCEDURE — 6370000000 HC RX 637 (ALT 250 FOR IP): Performed by: FAMILY MEDICINE

## 2024-04-06 RX ORDER — OXYCODONE HYDROCHLORIDE AND ACETAMINOPHEN 5; 325 MG/1; MG/1
1 TABLET ORAL 2 TIMES DAILY PRN
Qty: 6 TABLET | Refills: 0 | Status: SHIPPED | OUTPATIENT
Start: 2024-04-06 | End: 2024-04-06

## 2024-04-06 RX ORDER — OXYCODONE HYDROCHLORIDE AND ACETAMINOPHEN 5; 325 MG/1; MG/1
1 TABLET ORAL 2 TIMES DAILY PRN
Qty: 6 TABLET | Refills: 0 | Status: SHIPPED | OUTPATIENT
Start: 2024-04-06 | End: 2024-04-09

## 2024-04-06 RX ORDER — OXYCODONE HYDROCHLORIDE AND ACETAMINOPHEN 5; 325 MG/1; MG/1
1 TABLET ORAL 2 TIMES DAILY PRN
Status: DISCONTINUED | OUTPATIENT
Start: 2024-04-06 | End: 2024-04-06 | Stop reason: HOSPADM

## 2024-04-06 RX ORDER — LANOLIN ALCOHOL/MO/W.PET/CERES
3 CREAM (GRAM) TOPICAL NIGHTLY PRN
Qty: 5 TABLET | Refills: 0 | Status: SHIPPED | OUTPATIENT
Start: 2024-04-06 | End: 2024-04-11

## 2024-04-06 RX ADMIN — ASPIRIN 81 MG: 81 TABLET, CHEWABLE ORAL at 09:09

## 2024-04-06 RX ADMIN — METOPROLOL SUCCINATE 25 MG: 25 TABLET, EXTENDED RELEASE ORAL at 09:09

## 2024-04-06 RX ADMIN — OXYCODONE AND ACETAMINOPHEN 1 TABLET: 5; 325 TABLET ORAL at 13:47

## 2024-04-06 RX ADMIN — OXYCODONE AND ACETAMINOPHEN 1 TABLET: 5; 325 TABLET ORAL at 00:20

## 2024-04-06 RX ADMIN — Medication 3 MG: at 00:20

## 2024-04-06 RX ADMIN — ALLOPURINOL 100 MG: 100 TABLET ORAL at 09:09

## 2024-04-06 RX ADMIN — ROSUVASTATIN CALCIUM 10 MG: 20 TABLET, FILM COATED ORAL at 09:09

## 2024-04-06 RX ADMIN — Medication 1 TABLET: at 09:09

## 2024-04-06 RX ADMIN — LISINOPRIL 2.5 MG: 5 TABLET ORAL at 09:09

## 2024-04-06 ASSESSMENT — PAIN DESCRIPTION - DESCRIPTORS: DESCRIPTORS: ACHING

## 2024-04-06 ASSESSMENT — PAIN SCALES - GENERAL
PAINLEVEL_OUTOF10: 6
PAINLEVEL_OUTOF10: 6

## 2024-04-06 ASSESSMENT — PAIN DESCRIPTION - LOCATION: LOCATION: KNEE

## 2024-04-06 ASSESSMENT — PAIN DESCRIPTION - ORIENTATION: ORIENTATION: LEFT

## 2024-04-06 NOTE — PROGRESS NOTES
Date:   4/6/2024  Patient name: Ger Mccray  Date of admission:  4/4/2024  3:34 PM  MRN:   [id number]  YOB: 1940  PCP: Alin Hayes MD    Reason for Admission: Syncope and collapse [R55]  Effusion of left knee [M25.462]    Cardiology follow-up: Syncope       Referring physician: Dr Virgil Gomez     Impression     Admission 4/4/2024 episodes of syncope while in the toilet, patient had diarrhea before most likely postural hypotension/vasovagal  Negative high-sensitivity troponin, no ischemic ECG changes no arrhythmia  CAD, coronary intervention no exertional chest pain  Hypertension  Hyperlipidemia  Emphysema  CKD serum creatinine 1. 7 mg/dL and BUN 26 mg/dL on admit  History of gout  Since spinal stenosis and bilateral foraminal stenosis at L4/L5, L5/S1    Past surgical history includes bilateral shoulder surgery, bilateral hip replacement, left knee replacement    History of present illness     84-year-old  male who has a past medical history of coronary artery disease, coronary interventions x 3, hypertension, hyperlipidemia, COPD, multiple surgeries including bilateral hip replacement, shoulder surgery and left knee replacement had a syncopal episode when he was in the bathroom.  He said he was having diarrhea and he went to bathroom and then when he got up he passed.  There was no chest pain or palpitation or dizziness prior to the black.  He lost consciousness for very short time when he got up he was not having any headache or nausea or vomiting.  He had a similar episode about 5 years ago and he had diarrhea at that time also and he passed out in the bathroom.  He sustained an injury to his left knee. Normally he is independent in his activities of daily living and drives car he has not had any episode of chest pain or paroxysmal nocturnal dyspnea.  No ankle edema     Blood pressure on admission 132/76, heart rate 88, oxygen saturation 100%  ECG on admission showed no    HGB 15.3  --  13.2*   PLT UNABLE TO REPORT PLATELET COUNT DUE TO PLATELET CLUMPING. 253 191     BMP:    Recent Labs     04/04/24  1654 04/05/24  0703    140   K 4.1 3.9    108*   CO2 22 19*   BUN 26* 25*   CREATININE 1.7* 1.5*   GLUCOSE 118* 114*     Hepatic:   Recent Labs     04/04/24  1654   AST 54*   ALT 30   BILITOT 1.2   ALKPHOS 77     Troponin: No results for input(s): \"TROPONINI\" in the last 72 hours.  BNP: No results for input(s): \"BNP\" in the last 72 hours.  Lipids: No results for input(s): \"CHOL\", \"HDL\" in the last 72 hours.    Invalid input(s): \"LDLCALCU\"  INR: No results for input(s): \"INR\" in the last 72 hours.    Objective:   Vitals: /75   Pulse 61   Temp 97.9 °F (36.6 °C)   Resp 18   Ht 1.778 m (5' 10\")   Wt 83.5 kg (184 lb)   SpO2 92%   BMI 26.40 kg/m²   General appearance: alert and cooperative with exam  HEENT: Head: Normal, normocephalic, atraumatic.  Neck: no JVD and supple, symmetrical, trachea midline  Lungs: diminished breath sounds bibasilar  Heart: regular rate and rhythm  Abdomen:  Soft bowel sounds present  Extremities: Homans sign is negative, no sign of DVT  Neurologic: Mental status: Alert, oriented, thought content appropriate    Assessment / Acute Cardiac Problems:     Syncope most likely vasovagal/postural hypotension  No ischemic ECG changes normal high-sensitivity troponin  No history of exertional angina  Previous history of CAD, stent placement x 3  No sign of cardiopulmonary decompensation      Patient Active Problem List:     Chronic obstructive pulmonary disease, unspecified (HCC)     Elevated white blood cell count, unspecified     Anemia in chronic kidney disease     Atherosclerotic heart disease of native coronary artery without angina pectoris     History of TIA (transient ischemic attack)     History of hemorrhoids     Polyosteoarthritis, unspecified     History of carpal tunnel syndrome     Obstructive sleep apnea (adult) (pediatric)     Male

## 2024-04-06 NOTE — CARE COORDINATION
Continuity of Care Form    Patient Name: Ger Mccray   :  1940  MRN:  [id number]    Admit date:  2024  Discharge date:  2024    Code Status Order: Full Code   Advance Directives:     Admitting Physician:  Virgil Gomez MD  PCP: Alin Hayes MD    Discharging Nurse: [facility] Unit/Room#: 2049/2049-01  Discharging Unit Phone Number: [phone]    Emergency Contact:   Extended Emergency Contact Information  Primary Emergency Contact: Wendie Mccray  Address: [address] [place] [address]  Home Phone: [phone]  Work Phone: [phone]  Mobile Phone: [phone]  Relation: Spouse  Hearing or visual needs: None  Other needs: None  Preferred language: English   needed? No  Secondary Emergency Contact: Jessie Quevedo  Home Phone: [phone]  Work Phone: [phone]  Mobile Phone: [phone]  Relation: Child   needed? No    Past Surgical History:  Past Surgical History:   Procedure Laterality Date    BACK SURGERY      BLEPHAROPLASTY Bilateral 2012    CARDIAC SURGERY      CARPAL TUNNEL RELEASE Right 2002    CATARACT REMOVAL WITH IMPLANT Left 2012    CATARACT REMOVAL WITH IMPLANT Right 2012    COLONOSCOPY  08    POLYPECTOMY    CORONARY ANGIOPLASTY WITH STENT PLACEMENT  1994 x1 stent,1998 x2 stents,2009 x1,    ENDOSCOPY, COLON, DIAGNOSTIC      EYE SURGERY      cataracts, eyelids.    JOINT REPLACEMENT Right 11     RT TOTAL SHOULDER REPLACEMENT    JOINT REPLACEMENT Right     rt. hip replacement    JOINT REPLACEMENT Left 2018    ELLE    KNEE ARTHROPLASTY Left 2017    LUMBAR FUSION N/A 2021    L3-L5 POSTERIOR LUMBAR DECOMPRESSSION & INSTRUMENTED FUSION performed by [facility] at [facility]    DC ARTHRP ACETBLR/PROX FEM PROSTC AGRFT/ALGRFT Left 2018    LEFT TOTAL HIP ARTHROPLASTY MINIMALLY INVASIVE ASI WITH BIOMET SYSTEM & GPS SPRAY APPLICATION performed  Assessment:  Last Vital Signs: /75   Pulse 61   Temp 97.9 °F (36.6 °C)   Resp 18   Ht 1.778 m (5' 10\")   Wt 83.5 kg (184 lb)   SpO2 92%   BMI 26.40 kg/m²     Last documented pain score (0-10 scale): Pain Level: 6  Last Weight:   Wt Readings from Last 1 Encounters:   04/04/24 83.5 kg (184 lb)     Mental Status:  oriented, alert, coherent, and logical    IV Access:  - None    Nursing Mobility/ADLs:  Walking   Assisted  Transfer  Assisted  Bathing  Assisted  Dressing  Assisted  Toileting  Assisted  Feeding  Assisted  Med Admin  Assisted  Med Delivery   whole    Wound Care Documentation and Therapy:  Incision 09/20/21 Back (Active)   Number of days: 929        Elimination:  Continence:   Bowel: Yes  Bladder: Yes  Urinary Catheter: None   Colostomy/Ileostomy/Ileal Conduit: No       Date of Last BM: 4/5/2024    Intake/Output Summary (Last 24 hours) at 4/6/2024 1333  Last data filed at 4/6/2024 0542  Gross per 24 hour   Intake --   Output 500 ml   Net -500 ml     I/O last 3 completed shifts:  In: -   Out: 680 [Urine:680]    Safety Concerns:     History of Falls (last 30 days) and At Risk for Falls    Impairments/Disabilities:      None    Nutrition Therapy:  Current Nutrition Therapy:   - Oral Diet:  General    Routes of Feeding: Oral  Liquids: No Restrictions  Daily Fluid Restriction: no  Last Modified Barium Swallow with Video (Video Swallowing Test): not done    Treatments at the Time of Hospital Discharge:   Respiratory Treatments: n/a  Oxygen Therapy:  is not on home oxygen therapy.  Ventilator:    - No ventilator support    Rehab Therapies: Physical Therapy and Occupational Therapy  Weight Bearing Status/Restrictions: No weight bearing restrictions  Other Medical Equipment (for information only, NOT a DME order):  walker  Other Treatments: n/a    Patient's personal belongings (please select all that are sent with patient):  None    RN SIGNATURE:  Electronically signed by Miguel A Cantor RN on 4/6/24 at 3:25

## 2024-04-06 NOTE — PROGRESS NOTES
Physical Therapy  Facility/Department: [facility] MED SURG  Physical Therapy Initial Assessment    Name: Ger Mccray  : 1940  MRN: [id number]  Date of Service: 2024    Discharge Recommendations:  Home with assist PRN, Home with Home health PT, Patient would benefit from continued therapy after discharge, Therapy recommended at discharge          Patient Diagnosis(es): The primary encounter diagnosis was Syncope and collapse. Diagnoses of Effusion of left knee and Low back pain with sciatica, sciatica laterality unspecified, unspecified back pain laterality, unspecified chronicity were also pertinent to this visit.  Past Medical History:  has a past medical history of Anemia, Anesthesia, Arthritis, ASHD (arteriosclerotic heart disease), Asthma, CAD (coronary artery disease), Cerebral artery occlusion with cerebral infarction (HCC), Chronic kidney disease, Chronic obstructive pulmonary disease, unspecified (HCC), COPD (chronic obstructive pulmonary disease) ([facility]), COVID-19 vaccine series completed, Degenerative joint disease, Diabetes mellitus ([facility]), ED (erectile dysfunction), Full dentures, Gout, History of carpal tunnel syndrome, History of colon polyps, History of heart attack, History of hemorrhoids, History of TIA (transient ischemic attack), Hypercholesteremia, Hypertension, Hyperuricemia, Leukocytosis, Renal cyst, Sciatic nerve pain, Sleep apnea, and Wears glasses.  Past Surgical History:  has a past surgical history that includes Coronary angioplasty with stent (1994 x1 stent,1998 x2 stents,2009 x1,); Colonoscopy (08); Shoulder arthroscopy (Right, 2002 repair); Carpal tunnel release (Right, 2002); shoulder surgery (Left, 2002); Cataract removal with implant (Left, 2012); Cataract removal with implant (Right, 2012); eye surgery; Blepharoplasty (Bilateral, 2012); Knee Arthroplasty (Left, 2017); Total knee arthroplasty (Left, 2017); pr arthrp acetblr/prox fem  who has a past medical history of coronary artery disease, coronary interventions x 3, hypertension, hyperlipidemia, COPD, multiple surgeries including bilateral hip replacement, shoulder surgery and left knee replacement had a syncopal episode when he was in the bathroom.  He said he was having diarrhea and he went to bathroom and then when he got up he passed.  There was no chest pain or palpitation or dizziness prior to the black.  He lost consciousness for very short time when he got up he was not having any headache or nausea or vomiting.  He had a similar episode about 5 years ago and he had diarrhea at that time also and he passed out in the bathroom.  He sustained an injury to his left knee. Normally he is independent in his activities of daily living and drives car he has not had any episode of chest pain or paroxysmal nocturnal dyspnea.  No ankle edema  Family / Caregiver Present: Yes (Spouse and Daughter)  Referral Date : 04/05/24  Diagnosis: Syncope and collapse  Follows Commands: Within Functional Limits         Social/Functional History  Social/Functional History  Lives With: Spouse  Type of Home: House  Home Layout: One level, Laundry in basement  Home Access: Stairs to enter with rails  Entrance Stairs - Number of Steps: 3-4 JUANY  Entrance Stairs - Rails: Both (able to reach both at the same time)  Bathroom Shower/Tub: Tub/Shower unit, Curtain, Shower chair with back  Bathroom Toilet: Handicap height (sink close by toilet)  Bathroom Equipment: Grab bars in shower, Toilet raiser  Bathroom Accessibility: Walker accessible  Home Equipment: Walker, rolling, Crutches, Cane, quad, Reacher  Has the patient had two or more falls in the past year or any fall with injury in the past year?: No (1 fall)  ADL Assistance: Independent (Intermittent A with socks)  Homemaking Assistance: Independent (Shares with wife)  Homemaking Responsibilities: Yes  Ambulation Assistance: Independent  Transfer Assistance:

## 2024-04-06 NOTE — CARE COORDINATION
ONGOING DISCHARGE PLAN:    Patient is alert and oriented x4.    Spoke with patient regarding discharge plan and patient confirms that plan is still to return home with spouse, denies needs.    DME: Walker, cane, SC, GB, Cpap ([facility]).    VNS: Referral to [facility], they have had in the past, accepted.     Cardiology consulted-syncope and collapse; check postural hypotension.     Will continue to follow for additional discharge needs.    If patient is discharged prior to next notation, then this note serves as note for discharge by case management.    Electronically signed by Lara Butler RN on 4/6/2024 at 1:37 PM

## 2024-04-06 NOTE — DISCHARGE SUMMARY
the orbits demonstrate no acute abnormality. SINUSES: The visualized paranasal sinuses and mastoid air cells demonstrate no acute abnormality. SOFT TISSUES/SKULL:  No acute abnormality of the visualized skull or soft tissues.     No acute intracranial abnormality.     XR FOOT LEFT (MIN 3 VIEWS)    Result Date: 4/4/2024  EXAMINATION: THREE XRAY VIEWS OF THE LEFT FOOT 4/4/2024 4:16 pm COMPARISON: None. HISTORY: ORDERING SYSTEM PROVIDED HISTORY: fall TECHNOLOGIST PROVIDED HISTORY: fall Reason for Exam: left foot pain s/p fall FINDINGS: There is no evidence of acute fracture.  There is normal alignment of the tarsometatarsal joints.  No acute joint abnormality.  No focal osseous lesion. No focal soft tissue abnormality.  Calcaneal enthesopathy along the plantar surface.     No acute osseous or joint abnormality.     XR ANKLE LEFT (MIN 3 VIEWS)    Result Date: 4/4/2024  EXAMINATION: THREE XRAY VIEWS OF THE LEFT ANKLE 4/4/2024 4:16 pm COMPARISON: None. HISTORY: ORDERING SYSTEM PROVIDED HISTORY: fall TECHNOLOGIST PROVIDED HISTORY: fall Reason for Exam: left ankle pain s/p fall FINDINGS: No evidence of acute fracture or dislocation.  Normal alignment of the ankle mortise.  No focal osseous lesion.  No evidence of joint effusion. No focal soft tissue abnormality.  Calcaneal enthesopathy along the plantar surface.     No acute abnormality of the ankle.     XR CHEST PORTABLE    Result Date: 4/4/2024  EXAMINATION: ONE XRAY VIEW OF THE CHEST 4/4/2024 4:16 pm COMPARISON: 11/08/2023 HISTORY: ORDERING SYSTEM PROVIDED HISTORY: syncope TECHNOLOGIST PROVIDED HISTORY: syncope Reason for Exam: syncope, fall FINDINGS: The lungs are without acute focal process.  There is no effusion or pneumothorax. The cardiomediastinal silhouette is without acute process. The osseous structures are without acute process.     No acute process.     XR KNEE LEFT (3 VIEWS)    Result Date: 4/4/2024  EXAMINATION: THREE XRAY VIEWS OF THE LEFT KNEE 4/4/2024  1:46 pm COMPARISON: Radiographs 04/19/2017. HISTORY: ORDERING SYSTEM PROVIDED HISTORY: Lt knee pain s/p fall TECHNOLOGIST PROVIDED HISTORY: Lt knee pain s/p fall Reason for Exam: pt passed out last night , fell and injured lt knee, pain and swelling Additional signs and symptoms: knee replacement 2 years ago FINDINGS: Mineralization appears normal.  A prior left knee arthroplasty is again noted.  Alignment of the prosthesis appears normal.  No fracture, dislocation or focal bone lesion is identified.  There are vascular calcifications.     1. No acute fracture dislocation. 2. Stable left knee arthroplasty without an evident complication.       Disposition:   home    Discharge Instructions:  Activity: activity as tolerated  Diet:  regular diet    Follow up with Alin Hayes MD in 1 weeks.    Signed:  Virgil Gomez MD  4/6/2024, 1:34 PM    Time spent in discharge of this pt is more than 30 minutes in examination,evaluvation,  counseling and review of medication and discharge plan

## 2024-04-06 NOTE — PROGRESS NOTES
[facility]   Occupational Therapy Evaluation  Date: 24  Patient Name: Ger Mccray       Room: -  MRN: [id number]  Account: [id number]   : 1940  (84 y.o.) Gender: male     Discharge Recommendations:  Further Occupational Therapy is recommended upon facility discharge.    OT Equipment Recommendations  Other: TBD    Referring Practitioner: Virgil Gomez MD  Diagnosis: Syncope and collapse        Treatment Diagnosis: Impaired self care status    Past Medical History:  has a past medical history of Anemia, Anesthesia, Arthritis, ASHD (arteriosclerotic heart disease), Asthma, CAD (coronary artery disease), Cerebral artery occlusion with cerebral infarction (HCC), Chronic kidney disease, Chronic obstructive pulmonary disease, unspecified ([facility]), COPD (chronic obstructive pulmonary disease) ([facility]), COVID-19 vaccine series completed, Degenerative joint disease, Diabetes mellitus ([facility]), ED (erectile dysfunction), Full dentures, Gout, History of carpal tunnel syndrome, History of colon polyps, History of heart attack, History of hemorrhoids, History of TIA (transient ischemic attack), Hypercholesteremia, Hypertension, Hyperuricemia, Leukocytosis, Renal cyst, Sciatic nerve pain, Sleep apnea, and Wears glasses.    Past Surgical History:   has a past surgical history that includes Coronary angioplasty with stent (1994 x1 stent,1998 x2 stents,2009 x1,); Colonoscopy (08); Shoulder arthroscopy (Right, 2002 repair); Carpal tunnel release (Right, 2002); shoulder surgery (Left, 2002); Cataract removal with implant (Left, 2012); Cataract removal with implant (Right, 2012); eye surgery; Blepharoplasty (Bilateral, 2012); Knee Arthroplasty (Left, 2017); Total knee arthroplasty (Left, 2017); pr arthrp acetblr/prox fem prostc agrft/algrft (Left, 2018); joint replacement (Right, 11 ); joint replacement (Right, ); joint  Goals: By discharge  Short Term Goal 1: Pt will complete BADLs with Mod I and Good safety with use of AE as needed  Short Term Goal 2: Pt will complete functional transfers/mobility during self care tasks with Mod I and Good safety with use of least restrictive device  Short Term Goal 3: Pt will tolerate standing 10+ minutes during functional activity of choice with Good safety  Short Term Goal 4: Pt will verbalize/demonstrate Good understanding of home safety/fall prevention strategies to increase safety and independence with self care tasks.  Short Term Goal 5: Pt will verbalize/demonstrate Good understanding of AE/adaptive stratgies/DME to increase safety and independence with self care and mobility  Short Term Goal 6: Pt will participate in 15+ mintues of therapeutic exercises/functional activities to increase safety and independence with self care and mobility    Plan  Occupational Therapy Plan  Times Per Week: 4-6  Current Treatment Recommendations: Self-Care / ADL, Strengthening, Balance training, Functional mobility training, Endurance training, Pain management, Safety education & training, Patient/Caregiver education & training, Equipment evaluation, education, & procurement, Home management training      OT Individual Minutes  OT Individual Minutes  Time In: 1419  Time Out: 1503  Minutes: 44  Time Code Minutes   Timed Code Treatment Minutes: 30 Minutes      Electronically signed by SUMI Cabral on 4/6/24 at 4:06 PM EDT

## 2024-04-06 NOTE — PLAN OF CARE
Problem: Discharge Planning  Goal: Discharge to home or other facility with appropriate resources  4/6/2024 0416 by Berna Tan, RN  Outcome: Progressing  Flowsheets (Taken 4/6/2024 0416)  Discharge to home or other facility with appropriate resources:   Identify barriers to discharge with patient and caregiver   Arrange for needed discharge resources and transportation as appropriate   Identify discharge learning needs (meds, wound care, etc)   Refer to discharge planning if patient needs post-hospital services based on physician order or complex needs related to functional status, cognitive ability or social support system  Note: Inform pt. Of discharge teaching and planned. Instructed pt. To inform me if further teaching need to be done.      Problem: Pain  Goal: Verbalizes/displays adequate comfort level or baseline comfort level  4/6/2024 0416 by Berna Tan, RN  Outcome: Progressing  Flowsheets (Taken 4/6/2024 0416)  Verbalizes/displays adequate comfort level or baseline comfort level:   Assess pain using appropriate pain scale   Encourage patient to monitor pain and request assistance   Administer analgesics based on type and severity of pain and evaluate response   Implement non-pharmacological measures as appropriate and evaluate response   Consider cultural and social influences on pain and pain management   Notify Licensed Independent Practitioner if interventions unsuccessful or patient reports new pain  Note: PT. Received pain meds in timely manner. Teach pt. Non-pharm. Methods to handle pain.      Problem: Safety - Adult  Goal: Free from fall injury  4/6/2024 0416 by Berna Tan, RN  Outcome: Progressing  Flowsheets (Taken 4/6/2024 0416)  Free From Fall Injury:   Instruct family/caregiver on patient safety   Based on caregiver fall risk screen, instruct family/caregiver to ask for assistance with transferring infant if caregiver noted to have fall risk factors  Note: Made sure call  light was in reach, a clear pathway and adequate lighting was provided. Also made sure that the pt. Was wearing non-slip socks.      Problem: Chronic Conditions and Co-morbidities  Goal: Patient's chronic conditions and co-morbidity symptoms are monitored and maintained or improved  4/6/2024 0416 by Berna Tan, RN  Outcome: Progressing  Flowsheets (Taken 4/6/2024 0416)  Care Plan - Patient's Chronic Conditions and Co-Morbidity Symptoms are Monitored and Maintained or Improved:   Monitor and assess patient's chronic conditions and comorbid symptoms for stability, deterioration, or improvement   Collaborate with multidisciplinary team to address chronic and comorbid conditions and prevent exacerbation or deterioration   Update acute care plan with appropriate goals if chronic or comorbid symptoms are exacerbated and prevent overall improvement and discharge  Note: Made sure pt. Understood what their conditions was and why it was occurring. Also educated pt. On ways to prevent the condition from worsening and from occurring again.

## 2024-04-06 NOTE — PLAN OF CARE
Problem: Discharge Planning  Goal: Discharge to home or other facility with appropriate resources  4/6/2024 1733 by Miguel A Cantor RN  Outcome: Adequate for Discharge  4/6/2024 0416 by Berna Tan RN  Outcome: Progressing  Flowsheets (Taken 4/6/2024 0416)  Discharge to home or other facility with appropriate resources:   Identify barriers to discharge with patient and caregiver   Arrange for needed discharge resources and transportation as appropriate   Identify discharge learning needs (meds, wound care, etc)   Refer to discharge planning if patient needs post-hospital services based on physician order or complex needs related to functional status, cognitive ability or social support system  Note: Inform pt. Of discharge teaching and planned. Instructed pt. To inform me if further teaching need to be done.      Problem: Pain  Goal: Verbalizes/displays adequate comfort level or baseline comfort level  4/6/2024 1733 by Miguel A Cantor RN  Outcome: Adequate for Discharge  4/6/2024 0416 by Berna Tan RN  Outcome: Progressing  Flowsheets (Taken 4/6/2024 0416)  Verbalizes/displays adequate comfort level or baseline comfort level:   Assess pain using appropriate pain scale   Encourage patient to monitor pain and request assistance   Administer analgesics based on type and severity of pain and evaluate response   Implement non-pharmacological measures as appropriate and evaluate response   Consider cultural and social influences on pain and pain management   Notify Licensed Independent Practitioner if interventions unsuccessful or patient reports new pain  Note: PT. Received pain meds in timely manner. Teach pt. Non-pharm. Methods to handle pain.      Problem: Safety - Adult  Goal: Free from fall injury  4/6/2024 1733 by Miguel A Cantor RN  Outcome: Adequate for Discharge  4/6/2024 0416 by Berna Tan RN  Outcome: Progressing  Flowsheets (Taken 4/6/2024 0416)  Free From Fall Injury:   Instruct family/caregiver  on patient safety   Based on caregiver fall risk screen, instruct family/caregiver to ask for assistance with transferring infant if caregiver noted to have fall risk factors  Note: Made sure call light was in reach, a clear pathway and adequate lighting was provided. Also made sure that the pt. Was wearing non-slip socks.      Problem: Chronic Conditions and Co-morbidities  Goal: Patient's chronic conditions and co-morbidity symptoms are monitored and maintained or improved  4/6/2024 1733 by Miguel A Cantor, RN  Outcome: Adequate for Discharge  4/6/2024 0416 by Berna Tan, RN  Outcome: Progressing  Flowsheets (Taken 4/6/2024 0416)  Care Plan - Patient's Chronic Conditions and Co-Morbidity Symptoms are Monitored and Maintained or Improved:   Monitor and assess patient's chronic conditions and comorbid symptoms for stability, deterioration, or improvement   Collaborate with multidisciplinary team to address chronic and comorbid conditions and prevent exacerbation or deterioration   Update acute care plan with appropriate goals if chronic or comorbid symptoms are exacerbated and prevent overall improvement and discharge  Note: Made sure pt. Understood what their conditions was and why it was occurring. Also educated pt. On ways to prevent the condition from worsening and from occurring again.      Problem: Nutrition Deficit:  Goal: Optimize nutritional status  Outcome: Adequate for Discharge

## 2024-04-06 NOTE — DISCHARGE INSTRUCTIONS
Your information:  Name: Ger Mccray  : 1940    Your instructions:    N/a    What to do after you leave the hospital:    Recommended diet: regular diet    Recommended activity: activity as tolerated        The following personal items were collected during your admission and were returned to you:    Belongings  Dental Appliances: Uppers, Lowers  Vision - Corrective Lenses: Eyeglasses  Hearing Aid: None  Clothing: Footwear, Jacket/Coat, Pants, Shirt, Socks, Undergarments, At bedside  Jewelry: None  Body Piercings Removed: N/A  Electronic Devices: Cell Phone  Weapons (Notify Protective Services/Security): None  Other Valuables: Sent home  Home Medications: None  Valuables Given To: Patient  Provide Name(s) of Who Valuable(s) Were Given To: mello    Information obtained by:  By signing below, I understand that if any problems occur once I leave the hospital I am to contact my PCP or go to the ER.  I understand and acknowledge receipt of the instructions indicated above.

## 2024-04-06 NOTE — DISCHARGE INSTR - COC
Continuity of Care Form    Patient Name: Ger Mccray   :  1940  MRN:  [id number]    Admit date:  2024  Discharge date:  2024    Code Status Order: Full Code   Advance Directives:     Admitting Physician:  Virgil Gomez MD  PCP: Alin Hayes MD    Discharging Nurse: [facility] Unit/Room#: 2049/2049-01  Discharging Unit Phone Number: [phone]    Emergency Contact:   Extended Emergency Contact Information  Primary Emergency Contact: Wendie Mccray  Address: [address] [place] [address]  Home Phone: [phone]  Work Phone: [phone]  Mobile Phone: [phone]  Relation: Spouse  Hearing or visual needs: None  Other needs: None  Preferred language: English   needed? No  Secondary Emergency Contact: Jessie Quevedo  Home Phone: [phone]  Work Phone: [phone]  Mobile Phone: [phone]  Relation: Child   needed? No    Past Surgical History:  Past Surgical History:   Procedure Laterality Date    BACK SURGERY      BLEPHAROPLASTY Bilateral 2012    CARDIAC SURGERY      CARPAL TUNNEL RELEASE Right 2002    CATARACT REMOVAL WITH IMPLANT Left 2012    CATARACT REMOVAL WITH IMPLANT Right 2012    COLONOSCOPY  08    POLYPECTOMY    CORONARY ANGIOPLASTY WITH STENT PLACEMENT  1994 x1 stent,1998 x2 stents,2009 x1,    ENDOSCOPY, COLON, DIAGNOSTIC      EYE SURGERY      cataracts, eyelids.    JOINT REPLACEMENT Right 11     RT TOTAL SHOULDER REPLACEMENT    JOINT REPLACEMENT Right     rt. hip replacement    JOINT REPLACEMENT Left 2018    ELLE    KNEE ARTHROPLASTY Left 2017    LUMBAR FUSION N/A 2021    L3-L5 POSTERIOR LUMBAR DECOMPRESSSION & INSTRUMENTED FUSION performed by [facility]    AL ARTHRP ACETBLR/PROX FEM PROSTC AGRFT/ALGRFT Left 2018    LEFT TOTAL HIP ARTHROPLASTY MINIMALLY INVASIVE ASI WITH BIOMET SYSTEM & GPS SPRAY APPLICATION performed  PM EDT    CASE MANAGEMENT/SOCIAL WORK SECTION    Inpatient Status Date:     Readmission Risk Assessment Score:  Readmission Risk              Risk of Unplanned Readmission:  17           Discharging to Facility/ Agency   [facility]  P: [phone]  F: [phone]    Dialysis Facility (if applicable)   Name:  Address:  Dialysis Schedule:  Phone:  Fax:    / signature: Electronically signed by Lara Butler RN on 4/6/24 at 3:26 PM EDT    PHYSICIAN SECTION    Prognosis: Good    Condition at Discharge: Stable    Rehab Potential (if transferring to Rehab): Good    Recommended Labs or Other Treatments After Discharge: none    Physician Certification: I certify the above information and transfer of Ger Mccray  is necessary for the continuing treatment of the diagnosis listed and that he requires Home Care for greater 30 days.     Update Admission H&P: No change in H&P    PHYSICIAN SIGNATURE:  Electronically signed by Virgil Gomez MD on 4/6/24 at 1:34 PM EDT   Muscle Hinge Flap Text: The defect edges were debeveled with a #15 scalpel blade.  Given the size, depth and location of the defect and the proximity to free margins a muscle hinge flap was deemed most appropriate.  Using a sterile surgical marker, an appropriate hinge flap was drawn incorporating the defect. The area thus outlined was incised with a #15 scalpel blade.  The skin margins were undermined to an appropriate distance in all directions utilizing iris scissors.

## 2024-04-06 NOTE — CARE COORDINATION
DISCHARGE PLANNING NOTE:    BRIGITTE and DC orders faxed to Errol Bacon with [facility] on his way to meet the family at bedside and confirm start of care.    Electronically signed by Lara Butler RN on 4/6/2024 at 3:38 PM

## 2024-04-08 ENCOUNTER — COMMUNITY CARE MANAGEMENT (OUTPATIENT)
Facility: CLINIC | Age: 84
End: 2024-04-08

## 2024-04-08 LAB
EKG ATRIAL RATE: 65 BPM
EKG P AXIS: 61 DEGREES
EKG P-R INTERVAL: 216 MS
EKG Q-T INTERVAL: 416 MS
EKG QRS DURATION: 102 MS
EKG QTC CALCULATION (BAZETT): 432 MS
EKG R AXIS: 4 DEGREES
EKG T AXIS: 81 DEGREES
EKG VENTRICULAR RATE: 65 BPM

## 2024-04-15 ENCOUNTER — COMMUNITY CARE MANAGEMENT (OUTPATIENT)
Facility: CLINIC | Age: 84
End: 2024-04-15

## 2024-04-17 ENCOUNTER — OFFICE VISIT (OUTPATIENT)
Dept: FAMILY MEDICINE CLINIC | Age: 84
End: 2024-04-17

## 2024-04-17 VITALS
DIASTOLIC BLOOD PRESSURE: 64 MMHG | SYSTOLIC BLOOD PRESSURE: 110 MMHG | WEIGHT: 183.2 LBS | RESPIRATION RATE: 20 BRPM | BODY MASS INDEX: 26.29 KG/M2 | TEMPERATURE: 97.5 F | HEART RATE: 68 BPM

## 2024-04-17 DIAGNOSIS — I10 ESSENTIAL HYPERTENSION: ICD-10-CM

## 2024-04-17 DIAGNOSIS — N18.31 STAGE 3A CHRONIC KIDNEY DISEASE (HCC): ICD-10-CM

## 2024-04-17 DIAGNOSIS — S86.912A STRAIN OF LEFT KNEE, INITIAL ENCOUNTER: ICD-10-CM

## 2024-04-17 DIAGNOSIS — E11.9 TYPE 2 DIABETES MELLITUS WITHOUT COMPLICATION, WITHOUT LONG-TERM CURRENT USE OF INSULIN (HCC): ICD-10-CM

## 2024-04-17 DIAGNOSIS — R55 SYNCOPE, UNSPECIFIED SYNCOPE TYPE: Primary | ICD-10-CM

## 2024-04-17 DIAGNOSIS — Z09 HOSPITAL DISCHARGE FOLLOW-UP: ICD-10-CM

## 2024-04-17 ASSESSMENT — PATIENT HEALTH QUESTIONNAIRE - PHQ9
2. FEELING DOWN, DEPRESSED OR HOPELESS: NOT AT ALL
SUM OF ALL RESPONSES TO PHQ QUESTIONS 1-9: 0
SUM OF ALL RESPONSES TO PHQ9 QUESTIONS 1 & 2: 0
SUM OF ALL RESPONSES TO PHQ QUESTIONS 1-9: 0
1. LITTLE INTEREST OR PLEASURE IN DOING THINGS: NOT AT ALL

## 2024-04-17 NOTE — PROGRESS NOTES
Post-Discharge Transitional Care  Follow Up      Ger Mccray   YOB: 1940    Date of Office Visit:  4/17/2024  Date of Hospital Admission: 4/4/24  Date of Hospital Discharge: 4/6/24  Risk of hospital readmission (high >=14%. Medium >=10%) :Readmission Risk Score: 13      Care management risk score Rising risk (score 2-5) and Complex Care (Scores >=6): No Risk Score On File     Non face to face  following discharge, date last encounter closed (first attempt may have been earlier): 04/08/2024    Call initiated 2 business days of discharge: Yes    ASSESSMENT/PLAN:   Syncope, unspecified syncope type  - likely due to vasovagal vs dehydration.   - follow up with cardiology as scheduled     Stage 3a chronic kidney disease (HCC)  - stable. Continue to monitor   - avoid NSAIDs and follow up with nephrology as scheduled     Strain of left knee, initial encounter  - continue home PT     Type 2 diabetes mellitus without complication, without long-term current use of insulin (HCC)  - stable. Continue current diabetic therapy     Essential hypertension  - stable. Continue current bp therapy     Hospital discharge follow-up  -     ME DISCHARGE MEDS RECONCILED W/ CURRENT OUTPATIENT MED LIST      Medical Decision Making: moderate complexity  Return for as scheduled , diabetes, HTN, HLD.           Subjective:   HPI:  Follow up of Hospital problems/diagnosis(es):     84 year old male accompanied by his wife presents for follow up s/p hospital discharge for syncope. Pt passed out 2 weeks ago  while trying to get up from toilet . Blood tests were unremarkable other than mildly elevated WBC and CKD. CT neck/chest/head/left knee were unremarkable. EKG showed first degree AV block and pt is scheduled to follow up with Dr. West. States he has been doing well since discharge on 4/6 other than left knee pain/swelling. Currently he has home PT for strength/gait training. no recurrent syncope dizziness reported. Bp is

## 2024-05-08 ENCOUNTER — COMMUNITY CARE MANAGEMENT (OUTPATIENT)
Facility: CLINIC | Age: 84
End: 2024-05-08

## 2024-05-28 ENCOUNTER — HOSPITAL ENCOUNTER (OUTPATIENT)
Age: 84
Discharge: HOME | End: 2024-05-28
Payer: MEDICARE

## 2024-05-28 DIAGNOSIS — N28.1 RENAL CYST: ICD-10-CM

## 2024-05-28 DIAGNOSIS — N18.30 BENIGN HYPERTENSION WITH CKD (CHRONIC KIDNEY DISEASE) STAGE III (HCC): ICD-10-CM

## 2024-05-28 DIAGNOSIS — I12.9 BENIGN HYPERTENSION WITH CKD (CHRONIC KIDNEY DISEASE) STAGE III (HCC): ICD-10-CM

## 2024-05-28 DIAGNOSIS — N18.32 STAGE 3B CHRONIC KIDNEY DISEASE (HCC): ICD-10-CM

## 2024-05-28 DIAGNOSIS — E13.22 SECONDARY DIABETES MELLITUS WITH STAGE 3 CHRONIC KIDNEY DISEASE (HCC): ICD-10-CM

## 2024-05-28 DIAGNOSIS — N18.30 SECONDARY DIABETES MELLITUS WITH STAGE 3 CHRONIC KIDNEY DISEASE (HCC): ICD-10-CM

## 2024-05-28 LAB
ANION GAP SERPL CALCULATED.3IONS-SCNC: 13 MMOL/L (ref 9–17)
BACTERIA URNS QL MICRO: ABNORMAL
BASOPHILS # BLD: 0 K/UL (ref 0–0.2)
BASOPHILS NFR BLD: 0 % (ref 0–2)
BILIRUB UR QL STRIP: NEGATIVE
BUN SERPL-MCNC: 15 MG/DL (ref 8–23)
CALCIUM SERPL-MCNC: 9.5 MG/DL (ref 8.6–10.4)
CASTS #/AREA URNS LPF: ABNORMAL /LPF
CHLORIDE SERPL-SCNC: 107 MMOL/L (ref 98–107)
CLARITY UR: ABNORMAL
CO2 SERPL-SCNC: 23 MMOL/L (ref 20–31)
COLOR UR: YELLOW
CREAT SERPL-MCNC: 1.5 MG/DL (ref 0.7–1.2)
EOSINOPHIL # BLD: 0.5 K/UL (ref 0–0.4)
EOSINOPHILS RELATIVE PERCENT: 4 % (ref 0–4)
EPI CELLS #/AREA URNS HPF: ABNORMAL /HPF
ERYTHROCYTE [DISTWIDTH] IN BLOOD BY AUTOMATED COUNT: 14.4 % (ref 11.5–14.9)
GFR, ESTIMATED: 46 ML/MIN/1.73M2
GLUCOSE SERPL-MCNC: 137 MG/DL (ref 70–99)
GLUCOSE UR STRIP-MCNC: NEGATIVE MG/DL
HCT VFR BLD AUTO: 44.7 % (ref 41–53)
HGB BLD-MCNC: 14.1 G/DL (ref 13.5–17.5)
HGB UR QL STRIP.AUTO: ABNORMAL
KETONES UR STRIP-MCNC: ABNORMAL MG/DL
LEUKOCYTE ESTERASE UR QL STRIP: ABNORMAL
LYMPHOCYTES NFR BLD: 2.1 K/UL (ref 1–4.8)
LYMPHOCYTES RELATIVE PERCENT: 19 % (ref 24–44)
MAGNESIUM SERPL-MCNC: 2.2 MG/DL (ref 1.6–2.6)
MCH RBC QN AUTO: 29.9 PG (ref 26–34)
MCHC RBC AUTO-ENTMCNC: 31.5 G/DL (ref 31–37)
MCV RBC AUTO: 94.9 FL (ref 80–100)
MONOCYTES NFR BLD: 0.8 K/UL (ref 0.1–1.3)
MONOCYTES NFR BLD: 8 % (ref 1–7)
NEUTROPHILS NFR BLD: 69 % (ref 36–66)
NEUTS SEG NFR BLD: 7.6 K/UL (ref 1.3–9.1)
NITRITE UR QL STRIP: NEGATIVE
PH UR STRIP: 5.5 [PH] (ref 5–8)
PHOSPHATE SERPL-MCNC: 3.1 MG/DL (ref 2.5–4.5)
PLATELET # BLD AUTO: 213 K/UL (ref 150–450)
PMV BLD AUTO: 11.3 FL (ref 6–12)
POTASSIUM SERPL-SCNC: 4.4 MMOL/L (ref 3.7–5.3)
PROT UR STRIP-MCNC: ABNORMAL MG/DL
RBC # BLD AUTO: 4.71 M/UL (ref 4.5–5.9)
RBC #/AREA URNS HPF: ABNORMAL /HPF
SODIUM SERPL-SCNC: 143 MMOL/L (ref 135–144)
SP GR UR STRIP: 1.02 (ref 1–1.03)
UROBILINOGEN UR STRIP-ACNC: NORMAL EU/DL (ref 0–1)
WBC #/AREA URNS HPF: ABNORMAL /HPF
WBC OTHER # BLD: 11.1 K/UL (ref 3.5–11)

## 2024-05-28 PROCEDURE — 36415 COLL VENOUS BLD VENIPUNCTURE: CPT

## 2024-05-28 PROCEDURE — 81001 URINALYSIS AUTO W/SCOPE: CPT

## 2024-05-28 PROCEDURE — 80048 BASIC METABOLIC PNL TOTAL CA: CPT

## 2024-05-28 PROCEDURE — 84100 ASSAY OF PHOSPHORUS: CPT

## 2024-05-28 PROCEDURE — 85025 COMPLETE CBC W/AUTO DIFF WBC: CPT

## 2024-05-28 PROCEDURE — 83735 ASSAY OF MAGNESIUM: CPT

## 2024-06-01 ENCOUNTER — HOSPITAL ENCOUNTER (OUTPATIENT)
Age: 84
Setting detail: SPECIMEN
Discharge: HOME | End: 2024-06-01
Payer: MEDICARE

## 2024-06-01 DIAGNOSIS — E13.22 SECONDARY DIABETES MELLITUS WITH STAGE 3 CHRONIC KIDNEY DISEASE (HCC): ICD-10-CM

## 2024-06-01 DIAGNOSIS — R80.9 PROTEINURIA, UNSPECIFIED TYPE: ICD-10-CM

## 2024-06-01 DIAGNOSIS — N18.30 BENIGN HYPERTENSION WITH CKD (CHRONIC KIDNEY DISEASE) STAGE III (HCC): ICD-10-CM

## 2024-06-01 DIAGNOSIS — N18.30 SECONDARY DIABETES MELLITUS WITH STAGE 3 CHRONIC KIDNEY DISEASE (HCC): ICD-10-CM

## 2024-06-01 DIAGNOSIS — N18.32 STAGE 3B CHRONIC KIDNEY DISEASE (HCC): ICD-10-CM

## 2024-06-01 DIAGNOSIS — I12.9 BENIGN HYPERTENSION WITH CKD (CHRONIC KIDNEY DISEASE) STAGE III (HCC): ICD-10-CM

## 2024-06-01 DIAGNOSIS — R82.998 LEUKOCYTES IN URINE: ICD-10-CM

## 2024-06-01 PROCEDURE — 87086 URINE CULTURE/COLONY COUNT: CPT

## 2024-06-02 LAB
MICROORGANISM SPEC CULT: NO GROWTH
SERVICE CMNT-IMP: NORMAL
SPECIMEN DESCRIPTION: NORMAL

## 2024-06-18 ENCOUNTER — OFFICE VISIT (OUTPATIENT)
Dept: FAMILY MEDICINE CLINIC | Age: 84
End: 2024-06-18
Payer: MEDICARE

## 2024-06-18 VITALS
HEIGHT: 70 IN | SYSTOLIC BLOOD PRESSURE: 126 MMHG | WEIGHT: 184.6 LBS | DIASTOLIC BLOOD PRESSURE: 60 MMHG | HEART RATE: 56 BPM | TEMPERATURE: 97.2 F | BODY MASS INDEX: 26.43 KG/M2 | RESPIRATION RATE: 20 BRPM

## 2024-06-18 DIAGNOSIS — Z00.00 MEDICARE ANNUAL WELLNESS VISIT, SUBSEQUENT: Primary | ICD-10-CM

## 2024-06-18 PROCEDURE — 3074F SYST BP LT 130 MM HG: CPT | Performed by: FAMILY MEDICINE

## 2024-06-18 PROCEDURE — 3078F DIAST BP <80 MM HG: CPT | Performed by: FAMILY MEDICINE

## 2024-06-18 PROCEDURE — 1123F ACP DISCUSS/DSCN MKR DOCD: CPT | Performed by: FAMILY MEDICINE

## 2024-06-18 PROCEDURE — G0439 PPPS, SUBSEQ VISIT: HCPCS | Performed by: FAMILY MEDICINE

## 2024-06-18 ASSESSMENT — PATIENT HEALTH QUESTIONNAIRE - PHQ9
2. FEELING DOWN, DEPRESSED OR HOPELESS: NOT AT ALL
SUM OF ALL RESPONSES TO PHQ QUESTIONS 1-9: 0
SUM OF ALL RESPONSES TO PHQ QUESTIONS 1-9: 0
1. LITTLE INTEREST OR PLEASURE IN DOING THINGS: NOT AT ALL
SUM OF ALL RESPONSES TO PHQ QUESTIONS 1-9: 0
SUM OF ALL RESPONSES TO PHQ9 QUESTIONS 1 & 2: 0
SUM OF ALL RESPONSES TO PHQ QUESTIONS 1-9: 0

## 2024-06-18 ASSESSMENT — LIFESTYLE VARIABLES
HOW MANY STANDARD DRINKS CONTAINING ALCOHOL DO YOU HAVE ON A TYPICAL DAY: 3 OR 4
HOW OFTEN DO YOU HAVE A DRINK CONTAINING ALCOHOL: MONTHLY OR LESS

## 2024-06-18 NOTE — PROGRESS NOTES
Physician (Orthopedic Surgery)  Nathan West MD as Consulting Physician (Cardiology)  Eneida Limon MD as Consulting Physician (Gastroenterology)  Amauri Rangel MD as Surgeon (Orthopedic Surgery)  Franklyn Bauer MD as Consulting Physician (Pulmonology)  Radha Nelson APRN - CNP as Nurse Practitioner (Nurse Practitioner Family)  Miguel A Altamirano OD (Optometry)  Amauri Saenz MD (Dermatology)  Giovanny Moe MD as Consulting Physician (Oncology)  Tavo Jules MD as Consulting Physician (Nephrology)     Reviewed and updated this visit:  Tobacco  Allergies  Meds  Problems  Med Hx  Surg Hx  Soc Hx  Fam Hx        I, Aaliyah Hayes RN, 6/18/2024, performed the documented evaluation under the direct supervision of the attending physician.  This encounter was performed under my, Alin Hayes MD’s, direct supervision, 6/18/2024.

## 2024-06-18 NOTE — PATIENT INSTRUCTIONS
If you have questions about a medical condition or this instruction, always ask your healthcare professional. Healthwise, [facility] disclaims any warranty or liability for your use of this information.      Personalized Preventive Plan for Ger Mccray - 6/18/2024  Medicare offers a range of preventive health benefits. Some of the tests and screenings are paid in full while other may be subject to a deductible, co-insurance, and/or copay.    Some of these benefits include a comprehensive review of your medical history including lifestyle, illnesses that may run in your family, and various assessments and screenings as appropriate.    After reviewing your medical record and screening and assessments performed today your provider may have ordered immunizations, labs, imaging, and/or referrals for you.  A list of these orders (if applicable) as well as your Preventive Care list are included within your After Visit Summary for your review.    Other Preventive Recommendations:    A preventive eye exam performed by an eye specialist is recommended every 1-2 years to screen for glaucoma; cataracts, macular degeneration, and other eye disorders.  A preventive dental visit is recommended every 6 months.  Try to get at least 150 minutes of exercise per week or 10,000 steps per day on a pedometer .  Order or download the FREE \"Exercise & Physical Activity: Your Everyday Guide\" from [employer] [place] [employer]. Call 1-324.427.9539 or search [employer] [place] [employer] online.  You need 8247-6890 mg of calcium and 3202-0187 IU of vitamin D per day. It is possible to meet your calcium requirement with diet alone, but a vitamin D supplement is usually necessary to meet this goal.  When exposed to the sun, use a sunscreen that protects against both UVA and UVB radiation with an SPF of 30 or greater. Reapply every 2 to 3 hours or after sweating, drying off with a towel, or swimming.  Always wear a seat belt when

## 2024-07-08 RX ORDER — LISINOPRIL 2.5 MG/1
2.5 TABLET ORAL DAILY
Qty: 90 TABLET | Refills: 1 | Status: SHIPPED | OUTPATIENT
Start: 2024-07-08

## 2024-07-17 ENCOUNTER — OFFICE VISIT (OUTPATIENT)
Dept: ORTHOPEDIC SURGERY | Age: 84
End: 2024-07-17
Payer: MEDICARE

## 2024-07-17 DIAGNOSIS — M25.551 PAIN OF RIGHT HIP: Primary | ICD-10-CM

## 2024-07-17 PROCEDURE — 1036F TOBACCO NON-USER: CPT | Performed by: ORTHOPAEDIC SURGERY

## 2024-07-17 PROCEDURE — 99213 OFFICE O/P EST LOW 20 MIN: CPT | Performed by: ORTHOPAEDIC SURGERY

## 2024-07-17 PROCEDURE — 1123F ACP DISCUSS/DSCN MKR DOCD: CPT | Performed by: ORTHOPAEDIC SURGERY

## 2024-07-17 PROCEDURE — G8417 CALC BMI ABV UP PARAM F/U: HCPCS | Performed by: ORTHOPAEDIC SURGERY

## 2024-07-17 PROCEDURE — G8428 CUR MEDS NOT DOCUMENT: HCPCS | Performed by: ORTHOPAEDIC SURGERY

## 2024-07-17 NOTE — PROGRESS NOTES
Plan:  29 years status post a right total hip with some obvious polyethylene wear no gross evidence for component loosening  Previous left total of arthroplasty no problems        This is a 84 y.o. male who presents to the clinic today for evaluation / follow up of occasional right hip pain.     Past History:    Current Outpatient Medications:     lisinopril (PRINIVIL;ZESTRIL) 2.5 MG tablet, TAKE 1 TABLET BY MOUTH DAILY, Disp: 90 tablet, Rfl: 1    melatonin 3 MG TABS tablet, Take 1 tablet by mouth nightly as needed (sleep), Disp: 5 tablet, Rfl: 0    JANUVIA 50 MG tablet, TAKE 1 TABLET BY MOUTH DAILY, Disp: 90 tablet, Rfl: 1    rosuvastatin (CRESTOR) 10 MG tablet, TAKE 1 TABLET BY MOUTH DAILY, Disp: 90 tablet, Rfl: 1    allopurinol (ZYLOPRIM) 100 MG tablet, Take 1 tablet by mouth daily, Disp: 90 tablet, Rfl: 1    fluticasone (FLONASE) 50 MCG/ACT nasal spray, 1 spray by Each Nostril route daily, Disp: 32 g, Rfl: 1    metoprolol succinate (TOPROL XL) 25 MG extended release tablet, Take 1 tablet by mouth daily, Disp: , Rfl:     nitroGLYCERIN (NITROSTAT) 0.4 MG SL tablet, Place 1 tablet under the tongue every 5 minutes as needed for Chest pain up to max of 3 total doses. If no relief after 1 dose, call 911., Disp: , Rfl:     Fluticasone-Salmeterol 232-14 MCG/ACT AEPB, Inhale 1 puff into the lungs 2 times daily , Disp: , Rfl:     aspirin 81 MG chewable tablet, Take 1 tablet by mouth daily, Disp: , Rfl:     CPAP Machine MISC, --- ---, Disp: , Rfl:     albuterol sulfate HFA (PROVENTIL;VENTOLIN;PROAIR) 108 (90 Base) MCG/ACT inhaler, 2 puffs as needed, Disp: , Rfl:     therapeutic multivitamin-minerals (THERAGRAN-M) tablet, Take 1 tablet by mouth daily OTC, Disp: , Rfl:   Allergies   Allergen Reactions    Pcn [Penicillins] Itching    Ceclor [Cefaclor] Itching and Rash    Morphine Rash     Social History     Socioeconomic History    Marital status:      Spouse name: Not on file    Number of children: Not on file

## 2024-07-18 ENCOUNTER — OFFICE VISIT (OUTPATIENT)
Dept: FAMILY MEDICINE CLINIC | Age: 84
End: 2024-07-18

## 2024-07-18 VITALS
HEART RATE: 57 BPM | RESPIRATION RATE: 14 BRPM | BODY MASS INDEX: 26.4 KG/M2 | SYSTOLIC BLOOD PRESSURE: 116 MMHG | OXYGEN SATURATION: 98 % | DIASTOLIC BLOOD PRESSURE: 78 MMHG | WEIGHT: 184 LBS

## 2024-07-18 DIAGNOSIS — N18.31 TYPE 2 DIABETES MELLITUS WITH STAGE 3A CHRONIC KIDNEY DISEASE, WITHOUT LONG-TERM CURRENT USE OF INSULIN (HCC): Primary | ICD-10-CM

## 2024-07-18 DIAGNOSIS — E11.22 TYPE 2 DIABETES MELLITUS WITH STAGE 3A CHRONIC KIDNEY DISEASE, WITHOUT LONG-TERM CURRENT USE OF INSULIN (HCC): Primary | ICD-10-CM

## 2024-07-18 DIAGNOSIS — I10 ESSENTIAL HYPERTENSION: ICD-10-CM

## 2024-07-18 DIAGNOSIS — M1A.00X0 IDIOPATHIC CHRONIC GOUT WITHOUT TOPHUS, UNSPECIFIED SITE: ICD-10-CM

## 2024-07-18 DIAGNOSIS — E78.1 HYPERTRIGLYCERIDEMIA: ICD-10-CM

## 2024-07-18 LAB — HBA1C MFR BLD: 6.1 %

## 2024-07-18 SDOH — ECONOMIC STABILITY: FOOD INSECURITY: WITHIN THE PAST 12 MONTHS, YOU WORRIED THAT YOUR FOOD WOULD RUN OUT BEFORE YOU GOT MONEY TO BUY MORE.: NEVER TRUE

## 2024-07-18 SDOH — ECONOMIC STABILITY: FOOD INSECURITY: WITHIN THE PAST 12 MONTHS, THE FOOD YOU BOUGHT JUST DIDN'T LAST AND YOU DIDN'T HAVE MONEY TO GET MORE.: NEVER TRUE

## 2024-07-18 SDOH — ECONOMIC STABILITY: INCOME INSECURITY: HOW HARD IS IT FOR YOU TO PAY FOR THE VERY BASICS LIKE FOOD, HOUSING, MEDICAL CARE, AND HEATING?: NOT HARD AT ALL

## 2024-07-18 ASSESSMENT — PATIENT HEALTH QUESTIONNAIRE - PHQ9
1. LITTLE INTEREST OR PLEASURE IN DOING THINGS: NOT AT ALL
SUM OF ALL RESPONSES TO PHQ QUESTIONS 1-9: 0
SUM OF ALL RESPONSES TO PHQ QUESTIONS 1-9: 0
2. FEELING DOWN, DEPRESSED OR HOPELESS: NOT AT ALL
SUM OF ALL RESPONSES TO PHQ QUESTIONS 1-9: 0
SUM OF ALL RESPONSES TO PHQ9 QUESTIONS 1 & 2: 0
SUM OF ALL RESPONSES TO PHQ QUESTIONS 1-9: 0

## 2024-07-18 ASSESSMENT — ENCOUNTER SYMPTOMS
ABDOMINAL PAIN: 0
BLOOD IN STOOL: 0
CHEST TIGHTNESS: 0
SHORTNESS OF BREATH: 0

## 2024-07-18 NOTE — PROGRESS NOTES
[facility]     Date of Visit:  2024  Patient Name: Ger Mccray   Patient :  1940     CHIEF COMPLAINT:     Ger Mccray is a 84 y.o. male who presents today for an general visit to be evaluated for the following condition(s):  Chief Complaint   Patient presents with    Hyperlipidemia     HYPERCHOLESTEREMIA    Hypertension    Gout     PT STATES GOUT IS GETTING WORSE IN HIS FEET, STATES YOU MENTIONED LAST VISIT ABOUT INCREASING ALLOPURINOL?    Diabetes    Health Maintenance     RSV SHOT DUE, 23 LAST PSA DONE       HISTORY OF PRESENT ILLNESS:       HPI   Patient is an 84-year-old white male who presents for diabetes, hypertension, gout, hypertriglyceridemia.  He states that he is taking and tolerating his routine medication.  His hemoglobin A1c today was 6.1.  He denies any fever, chills, chest pain, abdominal pain, shortness of breath.  He has a good appetite and remains active.  REVIEW OF SYSTEMS:      Review of Systems   Constitutional:  Negative for chills and fever.   HENT:  Negative for congestion.    Respiratory:  Negative for chest tightness and shortness of breath.    Cardiovascular:  Negative for chest pain.   Gastrointestinal:  Negative for abdominal pain and blood in stool.   Genitourinary:  Negative for dysuria and hematuria.   Skin:  Negative for rash.   Neurological:  Negative for dizziness.   Psychiatric/Behavioral:  Negative for confusion and dysphoric mood.         REVIEWED INFORMATION      Current Outpatient Medications   Medication Sig Dispense Refill    lisinopril (PRINIVIL;ZESTRIL) 2.5 MG tablet TAKE 1 TABLET BY MOUTH DAILY 90 tablet 1    JANUVIA 50 MG tablet TAKE 1 TABLET BY MOUTH DAILY 90 tablet 1    rosuvastatin (CRESTOR) 10 MG tablet TAKE 1 TABLET BY MOUTH DAILY 90 tablet 1    allopurinol (ZYLOPRIM) 100 MG tablet Take 1 tablet by mouth daily 90 tablet 1    fluticasone (FLONASE) 50 MCG/ACT nasal spray 1 spray by Each Nostril route daily 32 g 1

## 2024-07-19 ENCOUNTER — HOSPITAL ENCOUNTER (OUTPATIENT)
Age: 84
Discharge: HOME | End: 2024-07-19
Payer: MEDICARE

## 2024-07-19 DIAGNOSIS — M1A.00X0 IDIOPATHIC CHRONIC GOUT WITHOUT TOPHUS, UNSPECIFIED SITE: ICD-10-CM

## 2024-07-19 DIAGNOSIS — N18.31 TYPE 2 DIABETES MELLITUS WITH STAGE 3A CHRONIC KIDNEY DISEASE, WITHOUT LONG-TERM CURRENT USE OF INSULIN (HCC): ICD-10-CM

## 2024-07-19 DIAGNOSIS — E78.1 HYPERTRIGLYCERIDEMIA: ICD-10-CM

## 2024-07-19 DIAGNOSIS — I10 ESSENTIAL HYPERTENSION: ICD-10-CM

## 2024-07-19 DIAGNOSIS — E11.22 TYPE 2 DIABETES MELLITUS WITH STAGE 3A CHRONIC KIDNEY DISEASE, WITHOUT LONG-TERM CURRENT USE OF INSULIN (HCC): ICD-10-CM

## 2024-07-19 LAB
ALBUMIN SERPL-MCNC: 4.5 G/DL (ref 3.5–5.2)
ALP SERPL-CCNC: 71 U/L (ref 40–129)
ALT SERPL-CCNC: 25 U/L (ref 5–41)
ANION GAP SERPL CALCULATED.3IONS-SCNC: 11 MMOL/L (ref 9–17)
AST SERPL-CCNC: 35 U/L
BASOPHILS # BLD: 0 K/UL (ref 0–0.2)
BASOPHILS NFR BLD: 0 % (ref 0–2)
BILIRUB SERPL-MCNC: 1.5 MG/DL (ref 0.3–1.2)
BUN SERPL-MCNC: 17 MG/DL (ref 8–23)
CALCIUM SERPL-MCNC: 9.5 MG/DL (ref 8.6–10.4)
CHLORIDE SERPL-SCNC: 108 MMOL/L (ref 98–107)
CHOLEST SERPL-MCNC: 141 MG/DL
CHOLESTEROL/HDL RATIO: 3.8
CO2 SERPL-SCNC: 25 MMOL/L (ref 20–31)
CREAT SERPL-MCNC: 1.6 MG/DL (ref 0.7–1.2)
EOSINOPHIL # BLD: 0.6 K/UL (ref 0–0.4)
EOSINOPHILS RELATIVE PERCENT: 6 % (ref 0–4)
ERYTHROCYTE [DISTWIDTH] IN BLOOD BY AUTOMATED COUNT: 14 % (ref 11.5–14.9)
GFR, ESTIMATED: 42 ML/MIN/1.73M2
GLUCOSE SERPL-MCNC: 111 MG/DL (ref 70–99)
HCT VFR BLD AUTO: 44.2 % (ref 41–53)
HDLC SERPL-MCNC: 37 MG/DL
HGB BLD-MCNC: 14.5 G/DL (ref 13.5–17.5)
LDLC SERPL CALC-MCNC: 62 MG/DL (ref 0–130)
LYMPHOCYTES NFR BLD: 2.3 K/UL (ref 1–4.8)
LYMPHOCYTES RELATIVE PERCENT: 24 % (ref 24–44)
MAGNESIUM SERPL-MCNC: 2.4 MG/DL (ref 1.6–2.6)
MCH RBC QN AUTO: 31.4 PG (ref 26–34)
MCHC RBC AUTO-ENTMCNC: 32.7 G/DL (ref 31–37)
MCV RBC AUTO: 96 FL (ref 80–100)
MONOCYTES NFR BLD: 0.7 K/UL (ref 0.1–1.3)
MONOCYTES NFR BLD: 7 % (ref 1–7)
NEUTROPHILS NFR BLD: 63 % (ref 36–66)
NEUTS SEG NFR BLD: 6 K/UL (ref 1.3–9.1)
PLATELET # BLD AUTO: 205 K/UL (ref 150–450)
PMV BLD AUTO: 11.3 FL (ref 6–12)
POTASSIUM SERPL-SCNC: 4.9 MMOL/L (ref 3.7–5.3)
PROT SERPL-MCNC: 7 G/DL (ref 6.4–8.3)
RBC # BLD AUTO: 4.6 M/UL (ref 4.5–5.9)
SODIUM SERPL-SCNC: 144 MMOL/L (ref 135–144)
TRIGL SERPL-MCNC: 211 MG/DL
URATE SERPL-MCNC: 6.3 MG/DL (ref 3.4–7)
WBC OTHER # BLD: 9.6 K/UL (ref 3.5–11)

## 2024-07-19 PROCEDURE — 85025 COMPLETE CBC W/AUTO DIFF WBC: CPT

## 2024-07-19 PROCEDURE — 80061 LIPID PANEL: CPT

## 2024-07-19 PROCEDURE — 84550 ASSAY OF BLOOD/URIC ACID: CPT

## 2024-07-19 PROCEDURE — 80053 COMPREHEN METABOLIC PANEL: CPT

## 2024-07-19 PROCEDURE — 83735 ASSAY OF MAGNESIUM: CPT

## 2024-07-19 PROCEDURE — 36415 COLL VENOUS BLD VENIPUNCTURE: CPT

## 2024-07-20 ENCOUNTER — HOSPITAL ENCOUNTER (OUTPATIENT)
Dept: CT IMAGING | Age: 84
End: 2024-07-20
Attending: ORTHOPAEDIC SURGERY
Payer: MEDICARE

## 2024-07-20 DIAGNOSIS — M25.551 PAIN OF RIGHT HIP: ICD-10-CM

## 2024-07-20 PROCEDURE — 72192 CT PELVIS W/O DYE: CPT

## 2024-08-12 ENCOUNTER — TELEPHONE (OUTPATIENT)
Dept: ORTHOPEDIC SURGERY | Age: 84
End: 2024-08-12

## 2024-08-12 NOTE — TELEPHONE ENCOUNTER
Patient has an appt 8/21 to discuss results of CT   ----- Message from Dr. Warner Duke MD sent at 7/29/2024  8:23 AM EDT -----  Will need to discuss with patient upon my return  ----- Message -----  From: Deirdre Crump Incoming Radiant Results From [facility]  Sent: 7/25/2024   7:46 PM EDT  To: Warner Duke MD

## 2024-08-13 DIAGNOSIS — M1A.00X0 IDIOPATHIC CHRONIC GOUT WITHOUT TOPHUS, UNSPECIFIED SITE: ICD-10-CM

## 2024-08-13 RX ORDER — ALLOPURINOL 100 MG/1
100 TABLET ORAL DAILY
Qty: 90 TABLET | Refills: 1 | Status: SHIPPED | OUTPATIENT
Start: 2024-08-13

## 2024-08-20 DIAGNOSIS — E11.22 TYPE 2 DIABETES MELLITUS WITH STAGE 3 CHRONIC KIDNEY DISEASE, WITHOUT LONG-TERM CURRENT USE OF INSULIN, UNSPECIFIED WHETHER STAGE 3A OR 3B CKD (HCC): ICD-10-CM

## 2024-08-20 DIAGNOSIS — N18.30 TYPE 2 DIABETES MELLITUS WITH STAGE 3 CHRONIC KIDNEY DISEASE, WITHOUT LONG-TERM CURRENT USE OF INSULIN, UNSPECIFIED WHETHER STAGE 3A OR 3B CKD (HCC): ICD-10-CM

## 2024-08-21 ENCOUNTER — OFFICE VISIT (OUTPATIENT)
Dept: ORTHOPEDIC SURGERY | Age: 84
End: 2024-08-21
Payer: MEDICARE

## 2024-08-21 DIAGNOSIS — E78.2 MIXED HYPERLIPIDEMIA: ICD-10-CM

## 2024-08-21 DIAGNOSIS — Z96.641 HISTORY OF TOTAL RIGHT HIP REPLACEMENT: Primary | ICD-10-CM

## 2024-08-21 PROCEDURE — 1123F ACP DISCUSS/DSCN MKR DOCD: CPT | Performed by: ORTHOPAEDIC SURGERY

## 2024-08-21 PROCEDURE — 99213 OFFICE O/P EST LOW 20 MIN: CPT | Performed by: ORTHOPAEDIC SURGERY

## 2024-08-21 PROCEDURE — G8417 CALC BMI ABV UP PARAM F/U: HCPCS | Performed by: ORTHOPAEDIC SURGERY

## 2024-08-21 PROCEDURE — 1036F TOBACCO NON-USER: CPT | Performed by: ORTHOPAEDIC SURGERY

## 2024-08-21 PROCEDURE — G8428 CUR MEDS NOT DOCUMENT: HCPCS | Performed by: ORTHOPAEDIC SURGERY

## 2024-08-21 RX ORDER — SITAGLIPTIN 50 MG/1
TABLET, FILM COATED ORAL
Qty: 90 TABLET | Refills: 1 | Status: SHIPPED | OUTPATIENT
Start: 2024-08-21

## 2024-08-21 RX ORDER — ROSUVASTATIN CALCIUM 10 MG/1
TABLET, COATED ORAL
Qty: 90 TABLET | Refills: 1 | Status: SHIPPED | OUTPATIENT
Start: 2024-08-21

## 2024-08-21 NOTE — PROGRESS NOTES
[facility]                   Warner Duke M.D.            [address], [address]               [place], [place] [address]           Dept Phone: [phone]           Dept Fax:  [phone] [address] [facility]                       [address]           [place], [place] [address]          Dept Phone: [phone]           Dept Fax:  [phone]      Chief Compliant:  Chief Complaint   Patient presents with    Pain     Rt ELLE        History of Present Illness:  This is a 84 y.o. male who presents to the clinic today for evaluation / follow up of right hip CT scan.  Patient has a history of right total hip x 29 years.  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  He did have a CT scan last June 2023 and we repeated 1 recently just for comparison as indicated below..       Review of Systems   Constitutional: Negative for fever, chills, sweats.   Eyes: Negative for changes in vision, or pain.   HENT: Negative for ear ache, epistaxis, or sore throat.  Respiratory/Cardio: Negative for Chest pain, palpitations, SOB, or cough.  Gastrointestinal: Negative for abdominal pain, N/V/D.   Genitourinary: Negative for dysuria, frequency, urgency, or hematuria.   Neurological: Negative for headache, numbness, or weakness.   Integumentary: Negative for rash, itching, laceration, or abrasion.   Musculoskeletal: Positive for Pain (Rt ELLE)       Physical Exam:  Constitutional: Patient is oriented to person, place, and time. Patient appears well-developed and well nourished.   HENT: Negative otherwise noted  Head: Normocephalic and Atraumatic  Nose: Normal  Eyes: Conjunctivae and EOM are normal  Neck: Normal range of motion Neck supple.    Respiratory/Cardio: Effort normal. No respiratory distress.  Musculoskeletal: Examination notes patient has basically

## 2024-08-26 ENCOUNTER — APPOINTMENT (OUTPATIENT)
Dept: CT IMAGING | Age: 84
End: 2024-08-26
Payer: MEDICARE

## 2024-08-26 ENCOUNTER — HOSPITAL ENCOUNTER (EMERGENCY)
Age: 84
Discharge: HOME | End: 2024-08-26
Attending: EMERGENCY MEDICINE
Payer: MEDICARE

## 2024-08-26 VITALS
HEIGHT: 70 IN | SYSTOLIC BLOOD PRESSURE: 143 MMHG | RESPIRATION RATE: 18 BRPM | BODY MASS INDEX: 25.77 KG/M2 | HEART RATE: 70 BPM | TEMPERATURE: 97.4 F | OXYGEN SATURATION: 98 % | DIASTOLIC BLOOD PRESSURE: 92 MMHG | WEIGHT: 180 LBS

## 2024-08-26 DIAGNOSIS — N20.1 URETERIC STONE: Primary | ICD-10-CM

## 2024-08-26 LAB
ALBUMIN SERPL-MCNC: 4.3 G/DL (ref 3.5–5.2)
ALP SERPL-CCNC: 73 U/L (ref 40–129)
ALT SERPL-CCNC: 27 U/L (ref 5–41)
ANION GAP SERPL CALCULATED.3IONS-SCNC: 12 MMOL/L (ref 9–17)
AST SERPL-CCNC: 32 U/L
BACTERIA URNS QL MICRO: ABNORMAL
BASOPHILS # BLD: 0 K/UL (ref 0–0.2)
BASOPHILS NFR BLD: 0 % (ref 0–2)
BILIRUB SERPL-MCNC: 1.5 MG/DL (ref 0.3–1.2)
BILIRUB UR QL STRIP: NEGATIVE
BUN SERPL-MCNC: 22 MG/DL (ref 8–23)
CALCIUM SERPL-MCNC: 9.6 MG/DL (ref 8.6–10.4)
CASTS #/AREA URNS LPF: ABNORMAL /LPF
CHLORIDE SERPL-SCNC: 106 MMOL/L (ref 98–107)
CLARITY UR: ABNORMAL
CO2 SERPL-SCNC: 22 MMOL/L (ref 20–31)
COLOR UR: YELLOW
CREAT SERPL-MCNC: 1.7 MG/DL (ref 0.7–1.2)
EOSINOPHIL # BLD: 0.17 K/UL (ref 0–0.4)
EOSINOPHILS RELATIVE PERCENT: 1 % (ref 0–4)
EPI CELLS #/AREA URNS HPF: ABNORMAL /HPF
ERYTHROCYTE [DISTWIDTH] IN BLOOD BY AUTOMATED COUNT: 13.8 % (ref 11.5–14.9)
GFR, ESTIMATED: 40 ML/MIN/1.73M2
GLUCOSE SERPL-MCNC: 140 MG/DL (ref 70–99)
GLUCOSE UR STRIP-MCNC: NEGATIVE MG/DL
HCT VFR BLD AUTO: 44.6 % (ref 41–53)
HGB BLD-MCNC: 14.7 G/DL (ref 13.5–17.5)
HGB UR QL STRIP.AUTO: ABNORMAL
KETONES UR STRIP-MCNC: ABNORMAL MG/DL
LEUKOCYTE ESTERASE UR QL STRIP: NEGATIVE
LIPASE SERPL-CCNC: 41 U/L (ref 13–60)
LYMPHOCYTES NFR BLD: 1.34 K/UL (ref 1–4.8)
LYMPHOCYTES RELATIVE PERCENT: 8 % (ref 24–44)
MCH RBC QN AUTO: 31.3 PG (ref 26–34)
MCHC RBC AUTO-ENTMCNC: 32.9 G/DL (ref 31–37)
MCV RBC AUTO: 95.2 FL (ref 80–100)
MONOCYTES NFR BLD: 1.17 K/UL (ref 0.1–1.3)
MONOCYTES NFR BLD: 7 % (ref 1–7)
MORPHOLOGY: NORMAL
NEUTROPHILS NFR BLD: 84 % (ref 36–66)
NEUTS SEG NFR BLD: 14.02 K/UL (ref 1.3–9.1)
NITRITE UR QL STRIP: NEGATIVE
PH UR STRIP: 5 [PH] (ref 5–8)
PLATELET # BLD AUTO: 197 K/UL (ref 150–450)
PMV BLD AUTO: 11.2 FL (ref 6–12)
POTASSIUM SERPL-SCNC: 4.6 MMOL/L (ref 3.7–5.3)
PROT SERPL-MCNC: 6.9 G/DL (ref 6.4–8.3)
PROT UR STRIP-MCNC: ABNORMAL MG/DL
RBC # BLD AUTO: 4.68 M/UL (ref 4.5–5.9)
RBC #/AREA URNS HPF: ABNORMAL /HPF
SODIUM SERPL-SCNC: 140 MMOL/L (ref 135–144)
SP GR UR STRIP: 1.02 (ref 1–1.03)
UROBILINOGEN UR STRIP-ACNC: NORMAL EU/DL (ref 0–1)
WBC #/AREA URNS HPF: ABNORMAL /HPF
WBC OTHER # BLD: 16.7 K/UL (ref 3.5–11)

## 2024-08-26 PROCEDURE — 83690 ASSAY OF LIPASE: CPT

## 2024-08-26 PROCEDURE — 74177 CT ABD & PELVIS W/CONTRAST: CPT

## 2024-08-26 PROCEDURE — 2580000003 HC RX 258

## 2024-08-26 PROCEDURE — 81001 URINALYSIS AUTO W/SCOPE: CPT

## 2024-08-26 PROCEDURE — 80053 COMPREHEN METABOLIC PANEL: CPT

## 2024-08-26 PROCEDURE — 6360000002 HC RX W HCPCS

## 2024-08-26 PROCEDURE — 36415 COLL VENOUS BLD VENIPUNCTURE: CPT

## 2024-08-26 PROCEDURE — 85025 COMPLETE CBC W/AUTO DIFF WBC: CPT

## 2024-08-26 PROCEDURE — 6360000004 HC RX CONTRAST MEDICATION

## 2024-08-26 PROCEDURE — 99285 EMERGENCY DEPT VISIT HI MDM: CPT

## 2024-08-26 PROCEDURE — 96374 THER/PROPH/DIAG INJ IV PUSH: CPT

## 2024-08-26 RX ORDER — OXYCODONE AND ACETAMINOPHEN 5; 325 MG/1; MG/1
1 TABLET ORAL EVERY 6 HOURS PRN
Qty: 12 TABLET | Refills: 0 | Status: SHIPPED | OUTPATIENT
Start: 2024-08-26 | End: 2024-08-29

## 2024-08-26 RX ORDER — FENTANYL CITRATE 0.05 MG/ML
50 INJECTION, SOLUTION INTRAMUSCULAR; INTRAVENOUS ONCE
Status: COMPLETED | OUTPATIENT
Start: 2024-08-26 | End: 2024-08-26

## 2024-08-26 RX ORDER — SODIUM CHLORIDE 0.9 % (FLUSH) 0.9 %
10 SYRINGE (ML) INJECTION PRN
Status: DISCONTINUED | OUTPATIENT
Start: 2024-08-26 | End: 2024-08-26 | Stop reason: HOSPADM

## 2024-08-26 RX ORDER — IOPAMIDOL 755 MG/ML
75 INJECTION, SOLUTION INTRAVASCULAR
Status: COMPLETED | OUTPATIENT
Start: 2024-08-26 | End: 2024-08-26

## 2024-08-26 RX ORDER — TAMSULOSIN HYDROCHLORIDE 0.4 MG/1
0.4 CAPSULE ORAL DAILY
Qty: 10 CAPSULE | Refills: 0 | Status: SHIPPED | OUTPATIENT
Start: 2024-08-26

## 2024-08-26 RX ORDER — 0.9 % SODIUM CHLORIDE 0.9 %
100 INTRAVENOUS SOLUTION INTRAVENOUS ONCE
Status: COMPLETED | OUTPATIENT
Start: 2024-08-26 | End: 2024-08-26

## 2024-08-26 RX ADMIN — SODIUM CHLORIDE, PRESERVATIVE FREE 10 ML: 5 INJECTION INTRAVENOUS at 16:56

## 2024-08-26 RX ADMIN — FENTANYL CITRATE 50 MCG: 0.05 INJECTION, SOLUTION INTRAMUSCULAR; INTRAVENOUS at 16:14

## 2024-08-26 RX ADMIN — IOPAMIDOL 75 ML: 755 INJECTION, SOLUTION INTRAVENOUS at 16:56

## 2024-08-26 RX ADMIN — SODIUM CHLORIDE 100 ML: 9 INJECTION, SOLUTION INTRAVENOUS at 16:56

## 2024-08-26 ASSESSMENT — PAIN DESCRIPTION - ORIENTATION: ORIENTATION: LEFT

## 2024-08-26 ASSESSMENT — ENCOUNTER SYMPTOMS
BACK PAIN: 1
NAUSEA: 0
ABDOMINAL PAIN: 0
VOMITING: 0
SHORTNESS OF BREATH: 0

## 2024-08-26 ASSESSMENT — PAIN DESCRIPTION - DESCRIPTORS: DESCRIPTORS: SHARP

## 2024-08-26 ASSESSMENT — PAIN DESCRIPTION - LOCATION: LOCATION: FLANK;ABDOMEN

## 2024-08-26 ASSESSMENT — PAIN - FUNCTIONAL ASSESSMENT: PAIN_FUNCTIONAL_ASSESSMENT: 0-10

## 2024-08-26 ASSESSMENT — PAIN SCALES - GENERAL
PAINLEVEL_OUTOF10: 8
PAINLEVEL_OUTOF10: 8

## 2024-08-26 NOTE — ED PROVIDER NOTES
[facility] ED  Emergency Department Encounter  Emergency Medicine Resident     Pt Name:Ger Mccray  MRN: [id number]  Birthdate 1940  Date of evaluation: 8/26/24  PCP:  Alin Hayes MD  Note Started: 3:34 PM EDT      CHIEF COMPLAINT       Chief Complaint   Patient presents with    Flank Pain    Abdominal Pain     Pt having pain on L lower abdomen, wrapping around to the back       HISTORY OF PRESENT ILLNESS  (Location/Symptom, Timing/Onset, Context/Setting, Quality, Duration, Modifying Factors, Severity.)      Ger Mccray is a 84 y.o. male with history of arthritis, CAD, prior CVA, CKD, COPD, DM 2, HLD, HTN presents with left flank pain radiating to groin, progressively worsening throughout the day.  Pain has been intermittent for the last several months, however has significantly worsened today.  Pain described as crampy/colicky, intermittent, no aggravating or relieving factors. Denies fever, chills, myalgias or night sweats.  No associated urinary symptoms, denies blood in urine.  Last bowel movement yesterday, described as normal color, consistency and caliber, denies blood.    PAST MEDICAL / SURGICAL / SOCIAL / FAMILY HISTORY      has a past medical history of Anemia, Anesthesia, Arthritis, ASHD (arteriosclerotic heart disease), Asthma, CAD (coronary artery disease), Cerebral artery occlusion with cerebral infarction (HCC), Chronic kidney disease, Chronic obstructive pulmonary disease, unspecified (HCC), COPD (chronic obstructive pulmonary disease) ([facility]), COVID-19 vaccine series completed, Degenerative joint disease, Diabetes mellitus (HCC), ED (erectile dysfunction), Full dentures, Gout, History of carpal tunnel syndrome, History of colon polyps, History of heart attack, History of hemorrhoids, History of TIA (transient ischemic attack), Hypercholesteremia, Hypertension, Hyperuricemia, Leukocytosis, Renal cyst, Sciatic nerve pain, Sleep apnea, and Wears glasses.       has a past  capsule by mouth daily       Arley Camacho MD  Emergency Medicine Resident    (Please note that portions of thisnote were completed with a voice recognition program.  Efforts were made to edit the dictations but occasionally words are mis-transcribed.)

## 2024-08-26 NOTE — DISCHARGE INSTRUCTIONS
You have a 5 mm kidney stone on the left.  This should pass on its own over the next several days.  Please strain all of your urine and collect stone to bring to your urology appointment.  Recommend follow-up with your urologist, contact information is provided above.    CT scan also showed enlargement of known left kidney cyst, recommend follow-up with a nephrologist at your earliest convenience    You have been provided with a narcotic pain medication, take as needed.  Please do not operate heavy machinery or a motor vehicle while under the influence of this medication as it may make you drowsy.    Return to the Emergency Department immediately if you begin experiencing worsening abdominal pain, persistent fever despite taking ibuprofen or Tylenol, nausea orvomiting as this may indicate a worsening of your underlying medical condition requiring immediate medical attention.

## 2024-09-03 ENCOUNTER — TELEPHONE (OUTPATIENT)
Dept: UROLOGY | Age: 84
End: 2024-09-03

## 2024-09-03 ENCOUNTER — OFFICE VISIT (OUTPATIENT)
Dept: UROLOGY | Age: 84
End: 2024-09-03
Payer: MEDICARE

## 2024-09-03 VITALS
HEART RATE: 72 BPM | WEIGHT: 178 LBS | DIASTOLIC BLOOD PRESSURE: 60 MMHG | SYSTOLIC BLOOD PRESSURE: 138 MMHG | TEMPERATURE: 97.2 F | BODY MASS INDEX: 25.54 KG/M2

## 2024-09-03 DIAGNOSIS — N20.1 URETERAL STONE: Primary | ICD-10-CM

## 2024-09-03 DIAGNOSIS — R10.9 FLANK PAIN: ICD-10-CM

## 2024-09-03 PROCEDURE — 1123F ACP DISCUSS/DSCN MKR DOCD: CPT | Performed by: UROLOGY

## 2024-09-03 PROCEDURE — G8417 CALC BMI ABV UP PARAM F/U: HCPCS | Performed by: UROLOGY

## 2024-09-03 PROCEDURE — 3078F DIAST BP <80 MM HG: CPT | Performed by: UROLOGY

## 2024-09-03 PROCEDURE — G8427 DOCREV CUR MEDS BY ELIG CLIN: HCPCS | Performed by: UROLOGY

## 2024-09-03 PROCEDURE — 1036F TOBACCO NON-USER: CPT | Performed by: UROLOGY

## 2024-09-03 PROCEDURE — 3075F SYST BP GE 130 - 139MM HG: CPT | Performed by: UROLOGY

## 2024-09-03 PROCEDURE — 99204 OFFICE O/P NEW MOD 45 MIN: CPT | Performed by: UROLOGY

## 2024-09-03 ASSESSMENT — ENCOUNTER SYMPTOMS
NAUSEA: 0
VOMITING: 0
EYE PAIN: 0
DIARRHEA: 0
COUGH: 0
CHOKING: 0
SHORTNESS OF BREATH: 0
CONSTIPATION: 0
WHEEZING: 0
BACK PAIN: 0
ABDOMINAL PAIN: 1

## 2024-09-03 NOTE — PROGRESS NOTES
Review of Systems   Constitutional:  Negative for chills, fatigue and fever.   Eyes:  Negative for pain and visual disturbance.   Respiratory:  Negative for cough, choking, shortness of breath and wheezing.    Cardiovascular:  Negative for chest pain, palpitations and leg swelling.   Gastrointestinal:  Positive for abdominal pain (Left). Negative for constipation, diarrhea, nausea and vomiting.   Genitourinary:  Positive for flank pain (left). Negative for decreased urine volume, difficulty urinating, dysuria, frequency, hematuria, scrotal swelling, testicular pain and urgency.   Musculoskeletal:  Negative for arthralgias, back pain, gait problem and joint swelling.   Skin:  Negative for rash and wound.   Neurological:  Negative for weakness.   Hematological:  Does not bruise/bleed easily.

## 2024-09-03 NOTE — TELEPHONE ENCOUNTER
[facility]    Cysto, left URS, HLL, left stent placement  [facility]: [phone]     Arrival time: 0600  Procedure time: 0730  NPO 2200  No need to stop blood thinners    Patient and wife given all information in office.  All questions answered.

## 2024-09-03 NOTE — PROGRESS NOTES
[facility]  [address]  [facility] [address]  Dept: [phone]  Dept Fax: [phone]    [facility]rology Office Note - New patient    Patient:  Ger Mccray  YOB: 1940  Date: 9/3/2024    The patient is a 84 y.o. male who presents todayfor evaluation of the following problems:   Chief Complaint   Patient presents with    Nephrolithiasis     New patient, hospital follow up    referred by Alin Hayes MD.      HPI  He is here for a left ureteral stone.   He was seen in the ER last week.  CT was done, which showed a 5mm stone in the proximal ureter.       (Patient's old records have been requested, reviewed and summarized in today's note.)    Summary of old records: N/A    Additional History: N/A    Procedures Today: N/A    Last several PSA's:  Lab Results   Component Value Date    PSA 2.07 07/12/2023    PSA 1.10 06/24/2016    PSA 1.09 11/24/2014     Last total testosterone:  Lab Results   Component Value Date    TESTOSTERONE 224 06/17/2013     Urinalysis today:  No results found for this visit on 09/03/24.    AUA Symptom Score (9/3/2024):                               Last BUN and creatinine:  Lab Results   Component Value Date    BUN 22 08/26/2024     Lab Results   Component Value Date    CREATININE 1.7 (H) 08/26/2024       Additional Lab/Culture results: none    Imaging Reviewed during this Office Visit: CT images reviewed.   5mm stone noted in proximal ureter.     (results were independently reviewed by physician and radiology report verified)    PAST MEDICAL, FAMILY AND SOCIAL HISTORY:  Past Medical History:   Diagnosis Date    Anemia     Anesthesia     woke up eary during surgery x1 , heard saw & felt the pressure.    Arthritis 07/15/2013    ASHD (arteriosclerotic heart disease) 07/15/2013    Asthma     CAD (coronary artery disease)     has cardiac stent x 4.    Cerebral artery occlusion with

## 2024-09-04 ENCOUNTER — HOSPITAL ENCOUNTER (OUTPATIENT)
Dept: PREADMISSION TESTING | Age: 84
Setting detail: OUTPATIENT SURGERY
Discharge: HOME | End: 2024-09-08

## 2024-09-04 VITALS — HEIGHT: 70 IN | BODY MASS INDEX: 25.48 KG/M2 | WEIGHT: 178 LBS

## 2024-09-06 ENCOUNTER — HOSPITAL ENCOUNTER (OUTPATIENT)
Age: 84
Setting detail: SPECIMEN
Discharge: HOME | End: 2024-09-06
Payer: MEDICARE

## 2024-09-06 PROCEDURE — 82365 CALCULUS SPECTROSCOPY: CPT

## 2024-09-10 LAB
STONE COMPOSITION: NORMAL
STONE DESCRIPTION: NORMAL
STONE MASS: 28 MG

## 2024-09-20 ENCOUNTER — OFFICE VISIT (OUTPATIENT)
Dept: UROLOGY | Age: 84
End: 2024-09-20
Payer: MEDICARE

## 2024-09-20 VITALS
BODY MASS INDEX: 25.11 KG/M2 | WEIGHT: 175 LBS | HEART RATE: 64 BPM | DIASTOLIC BLOOD PRESSURE: 80 MMHG | TEMPERATURE: 97.3 F | SYSTOLIC BLOOD PRESSURE: 136 MMHG

## 2024-09-20 DIAGNOSIS — N20.1 URETERAL STONE: Primary | ICD-10-CM

## 2024-09-20 PROCEDURE — G8417 CALC BMI ABV UP PARAM F/U: HCPCS | Performed by: UROLOGY

## 2024-09-20 PROCEDURE — 3075F SYST BP GE 130 - 139MM HG: CPT | Performed by: UROLOGY

## 2024-09-20 PROCEDURE — 1123F ACP DISCUSS/DSCN MKR DOCD: CPT | Performed by: UROLOGY

## 2024-09-20 PROCEDURE — 99213 OFFICE O/P EST LOW 20 MIN: CPT | Performed by: UROLOGY

## 2024-09-20 PROCEDURE — 3079F DIAST BP 80-89 MM HG: CPT | Performed by: UROLOGY

## 2024-09-20 PROCEDURE — G8427 DOCREV CUR MEDS BY ELIG CLIN: HCPCS | Performed by: UROLOGY

## 2024-09-20 PROCEDURE — 1036F TOBACCO NON-USER: CPT | Performed by: UROLOGY

## 2024-09-20 ASSESSMENT — ENCOUNTER SYMPTOMS
CONSTIPATION: 0
ABDOMINAL PAIN: 0
SHORTNESS OF BREATH: 0
EYE PAIN: 0
BACK PAIN: 0
COUGH: 0
VOMITING: 0
WHEEZING: 0
DIARRHEA: 0
NAUSEA: 0

## 2024-09-30 ENCOUNTER — COMMUNITY CARE MANAGEMENT (OUTPATIENT)
Facility: CLINIC | Age: 84
End: 2024-09-30

## 2024-10-03 ENCOUNTER — HOSPITAL ENCOUNTER (OUTPATIENT)
Age: 84
Discharge: HOME | End: 2024-10-03
Payer: MEDICARE

## 2024-10-03 LAB — TSH SERPL DL<=0.05 MIU/L-ACNC: 4.29 UIU/ML (ref 0.3–5)

## 2024-10-03 PROCEDURE — 84443 ASSAY THYROID STIM HORMONE: CPT

## 2024-10-03 PROCEDURE — 36415 COLL VENOUS BLD VENIPUNCTURE: CPT

## 2024-10-21 ENCOUNTER — COMMUNITY CARE MANAGEMENT (OUTPATIENT)
Facility: CLINIC | Age: 84
End: 2024-10-21

## 2024-10-22 ENCOUNTER — TELEPHONE (OUTPATIENT)
Dept: FAMILY MEDICINE CLINIC | Age: 84
End: 2024-10-22

## 2024-10-22 ENCOUNTER — IMMUNIZATION (OUTPATIENT)
Dept: FAMILY MEDICINE CLINIC | Age: 84
End: 2024-10-22
Payer: MEDICARE

## 2024-10-22 VITALS — TEMPERATURE: 97.9 F | RESPIRATION RATE: 14 BRPM | HEART RATE: 72 BPM | BODY MASS INDEX: 25.25 KG/M2 | WEIGHT: 176 LBS

## 2024-10-22 DIAGNOSIS — Z23 NEEDS FLU SHOT: Primary | ICD-10-CM

## 2024-10-22 PROCEDURE — 90653 IIV ADJUVANT VACCINE IM: CPT | Performed by: FAMILY MEDICINE

## 2024-10-22 PROCEDURE — G0008 ADMIN INFLUENZA VIRUS VAC: HCPCS | Performed by: FAMILY MEDICINE

## 2024-10-22 ASSESSMENT — PATIENT HEALTH QUESTIONNAIRE - PHQ9
SUM OF ALL RESPONSES TO PHQ QUESTIONS 1-9: 0
SUM OF ALL RESPONSES TO PHQ9 QUESTIONS 1 & 2: 0
2. FEELING DOWN, DEPRESSED OR HOPELESS: NOT AT ALL
SUM OF ALL RESPONSES TO PHQ QUESTIONS 1-9: 0
1. LITTLE INTEREST OR PLEASURE IN DOING THINGS: NOT AT ALL

## 2024-10-22 NOTE — TELEPHONE ENCOUNTER
Patient's last uric acid level had decreased back to normal.  Patient is to continue on current dose of allopurinol 100 mg daily

## 2024-10-22 NOTE — TELEPHONE ENCOUNTER
Patient was asking about his allopurinol 100 mg tablets.  He states you had said something about increasing his dosing?  If so, can you send to [facility], otherwise advise.  Thank you.

## 2024-10-22 NOTE — TELEPHONE ENCOUNTER
Message conveyed to Wendie, Wife.   Protocol For Photochemotherapy: Petrolatum And Nbuvb: The patient received Photochemotherapy: Petrolatum and NBUVB (petrolatum applied to all lesions prior to phototherapy). Protocol For Photochemotherapy: Tar And Broad Band Uvb (Goeckerman Treatment): The patient received Photochemotherapy: Tar and Broad Band UVB (Goeckerman treatment). Protocol For Photochemotherapy For Severe Photoresponsive Dermatoses: Petrolatum And Nbuvb: The patient received Photochemotherapy for severe photoresponsive dermatoses: Petrolatum and NBUVB requiring at least 4 to 8 hours of care under direct physician supervision. Protocol For Photochemotherapy: Tar And Nbuvb (Goeckerman Treatment): The patient received Photochemotherapy: Tar and NBUVB (Goeckerman treatment). Protocol: NBUVB Post-Care Instructions: I reviewed with the patient in detail post-care instructions. Patient is to wear sun protection. Patients may expect sunburn like redness, discomfort and scabbing. Protocol For Photochemotherapy: Mineral Oil And Broad Band Uvb: The patient received Photochemotherapy: Mineral Oil and Broad Band UVB. Protocol For Photochemotherapy For Severe Photoresponsive Dermatoses: Puva: The patient received Photochemotherapy for severe photoresponsive dermatoses: PUVA requiring at least 4 to 8 hours of care under direct physician supervision. Protocol For Photochemotherapy For Severe Photoresponsive Dermatoses: Tar And Broad Band Uvb (Goeckerman Treatment): The patient received Photochemotherapy for severe photoresponsive dermatoses: Tar and Broad Band UVB (Goeckerman treatment) requiring at least 4 to 8 hours of care under direct physician supervision. Protocol For Nbuvb: The patient received NBUVB. Protocol For Photochemotherapy: Mineral Oil And Nbuvb: The patient received Photochemotherapy: Mineral Oil and NBUVB (mineral oil applied to all lesions prior to phototherapy). Protocol For Protocol For Photochemotherapy For Severe Photoresponsive Dermatoses: Bath Puva: The patient received Photochemotherapy for severe photoresponsive dermatoses: Bath PUVA requiring at least 4 to 8 hours of care under direct physician supervision. Total Body Time: 8:00 Treatment Number: 9 Consent: Written consent obtained.  The risks were reviewed with the patient including but not limited to: burn, pigmentary changes, pain, blistering, scabbing, redness, increased risk of skin cancers, and the remote possibility of scarring. Protocol For Puva: The patient received PUVA. Protocol For Nb Uva: The patient received NB UVA. Protocol For Uva1: The patient received UVA1. Protocol For Photochemotherapy: Petrolatum And Broad Band Uvb: The patient received Photochemotherapy: Petrolatum and Broad Band UVB. Protocol For Photochemotherapy: Baby Oil And Nbuvb: The patient received Photochemotherapy: Baby Oil and NBUVB (baby oil applied to all lesions prior to phototherapy). Protocol For Photochemotherapy For Severe Photoresponsive Dermatoses: Petrolatum And Broad Band Uvb: The patient received Photochemotherapyfor severe photoresponsive dermatoses: Petrolatum and Broad Band UVB requiring at least 4 to 8 hours of care under direct physician supervision. Protocol For Photochemotherapy For Severe Photoresponsive Dermatoses: Tar And Nbuvb (Goeckerman Treatment): The patient received Photochemotherapy for severe photoresponsive dermatoses: Tar and NBUVB (Goeckerman treatment) requiring at least 4 to 8 hours of care under direct physician supervision. Protocol For Broad Band Uvb: The patient received Broad Band UVB. Protocol For Photochemotherapy: Triamcinolone Ointment And Nbuvb: The patient received Photochemotherapy: Triamcinolone and NBUVB (triamcinolone ointment applied to all lesions prior to phototherapy). Protocol For Bath Puva: The patient received Bath PUVA. Detail Level: Zone Protocol For Uva: The patient received UVA.

## 2024-11-18 ENCOUNTER — APPOINTMENT (OUTPATIENT)
Dept: CT IMAGING | Age: 84
DRG: 039 | End: 2024-11-18
Payer: MEDICARE

## 2024-11-18 ENCOUNTER — TELEPHONE (OUTPATIENT)
Dept: FAMILY MEDICINE CLINIC | Age: 84
End: 2024-11-18

## 2024-11-18 ENCOUNTER — HOSPITAL ENCOUNTER (INPATIENT)
Age: 84
LOS: 2 days | Discharge: ANOTHER HOSPITAL | DRG: 039 | End: 2024-11-20
Attending: EMERGENCY MEDICINE | Admitting: FAMILY MEDICINE
Payer: MEDICARE

## 2024-11-18 ENCOUNTER — APPOINTMENT (OUTPATIENT)
Dept: GENERAL RADIOLOGY | Age: 84
DRG: 039 | End: 2024-11-18
Payer: MEDICARE

## 2024-11-18 DIAGNOSIS — R55 SYNCOPE, UNSPECIFIED SYNCOPE TYPE: ICD-10-CM

## 2024-11-18 DIAGNOSIS — R51.9 ACUTE NONINTRACTABLE HEADACHE, UNSPECIFIED HEADACHE TYPE: Primary | ICD-10-CM

## 2024-11-18 LAB
ALBUMIN SERPL-MCNC: 4.4 G/DL (ref 3.5–5.2)
ALP SERPL-CCNC: 63 U/L (ref 40–129)
ALT SERPL-CCNC: 21 U/L (ref 10–50)
ANION GAP SERPL CALCULATED.3IONS-SCNC: 11 MMOL/L (ref 9–16)
AST SERPL-CCNC: 28 U/L (ref 10–50)
BASOPHILS # BLD: 0 K/UL (ref 0–0.2)
BASOPHILS NFR BLD: 0 % (ref 0–2)
BILIRUB SERPL-MCNC: 1 MG/DL (ref 0–1.2)
BILIRUB UR QL STRIP: NEGATIVE
BUN SERPL-MCNC: 22 MG/DL (ref 8–23)
CALCIUM SERPL-MCNC: 9.8 MG/DL (ref 8.6–10.4)
CHLORIDE SERPL-SCNC: 107 MMOL/L (ref 98–107)
CLARITY UR: CLEAR
CO2 SERPL-SCNC: 25 MMOL/L (ref 20–31)
COLOR UR: YELLOW
COMMENT: ABNORMAL
CREAT SERPL-MCNC: 1.6 MG/DL (ref 0.7–1.2)
EOSINOPHIL # BLD: 0.3 K/UL (ref 0–0.4)
EOSINOPHILS RELATIVE PERCENT: 2 % (ref 0–4)
ERYTHROCYTE [DISTWIDTH] IN BLOOD BY AUTOMATED COUNT: 14.3 % (ref 11.5–14.9)
GFR, ESTIMATED: 42 ML/MIN/1.73M2
GLUCOSE BLD-MCNC: 98 MG/DL (ref 75–110)
GLUCOSE SERPL-MCNC: 92 MG/DL (ref 74–99)
GLUCOSE UR STRIP-MCNC: NEGATIVE MG/DL
HCT VFR BLD AUTO: 42.2 % (ref 41–53)
HGB BLD-MCNC: 14.1 G/DL (ref 13.5–17.5)
HGB UR QL STRIP.AUTO: NEGATIVE
INR PPP: 1.1
KETONES UR STRIP-MCNC: NEGATIVE MG/DL
LEUKOCYTE ESTERASE UR QL STRIP: NEGATIVE
LIPASE SERPL-CCNC: 58 U/L (ref 13–60)
LYMPHOCYTES NFR BLD: 2.5 K/UL (ref 1–4.8)
LYMPHOCYTES RELATIVE PERCENT: 21 % (ref 24–44)
MAGNESIUM SERPL-MCNC: 2.2 MG/DL (ref 1.6–2.4)
MCH RBC QN AUTO: 31.5 PG (ref 26–34)
MCHC RBC AUTO-ENTMCNC: 33.5 G/DL (ref 31–37)
MCV RBC AUTO: 94 FL (ref 80–100)
MONOCYTES NFR BLD: 0.9 K/UL (ref 0.1–1.3)
MONOCYTES NFR BLD: 7 % (ref 1–7)
NEUTROPHILS NFR BLD: 70 % (ref 36–66)
NEUTS SEG NFR BLD: 8.1 K/UL (ref 1.3–9.1)
NITRITE UR QL STRIP: NEGATIVE
PH UR STRIP: 6 [PH] (ref 5–8)
PLATELET # BLD AUTO: 191 K/UL (ref 150–450)
PMV BLD AUTO: 10.9 FL (ref 6–12)
POTASSIUM SERPL-SCNC: 4.5 MMOL/L (ref 3.7–5.3)
PROT SERPL-MCNC: 7.1 G/DL (ref 6.6–8.7)
PROT UR STRIP-MCNC: NEGATIVE MG/DL
PROTHROMBIN TIME: 14.9 SEC (ref 11.8–14.6)
RBC # BLD AUTO: 4.49 M/UL (ref 4.5–5.9)
SODIUM SERPL-SCNC: 143 MMOL/L (ref 136–145)
SP GR UR STRIP: 1.06 (ref 1–1.03)
TROPONIN I SERPL HS-MCNC: 23 NG/L (ref 0–22)
TROPONIN I SERPL HS-MCNC: 32 NG/L (ref 0–22)
UROBILINOGEN UR STRIP-ACNC: NORMAL EU/DL (ref 0–1)
WBC OTHER # BLD: 11.8 K/UL (ref 3.5–11)

## 2024-11-18 PROCEDURE — 2580000003 HC RX 258: Performed by: EMERGENCY MEDICINE

## 2024-11-18 PROCEDURE — 2060000000 HC ICU INTERMEDIATE R&B

## 2024-11-18 PROCEDURE — 85610 PROTHROMBIN TIME: CPT

## 2024-11-18 PROCEDURE — 70450 CT HEAD/BRAIN W/O DYE: CPT

## 2024-11-18 PROCEDURE — 83735 ASSAY OF MAGNESIUM: CPT

## 2024-11-18 PROCEDURE — 70496 CT ANGIOGRAPHY HEAD: CPT

## 2024-11-18 PROCEDURE — 80053 COMPREHEN METABOLIC PANEL: CPT

## 2024-11-18 PROCEDURE — 71045 X-RAY EXAM CHEST 1 VIEW: CPT

## 2024-11-18 PROCEDURE — 6360000004 HC RX CONTRAST MEDICATION: Performed by: EMERGENCY MEDICINE

## 2024-11-18 PROCEDURE — 85025 COMPLETE CBC W/AUTO DIFF WBC: CPT

## 2024-11-18 PROCEDURE — 99285 EMERGENCY DEPT VISIT HI MDM: CPT

## 2024-11-18 PROCEDURE — 6370000000 HC RX 637 (ALT 250 FOR IP): Performed by: FAMILY MEDICINE

## 2024-11-18 PROCEDURE — 99222 1ST HOSP IP/OBS MODERATE 55: CPT | Performed by: PSYCHIATRY & NEUROLOGY

## 2024-11-18 PROCEDURE — 84484 ASSAY OF TROPONIN QUANT: CPT

## 2024-11-18 PROCEDURE — 83690 ASSAY OF LIPASE: CPT

## 2024-11-18 PROCEDURE — 82947 ASSAY GLUCOSE BLOOD QUANT: CPT

## 2024-11-18 PROCEDURE — 93005 ELECTROCARDIOGRAM TRACING: CPT | Performed by: EMERGENCY MEDICINE

## 2024-11-18 PROCEDURE — 36415 COLL VENOUS BLD VENIPUNCTURE: CPT

## 2024-11-18 PROCEDURE — 81003 URINALYSIS AUTO W/O SCOPE: CPT

## 2024-11-18 RX ORDER — INSULIN LISPRO 100 [IU]/ML
0-4 INJECTION, SOLUTION INTRAVENOUS; SUBCUTANEOUS
Status: DISCONTINUED | OUTPATIENT
Start: 2024-11-18 | End: 2024-11-20 | Stop reason: HOSPADM

## 2024-11-18 RX ORDER — DEXTROSE MONOHYDRATE 100 MG/ML
INJECTION, SOLUTION INTRAVENOUS CONTINUOUS PRN
Status: DISCONTINUED | OUTPATIENT
Start: 2024-11-18 | End: 2024-11-20 | Stop reason: HOSPADM

## 2024-11-18 RX ORDER — ENOXAPARIN SODIUM 100 MG/ML
40 INJECTION SUBCUTANEOUS DAILY
Status: DISCONTINUED | OUTPATIENT
Start: 2024-11-19 | End: 2024-11-20 | Stop reason: HOSPADM

## 2024-11-18 RX ORDER — IOPAMIDOL 755 MG/ML
75 INJECTION, SOLUTION INTRAVASCULAR
Status: COMPLETED | OUTPATIENT
Start: 2024-11-18 | End: 2024-11-18

## 2024-11-18 RX ORDER — ROSUVASTATIN CALCIUM 20 MG/1
10 TABLET, COATED ORAL NIGHTLY
Status: DISCONTINUED | OUTPATIENT
Start: 2024-11-18 | End: 2024-11-19

## 2024-11-18 RX ORDER — M-VIT,TX,IRON,MINS/CALC/FOLIC 27MG-0.4MG
1 TABLET ORAL DAILY
Status: DISCONTINUED | OUTPATIENT
Start: 2024-11-19 | End: 2024-11-20 | Stop reason: HOSPADM

## 2024-11-18 RX ORDER — METOPROLOL SUCCINATE 25 MG/1
25 TABLET, EXTENDED RELEASE ORAL DAILY
Status: DISCONTINUED | OUTPATIENT
Start: 2024-11-19 | End: 2024-11-19

## 2024-11-18 RX ORDER — SODIUM CHLORIDE 0.9 % (FLUSH) 0.9 %
10 SYRINGE (ML) INJECTION PRN
Status: DISCONTINUED | OUTPATIENT
Start: 2024-11-18 | End: 2024-11-20 | Stop reason: HOSPADM

## 2024-11-18 RX ORDER — ASPIRIN 81 MG/1
81 TABLET, CHEWABLE ORAL DAILY
Status: DISCONTINUED | OUTPATIENT
Start: 2024-11-19 | End: 2024-11-20 | Stop reason: HOSPADM

## 2024-11-18 RX ORDER — LISINOPRIL 5 MG/1
2.5 TABLET ORAL DAILY
Status: DISCONTINUED | OUTPATIENT
Start: 2024-11-19 | End: 2024-11-19

## 2024-11-18 RX ORDER — ACETAMINOPHEN 325 MG/1
650 TABLET ORAL EVERY 6 HOURS PRN
Status: DISCONTINUED | OUTPATIENT
Start: 2024-11-18 | End: 2024-11-20 | Stop reason: HOSPADM

## 2024-11-18 RX ORDER — BUDESONIDE AND FORMOTEROL FUMARATE DIHYDRATE 160; 4.5 UG/1; UG/1
2 AEROSOL RESPIRATORY (INHALATION)
Status: DISCONTINUED | OUTPATIENT
Start: 2024-11-18 | End: 2024-11-20 | Stop reason: HOSPADM

## 2024-11-18 RX ORDER — 0.9 % SODIUM CHLORIDE 0.9 %
100 INTRAVENOUS SOLUTION INTRAVENOUS ONCE
Status: COMPLETED | OUTPATIENT
Start: 2024-11-18 | End: 2024-11-18

## 2024-11-18 RX ORDER — ALBUTEROL SULFATE 90 UG/1
2 INHALANT RESPIRATORY (INHALATION) EVERY 6 HOURS PRN
Status: DISCONTINUED | OUTPATIENT
Start: 2024-11-18 | End: 2024-11-20 | Stop reason: HOSPADM

## 2024-11-18 RX ADMIN — SODIUM CHLORIDE, PRESERVATIVE FREE 10 ML: 5 INJECTION INTRAVENOUS at 17:08

## 2024-11-18 RX ADMIN — SODIUM CHLORIDE 100 ML: 9 INJECTION, SOLUTION INTRAVENOUS at 17:08

## 2024-11-18 RX ADMIN — IOPAMIDOL 75 ML: 755 INJECTION, SOLUTION INTRAVENOUS at 17:08

## 2024-11-18 RX ADMIN — ACETAMINOPHEN 650 MG: 325 TABLET ORAL at 23:44

## 2024-11-18 RX ADMIN — ROSUVASTATIN CALCIUM 10 MG: 20 TABLET, FILM COATED ORAL at 22:26

## 2024-11-18 ASSESSMENT — LIFESTYLE VARIABLES
HOW MANY STANDARD DRINKS CONTAINING ALCOHOL DO YOU HAVE ON A TYPICAL DAY: PATIENT DOES NOT DRINK
HOW OFTEN DO YOU HAVE A DRINK CONTAINING ALCOHOL: NEVER

## 2024-11-18 ASSESSMENT — ENCOUNTER SYMPTOMS
ABDOMINAL PAIN: 0
BACK PAIN: 0
COLOR CHANGE: 0
EYE PAIN: 0
SHORTNESS OF BREATH: 0

## 2024-11-18 ASSESSMENT — PAIN DESCRIPTION - DESCRIPTORS: DESCRIPTORS: THROBBING

## 2024-11-18 ASSESSMENT — PAIN DESCRIPTION - LOCATION: LOCATION: HEAD

## 2024-11-18 ASSESSMENT — PAIN SCALES - GENERAL: PAINLEVEL_OUTOF10: 3

## 2024-11-18 NOTE — PROGRESS NOTES
Pharmacy Medication History Note      List of current medications patient is taking is complete.    Source of information: Sure Scripts, OARRS, Patient med list, patient    Changes made to medication list:  Medications removed (include reason, ex. therapy complete or physician discontinued, noncompliance):  CPAP machine - list clean up - from 2020    Medications flagged for provider review:  Allopurinol - patient reports not taking for 3 months - states discontinued - last filled in August  Flonase - patient reports not taking  Nitroglycerin SL tabs - patient reports not taking - has never had to use    Medications added/doses adjusted:  none    Other notes (ex. Recent course of antibiotics, Coumadin dosing):  OARRs: negative since April 2024  Patient unsure which medications he took today. Says he took about 1/2 of them, but can not remember which ones. Did report he used his inhaler today. States it has been a long time since he has used a rescue inhaler.      Current Home Medication List at Time of Admission:  Prior to Admission medications    Medication Sig   Fluticasone-Salmeterol 232-14 MCG/ACT AEPB Inhale 1 puff into the lungs 2 times daily    JANUVIA 50 MG tablet TAKE 1 TABLET BY MOUTH DAILY   rosuvastatin (CRESTOR) 10 MG tablet TAKE 1 TABLET BY MOUTH DAILY   allopurinol (ZYLOPRIM) 100 MG tablet TAKE 1 TABLET BY MOUTH DAILY  Patient not taking: Reported on 11/18/2024   lisinopril (PRINIVIL;ZESTRIL) 2.5 MG tablet TAKE 1 TABLET BY MOUTH DAILY   fluticasone (FLONASE) 50 MCG/ACT nasal spray 1 spray by Each Nostril route daily  Patient not taking: Reported on 11/18/2024   metoprolol succinate (TOPROL XL) 25 MG extended release tablet Take 1 tablet by mouth daily   nitroGLYCERIN (NITROSTAT) 0.4 MG SL tablet Place 1 tablet under the tongue every 5 minutes as needed for Chest pain up to max of 3 total doses. If no relief after 1 dose, call 911.  Patient not taking: Reported on 11/18/2024   aspirin 81 MG chewable  chewable tablet Take 1 tablet by mouth daily   albuterol sulfate HFA (PROVENTIL;VENTOLIN;PROAIR) 108 (90 Base) MCG/ACT inhaler Inhale 2 puffs into the lungs every 6 hours as needed   therapeutic multivitamin-minerals (THERAGRAN-M) tablet Take 1 tablet by mouth daily         Please let me know if you have any questions about this encounter. Thank you!    Hannah Emanuel, PharmD  PGY1 Pharmacy Resident    11/18/2024  5:30 PM

## 2024-11-18 NOTE — ED PROVIDER NOTES
EMERGENCY DEPARTMENT ENCOUNTER    Pt Name: Ger Mccray  MRN: [id number]  Birthdate 1940  Date of evaluation: 11/18/24  CHIEF COMPLAINT       Chief Complaint   Patient presents with    Headache    Loss of Consciousness    Dizziness     HISTORY OF PRESENT ILLNESS   84-year-old male presents for complaints of headache, lightheadedness and reported syncopal episode.  Patient reports he has been having headaches and lightheadedness for about the last week.  States that he was feeling worse today states that when he went to get up from his chair earlier he passed out for short period of time.  He states he is still feeling lightheaded, still having some right-sided headache, he states he is just feeling generally overall fatigued and weak.  He denies chest pain or shortness of breath denies abdominal pain nausea or vomiting.  He denies any significant past headache history.  He denies any new numbness or tingling or weakness to extremities denies vision changes, denies any difficulty with speech or word finding.  He denies any recent illness or known sick contacts.  He denies any cough changes in bowel movements, difficulty urinating or pain with urination.    The history is provided by the patient.           REVIEW OF SYSTEMS     Review of Systems   Constitutional:  Positive for fatigue. Negative for chills and fever.   HENT:  Negative for congestion and ear pain.    Eyes:  Negative for pain.   Respiratory:  Negative for shortness of breath.    Cardiovascular:  Negative for chest pain, palpitations and leg swelling.   Gastrointestinal:  Negative for abdominal pain.   Genitourinary:  Negative for dysuria and flank pain.   Musculoskeletal:  Negative for back pain.   Skin:  Negative for color change.   Neurological:  Positive for syncope and headaches. Negative for numbness.   Psychiatric/Behavioral:  Negative for confusion.    All other systems reviewed and are negative.    PASTMEDICAL HISTORY     Past Medical  Analyzed  (Lab and radiology tests/orders below in next section)        My EKG interpretation: Normal sinus rhythm rate of 61, normal axis, normal intervals, no ST segment elevation or depression no T wave      3)  Treatment and Disposition             Will check labs, imaging    Labs are reviewed and unremarkable, CT head showed no acute abnormality, chest x-ray showed no acute abnormality    CTA head showing moderate to severe stenosis of the distal common carotids bilaterally    Discussed with neurointervention Dr. Gtz feels that the imaging is suboptimal and does not feel that this is significant stenosis no acute intervention needed currently, recommending admission and further syncopal workup as well as carotid ultrasounds    Results were discussed with patient and family discussed plan for admission they are agreeable    Spoke with Dr. Gomez who accepts admission. Patient demonstrates understanding and agreement with the plan, was given the opportunity to ask questions, and these questions were answered to the best of the provided information at this time. VS stable for transfer to floor.       This dictation was prepared using Dragon Medical voice recognition software.       CRITICAL CARE:       PROCEDURES:    Procedures      DATA FOR LAB AND RADIOLOGY TESTS ORDERED BELOW ARE REVIEWED BY THE ED CLINICIAN:    RADIOLOGY: All x-rays, CT, MRI, and formal ultrasound images (except ED bedside ultrasound) are read by the radiologist, see reports below, unless otherwise noted in MDM or here.  Reports below are reviewed by myself.  CT Head W/O Contrast   Final Result   No acute intracranial hemorrhage or acute cerebral infarction identified.         CTA HEAD NECK W CONTRAST   Final Result   1. Motion artifact limits evaluation.   2. Moderate to severe stenosis of the distal common carotid arteries   bilaterally.   3. Possible severe stenosis at the origin of the vertebral arteries   bilaterally.   4. Severe

## 2024-11-18 NOTE — TELEPHONE ENCOUNTER
Patient's daughter called to let office know that patient is going to [facility] ED.  Currently weak, severe shooting pain through head (headache) the past week.  Passed out for a few seconds today getting out of chair.

## 2024-11-18 NOTE — ED TRIAGE NOTES
Mode of arrival (squad #, walk in, police, etc) : walk-in        Chief complaint(s): loss of consciousness/headache/dizziness        Arrival Note (brief scenario, treatment PTA, etc).: Pt reports falling this morning. Pt reports now having headache. Pt denies hitting head or blood thinner use        C= \"Have you ever felt that you should Cut down on your drinking?\"  No  A= \"Have people Annoyed you by criticizing your drinking?\"  No  G= \"Have you ever felt bad or Guilty about your drinking?\"  No  E= \"Have you ever had a drink as an Eye-opener first thing in the morning to steady your nerves or to help a hangover?\"  No      Deferred []      Reason for deferring: N/A    *If yes to two or more: probable alcohol abuse.*

## 2024-11-19 ENCOUNTER — APPOINTMENT (OUTPATIENT)
Dept: MRI IMAGING | Age: 84
DRG: 039 | End: 2024-11-19
Payer: MEDICARE

## 2024-11-19 ENCOUNTER — APPOINTMENT (OUTPATIENT)
Dept: VASCULAR LAB | Age: 84
DRG: 039 | End: 2024-11-19
Attending: FAMILY MEDICINE
Payer: MEDICARE

## 2024-11-19 ENCOUNTER — APPOINTMENT (OUTPATIENT)
Age: 84
DRG: 039 | End: 2024-11-19
Attending: FAMILY MEDICINE
Payer: MEDICARE

## 2024-11-19 PROBLEM — I63.9 ACUTE CEREBRAL INFARCTION (HCC): Status: ACTIVE | Noted: 2024-11-19

## 2024-11-19 PROBLEM — I65.23 BILATERAL CAROTID ARTERY STENOSIS: Status: ACTIVE | Noted: 2024-11-19

## 2024-11-19 LAB
ANION GAP SERPL CALCULATED.3IONS-SCNC: 9 MMOL/L (ref 9–16)
BASOPHILS # BLD: 0.1 K/UL (ref 0–0.2)
BASOPHILS NFR BLD: 1 % (ref 0–2)
BUN SERPL-MCNC: 20 MG/DL (ref 8–23)
CALCIUM SERPL-MCNC: 9.3 MG/DL (ref 8.6–10.4)
CHLORIDE SERPL-SCNC: 106 MMOL/L (ref 98–107)
CO2 SERPL-SCNC: 24 MMOL/L (ref 20–31)
CREAT SERPL-MCNC: 1.4 MG/DL (ref 0.7–1.2)
ECHO AO ASC DIAM: 3.5 CM
ECHO AO ASCENDING AORTA INDEX: 1.77 CM/M2
ECHO AO ROOT DIAM: 3 CM
ECHO AO ROOT INDEX: 1.52 CM/M2
ECHO AV AREA PEAK VELOCITY: 1.2 CM2
ECHO AV AREA VTI: 1.2 CM2
ECHO AV AREA/BSA PEAK VELOCITY: 0.6 CM2/M2
ECHO AV AREA/BSA VTI: 0.6 CM2/M2
ECHO AV MEAN GRADIENT: 4 MMHG
ECHO AV MEAN VELOCITY: 0.9 M/S
ECHO AV PEAK GRADIENT: 7 MMHG
ECHO AV PEAK VELOCITY: 1.3 M/S
ECHO AV VELOCITY RATIO: 0.46
ECHO AV VTI: 28.6 CM
ECHO BSA: 1.99 M2
ECHO BSA: 1.99 M2
ECHO EST RA PRESSURE: 3 MMHG
ECHO LA AREA 2C: 18.5 CM2
ECHO LA AREA 4C: 16.6 CM2
ECHO LA DIAMETER INDEX: 2.02 CM/M2
ECHO LA DIAMETER: 4 CM
ECHO LA MAJOR AXIS: 5.3 CM
ECHO LA MINOR AXIS: 5.4 CM
ECHO LA TO AORTIC ROOT RATIO: 1.33
ECHO LA VOL BP: 45 ML (ref 18–58)
ECHO LA VOL MOD A2C: 51 ML (ref 18–58)
ECHO LA VOL MOD A4C: 40 ML (ref 18–58)
ECHO LA VOL/BSA BIPLANE: 23 ML/M2 (ref 16–34)
ECHO LA VOLUME INDEX MOD A2C: 26 ML/M2 (ref 16–34)
ECHO LA VOLUME INDEX MOD A4C: 20 ML/M2 (ref 16–34)
ECHO LV E' LATERAL VELOCITY: 9.19 CM/S
ECHO LV E' SEPTAL VELOCITY: 5.32 CM/S
ECHO LV EF PHYSICIAN: 60 %
ECHO LV FRACTIONAL SHORTENING: 33 % (ref 28–44)
ECHO LV INTERNAL DIMENSION DIASTOLE INDEX: 2.12 CM/M2
ECHO LV INTERNAL DIMENSION DIASTOLIC: 4.2 CM (ref 4.2–5.9)
ECHO LV INTERNAL DIMENSION SYSTOLIC INDEX: 1.41 CM/M2
ECHO LV INTERNAL DIMENSION SYSTOLIC: 2.8 CM
ECHO LV IVSD: 1.1 CM (ref 0.6–1)
ECHO LV MASS 2D: 157.1 G (ref 88–224)
ECHO LV MASS INDEX 2D: 79.3 G/M2 (ref 49–115)
ECHO LV POSTERIOR WALL DIASTOLIC: 1.1 CM (ref 0.6–1)
ECHO LV RELATIVE WALL THICKNESS RATIO: 0.52
ECHO LVOT AREA: 2.8 CM2
ECHO LVOT AV VTI INDEX: 0.41
ECHO LVOT DIAM: 1.9 CM
ECHO LVOT MEAN GRADIENT: 1 MMHG
ECHO LVOT PEAK GRADIENT: 1 MMHG
ECHO LVOT PEAK VELOCITY: 0.6 M/S
ECHO LVOT STROKE VOLUME INDEX: 16.9 ML/M2
ECHO LVOT SV: 33.4 ML
ECHO LVOT VTI: 11.8 CM
ECHO MV A VELOCITY: 0.99 M/S
ECHO MV AREA VTI: 0.9 CM2
ECHO MV E DECELERATION TIME (DT): 246 MS
ECHO MV E VELOCITY: 0.6 M/S
ECHO MV E/A RATIO: 0.61
ECHO MV E/E' LATERAL: 6.53
ECHO MV E/E' RATIO (AVERAGED): 8.9
ECHO MV E/E' SEPTAL: 11.28
ECHO MV LVOT VTI INDEX: 3.19
ECHO MV MAX VELOCITY: 1 M/S
ECHO MV MEAN GRADIENT: 1 MMHG
ECHO MV MEAN VELOCITY: 0.5 M/S
ECHO MV PEAK GRADIENT: 4 MMHG
ECHO MV VTI: 37.6 CM
ECHO RA AREA 4C: 17.2 CM2
ECHO RA END SYSTOLIC VOLUME APICAL 4 CHAMBER INDEX BSA: 22 ML/M2
ECHO RA VOLUME: 44 ML
ECHO RIGHT VENTRICULAR SYSTOLIC PRESSURE (RVSP): 26 MMHG
ECHO RV TAPSE: 2 CM (ref 1.7–?)
ECHO TV REGURGITANT MAX VELOCITY: 2.4 M/S
ECHO TV REGURGITANT PEAK GRADIENT: 22 MMHG
EOSINOPHIL # BLD: 0.4 K/UL (ref 0–0.4)
EOSINOPHILS RELATIVE PERCENT: 3 % (ref 0–4)
ERYTHROCYTE [DISTWIDTH] IN BLOOD BY AUTOMATED COUNT: 13.8 % (ref 11.5–14.9)
GFR, ESTIMATED: 50 ML/MIN/1.73M2
GLUCOSE BLD-MCNC: 129 MG/DL (ref 75–110)
GLUCOSE BLD-MCNC: 142 MG/DL (ref 75–110)
GLUCOSE BLD-MCNC: 148 MG/DL (ref 75–110)
GLUCOSE BLD-MCNC: 176 MG/DL (ref 75–110)
GLUCOSE SERPL-MCNC: 127 MG/DL (ref 74–99)
HCT VFR BLD AUTO: 40.8 % (ref 41–53)
HGB BLD-MCNC: 13.4 G/DL (ref 13.5–17.5)
LYMPHOCYTES NFR BLD: 2.4 K/UL (ref 1–4.8)
LYMPHOCYTES RELATIVE PERCENT: 22 % (ref 24–44)
MCH RBC QN AUTO: 31.1 PG (ref 26–34)
MCHC RBC AUTO-ENTMCNC: 32.9 G/DL (ref 31–37)
MCV RBC AUTO: 94.5 FL (ref 80–100)
MONOCYTES NFR BLD: 0.8 K/UL (ref 0.1–1.3)
MONOCYTES NFR BLD: 7 % (ref 1–7)
NEUTROPHILS NFR BLD: 67 % (ref 36–66)
NEUTS SEG NFR BLD: 7.4 K/UL (ref 1.3–9.1)
PLATELET # BLD AUTO: 172 K/UL (ref 150–450)
PMV BLD AUTO: 11.3 FL (ref 6–12)
POTASSIUM SERPL-SCNC: 4.3 MMOL/L (ref 3.7–5.3)
RBC # BLD AUTO: 4.32 M/UL (ref 4.5–5.9)
SODIUM SERPL-SCNC: 139 MMOL/L (ref 136–145)
VAS LEFT CCA DIST PSV: 95.8 CM/S
VAS LEFT CCA MID EDV: 24.1 CM/S
VAS LEFT CCA MID PSV: 85.7 CM/S
VAS LEFT CCA PROX PSV: 117 CM/S
VAS LEFT ECA PSV: 113 CM/S
VAS LEFT ICA DIST EDV: 39 CM/S
VAS LEFT ICA DIST PSV: 95.9 CM/S
VAS LEFT ICA MID EDV: 41.2 CM/S
VAS LEFT ICA MID PSV: 109 CM/S
VAS LEFT ICA PROX EDV: 33.5 CM/S
VAS LEFT ICA PROX PSV: 90.4 CM/S
VAS LEFT VERTEBRAL EDV: 10.5 CM/S
VAS LEFT VERTEBRAL PSV: 39.9 CM/S
VAS RIGHT CCA DIST PSV: 53.5 CM/S
VAS RIGHT CCA MID PSV: 41.6 CM/S
VAS RIGHT CCA PROX EDV: 5.9 CM/S
VAS RIGHT CCA PROX PSV: 52.9 CM/S
VAS RIGHT ECA PSV: 111 CM/S
VAS RIGHT ICA DIST PSV: 36.8 CM/S
VAS RIGHT ICA MID PSV: 26.8 CM/S
VAS RIGHT ICA PROX EDV: 63 CM/S
VAS RIGHT ICA PROX PSV: 274 CM/S
VAS RIGHT VERTEBRAL PSV: 48.9 CM/S
WBC OTHER # BLD: 11 K/UL (ref 3.5–11)

## 2024-11-19 PROCEDURE — 36415 COLL VENOUS BLD VENIPUNCTURE: CPT

## 2024-11-19 PROCEDURE — 97535 SELF CARE MNGMENT TRAINING: CPT

## 2024-11-19 PROCEDURE — 97166 OT EVAL MOD COMPLEX 45 MIN: CPT

## 2024-11-19 PROCEDURE — 94640 AIRWAY INHALATION TREATMENT: CPT

## 2024-11-19 PROCEDURE — 80048 BASIC METABOLIC PNL TOTAL CA: CPT

## 2024-11-19 PROCEDURE — 94761 N-INVAS EAR/PLS OXIMETRY MLT: CPT

## 2024-11-19 PROCEDURE — 6370000000 HC RX 637 (ALT 250 FOR IP): Performed by: FAMILY MEDICINE

## 2024-11-19 PROCEDURE — 2060000000 HC ICU INTERMEDIATE R&B

## 2024-11-19 PROCEDURE — 70551 MRI BRAIN STEM W/O DYE: CPT

## 2024-11-19 PROCEDURE — 93306 TTE W/DOPPLER COMPLETE: CPT

## 2024-11-19 PROCEDURE — 93880 EXTRACRANIAL BILAT STUDY: CPT

## 2024-11-19 PROCEDURE — 99223 1ST HOSP IP/OBS HIGH 75: CPT | Performed by: SURGERY

## 2024-11-19 PROCEDURE — 97116 GAIT TRAINING THERAPY: CPT

## 2024-11-19 PROCEDURE — 82947 ASSAY GLUCOSE BLOOD QUANT: CPT

## 2024-11-19 PROCEDURE — 6360000002 HC RX W HCPCS: Performed by: FAMILY MEDICINE

## 2024-11-19 PROCEDURE — 97162 PT EVAL MOD COMPLEX 30 MIN: CPT

## 2024-11-19 PROCEDURE — 93880 EXTRACRANIAL BILAT STUDY: CPT | Performed by: SURGERY

## 2024-11-19 PROCEDURE — 93306 TTE W/DOPPLER COMPLETE: CPT | Performed by: INTERNAL MEDICINE

## 2024-11-19 PROCEDURE — 97530 THERAPEUTIC ACTIVITIES: CPT

## 2024-11-19 PROCEDURE — 85025 COMPLETE CBC W/AUTO DIFF WBC: CPT

## 2024-11-19 PROCEDURE — 6370000000 HC RX 637 (ALT 250 FOR IP): Performed by: PSYCHIATRY & NEUROLOGY

## 2024-11-19 PROCEDURE — 99223 1ST HOSP IP/OBS HIGH 75: CPT | Performed by: PSYCHIATRY & NEUROLOGY

## 2024-11-19 RX ORDER — CLOPIDOGREL BISULFATE 75 MG/1
75 TABLET ORAL DAILY
Status: DISCONTINUED | OUTPATIENT
Start: 2024-11-19 | End: 2024-11-19

## 2024-11-19 RX ORDER — HYDROCODONE BITARTRATE AND ACETAMINOPHEN 5; 325 MG/1; MG/1
1 TABLET ORAL ONCE
Status: COMPLETED | OUTPATIENT
Start: 2024-11-19 | End: 2024-11-19

## 2024-11-19 RX ORDER — ROSUVASTATIN CALCIUM 40 MG/1
40 TABLET, COATED ORAL NIGHTLY
Status: DISCONTINUED | OUTPATIENT
Start: 2024-11-19 | End: 2024-11-20 | Stop reason: HOSPADM

## 2024-11-19 RX ORDER — CLOPIDOGREL BISULFATE 75 MG/1
75 TABLET ORAL DAILY
Status: DISCONTINUED | OUTPATIENT
Start: 2024-11-19 | End: 2024-11-20 | Stop reason: HOSPADM

## 2024-11-19 RX ORDER — LABETALOL HYDROCHLORIDE 5 MG/ML
10 INJECTION, SOLUTION INTRAVENOUS EVERY 4 HOURS PRN
Status: DISCONTINUED | OUTPATIENT
Start: 2024-11-19 | End: 2024-11-20 | Stop reason: HOSPADM

## 2024-11-19 RX ORDER — LABETALOL HYDROCHLORIDE 5 MG/ML
10 INJECTION, SOLUTION INTRAVENOUS EVERY 4 HOURS PRN
Status: DISCONTINUED | OUTPATIENT
Start: 2024-11-19 | End: 2024-11-19

## 2024-11-19 RX ADMIN — BUDESONIDE AND FORMOTEROL FUMARATE DIHYDRATE 2 PUFF: 160; 4.5 AEROSOL RESPIRATORY (INHALATION) at 07:10

## 2024-11-19 RX ADMIN — BUDESONIDE AND FORMOTEROL FUMARATE DIHYDRATE 2 PUFF: 160; 4.5 AEROSOL RESPIRATORY (INHALATION) at 19:31

## 2024-11-19 RX ADMIN — ASPIRIN 81 MG 81 MG: 81 TABLET ORAL at 09:17

## 2024-11-19 RX ADMIN — HYDROCODONE BITARTRATE AND ACETAMINOPHEN 1 TABLET: 5; 325 TABLET ORAL at 16:50

## 2024-11-19 RX ADMIN — CLOPIDOGREL BISULFATE 75 MG: 75 TABLET ORAL at 15:24

## 2024-11-19 RX ADMIN — ENOXAPARIN SODIUM 40 MG: 100 INJECTION SUBCUTANEOUS at 09:17

## 2024-11-19 RX ADMIN — ROSUVASTATIN 40 MG: 40 TABLET, FILM COATED ORAL at 20:46

## 2024-11-19 RX ADMIN — Medication 1 TABLET: at 09:17

## 2024-11-19 ASSESSMENT — PAIN SCALES - GENERAL
PAINLEVEL_OUTOF10: 8
PAINLEVEL_OUTOF10: 4
PAINLEVEL_OUTOF10: 0

## 2024-11-19 ASSESSMENT — PAIN DESCRIPTION - LOCATION: LOCATION: HEAD

## 2024-11-19 ASSESSMENT — PAIN DESCRIPTION - ORIENTATION: ORIENTATION: MID

## 2024-11-19 ASSESSMENT — PAIN - FUNCTIONAL ASSESSMENT: PAIN_FUNCTIONAL_ASSESSMENT: PREVENTS OR INTERFERES SOME ACTIVE ACTIVITIES AND ADLS

## 2024-11-19 ASSESSMENT — PAIN DESCRIPTION - DESCRIPTORS: DESCRIPTORS: THROBBING

## 2024-11-19 NOTE — FLOWSHEET NOTE
11/19/24 0948   Treatment Team Notification   Reason for Communication Review case   Name of Team Member Notified Dr. Gomez   Treatment Team Role Attending Provider   Method of Communication Secure Message   Response Waiting for response   Notification Time 0948     I messaged neurology and haven't gotten a response. Since pt was a stroke alert would you like to order an MRI? Also the CTA showed severe carotid stenosis, Carotid duplex was done. Would you like a vascular consult?    Verbal orders for MRI brain and vascular consult.

## 2024-11-19 NOTE — PLAN OF CARE
Problem: Discharge Planning  Goal: Discharge to home or other facility with appropriate resources  Outcome: Progressing   DC barrier: Neuro consult, Echo, carotid ultrasounds, MRI     Problem: Safety - Adult  Goal: Free from fall injury  Outcome: Progressing   Pt assessed as a fall risk this shift. Remains free from falls and accidental injury at this time. Fall precautions in place, including falling star sign and fall risk band on pt. Floor free from obstacles, and bed is locked and in lowest position. Adequate lighting provided. Bed alarm activated.    Problem: Pain  Goal: Verbalizes/displays adequate comfort level or baseline comfort level  Outcome: Progressing   Pt medicated with pain medication prn.  Assessed all pain characteristics including level, type, location, frequency, and onset.  Non-pharmacologic interventions offered to pt as well.  Pt states pain has not improved at this time with tylenol or ice pack to head. States \"head is still pounding\"

## 2024-11-19 NOTE — PROGRESS NOTES
RN rounded with Dr. Gomez at bedside. Pt was in MRI but pts wife in room. Per Dr. Gomez, allow permissive HTN for now.

## 2024-11-19 NOTE — PROGRESS NOTES
[facility]   Occupational Therapy Evaluation  Date: 24  Patient Name: Ger Mccray       Room: 4/2124-01  MRN: [id number]  Account: [id number]   : 1940  (84 y.o.) Gender: male     Discharge Recommendations:  Further Occupational Therapy is recommended upon facility discharge.    OT Equipment Recommendations  Other: TBD    Referring Practitioner: Virgil Gomez MD  Diagnosis: Syncope        Treatment Diagnosis: Impaired self care status    Past Medical History:  has a past medical history of Anemia, Anesthesia, Arthritis, ASHD (arteriosclerotic heart disease), Asthma, CAD (coronary artery disease), Cerebral artery occlusion with cerebral infarction (HCC), Chronic kidney disease, Chronic obstructive pulmonary disease, unspecified ([facility]), COPD (chronic obstructive pulmonary disease) ([facility]), COVID-19 vaccine series completed, Degenerative joint disease, Diabetes mellitus ([facility]), ED (erectile dysfunction), Full dentures, Gout, History of carpal tunnel syndrome, History of colon polyps, History of heart attack, History of hemorrhoids, History of TIA (transient ischemic attack), Hypercholesteremia, Hypertension, Hyperuricemia, Leukocytosis, Renal cyst, Sciatic nerve pain, Sleep apnea, and Wears glasses.    Past Surgical History:   has a past surgical history that includes Coronary angioplasty with stent (1994 x1 stent,1998 x2 stents,2009 x1,); Colonoscopy (2008); Shoulder arthroscopy (Right, 2002 repair); Carpal tunnel release (Right, 2002); shoulder surgery (Left, 2002); Cataract removal with implant (Left, 2012); Cataract removal with implant (Right, 2012); eye surgery; Blepharoplasty (Bilateral, 2012); Total knee arthroplasty (Left, 2017); pr arthrp acetblr/prox fem prostc agrft/algrft (Left, 2018); joint replacement (Right, 2011); joint replacement (Right, ); joint replacement (Left, 2018); lumbar fusion  assistance  Scooting: Stand by assistance  Bed Mobility Comments: HOB flat without rails    Balance  Balance  Sitting Balance: Stand by assistance (Notable L lean)  Standing Balance: Contact guard assistance (Notable L side Lean)       Transfers  Transfers  Sit to stand: Contact guard assistance  Stand to sit: Contact guard assistance  Transfer Comments: VC for hand placement/ safety    Functional Mobility  Functional - Mobility Device: Rolling Walker  Activity: Other (To/From hallway)  Assist Level: Contact guard assistance  Functional Mobility Comments: Pt presents w/ L side lean during ambulation. VC for hand placement and safety. Orthostatic BP taken as follows: Sup = 139/86 - 67BPM, Sit = 143/96 - 112BPM, Stand = 142/78 - 98BPM          Assessment  Assessment  Performance deficits / Impairments: Decreased functional mobility , Decreased ADL status, Decreased ROM, Decreased strength, Decreased endurance, Decreased sensation, Decreased balance, Decreased high-level IADLs, Decreased fine motor control, Decreased coordination, Decreased posture  Treatment Diagnosis: Impaired self care status  Prognosis: Good  Decision Making: Medium Complexity  Discharge Recommendations: Patient would benefit from continued therapy after discharge    Activity Tolerance  Activity Tolerance: Patient Tolerated treatment well, Patient limited by fatigue    Safety Devices  Type of Devices: Bed alarm in place, Call light within reach, Gait belt, Left in bed    Patient Education  Patient Education  Education Given To: Patient, Family  Education Provided: Role of Therapy, Plan of Care, Transfer Training  Education Method: Demonstration, Verbal  Barriers to Learning: None  Education Outcome: Verbalized understanding, Demonstrated understanding, Continued education needed      Functional Outcome Measures  AM-PAC Daily Activity - Inpatient   How much help is needed for putting on and taking off regular lower body clothing?: A Little  How much  help is needed for bathing (which includes washing, rinsing, drying)?: A Little  How much help is needed for toileting (which includes using toilet, bedpan, or urinal)?: A Little  How much help is needed for putting on and taking off regular upper body clothing?: A Little  How much help is needed for taking care of personal grooming?: A Little  How much help for eating meals?: A Little  AM-[facility] Inpatient Daily Activity Raw Score: 18  AM-PAC Inpatient ADL T-Scale Score : 38.66  ADL Inpatient CMS 0-100% Score: 46.65  ADL Inpatient CMS G-Code Modifier : CK       Goals     Short Term Goals  Time Frame for Short Term Goals: By D/C  Short Term Goal 1: Pt to complete UE dressing/ bathing techniques w/ Jessica for improved dressing/ bathign tasks.  Short Term Goal 2: Pt to complete LE dressing/ bathing techniques w/ Jessica for improved dressing/ bathign tasks.  Short Term Goal 3: Pt to complete functionalmobility/ transfer using least restrictive device w/ Jessica for improved independence and safety w/ ADL comletion.  Short Term Goal 4: Pt to stand for 7+ min using least restrictive device for improved standing balance and activity tolerance w/ ADL completion.  Short Term Goal 5: Pt to participate in 15+ min of therex/ functional activity for improved selfcare status and overall quality of life.  Additional Goals?: Yes  Short Term Goal 6: Pt to demonstrate good understanding of AE/ Adaptive strategies for improved independence and safety for ADL completion within the home setting.    Plan  Occupational Therapy Plan  Times Per Week: 4-6  Current Treatment Recommendations: Strengthening, ROM, Balance training, Functional mobility training, Endurance training, Pain management, Safety education & training, Positioning, Self-Care / ADL, Home management training, Coordination training, Neuromuscular re-education      OT Individual Minutes  OT Individual Minutes  Time In: 1326  Time Out: 1414  Minutes: 48  Time Code Minutes   Timed Code  Treatment Minutes: 33 Minutes        Electronically signed by Armando Monae SMARSHA on 11/19/24 at 3:17 PM EST

## 2024-11-19 NOTE — CARE COORDINATION
Case Management Assessment  Initial Evaluation    Date/Time of Evaluation: 11/19/2024 3:41 PM  Assessment Completed by: Betsey Mercedes RN    If patient is discharged prior to next notation, then this note serves as note for discharge by case management.    Patient Name: Ger Mccray                   YOB: 1940  Diagnosis: Syncope, unspecified syncope type [R55]  Acute nonintractable headache, unspecified headache type [R51.9]                   Date / Time: 11/18/2024  3:03 PM    Patient Admission Status: Inpatient   Readmission Risk (Low < 19, Mod (19-27), High > 27): Readmission Risk Score: 14.2    Current PCP: Alin Hayes MD  PCP verified by CM? No    Chart Reviewed: Yes      History Provided by: Patient  Patient Orientation: Alert and Oriented    Patient Cognition: Alert    Hospitalization in the last 30 days (Readmission):  No    If yes, Readmission Assessment in CM Navigator will be completed.    Advance Directives:      Code Status: Full Code   Patient's Primary Decision Maker is: Legal Next of Kin    Primary Decision Maker: Wendie Mccray - Spouse - [phone]    Secondary Decision Maker: Jessie Quevedo - Child - [phone]    Discharge Planning:    Patient lives with: Spouse/Significant Other Type of Home: House  Primary Care Giver: Self  Patient Support Systems include: Spouse/Significant Other   Current Financial resources: Medicare  Current community resources: None  Current services prior to admission: Home Bipap            Current DME:              Type of Home Care services:  None    ADLS  Prior functional level: Independent in ADLs/IADLs  Current functional level: Independent in ADLs/IADLs    PT AM-PAC:   /24  OT AM-PAC: 18 /24    Family can provide assistance at DC: Yes  Would you like Case Management to discuss the discharge plan with any other family members/significant others, and if so, who? Yes  Plans to Return to Present Housing: Yes  Other Identified  Issues/Barriers to RETURNING to current housing: no  Potential Assistance needed at discharge: N/A            Potential DME:    Patient expects to discharge to: House  Plan for transportation at discharge: Family    Financial    Payor: MEDICARE / Plan: MEDICARE PART A AND B / Product Type: *No Product type* /     Does insurance require precert for SNF: No    Potential assistance Purchasing Medications: No  Meds-to-Beds request:        [facility] [id number] - [place], [place] - [address] - P [phone] - F [phone]  [address]  [place] [address]  Phone: [phone] Fax: [phone]    [facility] - [place], [place] - [address] -  [phone] - F [phone]  [address]  [address] [address]  Phone: [phone] Fax: [phone]      Notes:    Factors facilitating achievement of predicted outcomes: Family support, Motivated, Cooperative, and Pleasant    Barriers to discharge: Medical complications    Additional Case Management Notes: 11/19/24 Medicare Pt is from home in a one story home DME walker VNS is agreeable to Adrianna Palacio has had in the past. Will send a referral. Will continue to follow for needs. .//tv    The Plan for Transition of Care is related to the following treatment goals of Syncope, unspecified syncope type [R55]  Acute nonintractable headache, unspecified headache type [R51.9]    IF APPLICABLE: The Patient and/or patient representative Ger and his family were provided with a choice of provider and agrees with the discharge plan. Freedom of choice list with basic dialogue that supports the patient's individualized plan of care/goals and shares the quality data associated with the providers was provided to: Patient   Patient Representative Name:       The Patient and/or Patient Representative Agree with the Discharge Plan? Yes    Betsey Mercedes RN  Case Management Department  Ph:  Fax:

## 2024-11-19 NOTE — FLOWSHEET NOTE
11/19/24 0803   Treatment Team Notification   Reason for Communication Review case   Name of Team Member Notified Dr. Willis   Treatment Team Role Consulting Provider   Method of Communication Secure Message   Response Waiting for response   Notification Time 0803     Pt was a stroke alert in the ER. NIH is a 2. CT was negative, CTA showed severe stenosis in bilat carotid areteries. No MRI was ordered, would you like an MRI? Also, would you like to allow permissive HTN? If so what parameters?

## 2024-11-19 NOTE — PROGRESS NOTES
Pt arrived to floor via stretcher from ED and was transfered to bed. Vitals taken, telemetry applied. Admission and assessment complete. No distress noted. See doc flowsheet and admission navigator for details. POC and education initiated and reviewed with patient. Call light within reach, and pt educated on its use. Bed in lowest position, and locked. Side rails up x 2. Denied further questions or needs at this time.

## 2024-11-19 NOTE — PROGRESS NOTES
RN notifief Dr. Gomez of pt complaining of throbbing headache that is not relieved by tylenol and that the he is requesting something else for pain.     Verbal orders for a one time dose of Norco 5-325mg.

## 2024-11-19 NOTE — PROGRESS NOTES
Physical Therapy  Facility/Department: [facility] PROGRESSIVE CARE  Physical Therapy Initial Assessment    Name: Ger Mccray  : 1940  MRN: [id number]  Date of Service: 2024    Discharge Recommendations:  Patient would benefit from continued therapy after discharge   PT Equipment Recommendations  Equipment Needed: Yes (recommending RW; pt reports having one)      Patient Diagnosis(es): The primary encounter diagnosis was Acute nonintractable headache, unspecified headache type. A diagnosis of Syncope, unspecified syncope type was also pertinent to this visit.  Past Medical History:  has a past medical history of Anemia, Anesthesia, Arthritis, ASHD (arteriosclerotic heart disease), Asthma, CAD (coronary artery disease), Cerebral artery occlusion with cerebral infarction (HCC), Chronic kidney disease, Chronic obstructive pulmonary disease, unspecified (HCC), COPD (chronic obstructive pulmonary disease) ([facility]), COVID-19 vaccine series completed, Degenerative joint disease, Diabetes mellitus ([facility]), ED (erectile dysfunction), Full dentures, Gout, History of carpal tunnel syndrome, History of colon polyps, History of heart attack, History of hemorrhoids, History of TIA (transient ischemic attack), Hypercholesteremia, Hypertension, Hyperuricemia, Leukocytosis, Renal cyst, Sciatic nerve pain, Sleep apnea, and Wears glasses.  Past Surgical History:  has a past surgical history that includes Coronary angioplasty with stent (1994 x1 stent,1998 x2 stents,2009 x1,); Colonoscopy (2008); Shoulder arthroscopy (Right, 2002 repair); Carpal tunnel release (Right, 2002); shoulder surgery (Left, 2002); Cataract removal with implant (Left, 2012); Cataract removal with implant (Right, 2012); eye surgery; Blepharoplasty (Bilateral, 2012); Total knee arthroplasty (Left, 2017); pr arthrp acetblr/prox fem prostc agrft/algrft (Left, 2018); joint replacement (Right, 2011); joint  replacement (Right, 1995); joint replacement (Left, 01/23/2018); lumbar fusion (N/A, 09/20/2021); back surgery; Endoscopy, colon, diagnostic; and Cardiac surgery.    Assessment  Assessment: Pt presents with impaired balance, posture, body mechanics, and strength 2* acute CVA with complaints of Headache. Pt requires CGA of 1-2 to mobilize with use of RW. At this time pt will benefit from continued PT services to progress towards prior level of functional independence,  Treatment Diagnosis: impaired mobility 2* CVA  Therapy Prognosis: Good  Decision Making: Medium Complexity  Exam: ROM, MMT, Balance, and functional mobility assessments  Requires PT Follow-Up: Yes  Activity Tolerance  Activity Tolerance: Patient tolerated evaluation without incident    Plan  Physical Therapy Plan  General Plan: 5-7 times per week  Current Treatment Recommendations: Strengthening, Balance training, Functional mobility training, Transfer training, Cognitive/Perceptual training, Stair training, Gait training, Neuromuscular re-education, Safety education & training, Patient/Caregiver education & training, Therapeutic activities  Safety Devices  Type of Devices: Bed alarm in place, Call light within reach, Gait belt, Left in bed    Restrictions  Restrictions/Precautions  Restrictions/Precautions: Fall Risk, General Precautions  Required Braces or Orthoses?: No  Implants present? : Metal implants (Bilateral Hip, Bilater shoulder, L knee)     Subjective  Pain: 3/10 \"pulling\" pain in R hip  General  Chart Reviewed: Yes  Patient assessed for rehabilitation services?: Yes  Additional Pertinent Hx: 84-year-old male presents for complaints of headache, lightheadedness and reported syncopal episode.  Patient reports he has been having headaches and lightheadedness for about the last week.  States that he was feeling worse today states that when he went to get up from his chair earlier he passed out for short period of time.  He states he is still  entirely on the paretic leg.  Do not consider how the subject accomplishes the task.    0  Cannot stand of paretic leg     Maintaining Posture SUBTOTAL     8/15    Changing a Posture  6.    Supine to Paretic Side Lateral  Examiner:  Begin with the subject in supine on a treatment mat. Instruct the subject to roll to the paretic side (lateral movement).  Assist as necessary. Evaluated the subject's performance on the amount of help required.  Do not consider the quality of performance.   3  Can perform without help  7.  Supine to Non-paretic Side Lateral  Examiner:  Begin with the subject in supine on a treatment mat.  Instruct the subject to roll to the non-paretic side (lateral movement).  Assist as necessary.  Evaluate the subject's Performance on the amount of help required.  Do not consider the quality of performance.  3  Can perform without help  8.  Supine to Sitting up on the Edge of the Mat   Examiner:  Begin with the subject in supine on a treatment mat. Instruct the subject to come to sitting on the edge of the mat.  Assist as necessary. Evaluate the subject's performance on the amount of help required.  Do not considered the quality performance.   3  Can perform without help   9.  Sitting on the Edge of the Mat to Supine  Examiner: Begin with the subject sitting on the edge of a treatment mat.  Instruct the subject to return to supine.  Assist as necessary.  Evaluate the subjects performance on the amount of help required.  Co not consider the quality of performance.   3  Can perform without help  10.  Sitting to Standing Up  Examiner:  Begin with the subject sitting on the edge of a treatment mat.  Instruct the subject to stand up without support.  Assist if necessary.  Evaluated the subject's performance on the amount ot help required.  Do not consider the quality of the performance.    2  Can perform with little help  11.  Standing Up to Sitting Down  Examiner:  Begin with the subject standing by the

## 2024-11-19 NOTE — PROGRESS NOTES
RN sent MRI results to Dr. Gomez and Dr. Willis. Per Dr. Gomez place orders for plavix and aspirin daily and order fasting lipid labs for tomorrow morning

## 2024-11-19 NOTE — ED NOTES
Admission Dx: h/a, syncope    Pts Chief Complaints on Arrival: s/p fall    ADL's - Partial assistance    Pending Diagnostics:  n/a    Residence PTA: single story home    Special Considerations/Circumstances:  n/a    Vitals: Current vital signs:  BP (!) 171/86   Pulse 57   Temp 98.2 °F (36.8 °C) (Oral)   Resp 16   Ht 1.778 m (5' 10\")   Wt 80.3 kg (177 lb)   SpO2 97%   BMI 25.40 kg/m²                MEWS Score: 1

## 2024-11-19 NOTE — PROGRESS NOTES
11/19/24 1421   Encounter Summary   Encounter Overview/Reason Volunteer Encounter   Service Provided For Patient   Last Encounter  11/19/24   Assessment/Intervention/Outcome   Intervention Sustaining Presence/Ministry of presence

## 2024-11-19 NOTE — VIRTUAL HEALTH
[facility] Stroke and Telestroke Consult for  [facility] Stroke Alert through [employer] @ 18:56  11/18/2024 7:04 PM    Pt Name: Ger Mccary  MRN: [id number]  YOB: 1940  Date of evaluation: 11/18/2024  Primary Care Physician: Alin Hayes MD  Reason for Evaluation: Stroke Evaluation with Discussion with Ed or primary team with Telemedicine and stroke evaluation with Review of imaging and labs    Ger Mccray is a 84 y.o. male who presents with history of htn, hld, t2dm, copd, ckd creat 1.6, cad and yanelis with cpap with headache, syncope with loc.  No focal neuro deficits.  CT/cta no hemorrhage. Limited by motion artifact and poor contrast bolusing.  Possible bilateral calcified carotid and intracranial athero.       NIH:  0    Allergies  is allergic to pcn [penicillins], ceclor [cefaclor], and morphine.  Medications  Prior to Admission medications    Medication Sig Start Date End Date Taking? Authorizing Provider   Fluticasone-Salmeterol 232-14 MCG/ACT AEPB Inhale 1 puff into the lungs 2 times daily  6/25/20  Yes Provider, MD Kirstie   JANUVIA 50 MG tablet TAKE 1 TABLET BY MOUTH DAILY 8/21/24   Alin Hayes MD   rosuvastatin (CRESTOR) 10 MG tablet TAKE 1 TABLET BY MOUTH DAILY 8/21/24   Alin Hayes MD   allopurinol (ZYLOPRIM) 100 MG tablet TAKE 1 TABLET BY MOUTH DAILY  Patient not taking: Reported on 11/18/2024 8/13/24   Alin Hayes MD   lisinopril (PRINIVIL;ZESTRIL) 2.5 MG tablet TAKE 1 TABLET BY MOUTH DAILY 7/8/24   Aye Tapia APRN - NP   fluticasone (FLONASE) 50 MCG/ACT nasal spray 1 spray by Each Nostril route daily  Patient not taking: Reported on 11/18/2024 10/17/23   Socrates Costa APRN - NP   metoprolol succinate (TOPROL XL) 25 MG extended release tablet Take 1 tablet by mouth daily    Provider, MD Kirstie   nitroGLYCERIN (NITROSTAT) 0.4 MG SL tablet Place 1 tablet under the tongue every 5 minutes as needed for Chest pain up to max  3/30/21: NICOTINE LOZENGES - current use   Vaping Use    Vaping status: Never Used   Substance and Sexual Activity    Alcohol use: No     Alcohol/week: 0.0 standard drinks of alcohol     Comment: rarely    Drug use: No    Sexual activity: Defer   Other Topics Concern    Not on file   Social History Narrative    Not on file     Social Determinants of Health     Financial Resource Strain: Low Risk  (7/18/2024)    Overall Financial Resource Strain (CARDIA)     Difficulty of Paying Living Expenses: Not hard at all   Food Insecurity: No Food Insecurity (7/18/2024)    Hunger Vital Sign     Worried About Running Out of Food in the Last Year: Never true     Ran Out of Food in the Last Year: Never true   Transportation Needs: No Transportation Needs (7/18/2024)    PRAPARE - Transportation     Lack of Transportation (Medical): No     Lack of Transportation (Non-Medical): No   Physical Activity: Insufficiently Active (6/18/2024)    Exercise Vital Sign     Days of Exercise per Week: 1 day     Minutes of Exercise per Session: 20 min   Stress: Not on file   Social Connections: Not on file   Intimate Partner Violence: Not on file   Housing Stability: Low Risk  (7/18/2024)    Housing Stability Vital Sign     Unable to Pay for Housing in the Last Year: No     Number of Places Lived in the Last Year: 1     Unstable Housing in the Last Year: No     Family History      Problem Relation Age of Onset    Alzheimer's Disease Mother     Heart Disease Mother     Emphysema Father        OBJECTIVE  BP (!) 145/103   Pulse 55   Temp 97.7 °F (36.5 °C) (Oral)   Resp 16   Ht 1.778 m (5' 10\")   Wt 80.3 kg (177 lb)   SpO2 98%   BMI 25.40 kg/m²        Pre-Morbid mRS: 0    Imaging:  Images were personally reviewed with [email or web address] and PACS used to review images including:  CT brain without contrast: no hemorrhage  CTA imaging: significant breathing artifact, almost nondiagnostic.  Bilateral calcified carotid cervical origin and cavernous

## 2024-11-19 NOTE — CONSULTS
85 yo male with dizziness and episode of unresponsiveness . Patient for one week PTA has been having described dizziness with right side headache. This has been accompanied by left hand weakness with decreased finger dexterity with left arm numbness . Yesterday on attempting to get up from sitting position he passed out briefly brought to [facility] ER . Head CT with mild chronic periventricular small vessel ischemia . CTA hed and neck moderate to severe distal common carotid stenosis . Possible severe stenosis origin of vertebral arteries along with severe stenosis of bilateral cavernous carotids . He has history of cerebral infarction with no residual 10 years ago. There is history of CAD , asthma , CKD , COPD , HTN  sleep apnea . He is on aspirin 81 mg po qd, crestor 10 mg po qd . MRI of Head with acute small right occipital , parietal frontal and insular infarctions with mild chronic periventricular small vessel ischemia . Cardiac 2 D echo shauna LVF EF 60-65 % . Mild LVH . EKG NSR . He was seen by Dr Gtz in ER by telestroke and felt that CTA findings maybe from poor visualization from IV dye bolus timing. BUN/creatinine 20/1.4  Significant medications aspirin 81 mg po qd, crestor 10 mg po qd. He is on  lisinopril 2.5 mg po qd , toprol XL 25 mg po qd . /75 . Testing  Head CT with mild chronic periventricular small vessel ischemia . CTA hed and neck moderate to severe distal common carotid stenosis . Possible severe stenosis origin of vertebral arteries along with severe stenosis of bilateral cavernous carotids  . MRI of Head with acute small right occipital , parietal frontal and insular infarctions with mild chronic periventricular small vessel ischemia . Cardiac 2 D echo shauna LVF EF 60-65 % . Mild LVH .     Past Medical History:   Diagnosis Date    Anemia     Anesthesia     woke up eary during surgery x1 , heard saw & felt the pressure.    Arthritis 07/15/2013    ASHD (arteriosclerotic heart  tablet 2.5 mg  2.5 mg Oral Daily Virgil Gomez MD        metoprolol succinate (TOPROL XL) extended release tablet 25 mg  25 mg Oral Daily Virgil Gomez MD        rosuvastatin (CRESTOR) tablet 10 mg  10 mg Oral Nightly Virgil Gomez MD   10 mg at 11/18/24 2226    therapeutic multivitamin-minerals 1 tablet  1 tablet Oral Daily Virgil Gomez MD   1 tablet at 11/19/24 0917       Allergies   Allergen Reactions    Pcn [Penicillins] Itching    Ceclor [Cefaclor] Itching and Rash    Morphine Rash       ROS:   Constitutional                  Negative for fever and chills   HEENT                            Negative for ear discharge, ear pain, nosebleed  Eyes                                Negative for photophobia, pain and discharge  Respiratory                      Negative for hemoptysis and sputum  Cardiovascular                Negative for orthopnea, claudication and PND  Gastrointestinal               Negative for abdominal pain, diarrhea, blood in stool  Musculoskeletal               Negative for joint pain, negative for myalgia  Skin                                 Negative for rash or itching  Endo/heme/allergies       Negative for polydipsia, environmental allergy  Psychiatric                       Negative for suicidal ideation.  Patient is not anxious    Vitals:    11/19/24 1630   BP: 126/75   Pulse: 61   Resp: 18   Temp: 97.3 °F (36.3 °C)   SpO2: 95%     Admission weight: 80.3 kg (177 lb)    Neurological Examination  Constitutional .General exam well groomed   Head/ Ears /Nose/Throat/external ear .Normal exam  Neck and thyroid .Normal size. No bruits  Respiratory .Breathsounds clear bilaterally  Cardiovascular: Auscultation of heart with regular rate and rhythm   Musculoskeletal. Muscle bulk and tone normal                                                           Muscle strength decrease left fine motor with left upper extremity drift otherwise 5/5 strength throughout                                                                                 No dysmetria or dysdiadokinesis  No tremor   Decrease left fine motor  Orientation Alert and oriented x 3   Attention and concentration normal  Short term memory normal  Language process and speech normal . No aphasia   Cranial nerve 2 normal acuety and visual fields  Cranial nerve 3, 4 and 6 .Extraocular muscles are intact . Pupils are equal and reactive   Cranial nerve 5 . Intact corneal reflex. Normal facial sensation  Cranial nerve 7 normal exam   Cranial nerve 8. Grossly intact hearing   Cranial nerve 9 and 10. Symmetric palate elevation   Cranial nerve 11 , 5 out of 5 strength   Cranial Nerve 12 midline tongue . No atrophy  Sensation .Decrease pinprick and light touch left arm  Deep Tendon Reflexes brisker on left   Plantar response extensor on left     Assessment :    Right cerebral watershed infarctions . 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 Syncope    Plan:    Hold lisinopril and toprol . Labetolol 10 mg IVP q 4 hours PRN SBP < 180  . Plavix 75 mg po qd , aspirin 81 mg po qd , Crestor 40 mg po qd . Orthostatic blood pressures . PT/OT . Discussed with neurointervention and they would like patient transferred to [facility] for cerebral angiogram possible stenting . He is to be transferred to neurology service

## 2024-11-19 NOTE — PLAN OF CARE
Problem: Discharge Planning  Goal: Discharge to home or other facility with appropriate resources  11/19/2024 1623 by Whit Andrea, RN  Outcome: Progressing  D/C barriers: MRI positive for CVA. Pending vascular consult.      Problem: Safety - Adult  Goal: Free from fall injury  11/19/2024 1623 by Whit Andrea, RN  Outcome: Progressing  Pt assessed as a fall risk this shift. Remains free from falls and accidental injury at this time. Fall precautions in place. Floor free from obstacles, and bed is locked and in lowest position. Adequate lighting provided. Pt encouraged to call before getting OOB for any need. Bed alarm activated.      Problem: Neurosensory - Adult  Goal: Achieves stable or improved neurological status  Outcome: Progressing  Pt remains having decreased sensation on the left side with L arm ataxia

## 2024-11-20 ENCOUNTER — TELEPHONE (OUTPATIENT)
Dept: FAMILY MEDICINE CLINIC | Age: 84
End: 2024-11-20

## 2024-11-20 ENCOUNTER — HOSPITAL ENCOUNTER (INPATIENT)
Age: 84
LOS: 4 days | Discharge: HOME | DRG: 039 | End: 2024-11-24
Attending: STUDENT IN AN ORGANIZED HEALTH CARE EDUCATION/TRAINING PROGRAM | Admitting: STUDENT IN AN ORGANIZED HEALTH CARE EDUCATION/TRAINING PROGRAM
Payer: MEDICARE

## 2024-11-20 VITALS
WEIGHT: 177 LBS | SYSTOLIC BLOOD PRESSURE: 141 MMHG | RESPIRATION RATE: 18 BRPM | TEMPERATURE: 97.7 F | OXYGEN SATURATION: 92 % | BODY MASS INDEX: 25.34 KG/M2 | DIASTOLIC BLOOD PRESSURE: 88 MMHG | HEART RATE: 65 BPM | HEIGHT: 70 IN

## 2024-11-20 DIAGNOSIS — I67.9 CEREBROVASCULAR DISEASE: Primary | ICD-10-CM

## 2024-11-20 DIAGNOSIS — I65.21 CAROTID STENOSIS, RIGHT: ICD-10-CM

## 2024-11-20 PROBLEM — I63.9 CEREBRAL INFARCTION (HCC): Status: ACTIVE | Noted: 2024-11-20

## 2024-11-20 PROBLEM — I63.9 ISCHEMIC STROKE (HCC): Status: ACTIVE | Noted: 2024-11-20

## 2024-11-20 LAB
ANION GAP SERPL CALCULATED.3IONS-SCNC: 10 MMOL/L (ref 9–16)
BUN SERPL-MCNC: 17 MG/DL (ref 8–23)
CALCIUM SERPL-MCNC: 9.6 MG/DL (ref 8.6–10.4)
CHLORIDE SERPL-SCNC: 104 MMOL/L (ref 98–107)
CHOLEST SERPL-MCNC: 127 MG/DL (ref 0–199)
CHOLESTEROL/HDL RATIO: 3.5
CO2 SERPL-SCNC: 26 MMOL/L (ref 20–31)
CREAT SERPL-MCNC: 1.6 MG/DL (ref 0.7–1.2)
EST. AVERAGE GLUCOSE BLD GHB EST-MCNC: 123 MG/DL
GFR, ESTIMATED: 42 ML/MIN/1.73M2
GLUCOSE BLD-MCNC: 98 MG/DL (ref 75–110)
GLUCOSE SERPL-MCNC: 146 MG/DL (ref 74–99)
HBA1C MFR BLD: 5.9 % (ref 4–6)
HDLC SERPL-MCNC: 36 MG/DL
LDLC SERPL CALC-MCNC: 60 MG/DL (ref 0–100)
POTASSIUM SERPL-SCNC: 4.5 MMOL/L (ref 3.7–5.3)
SODIUM SERPL-SCNC: 140 MMOL/L (ref 136–145)
TRIGL SERPL-MCNC: 153 MG/DL (ref 0–149)

## 2024-11-20 PROCEDURE — 2580000003 HC RX 258

## 2024-11-20 PROCEDURE — 2060000000 HC ICU INTERMEDIATE R&B

## 2024-11-20 PROCEDURE — 94760 N-INVAS EAR/PLS OXIMETRY 1: CPT

## 2024-11-20 PROCEDURE — 82947 ASSAY GLUCOSE BLOOD QUANT: CPT

## 2024-11-20 PROCEDURE — 6370000000 HC RX 637 (ALT 250 FOR IP)

## 2024-11-20 PROCEDURE — 97530 THERAPEUTIC ACTIVITIES: CPT

## 2024-11-20 PROCEDURE — 2580000003 HC RX 258: Performed by: EMERGENCY MEDICINE

## 2024-11-20 PROCEDURE — 6360000002 HC RX W HCPCS: Performed by: FAMILY MEDICINE

## 2024-11-20 PROCEDURE — 94640 AIRWAY INHALATION TREATMENT: CPT

## 2024-11-20 PROCEDURE — 99221 1ST HOSP IP/OBS SF/LOW 40: CPT | Performed by: RADIOLOGY

## 2024-11-20 PROCEDURE — 6370000000 HC RX 637 (ALT 250 FOR IP): Performed by: PSYCHIATRY & NEUROLOGY

## 2024-11-20 PROCEDURE — 6370000000 HC RX 637 (ALT 250 FOR IP): Performed by: FAMILY MEDICINE

## 2024-11-20 PROCEDURE — 97110 THERAPEUTIC EXERCISES: CPT

## 2024-11-20 PROCEDURE — 97116 GAIT TRAINING THERAPY: CPT

## 2024-11-20 PROCEDURE — 80061 LIPID PANEL: CPT

## 2024-11-20 PROCEDURE — 99232 SBSQ HOSP IP/OBS MODERATE 35: CPT | Performed by: PSYCHIATRY & NEUROLOGY

## 2024-11-20 PROCEDURE — 36415 COLL VENOUS BLD VENIPUNCTURE: CPT

## 2024-11-20 PROCEDURE — 80048 BASIC METABOLIC PNL TOTAL CA: CPT

## 2024-11-20 PROCEDURE — 83036 HEMOGLOBIN GLYCOSYLATED A1C: CPT

## 2024-11-20 RX ORDER — ONDANSETRON 4 MG/1
4 TABLET, ORALLY DISINTEGRATING ORAL EVERY 8 HOURS PRN
Status: DISCONTINUED | OUTPATIENT
Start: 2024-11-20 | End: 2024-11-24 | Stop reason: HOSPADM

## 2024-11-20 RX ORDER — SODIUM CHLORIDE 9 MG/ML
INJECTION, SOLUTION INTRAVENOUS PRN
Status: DISCONTINUED | OUTPATIENT
Start: 2024-11-20 | End: 2024-11-24 | Stop reason: HOSPADM

## 2024-11-20 RX ORDER — POLYETHYLENE GLYCOL 3350 17 G/17G
17 POWDER, FOR SOLUTION ORAL DAILY PRN
Status: DISCONTINUED | OUTPATIENT
Start: 2024-11-20 | End: 2024-11-24 | Stop reason: HOSPADM

## 2024-11-20 RX ORDER — SODIUM CHLORIDE 0.9 % (FLUSH) 0.9 %
5-40 SYRINGE (ML) INJECTION EVERY 12 HOURS SCHEDULED
Status: DISCONTINUED | OUTPATIENT
Start: 2024-11-20 | End: 2024-11-24 | Stop reason: HOSPADM

## 2024-11-20 RX ORDER — CLOPIDOGREL BISULFATE 75 MG/1
75 TABLET ORAL DAILY
Status: DISCONTINUED | OUTPATIENT
Start: 2024-11-21 | End: 2024-11-23

## 2024-11-20 RX ORDER — ONDANSETRON 2 MG/ML
4 INJECTION INTRAMUSCULAR; INTRAVENOUS EVERY 6 HOURS PRN
Status: DISCONTINUED | OUTPATIENT
Start: 2024-11-20 | End: 2024-11-24 | Stop reason: HOSPADM

## 2024-11-20 RX ORDER — ALLOPURINOL 100 MG/1
100 TABLET ORAL DAILY
Status: DISCONTINUED | OUTPATIENT
Start: 2024-11-20 | End: 2024-11-20

## 2024-11-20 RX ORDER — BUDESONIDE AND FORMOTEROL FUMARATE DIHYDRATE 80; 4.5 UG/1; UG/1
2 AEROSOL RESPIRATORY (INHALATION)
Status: DISCONTINUED | OUTPATIENT
Start: 2024-11-20 | End: 2024-11-24 | Stop reason: HOSPADM

## 2024-11-20 RX ORDER — SODIUM CHLORIDE 0.9 % (FLUSH) 0.9 %
5-40 SYRINGE (ML) INJECTION PRN
Status: DISCONTINUED | OUTPATIENT
Start: 2024-11-20 | End: 2024-11-24 | Stop reason: HOSPADM

## 2024-11-20 RX ORDER — ASPIRIN 300 MG/1
300 SUPPOSITORY RECTAL DAILY
Status: DISCONTINUED | OUTPATIENT
Start: 2024-11-21 | End: 2024-11-24 | Stop reason: HOSPADM

## 2024-11-20 RX ORDER — LISINOPRIL 2.5 MG/1
2.5 TABLET ORAL DAILY
Status: DISCONTINUED | OUTPATIENT
Start: 2024-11-20 | End: 2024-11-24 | Stop reason: HOSPADM

## 2024-11-20 RX ORDER — ENOXAPARIN SODIUM 100 MG/ML
40 INJECTION SUBCUTANEOUS DAILY
Status: DISCONTINUED | OUTPATIENT
Start: 2024-11-21 | End: 2024-11-24 | Stop reason: HOSPADM

## 2024-11-20 RX ORDER — ASPIRIN 81 MG/1
81 TABLET, CHEWABLE ORAL DAILY
Status: DISCONTINUED | OUTPATIENT
Start: 2024-11-21 | End: 2024-11-24 | Stop reason: HOSPADM

## 2024-11-20 RX ORDER — ALOGLIPTIN 12.5 MG/1
12.5 TABLET, FILM COATED ORAL DAILY
Status: DISCONTINUED | OUTPATIENT
Start: 2024-11-20 | End: 2024-11-21

## 2024-11-20 RX ORDER — ALBUTEROL SULFATE 90 UG/1
2 INHALANT RESPIRATORY (INHALATION) EVERY 6 HOURS PRN
Status: DISCONTINUED | OUTPATIENT
Start: 2024-11-20 | End: 2024-11-24 | Stop reason: HOSPADM

## 2024-11-20 RX ORDER — METOPROLOL SUCCINATE 25 MG/1
25 TABLET, EXTENDED RELEASE ORAL DAILY
Status: DISCONTINUED | OUTPATIENT
Start: 2024-11-20 | End: 2024-11-22

## 2024-11-20 RX ORDER — LABETALOL HYDROCHLORIDE 5 MG/ML
10 INJECTION, SOLUTION INTRAVENOUS
Status: DISCONTINUED | OUTPATIENT
Start: 2024-11-20 | End: 2024-11-24 | Stop reason: HOSPADM

## 2024-11-20 RX ORDER — ROSUVASTATIN CALCIUM 10 MG/1
10 TABLET, COATED ORAL NIGHTLY
Status: DISCONTINUED | OUTPATIENT
Start: 2024-11-20 | End: 2024-11-24 | Stop reason: HOSPADM

## 2024-11-20 RX ADMIN — BUDESONIDE AND FORMOTEROL FUMARATE DIHYDRATE 2 PUFF: 160; 4.5 AEROSOL RESPIRATORY (INHALATION) at 07:41

## 2024-11-20 RX ADMIN — ASPIRIN 81 MG 81 MG: 81 TABLET ORAL at 11:31

## 2024-11-20 RX ADMIN — CLOPIDOGREL BISULFATE 75 MG: 75 TABLET ORAL at 08:22

## 2024-11-20 RX ADMIN — BUDESONIDE AND FORMOTEROL FUMARATE DIHYDRATE 2 PUFF: 80; 4.5 AEROSOL RESPIRATORY (INHALATION) at 20:57

## 2024-11-20 RX ADMIN — ALOGLIPTIN 12.5 MG: 12.5 TABLET, FILM COATED ORAL at 20:02

## 2024-11-20 RX ADMIN — Medication 3 MG: at 22:38

## 2024-11-20 RX ADMIN — Medication 1 TABLET: at 08:22

## 2024-11-20 RX ADMIN — SODIUM CHLORIDE, PRESERVATIVE FREE 10 ML: 5 INJECTION INTRAVENOUS at 20:02

## 2024-11-20 RX ADMIN — ROSUVASTATIN CALCIUM 10 MG: 20 TABLET, FILM COATED ORAL at 20:02

## 2024-11-20 RX ADMIN — ENOXAPARIN SODIUM 40 MG: 100 INJECTION SUBCUTANEOUS at 11:31

## 2024-11-20 RX ADMIN — SODIUM CHLORIDE, PRESERVATIVE FREE 10 ML: 5 INJECTION INTRAVENOUS at 08:22

## 2024-11-20 ASSESSMENT — PAIN DESCRIPTION - LOCATION: LOCATION: HEAD

## 2024-11-20 ASSESSMENT — PAIN SCALES - GENERAL: PAINLEVEL_OUTOF10: 2

## 2024-11-20 NOTE — FLOWSHEET NOTE
11/20/24 0812   Treatment Team Notification   Reason for Communication Evaluate   Name of Team Member Notified Dr. Rodriguez   Treatment Team Role Consulting Provider   Method of Communication Secure Message   Response Waiting for response   Notification Time 0812     ok to give aspirin 81mg and lovenox this morning before transfer to [facility]?

## 2024-11-20 NOTE — CONSULTS
Consult Note            Date:11/20/2024        Patient Name:Ger Mccray     YOB: 1940     Age:84 y.o.    Inpatient consult to Vascular Surgery  Consult performed by: Amauri Sy MD  Consult ordered by: Virgil Gomez MD  Reason for consult: Acute right hemispheric embolic stroke  Assessment/Recommendations: The patient has had left hand numbness and some slight weakness.  This has been going on for 1 week.  He had never had this previously.  He has never had stroke in the past either.  He had CTA of the neck and head.  The CTA of the neck was nondiagnostic for regions of motion artifact and calcification.  This prompted duplex examination of the carotid arteries revealing a significant stenosis on the right.  When I investigate the carotid duplex imaging I think the stenosis is much higher than the velocities would predict.  He has a very tardus parvus waveform in the distal internal carotid artery with significant turbulence.  I think this represents greater than 80% stenosis.    His examination currently is relatively normal.  He says that his left hand is still numb.  His motor function is normal.  He has no facial droop.  He has no deviation of the tongue.  His cognition is normal.  His speech is normal.  He is able to ambulate.  He has palpable dorsalis pedis pulses bilaterally.  He has palpable radial pulses bilaterally.  He has no neck incisions.    The CTA examination reveals calcified carotid disease bilaterally which is quite significant.  Degree of stenosis is not able to be determined due to motion artifact.    MRI of the brain shows several small infarcts in the right hemisphere.    In my opinion his best option is carotid endarterectomy on the right.  I do not think that carotid stenting would be a wise idea.  I have taken the liberty to schedule him for carotid endarterectomy on Friday morning at [facility].  He understands and agrees with these  doses. If no relief after 1 dose, call 911.  Patient not taking: Reported on 11/18/2024    ProviderKirstie MD   aspirin 81 MG chewable tablet Take 1 tablet by mouth daily    Kirstie Chu MD   albuterol sulfate HFA (PROVENTIL;VENTOLIN;PROAIR) 108 (90 Base) MCG/ACT inhaler Inhale 2 puffs into the lungs every 6 hours as needed for Wheezing    Kirstie Chu MD   therapeutic multivitamin-minerals (THERAGRAN-M) tablet Take 1 tablet by mouth daily OTC    Alin Hayes MD        clopidogrel (PLAVIX) tablet 75 mg, Daily  rosuvastatin (CRESTOR) tablet 40 mg, Nightly  labetalol (NORMODYNE;TRANDATE) injection 10 mg, Q4H PRN  sodium chloride flush 0.9 % injection 10 mL, PRN  acetaminophen (TYLENOL) tablet 650 mg, Q6H PRN  melatonin tablet 3 mg, Nightly PRN  enoxaparin (LOVENOX) injection 40 mg, Daily  insulin lispro (HUMALOG,ADMELOG) injection vial 0-4 Units, 4x Daily AC & HS  glucose chewable tablet 16 g, PRN  dextrose bolus 10% 125 mL, PRN   Or  dextrose bolus 10% 250 mL, PRN  glucagon injection 1 mg, PRN  dextrose 10 % infusion, Continuous PRN  albuterol sulfate HFA (PROVENTIL;VENTOLIN;PROAIR) 108 (90 Base) MCG/ACT inhaler 2 puff, Q6H PRN  aspirin chewable tablet 81 mg, Daily  budesonide-formoterol (SYMBICORT) 160-4.5 MCG/ACT inhaler 2 puff, BID RT  therapeutic multivitamin-minerals 1 tablet, Daily        Allergies   Pcn [penicillins], Ceclor [cefaclor], and Morphine    Social History     Social History       Tobacco History       Smoking Status  Former Smoking Start Date  1967 Quit Date  2002 Average Packs/Day  2.0 packs/day for 35.0 years (70.0 ttl pk-yrs) Smoking Tobacco Type  Cigarettes from 1967 to 2002   Pack Year History     Packs/Day From To Years    0 2002  22.9    2 1967 2002 35.0      Passive Exposure  Past      Smokeless Tobacco Use  Former Smokeless Tobacco Type  Chew      Tobacco Comments  3/30/21: NICOTINE LOZENGES - current use              Alcohol History       Alcohol Use  Status  No Comment  rarely              Drug Use       Drug Use Status  No              Sexual Activity       Sexually Active  Defer                    Family History     Family History   Problem Relation Age of Onset    Alzheimer's Disease Mother     Heart Disease Mother     Emphysema Father        Review of Systems   Review of Systems     Physical Exam   BP (!) 141/88   Pulse 65   Temp 97.7 °F (36.5 °C) (Axillary)   Resp 18   Ht 1.778 m (5' 10\")   Wt 80.3 kg (177 lb)   SpO2 92%   BMI 25.40 kg/m²      Physical Exam  As above  Labs    CBC:  Recent Labs     11/18/24  1625 11/19/24  0456   WBC 11.8* 11.0   RBC 4.49* 4.32*   HGB 14.1 13.4*   HCT 42.2 40.8*   MCV 94.0 94.5   RDW 14.3 13.8    172     CHEMISTRIES:  Recent Labs     11/18/24  1625 11/19/24  0456 11/20/24  1453    139 140   K 4.5 4.3 4.5    106 104   CO2 25 24 26   BUN 22 20 17   CREATININE 1.6* 1.4* 1.6*   GLUCOSE 92 127* 146*   MG 2.2  --   --      PT/INR:  Recent Labs     11/18/24  1625   PROTIME 14.9*   INR 1.1     APTT:No results for input(s): \"APTT\" in the last 72 hours.  LIVER PROFILE:  Recent Labs     11/18/24  1625   AST 28   ALT 21   BILITOT 1.0   ALKPHOS 63       Imaging/Diagnostics   MRI BRAIN WO CONTRAST    Result Date: 11/19/2024  Scattered small acute to subacute infarctions in the right frontal parietooccipital lobes and the right insula cortex. Mild parenchymal volume loss. Mild chronic microvascular disease.     CTA HEAD NECK W CONTRAST    Result Date: 11/18/2024  1. Motion artifact limits evaluation. 2. Moderate to severe stenosis of the distal common carotid arteries bilaterally. 3. Possible severe stenosis at the origin of the vertebral arteries bilaterally. 4. Severe stenosis in the cavernous segments of the internal carotid arteries bilaterally. 5. Aberrant origin of the right subclavian artery.     CT Head W/O Contrast    Result Date: 11/18/2024  No acute intracranial hemorrhage or acute cerebral infarction  identified.     XR CHEST PORTABLE    Result Date: 11/18/2024  Stable chronic changes with no acute abnormality seen       Assessment      Hospital Problems             Last Modified POA    * (Principal) Syncope, unspecified syncope type 11/18/2024 Yes    Bilateral carotid artery stenosis 11/19/2024 Yes    Acute cerebral infarction (HCC) 11/19/2024 Yes       Plan   1.  We will schedule for carotid endarterectomy accordingly.    Electronically signed by Amauri Sy MD on 11/20/24 at 4:23 PM EST

## 2024-11-20 NOTE — PROGRESS NOTES
11/20/24 1411   Encounter Summary   Encounter Overview/Reason  Encounter   Service Provided For Patient   Last Encounter  11/20/24   Assessment/Intervention/Outcome   Intervention Prayer (assurance of)/[place];Sustaining Presence/Ministry of presence

## 2024-11-20 NOTE — PROGRESS NOTES
Writer notified Dr. Gomez of planning pt transfer to [facility]. Stated that he is ok for transfer.

## 2024-11-20 NOTE — PLAN OF CARE
Problem: Discharge Planning  Goal: Discharge to home or other facility with appropriate resources  11/20/2024 0310 by Maranda Chu RN  Outcome: Progressing  DC barrier: transfer to another facility pending    Problem: Safety - Adult  Goal: Free from fall injury  11/20/2024 0310 by Maranda Chu RN  Outcome: Progressing  Pt assessed as a fall risk this shift. Remains free from falls and accidental injury at this time. Fall precautions in place, including falling star sign and fall risk band on pt. Floor free from obstacles, and bed is locked and in lowest position. Adequate lighting provided.  Pt encouraged to call before getting OOB for any need.  Bed alarm activated.     Problem: Skin/Tissue Integrity  Goal: Absence of new skin breakdown  Description: 1.  Monitor for areas of redness and/or skin breakdown  2.  Assess vascular access sites hourly  3.  Every 4-6 hours minimum:  Change oxygen saturation probe site  4.  Every 4-6 hours:  If on nasal continuous positive airway pressure, respiratory therapy assess nares and determine need for appliance change or resting period.  Outcome: Progressing   Skin assessment complete. Waffle mattress in place. Sensicare applied PRN. Turned and repositioned every two hours.  Area kept free from moisture. Proper nourishment and fluids encouraged, as appropriate.     Problem: Pain  Goal: Verbalizes/displays adequate comfort level or baseline comfort level  Outcome: Progressing   Pt medicated with pain medication prn.  Assessed all pain characteristics including level, type, location, frequency, and onset.  Non-pharmacologic interventions offered to pt as well.  Pt states pain is tolerable at this time.    Care Management Follow Up     Length of Stay (days): 10     Expected Discharge Date: 11/29/2023     Concerns to be Addressed: Discharge planning - tcu follow up  Patient plan of care discussed at interdisciplinary rounds: Yes     Anticipated Discharge Disposition: Transitional care     Anticipated Discharge Services: None  Anticipated Discharge DME:  (wound care)     Patient/family educated on Medicare website which has current facility and service quality ratings:  yes  Education Provided on the Discharge Plan:  n/a  Patient/Family in Agreement with the Plan: n/a     Referrals Placed by CM/SW:       [facility]  [address] [facility] [address]  P: [phone]  11/27: Left a voicemail asking for an update on pt referral. Left a callback number. Called back twice and still no answer. CHW to continue to follow up.  11/28: Still no answer today from admissions. CHW left a voicemail asking for a callback, will continue to follow up. CHW called the main line and spoke with the  who shared that there is a COVID outbreak that started in the LTC then moved into the TCU and how another employee tested positive for COVID, so they don't know when they would be able to take in new patients for admission.     [facility]  [address] [place] [address]  P: [phone]  11/27: Left a voicemail asking for an update on pt referral. Left a callback number. Called back twice and still no answer. CHW to continue to follow up.  11/28: Still no answer today from admissions. CHW left a voicemail asking for a callback, will continue to follow up.     [facility]   [address] , [place] [address]  P: [phone]  11/27: Talked to Kika the liaison, who said that she will look for facilities that can take pt's with NG tubes and will call back when she finds an availability.  11/28: Talked with Kika who asked if the pt was  still in need of the hyperbaric chamber therapy, which the CHW helped clarify that he does not. Kika said that there might be an accepting place and will call back after talking with that facility.     [facility]   [address] [address] [place] [address]  P: [phone]  11/27: No beds available for the rest of the week and asked CHW to check in again on Monday, 12/4 and have the referral resent, just in case the pt still needs a bed.    Declined:  [facility] in [place]- no beds available        Private pay costs discussed: Not applicable     Additional Information:     CHW provides updates on referral follow-ups above.     CHW will continue to follow.     [email or web address]  [phone]

## 2024-11-20 NOTE — PROGRESS NOTES
House Supervisor Reta and Dago Houser notified of transfer to other facility.Working with access team to facilitate transfer.

## 2024-11-20 NOTE — TELEPHONE ENCOUNTER
Candy from [facility] called asking if the patient's PCP would   follow [facility] upon discharged from [facility].   Approval was given.

## 2024-11-20 NOTE — PROGRESS NOTES
[facility]   INPATIENT OCCUPATIONAL THERAPY  PROGRESS NOTE  Date: 2024  Patient Name: Ger Mccray       Room: -  MRN: [id number]    : 1940  (84 y.o.)  Gender: male   Referring Practitioner: Virgil Gomez MD  Diagnosis: Syncope      Discharge Recommendations:  Further Occupational Therapy is recommended upon facility discharge.    OT Equipment Recommendations  Other: TBD    Restrictions/Precautions  Restrictions/Precautions  Restrictions/Precautions: Fall Risk;General Precautions  Required Braces or Orthoses?: No  Implants present? : Metal implants (Bilateral Hip, Bilater shoulder, L knee)    O2 Device: None (Room air)  Orthostatic B/P and Pulse?: Yes  Blood Pressure Lyin/77  Blood Pressure Sittin/97 (Second attempt after standing 136/83)  Blood Pressure Standin/77  Pulse Lyin PER MINUTE  Pulse Sittin PER MINUTE (second attempty after sitting 98BPM)  Pulse Standin PER MINUTE  Comment: RN Guy notified    Subjective  Subjective  Subjective: \"Had a bit of a headache earlier, but its gone now.\" Pt agreeable to OT/PT treatment.  Subjective  Pain: Pt denies  Comments: OK per RN Guy for OT/PT treatment    Objective  Orientation  Overall Orientation Status: Within Functional Limits  Cognition  Overall Cognitive Status: WFL    Activities of Daily Living  Additional Comments: Pt ADLs limited by balance, fatigue, endurance, strength, ROM.    Balance  Balance  Sitting Balance: Stand by assistance  Standing Balance: Contact guard assistance (Pt w/ L side lean)    Transfers/Mobility  Bed mobility  Rolling to Right: Independent  Supine to Sit: Stand by assistance  Scooting: Stand by assistance  Bed Mobility Comments: HOB flat without rails  Transfers  Sit to stand: Contact guard assistance (CGA - SBA)  Stand to sit: Contact guard assistance (CGA - SBA)  Transfer Comments: Pt demonstrates good carry over of T/F techniques froom previous  session w/ minimal VC for hand placement/ safety.    Functional Mobility  Functional - Mobility Device: Rolling Walker  Activity: Other (Hallway)  Assist Level: Contact guard assistance  Functional Mobility Comments: Pt presents w/ L side lean during ambulation. VC for hand placement and safety. Orthostatic BP taken as follows: Sup = 130/77 - 95BPM, Sit = 147/97 - 114BPM, Stand = 114/77 - 88BPM, Sit x2 = 136/83 - 98BPM      OT Exercises  Dynamic Standing Balance Exercises: S/OT facilitates pt participation in dynamic standing exercise. Standing w/ RW at CGA, Pt reaching for S/OT and PTA hand ~10x w/ each hand. Activity completed to improve Pt ROM, balance, standing tolerance and ability to complete ADLs within the home environment. Pt shows good tolerance w/ activity.    Patient Education  Patient Education  Education Given To: Patient  Education Provided: Role of Therapy, Plan of Care, Transfer Training  Education Method: Demonstration, Verbal  Barriers to Learning: None  Education Outcome: Verbalized understanding, Demonstrated understanding, Continued education needed    Goals  Short Term Goals  Time Frame for Short Term Goals: By D/C  Short Term Goal 1: Pt to complete UE dressing/ bathing techniques w/ Jessica for improved dressing/ bathign tasks.  Short Term Goal 2: Pt to complete LE dressing/ bathing techniques w/ Jessica for improved dressing/ bathign tasks.  Short Term Goal 3: Pt to complete functionalmobility/ transfer using least restrictive device w/ Jessica for improved independence and safety w/ ADL comletion.  Short Term Goal 4: Pt to stand for 7+ min using least restrictive device for improved standing balance and activity tolerance w/ ADL completion.  Short Term Goal 5: Pt to participate in 15+ min of therex/ functional activity for improved selfcare status and overall quality of life.  Additional Goals?: Yes  Short Term Goal 6: Pt to demonstrate good understanding of AE/ Adaptive strategies for improved  independence and safety for ADL completion within the home setting.  Occupational Therapy Plan  Times Per Week: 4-6  Current Treatment Recommendations: Strengthening, ROM, Balance training, Functional mobility training, Endurance training, Pain management, Safety education & training, Positioning, Self-Care / ADL, Home management training, Coordination training, Neuromuscular re-education    Assessment  Activity Tolerance  Activity Tolerance: Patient Tolerated treatment well, Patient limited by fatigue  Assessment  Performance deficits / Impairments: Decreased functional mobility , Decreased ADL status, Decreased ROM, Decreased strength, Decreased endurance, Decreased sensation, Decreased balance, Decreased high-level IADLs, Decreased fine motor control, Decreased coordination, Decreased posture  Treatment Diagnosis: Impaired self care status  Prognosis: Good  Decision Making: Medium Complexity  Discharge Recommendations: Patient would benefit from continued therapy after discharge  OT Equipment Recommendations  Other: TBD  Safety Devices  Type of Devices: Bed alarm in place, Call light within reach, Gait belt, Left in bed    AM-PAC Daily Activities Inpatient  AM-PAC Daily Activity - Inpatient   How much help is needed for putting on and taking off regular lower body clothing?: A Little  How much help is needed for bathing (which includes washing, rinsing, drying)?: A Little  How much help is needed for toileting (which includes using toilet, bedpan, or urinal)?: A Little  How much help is needed for putting on and taking off regular upper body clothing?: A Little  How much help is needed for taking care of personal grooming?: A Little  How much help for eating meals?: A Little  AM-[facility] Inpatient Daily Activity Raw Score: 18  AM-PAC Inpatient ADL T-Scale Score : 38.66  ADL Inpatient CMS 0-100% Score: 46.65  ADL Inpatient CMS G-Code Modifier : CK    OT Minutes  OT Individual Minutes  Time In: 0915  Time Out:  0940  Minutes: 25  Time Code Minutes   Timed Code Treatment Minutes: 25 Minutes      Electronically signed by Armando Monae S/RILEY on 11/20/24 at 2:13 PM EST

## 2024-11-20 NOTE — H&P
[facility] Neurology   IN-PATIENT SERVICE   [facility]    HISTORY AND PHYSICAL EXAMINATION            Date:   11/20/2024  Patient name:  Ger Mccray  Date of admission:  11/20/2024  4:42 PM  MRN:   [id number]  Account:  [id number]  YOB: 1940  PCP:    Alin Hayes MD  Room:   [address] [place] [address]  Code Status:    Full Code    Chief Complaint:     No chief complaint on file.      History Obtained From:     EMR    History of Present Illness:     The patient is a 84 y.o.  Non- / non  male who was transferred from [facility] after MRI showed acute to subacute right frontal parietal occipital infarcts.  Started on aspirin, Plavix for 21 days. Seen by telestroke 11/18 Dr Gtz for headache, syncope with LOC.  No focal neurodeficits, NIH of 0.  CT head unremarkable, CTA limited by motion artifact and poor contrast bolusing.  Was continued on aspirin and started on Plavix for 21 days.  MRI 1119 showed scattered small acute to subacute infarctions in the right frontal parietooccipital lobes and the right insula cortex, mild chronic periventricular small vessel disease.  11/18 carotid Doppler shows 70% stenosis of right ICA, mild less than 50% stenosis in the left ICA.  Vascular surgery was consulted at [facility] and plan to schedule patient for carotid endarterectomy.  Patient states his left upper extremity went numb for three days is now resoloved as of last night. Symptoms associated with decreased left hand dexterity, reports the numbness up to the level of the elbow. He stated he felt \"funny\" this past week and [facility] notes document dizziness  however upon further clarification, patient states he had multiple episodes of intense fear/impending doom over the past week. Denies LOC, headache, transient weakness of any facial droop to his knowledge.  Denies history of migraines, seizures. Reports history of TIA over 15 years ago, was driving to work and felt  11/19/24  1640 11/19/24  1940/24  0632   POCGLU 142* 148* 176* 98     No intake or output data in the 24 hours ending 11/20/24 1726      Neurologic Exam    GENERAL Not in acute distress    HEENT  NC/ AT   HEART  S1 and S2 heard   NECK  Supple and no bruits heard   MENTAL STATUS:  Alert, oriented, no dysarthria or aphasia   CRANIAL NERVES: II     -      Visual fields intact to confrontation  III,IV,VI -  PERR, EOMs full  V     -     Normal facial sensation   VII    -     Normal facial symmetry   MOTOR FUNCTION: Left extremity numbness has resolved.  Right lower extremity weakness 3/5, states it is chronic 2/2 hip replacement and pain.  Left lower extremity 5/5.  Right and left upper extremity 4+/5 bilaterally.    SENSORY FUNCTION: Denies diminished sensation   CEREBELLAR FUNCTION:  Intact fine motor control over upper limbs and lower limbs   REFLEX FUNCTION: Unable to elicit left patellar reflex 2/2 knee replacement   STATION and GAIT Deferred to PT         Investigations:      Laboratory Testing:  Recent Results (from the past 24 hour(s))   POC Glucose Fingerstick    Collection Time: 11/19/24  7:41 PM   Result Value Ref Range    POC Glucose 176 (H) 75 - 110 mg/dL   Lipid, Fasting    Collection Time: 11/20/24  5:26 AM   Result Value Ref Range    Cholesterol, Fasting 127 0 - 199 mg/dL    HDL 36 (L) >40 mg/dL    LDL Cholesterol 60 0 - 100 mg/dL    Chol/HDL Ratio 3.5     Triglyceride, Fasting 153 (H) 0 - 149 mg/dL   POC Glucose Fingerstick    Collection Time: 11/20/24  6:32 AM   Result Value Ref Range    POC Glucose 98 75 - 110 mg/dL   Basic Metabolic Panel    Collection Time: 11/20/24  2:53 PM   Result Value Ref Range    Sodium 140 136 - 145 mmol/L    Potassium 4.5 3.7 - 5.3 mmol/L    Chloride 104 98 - 107 mmol/L    CO2 26 20 - 31 mmol/L    Anion Gap 10 9 - 16 mmol/L    Glucose 146 (H) 74 - 99 mg/dL    BUN 17 8 - 23 mg/dL    Creatinine 1.6 (H) 0.7 - 1.2 mg/dL    Est, Glom Filt Rate 42 (L) >60 mL/min/1.73m2

## 2024-11-20 NOTE — PROGRESS NOTES
11/20/24 1400   Encounter Summary   Encounter Overview/Reason Volunteer Encounter   Service Provided For Patient   Last Encounter  11/20/24   Assessment/Intervention/Outcome   Intervention Prayer (assurance of)/[place];Sustaining Presence/Ministry of presence

## 2024-11-20 NOTE — H&P
Hospital Medicine  History and Physical                                                                                                                                                 Full Code    Patient:  Ger Mccray  MRN: [id number]                                                                                                                                                                     CHIEF COMPLAINT:  weakness    History Obtained From:  patient, spouse  PCP: Alin Hayes MD    HISTORY OF PRESENT ILLNESS:   The patient is a 84 y.o. male who presents with h/o of . Dizziness and episode of unresponsiveness, pt was seen in [facility] and admitted for possible stroke  Pt has h/o of tobacco dep and quit smoking few yrs back. Pt had cta showed blockage of carotid wesley     Past Medical History:        Diagnosis Date    Anemia     Anesthesia     woke up eary during surgery x1 , heard saw & felt the pressure.    Arthritis 07/15/2013    ASHD (arteriosclerotic heart disease) 07/15/2013    Asthma     CAD (coronary artery disease)     has cardiac stent x 4.    Cerebral artery occlusion with cerebral infarction (HCC)     Chronic kidney disease     stage 3    Chronic obstructive pulmonary disease, unspecified (HCC) 09/27/2021    COPD (chronic obstructive pulmonary disease) (HCC)     COVID-19 vaccine series completed     Moderna 1/26/2021, 2/23/2021    Degenerative joint disease 07/15/2013    Diabetes mellitus (HCC)     ED (erectile dysfunction) 07/15/2013    Full dentures     Gout     History of carpal tunnel syndrome 07/15/2013    had surgery    History of colon polyps 07/15/2013    History of heart attack     silent one, patient was unaware    History of hemorrhoids 07/15/2013    History of TIA (transient ischemic attack) 07/15/2013    Hypercholesteremia 07/15/2013    Hypertension     Hyperuricemia 07/15/2013    Leukocytosis     Renal cyst     Sciatic nerve pain     4th and 5th lumbar vertebrae causing pinched nerve  chest pain, dyspnea, palpitations, orthopnea, PND, edema  Respiratory:  negative for  dry cough, cough with sputum, dyspnea, wheezing and hemoptysis  Gastrointestinal:  negative for nausea, vomiting, change in bowel habits, diarrhea, constipation, abdominal pain and hemtochezia  :  negative for frequency, dysuria, urinary incontinence, hesitancy, decreased stream and hematuria  Musculoskeletal:  negative for  myalgias, arthralgias, joint swelling and stiff joints  Neuro:  negative for headaches, dizziness, seizures, memory problems, visual disturbance, weakness and syncope      Physical Exam:    Vitals: BP (!) 141/88   Pulse 65   Temp 97.7 °F (36.5 °C) (Axillary)   Resp 18   Ht 1.778 m (5' 10\")   Wt 80.3 kg (177 lb)   SpO2 92%   BMI 25.40 kg/m²   General appearance: alert, appears stated age and cooperative  Skin: Skin color, texture, turgor normal. No rashes or lesions  HEENT: Head: Normocephalic, no lesions, without obvious abnormality.  Eye: Normal external eye, conjunctiva, lids cornea, VICTORIA  Neck: no adenopathy, no carotid bruit, no JVD, supple, symmetrical, trachea midline and thyroid not enlarged, symmetric, no tenderness/mass/nodules  Lungs: clear to auscultation bilaterally  Heart: regular rate and rhythm, S1, S2 normal, no murmur, click, rub or gallop  Abdomen: soft, non-tender; bowel sounds normal; no masses,  no organomegaly  Extremities: extremities normal, atraumatic, no cyanosis or edema  Neurologic: Mental status: Alert, oriented, thought content appropriate    CBC:   Recent Labs     11/18/24  1625 11/19/24  0456   WBC 11.8* 11.0   HGB 14.1 13.4*    172     BMP:    Recent Labs     11/18/24  1625 11/19/24  0456 11/20/24  1453    139 140   K 4.5 4.3 4.5    106 104   CO2 25 24 26   BUN 22 20 17   CREATININE 1.6* 1.4* 1.6*   GLUCOSE 92 127* 146*     Hepatic:   Recent Labs     11/18/24  1625   AST 28   ALT 21   BILITOT 1.0   ALKPHOS 63     Troponin: No results for input(s):  \"TROPONINI\" in the last 72 hours.  BNP: No results for input(s): \"BNP\" in the last 72 hours.  Lipids:   Recent Labs     11/20/24  0526   HDL 36*     ABGs: No results found for: \"PHART\", \"PO2ART\", \"NQL1DLR\"  INR:   Recent Labs     11/18/24  1625   INR 1.1     -----------------------------------------------------------------  PA/lat CXR: No results found.    EKG:  Normal sinus rhythm  Septal infarct (cited on or before 18-NOV-2024)  Abnormal ECG  When compared with ECG of 18-NOV-2024 16:47, (unconfirmed)  Premature ventricular complexes are no longer Present  OR interval has decreased    Prophylaxis:   DVT with  [] lovenox        [] heparin        [] Scd        [] none:     Assessment and Plan   Tia vs stroke/wesley carotid artery disease/transfer to [facility]    Patient Active Problem List   Diagnosis Code    Chronic obstructive pulmonary disease, unspecified ([facility]) J44.9    Elevated white blood cell count, unspecified D72.829    Anemia in chronic kidney disease N18.9, D63.1    Atherosclerotic heart disease of native coronary artery without angina pectoris I25.10    History of TIA (transient ischemic attack) Z86.73    History of hemorrhoids Z87.19    Polyosteoarthritis, unspecified M15.9    History of carpal tunnel syndrome Z86.69    Obstructive sleep apnea (adult) (pediatric) G47.33    Male erectile dysfunction, unspecified N52.9    History of colon polyps Z86.0100    Hypertensive chronic kidney disease with stage 1 through stage 4 chronic kidney disease, or unspecified chronic kidney disease I12.9    Renal cyst N28.1    Essential hypertension I10    Allergic rhinitis J30.9    Idiopathic chronic gout, unspecified site, without tophus (tophi) M1A.00X0    Pure hypercholesterolemia, unspecified E78.00    Unilateral primary osteoarthritis, left hip M16.12    Dependence on other enabling machines and devices Z99.89    Hypertensive retinopathy, unspecified eye H35.039    Type 2 diabetes mellitus with chronic kidney disease  ([facility]) E11.22    Spinal stenosis, lumbar region with neurogenic claudication M48.062    Lumbar radicular pain M54.16    Back pain M54.9    Drug induced constipation K59.03    Difficulty in walking, not elsewhere classified R26.2    Acute kidney failure, unspecified ([facility]) N17.9    Lumbago with sciatica, unspecified side M54.40    Old myocardial infarction I25.2    Polyp of colon K63.5    Presence of coronary angioplasty implant and graft Z95.5    Presence of intraocular lens Z96.1    Presence of left artificial knee joint Z96.652    Presence of right artificial shoulder joint Z96.611    Weakness R53.1    Stage 3b chronic kidney disease ([facility]) N18.32    Syncope and collapse R55    Hypertriglyceridemia E78.1    Syncope, unspecified syncope type R55    Bilateral carotid artery stenosis I65.23    Acute cerebral infarction ([facility]) I63.9       Anticipated Disposition upon discharge: [] Home                                                                         [] Home with Home Health                                                                         [] Skilled Nursing Facility                                                                         [] Long-Term Acute [facility] Hospital          Patient is admitted as inpatient status because of co-morbidities listed above, severity of signs and symptoms as outlined, requirement for current medical therapies and most importantly because of direct risk to patient if care not provided in a hospital setting.    Virgil Gomez MD, MD  Admitting Hospitalist

## 2024-11-20 NOTE — PROGRESS NOTES
Active problem Right cerebral watershed infarctions . 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 Syncope. The condition is lisinopril and toprol are on hold on labetolol 10 mg IVP q 4 hours PRN SBP < 180 with blood pressure running 141/88 . He is on plavix 75 mg po qd , aspirin 81 mg po qd and crestor 40 mg po qd . He has some orthostasis with blood pressure supine 130/77 , sitting 147/97 , standing 114/77 . He walked 50 feet with rolling walker in PT. 85 yo male with dizziness and episode of unresponsiveness . Patient for one week PTA has been having described dizziness with right side headache. This has been accompanied by left hand weakness with decreased finger dexterity with left arm numbness . Yesterday on attempting to get up from sitting position he passed out briefly brought to [facility] ER . Head CT with mild chronic periventricular small vessel ischemia . CTA head and neck moderate to severe distal common carotid stenosis . Possible severe stenosis origin of vertebral arteries along with severe stenosis of bilateral cavernous carotids . He has history of cerebral infarction with no residual 10 years ago. There is history of CAD , asthma , CKD , COPD , HTN  sleep apnea . He is on aspirin 81 mg po qd, crestor 10 mg po qd . MRI of Head with acute small right occipital , parietal , frontal and insular infarctions with mild chronic periventricular small vessel ischemia . Cardiac 2 D echo shauna LVF EF 60-65 % . Mild LVH . EKG NSR . He was seen by Dr Gtz in ER by telestroke and felt that CTA findings maybe from poor visualization from IV dye bolus timing. BUN/creatinine 20/1.4  Significant medications aspirin 81 mg po qd, crestor 10 mg po qd. He is on  lisinopril 2.5 mg po qd , toprol XL 25 mg po qd . /75 . Testing  Head CT with mild chronic periventricular small vessel ischemia . CTA hed and neck moderate to severe distal common carotid stenosis . Possible severe stenosis  stents,2009 x1,    ENDOSCOPY, COLON, DIAGNOSTIC      EYE SURGERY      cataracts, eyelids.    JOINT REPLACEMENT Right 2011    RT TOTAL SHOULDER REPLACEMENT    JOINT REPLACEMENT Right     rt. hip replacement    JOINT REPLACEMENT Left 2018    ELLE    LUMBAR FUSION N/A 2021    L3-L5 POSTERIOR LUMBAR DECOMPRESSSION & INSTRUMENTED FUSION performed by Amauri Rangel MD at [facility] OR    VT ARTHRP ACETBLR/PROX FEM PROSTC AGRFT/ALGRFT Left 2018    LEFT TOTAL HIP ARTHROPLASTY MINIMALLY INVASIVE ASI WITH BIOMET SYSTEM & GPS SPRAY APPLICATION performed by Warner Duke MD at [facility] OR    SHOULDER ARTHROSCOPY Right 2002 repair    11 right shoulder reverse replacement.    SHOULDER SURGERY Left 2002    shoulder resurfacing    TOTAL KNEE ARTHROPLASTY Left 2017    KNEE TOTAL ARTHROPLASTY performed by Warner Duke MD at [facility] OR       Family History   Problem Relation Age of Onset    Alzheimer's Disease Mother     Heart Disease Mother     Emphysema Father        Social History     Socioeconomic History    Marital status:      Spouse name: None    Number of children: None    Years of education: None    Highest education level: None   Tobacco Use    Smoking status: Former     Current packs/day: 0.00     Average packs/day: 2.0 packs/day for 35.0 years (70.0 ttl pk-yrs)     Types: Cigarettes     Start date:      Quit date:      Years since quittin.9     Passive exposure: Past    Smokeless tobacco: Former     Types: Chew    Tobacco comments:     3/30/21: NICOTINE LOZENGES - current use   Vaping Use    Vaping status: Never Used   Substance and Sexual Activity    Alcohol use: No     Alcohol/week: 0.0 standard drinks of alcohol     Comment: rarely    Drug use: No    Sexual activity: Defer     Social Determinants of Health     Financial Resource Strain: Low Risk  (2024)    Overall Financial Resource Strain (CARDIA)     Difficulty of Paying Living Expenses: Not hard at all

## 2024-11-20 NOTE — PROGRESS NOTES
Physical Therapy  [facility]   Physical Therapy Treatment  Date: 24  Patient Name: Ger Mccray       Room: 4/2124-01  MRN: [id number]  Account: [id number]   : 1940  (84 y.o.) Gender: male     Discharge Recommendations:  Further Physical Therapy is recommended upon facility discharge    PT Equipment Recommendations  Equipment Needed: Yes (recommending RW; pt reports having one)    General  Chart Reviewed: Yes  Patient assessed for rehabilitation services?: Yes  Additional Pertinent Hx: 84-year-old male presents for complaints of headache, lightheadedness and reported syncopal episode.  Patient reports he has been having headaches and lightheadedness for about the last week.  States that he was feeling worse today states that when he went to get up from his chair earlier he passed out for short period of time.  He states he is still feeling lightheaded, still having some right-sided headache, he states he is just feeling generally overall fatigued and weak.  He denies chest pain or shortness of breath denies abdominal pain nausea or vomiting.  He denies any significant past headache history. MRI positive for Scattered small acute to subacute infarctions in the right frontal  parietooccipital lobes and the right insula cortex.  Response To Previous Treatment: Patient with no complaints from previous session.  Family / Caregiver Present: No  Referring Practitioner: Virgil Gomez MD  Referral Date : 24  Diagnosis: Syncope  Follows Commands: Within Functional Limits      Restrictions  Restrictions/Precautions  Restrictions/Precautions: Fall Risk, General Precautions  Required Braces or Orthoses?: No  Implants present? : Metal implants (Bilateral Hip, Bilater shoulder, L knee)       Subjective  Subjective  Subjective: Pt sitting EOB upon arrival with OT. Pt agreeable to therapy   General Comment  Comments: VIRI Tovar approved therapy     Subjective  Pain: Pt denies  50.57  Mobility Inpatient CMS G-Code Modifier : CK     Goals     Short Term Goals  Time Frame for Short Term Goals: 6 visits  Short Term Goal 1: Pt to demo all bed mobility independently  Short Term Goal 2: Pt to perform safe transfers with RW from various surface heights Mod I  Short Term Goal 3: Pt to ambulate at least 50' with RW and SBA  Short Term Goal 4: Pt to navigate 4-5 stairs with bilat rails and SBA  Short Term Goal 5: Pt to improve standing balance to at least FAIR to dec fall risk  Additional Goals?: Yes  Short Term Goal 6: Pt to improve on PASS from 26/36 to at least 28/36 to demo improving balance and safety      Plan  Physical Therapy Plan  General Plan: 5-7 times per week  Current Treatment Recommendations: Strengthening, Balance training, Functional mobility training, Transfer training, Cognitive/Perceptual training, Stair training, Gait training, Neuromuscular re-education, Safety education & training, Patient/Caregiver education & training, Therapeutic activities   Safety Devices  Type of Devices: Bed alarm in place, Call light within reach, Gait belt, Left in bed       PT Individual Minutes    PT Individual Minutes  Time In: 0927  Time Out: 0950  Minutes: 23           Electronically signed by Carolyn Scott PTA on 11/20/24 at 10:53 AM EST

## 2024-11-20 NOTE — CARE COORDINATION
ONGOING DISCHARGE PLAN:    Chart Review    Patient to transfer to [facility] for cerebral angiogram possible stenting. Waiting for available bed.    Will continue to follow for additional discharge needs.    If patient is discharged prior to next notation, then this note serves as note for discharge by case management.    Electronically signed by Yessenia Moulton RN on 11/20/2024 at 2:09 PM

## 2024-11-20 NOTE — CONSULTS
Endovascular Neurosurgery Consult    Reason for evaluation: bL CCA, bL ICA and bL vert stenosis, with R hemis infarcts  Referring Provider: River De MD     SUBJECTIVE:   History of Chief Complaint:    Ger Mccray is a 84 y.o. male with pmh of CAD , asthma , CKD , COPD , HTN  sleep apnea, on aspirin 81 daily. He presented to an outside hospital with dizziness and unresponsiveness. Also complained of LUE numbness. Seen as a stroke code by Dr. Gtz and Neurology with Dr. Willis. CTA found bilateral common, bilateral internal and bilateral vertebral artery stenoses. MRI of Head with acute small right occipital , parietal frontal and insular infarctions with mild chronic periventricular small vessel ischemia. Pt was transferred to [place] under Neurology. EVN consulted. Pt now states that the dizziness and LUE numbness has entirely resolved.     Allergies  is allergic to pcn [penicillins], ceclor [cefaclor], and morphine.  Medications  Prior to Admission medications    Medication Sig Start Date End Date Taking? Authorizing Provider   JANUVIA 50 MG tablet TAKE 1 TABLET BY MOUTH DAILY 8/21/24   Alin Hayes MD   rosuvastatin (CRESTOR) 10 MG tablet TAKE 1 TABLET BY MOUTH DAILY 8/21/24   Alin Hayes MD   allopurinol (ZYLOPRIM) 100 MG tablet TAKE 1 TABLET BY MOUTH DAILY  Patient not taking: Reported on 11/18/2024 8/13/24   Alin Hayes MD   lisinopril (PRINIVIL;ZESTRIL) 2.5 MG tablet TAKE 1 TABLET BY MOUTH DAILY 7/8/24   Aye Tapia APRN - NP   fluticasone (FLONASE) 50 MCG/ACT nasal spray 1 spray by Each Nostril route daily  Patient not taking: Reported on 11/18/2024 10/17/23   Socrates Costa APRN - NP   metoprolol succinate (TOPROL XL) 25 MG extended release tablet Take 1 tablet by mouth daily    Provider, MD Kirstie   nitroGLYCERIN (NITROSTAT) 0.4 MG SL tablet Place 1 tablet under the tongue every 5 minutes as needed for Chest pain up to max of 3 total doses. If no relief  agrft/algrft (Left, 01/23/2018); joint replacement (Right, 07/12/2011); joint replacement (Right, 1995); joint replacement (Left, 01/23/2018); lumbar fusion (N/A, 09/20/2021); back surgery; Endoscopy, colon, diagnostic; and Cardiac surgery.  Social History   reports that he quit smoking about 22 years ago. His smoking use included cigarettes. He started smoking about 57 years ago. He has a 70 pack-year smoking history. He has been exposed to tobacco smoke. He has quit using smokeless tobacco.  His smokeless tobacco use included chew.   reports no history of alcohol use.   reports no history of drug use.  Family History  family history includes Alzheimer's Disease in his mother; Emphysema in his father; Heart Disease in his mother.    Review of Systems:  CONSTITUTIONAL:  negative for fevers, chills, fatigue and malaise    EYES:  negative for double vision, blurred vision and photophobia     HEENT:  negative for tinnitus, epistaxis and sore throat    RESPIRATORY:  negative for cough, shortness of breath, wheezing    CARDIOVASCULAR:  negative for chest pain, palpitations, syncope, edema    GASTROINTESTINAL:  negative for nausea, vomiting    GENITOURINARY:  negative for incontinence    MUSCULOSKELETAL:  negative for neck or back pain    NEUROLOGICAL:  Negative for weakness and tingling  negative for headaches and dizziness    PSYCHIATRIC:  negative for anxiety      Review of systems otherwise negative.      OBJECTIVE:   There were no vitals filed for this visit.     Gen: No acute distress  MS: Awake, Oriented x3, No obvious aphasia  CN: Pupils reactive, no gaze deviation, no gross facial droop, tongue midline  Motor: Moves all extremities antigravity  Sens: Responds to LT in all extremities  Gait: Deferred      NIH Stroke Scale:   1a  Level of consciousness: 0 - alert; keenly responsive   1b. LOC questions:  0 - answers both questions correctly   1c. LOC commands: 0 - performs both tasks correctly   2.  Best Gaze: 0 -

## 2024-11-21 PROBLEM — I63.239 CAROTID ARTERY STENOSIS WITH CEREBRAL INFARCTION (HCC): Status: ACTIVE | Noted: 2024-11-21

## 2024-11-21 LAB
ANION GAP SERPL CALCULATED.3IONS-SCNC: 12 MMOL/L (ref 9–16)
BUN SERPL-MCNC: 21 MG/DL (ref 8–23)
CALCIUM SERPL-MCNC: 9.4 MG/DL (ref 8.6–10.4)
CHLORIDE SERPL-SCNC: 107 MMOL/L (ref 98–107)
CHOLEST SERPL-MCNC: 123 MG/DL (ref 0–199)
CHOLESTEROL/HDL RATIO: 3.2
CO2 SERPL-SCNC: 20 MMOL/L (ref 20–31)
CREAT SERPL-MCNC: 1.4 MG/DL (ref 0.7–1.2)
EKG ATRIAL RATE: 61 BPM
EKG ATRIAL RATE: 61 BPM
EKG P AXIS: -21 DEGREES
EKG P AXIS: 58 DEGREES
EKG P-R INTERVAL: 166 MS
EKG P-R INTERVAL: 210 MS
EKG Q-T INTERVAL: 428 MS
EKG Q-T INTERVAL: 430 MS
EKG QRS DURATION: 94 MS
EKG QRS DURATION: 98 MS
EKG QTC CALCULATION (BAZETT): 430 MS
EKG QTC CALCULATION (BAZETT): 432 MS
EKG R AXIS: -18 DEGREES
EKG R AXIS: -28 DEGREES
EKG T AXIS: 65 DEGREES
EKG T AXIS: 93 DEGREES
EKG VENTRICULAR RATE: 61 BPM
EKG VENTRICULAR RATE: 61 BPM
ERYTHROCYTE [DISTWIDTH] IN BLOOD BY AUTOMATED COUNT: 13.9 % (ref 11.8–14.4)
EST. AVERAGE GLUCOSE BLD GHB EST-MCNC: 126 MG/DL
GFR, ESTIMATED: 50 ML/MIN/1.73M2
GLUCOSE SERPL-MCNC: 104 MG/DL (ref 74–99)
HBA1C MFR BLD: 6 % (ref 4–6)
HCT VFR BLD AUTO: 42.6 % (ref 40.7–50.3)
HDLC SERPL-MCNC: 38 MG/DL
HGB BLD-MCNC: 13.6 G/DL (ref 13–17)
LDLC SERPL CALC-MCNC: 62 MG/DL (ref 0–100)
MCH RBC QN AUTO: 30.6 PG (ref 25.2–33.5)
MCHC RBC AUTO-ENTMCNC: 31.9 G/DL (ref 28.4–34.8)
MCV RBC AUTO: 95.9 FL (ref 82.6–102.9)
NRBC BLD-RTO: 0 PER 100 WBC
PLATELET # BLD AUTO: 187 K/UL (ref 138–453)
PMV BLD AUTO: 12.7 FL (ref 8.1–13.5)
POTASSIUM SERPL-SCNC: 4.3 MMOL/L (ref 3.7–5.3)
RBC # BLD AUTO: 4.44 M/UL (ref 4.21–5.77)
SODIUM SERPL-SCNC: 139 MMOL/L (ref 136–145)
TRIGL SERPL-MCNC: 113 MG/DL
VLDLC SERPL CALC-MCNC: 23 MG/DL (ref 1–30)
WBC OTHER # BLD: 12.1 K/UL (ref 3.5–11.3)

## 2024-11-21 PROCEDURE — 97166 OT EVAL MOD COMPLEX 45 MIN: CPT

## 2024-11-21 PROCEDURE — 92523 SPEECH SOUND LANG COMPREHEN: CPT

## 2024-11-21 PROCEDURE — 2580000003 HC RX 258

## 2024-11-21 PROCEDURE — 85027 COMPLETE CBC AUTOMATED: CPT

## 2024-11-21 PROCEDURE — 36415 COLL VENOUS BLD VENIPUNCTURE: CPT

## 2024-11-21 PROCEDURE — 6370000000 HC RX 637 (ALT 250 FOR IP)

## 2024-11-21 PROCEDURE — 83036 HEMOGLOBIN GLYCOSYLATED A1C: CPT

## 2024-11-21 PROCEDURE — 93010 ELECTROCARDIOGRAM REPORT: CPT | Performed by: INTERNAL MEDICINE

## 2024-11-21 PROCEDURE — 2060000000 HC ICU INTERMEDIATE R&B

## 2024-11-21 PROCEDURE — 95816 EEG AWAKE AND DROWSY: CPT

## 2024-11-21 PROCEDURE — 80048 BASIC METABOLIC PNL TOTAL CA: CPT

## 2024-11-21 PROCEDURE — 6360000002 HC RX W HCPCS

## 2024-11-21 PROCEDURE — 95819 EEG AWAKE AND ASLEEP: CPT | Performed by: PSYCHIATRY & NEUROLOGY

## 2024-11-21 PROCEDURE — 80061 LIPID PANEL: CPT

## 2024-11-21 PROCEDURE — 99223 1ST HOSP IP/OBS HIGH 75: CPT | Performed by: STUDENT IN AN ORGANIZED HEALTH CARE EDUCATION/TRAINING PROGRAM

## 2024-11-21 PROCEDURE — 97535 SELF CARE MNGMENT TRAINING: CPT

## 2024-11-21 RX ADMIN — ROSUVASTATIN CALCIUM 10 MG: 20 TABLET, FILM COATED ORAL at 20:49

## 2024-11-21 RX ADMIN — CLOPIDOGREL BISULFATE 75 MG: 75 TABLET ORAL at 08:19

## 2024-11-21 RX ADMIN — SODIUM CHLORIDE, PRESERVATIVE FREE 10 ML: 5 INJECTION INTRAVENOUS at 08:20

## 2024-11-21 RX ADMIN — ENOXAPARIN SODIUM 40 MG: 100 INJECTION SUBCUTANEOUS at 08:19

## 2024-11-21 RX ADMIN — Medication 3 MG: at 20:49

## 2024-11-21 RX ADMIN — SODIUM CHLORIDE, PRESERVATIVE FREE 10 ML: 5 INJECTION INTRAVENOUS at 20:49

## 2024-11-21 RX ADMIN — ASPIRIN 81 MG: 81 TABLET, CHEWABLE ORAL at 08:19

## 2024-11-21 RX ADMIN — BUDESONIDE AND FORMOTEROL FUMARATE DIHYDRATE 2 PUFF: 80; 4.5 AEROSOL RESPIRATORY (INHALATION) at 21:36

## 2024-11-21 ASSESSMENT — PAIN SCALES - GENERAL: PAINLEVEL_OUTOF10: 0

## 2024-11-21 NOTE — CARE COORDINATION
11/21/24 1608   Readmission Assessment   Number of Days since last admission? 8-30 days  (RAC N/A was transfer)   Who is being Interviewed Unable to Complete  (RAC N/A was transfer)   What was the patient's/caregiver's perception as to why they think they needed to return back to the hospital? Other (Comment)  (RAC N/A was transfer)   Did you visit your Primary Care Physician after you left the hospital, before you returned this time? No  (RAC N/A was transfer)   Why weren't you able to visit your PCP? Other (Comment)  (RAC N/A was transfer)   Did you see a specialist, such as Cardiac, Pulmonary, Orthopedic Physician, etc. after you left the hospital? Other (Comment)  (RAC N/A was transfer)   Who advised the patient to return to the hospital? Other (Comment)  (RAC N/A was transfer)   Does the patient report anything that got in the way of taking their medications? No  (RAC N/A was transfer)   In our efforts to provide the best possible care to you and others like you, can you think of anything that we could have done to help you after you left the hospital the first time, so that you might not have needed to return so soon? Other (Comment)  (RAC N/A was transfer)

## 2024-11-21 NOTE — CARE COORDINATION
Case Management Assessment  Initial Evaluation    Date/Time of Evaluation: 11/21/2024 4:07 PM  Assessment Completed by: JAREN HENSON    If patient is discharged prior to next notation, then this note serves as note for discharge by case management.    Patient Name: Ger Mccray                   YOB: 1940  Diagnosis: Cerebral infarction (HCC) [I63.9]  Ischemic stroke (HCC) [I63.9]                   Date / Time: 11/20/2024  4:42 PM    Patient Admission Status: Inpatient   Readmission Risk (Low < 19, Mod (19-27), High > 27): Readmission Risk Score: 16.7    Current PCP: Alin Hayes MD  PCP verified by CM? Yes    Chart Reviewed: Yes      History Provided by: Patient  Patient Orientation: Alert and Oriented    Patient Cognition: Alert    Hospitalization in the last 30 days (Readmission):  No    If yes, Readmission Assessment in CM Navigator will be completed.    Advance Directives:      Code Status: Prior   Patient's Primary Decision Maker is: Named in Scanned ACP Document    Primary Decision Maker: MccrayWendie - Spouse - [phone]    Secondary Decision Maker: Jessie Quevedo - Child - [phone]    Discharge Planning:    Patient lives with:   Type of Home: House  Primary Care Giver: Self  Patient Support Systems include: Spouse/Significant Other, Family Members   Current Financial resources: Medicare  Current community resources: None  Current services prior to admission: Home Bipap, Durable Medical Equipment            Current DME: (P) Walker, Cane, Shower Chair            Type of Home Care services:  (P) OT, PT, Nursing Services    ADLS  Prior functional level: Assistance with the following:, Shopping, Mobility, Housework  Current functional level: Assistance with the following:, Mobility, Toileting    PT AM-PAC:   /24  OT AM-PAC: 19 /24    Family can provide assistance at DC: Yes  Would you like Case Management to discuss the discharge plan with any other family members/significant  others, and if so, who? No  Plans to Return to Present Housing: Yes  Other Identified Issues/Barriers to RETURNING to current housing: medical condition   Potential Assistance needed at discharge: (P) N/A            Potential DME:    Patient expects to discharge to: (P) House  Plan for transportation at discharge:      Financial    Payor: MEDICARE / Plan: MEDICARE PART A AND B / Product Type: *No Product type* /     Does insurance require precert for SNF: No    Potential assistance Purchasing Medications: (P) No  Meds-to-Beds request: Yes      [facility] [id number] - [place], [place] - [address] - P [phone] - F [phone]  [address] [place] [address]  Phone: [phone] Fax: [phone]    [facility] - [place], [place] - [address] [facility] - P [phone] - F [phone]  [address]  [address]  [facility] [address]  Phone: [phone] Fax: [phone]      Notes:    Factors facilitating achievement of predicted outcomes: Family support, Cooperative, and Pleasant    Barriers to discharge: Medical complications    Additional Case Management Notes: Plan is to return home with supportive family, has walker, cane and shower chair.  Referral placed for [facility].  Carotid Endarterectomy scheduled 10:30am on 11/22/24.    The Plan for Transition of Care is related to the following treatment goals of Cerebral infarction (HCC) [I63.9]  Ischemic stroke (HCC) [I63.9]    IF APPLICABLE: The Patient and/or patient representative Ger and his family were provided with a choice of provider and agrees with the discharge plan. Freedom of choice list with basic dialogue that supports the patient's individualized plan of care/goals and shares the quality data associated with the providers was provided to: (P) Patient   Patient Representative Name:       The Patient and/or Patient Representative Agree with the Discharge Plan? (P) Yes    JAREN HENSON  Case Management Department  Ph:

## 2024-11-21 NOTE — CARE COORDINATION
Met with pt to assess for support and complete PHQ-9 screen. Pt's wife and dtr present with pt permission. Pt stated he lives with his wife. Stated they have 2 dtrs, both local. Pt shared he has 2 grchildren.  Pt reports family is very supportive. He denies any feelings of depression/SI.   Patient Health Questionnaire-9 (PHQ-9)    Over the last 2 weeks, how often have you been bothered by any of the following problems?    1. Little Interest or pleasure in doing things?   [x] Not at all  [] Several Days  [] More than half the day  []  Nearly every day    2. Feeling down, depressed or hopeless?    [x] Not at all  [] Several Days  [] More than half the day  []  Nearly every day    3. Trouble falling or staying asleep, or sleeping too much?   [x] Not at all  [] Several Days  [] More than half the day  []  Nearly every day    4. Feeling tired or having little energy?   [x] Not at all  [] Several Days  [] More than half the day  []  Nearly every day    5. Poor apettite or overeating?   [x] Not at all  [] Several Days  [] More than half the day  []  Nearly every day    6. Feeling bad about yourself-or that you are a failure or have let yourself or your family down?   [x] Not at all  [] Several Days  [] More than half the day  []  Nearly every day    7. Trouble concentrating on things, such as reading the newspaper or watching television?   [x] Not at all  [] Several Days  [] More than half the day  []  Nearly every day    8. Moving or speaking so slowly that other people could have noticed? Or the opposite-being so fidgety or restless that you have been moving around a lot more than usual?   [x] Not at all  [] Several Days  [] More than half the day  []  Nearly every day    9. Thoughts that you would be better off dead or of hurting yourself in some way?   [x] Not at all  [] Several Days  [] More than half the day  []  Nearly every day    Total Score: 0    If you checked off any problems, how difficult have these problems

## 2024-11-21 NOTE — PLAN OF CARE
Problem: Chronic Conditions and Co-morbidities  Goal: Patient's chronic conditions and co-morbidity symptoms are monitored and maintained or improved  Outcome: Progressing  Flowsheets (Taken 11/20/2024 1615 by Antoinette Florentino RN)  Care Plan - Patient's Chronic Conditions and Co-Morbidity Symptoms are Monitored and Maintained or Improved: Monitor and assess patient's chronic conditions and comorbid symptoms for stability, deterioration, or improvement     Problem: Safety - Adult  Goal: Free from fall injury  Outcome: Progressing

## 2024-11-21 NOTE — PROCEDURES
----- Message from Yulissa Marroquin sent at 2023 11:00 AM CDT -----  Contact: self  Jessi Linton  MRN: [id number]  : 1963  PCP: David Allred  Home Phone      [phone]  Work Phone      Not on file.  Mobile          [phone]      MESSAGE:   Patient states she got denial letter for [facility]--she would like alternative.    [facility]      [phone]     PROCEDURE NOTE  Date: 11/21/2024   Name: Ger Mccray  YOB: 1940    Procedures          Date: 11/20/2024  Referring physician: Dr. De    Indication  Patient aged 84 y with possible seizures. EEG done to assess for epileptiform activity.    Introduction  This routine 20-minute EEG was recorded using the [employer] 10-20 System on a [employer] workstation at 256 samples/s. Automated spike and seizure detection algorithms were applied.    Description  During the maximal alert state, a well-regulated, symmetric, and reactive 9 Hz posterior dominant rhythm was seen. No consistent focal slowing or interhemispheric asymmetry was noted. Stage I and stage II sleep were observed. There were no interictal epileptiform discharges or electrographic seizures.    Activations  Hyperventilation was not performed. Intermittent photic stimulation was performed and demonstrated no posterior driving response.    Impression  Normal awake and sleep EEG.       EKG lead did not show clear arrhythmia, if still in concern consider formal EKG or correlation with telemetry.       No epileptiform discharges were identified. Please note the absence of such activity on this record cannot conclusively rule out an epileptic disorder. If such is still clinically suspected, a repeat study with sleep deprivation and/or prolonged sampling may be helpful.    Oseas Mendez MD  Epilepsy Board Certified.  Neurology Board Certified.    Electronically Signed

## 2024-11-21 NOTE — PROGRESS NOTES
Endovascular Neurosurgery Progress Note    Reason for evaluation: bL CCA, bL ICA and bL vert stenosis, with R hemis infarcts  Referring Provider: River De MD     SUBJECTIVE:     NAEO from EVN perspective. Seen by vascular surgery.    History of Chief Complaint:    Ger Mccray is a 84 y.o. male with pmh of CAD , asthma , CKD , COPD , HTN  sleep apnea, on aspirin 81 daily. He presented to an outside hospital with dizziness and unresponsiveness. Also complained of LUE numbness. Seen as a stroke code by Dr. Gtz and Neurology with Dr. Willis. CTA found bilateral common, bilateral internal and bilateral vertebral artery stenoses. MRI of Head with acute small right occipital , parietal frontal and insular infarctions with mild chronic periventricular small vessel ischemia. Pt was transferred to [place] under Neurology. EVN consulted. Pt now states that the dizziness and LUE numbness has entirely resolved.     Allergies  is allergic to pcn [penicillins], ceclor [cefaclor], and morphine.  Medications  Prior to Admission medications    Medication Sig Start Date End Date Taking? Authorizing Provider   JANUVIA 50 MG tablet TAKE 1 TABLET BY MOUTH DAILY 8/21/24   Alin Hayes MD   rosuvastatin (CRESTOR) 10 MG tablet TAKE 1 TABLET BY MOUTH DAILY 8/21/24   Alin Hayes MD   lisinopril (PRINIVIL;ZESTRIL) 2.5 MG tablet TAKE 1 TABLET BY MOUTH DAILY 7/8/24   Aye Tapia APRN - NP   fluticasone (FLONASE) 50 MCG/ACT nasal spray 1 spray by Each Nostril route daily  Patient not taking: Reported on 11/18/2024 10/17/23   Socrates Costa APRN - NP   metoprolol succinate (TOPROL XL) 25 MG extended release tablet Take 1 tablet by mouth daily    Provider, MD Kirstie   nitroGLYCERIN (NITROSTAT) 0.4 MG SL tablet Place 1 tablet under the tongue every 5 minutes as needed for Chest pain up to max of 3 total doses. If no relief after 1 dose, call 911.  Patient not taking: Reported on 11/18/2024    Provider,  MD Kirstie   Fluticasone-Salmeterol 232-14 MCG/ACT AEPB Inhale 1 puff into the lungs 2 times daily  6/25/20   Kirstie Chu MD   aspirin 81 MG chewable tablet Take 1 tablet by mouth daily    Kirstie Chu MD   albuterol sulfate HFA (PROVENTIL;VENTOLIN;PROAIR) 108 (90 Base) MCG/ACT inhaler Inhale 2 puffs into the lungs every 6 hours as needed for Wheezing    Kirstie Chu MD   therapeutic multivitamin-minerals (THERAGRAN-M) tablet Take 1 tablet by mouth daily OTC    Alin Hayes MD    Scheduled Meds:   sodium chloride flush  5-40 mL IntraVENous 2 times per day    enoxaparin  40 mg SubCUTAneous Daily    rosuvastatin  10 mg Oral Nightly    aspirin  81 mg Oral Daily    Or    aspirin  300 mg Rectal Daily    budesonide-formoterol  2 puff Inhalation BID RT    [Held by provider] lisinopril  2.5 mg Oral Daily    [Held by provider] metoprolol succinate  25 mg Oral Daily    clopidogrel  75 mg Oral Daily    melatonin  3 mg Oral Nightly     Continuous Infusions:   sodium chloride       PRN Meds:.sodium chloride flush, sodium chloride, ondansetron **OR** ondansetron, polyethylene glycol, labetalol, albuterol sulfate HFA  Past Medical History   has a past medical history of Anemia, Anesthesia, Arthritis, ASHD (arteriosclerotic heart disease), Asthma, CAD (coronary artery disease), Cerebral artery occlusion with cerebral infarction (formerly [facility]), Chronic kidney disease, Chronic obstructive pulmonary disease, unspecified (formerly [facility]), COPD (chronic obstructive pulmonary disease) (formerly [facility]), COVID-19 vaccine series completed, Degenerative joint disease, Diabetes mellitus (formerly [facility]), ED (erectile dysfunction), Full dentures, Gout, History of carpal tunnel syndrome, History of colon polyps, History of heart attack, History of hemorrhoids, History of TIA (transient ischemic attack), Hypercholesteremia, Hypertension, Hyperuricemia, Leukocytosis, Renal cyst, Sciatic nerve pain, Sleep apnea, and Wears glasses.  Past Surgical      Vitals:    24 0712   BP: 126/81   Pulse: 71   Resp: 20   Temp: 99.2 °F (37.3 °C)   SpO2: 91%        Gen: No acute distress  MS: Awake, Oriented x3, No obvious aphasia  CN: Pupils reactive, no gaze deviation, no gross facial droop, tongue midline  Motor: Moves all extremities antigravity  Sens: Responds to LT in all extremities  Gait: Deferred      NIH Stroke Scale:   1a  Level of consciousness: 0 - alert; keenly responsive   1b. LOC questions:  0 - answers both questions correctly   1c. LOC commands: 0 - performs both tasks correctly   2.  Best Gaze: 0 - normal   3. Visual: 0 - no visual loss   4. Facial Palsy: 0 - normal symmetric movement   5a. Motor left arm: 0 - no drift, limb holds 90 (or 45) degrees for full 10 seconds   5b.  Motor right arm: 0 - no drift, limb holds 90 (or 45) degrees for full 10 seconds   6a. Motor left le - no drift; leg holds 30 degree position for full 5 seconds   6b  Motor right le - no drift; leg holds 30 degree position for full 5 seconds   7. Limb Ataxia: 0 - absent   8.  Sensory: 0 - normal; no sensory loss   9. Best Language:  0 - no aphasia, normal   10. Dysarthria: 0 - normal   11. Extinction and Inattention: 0 - no abnormality         Total:   0     MRS: 0      LABS:   Reviewed.  Lab Results   Component Value Date    HGB 13.6 2024    WBC 12.1 (H) 2024     2024     2024    BUN 21 2024    CREATININE 1.4 (H) 2024    AST 28 2024    ALT 21 2024    MG 2.2 2024    INR 1.1 2024      Lab Results   Component Value Date/Time    COVID19 DETECTED 10/17/2023 04:35 PM       RADIOLOGY:   Images were personally reviewed including:    CTA:      MRI:        ASSESSMENT:     # bL CCA, bL ICA, bL vert stenosis - though on images looks like it may be poor contrast flow  # MRI with R hemispheric infarcts    PLAN:     - -140  - Aspirin 81 daily  - Seen by Vascular surgery, defer further management to their

## 2024-11-21 NOTE — CONSULTS
Division of Vascular Surgery        New Consult      Physician Requesting Consult:  Dr. De    Reason for Consult:   \"vascular planning for carotid endarterectomy\"    Chief Complaint:      Left upper extremity numbness and parethesias    History of Present Illness:      Ger Mccray is a 84 y.o. gentleman who initially presented to [place] ED with left sided numbness and paresthesias. He underwent stroke workup including MRI brain showing acute to subacute right frontal, parietal, and occipital infarcts.  Patient seen by Dr. Sy at [place] as carotid duplex on 11/18 showed greater than 80% stenosis of the right internal carotid artery.  He was transferred from [facility] to [facility] for vascular surgery intervention.  Since he was seen patient's numbness and paresthesias have resolved.  Denies any dysphagia, amaurosis fugax, fatigue, facial droop, or slurred speech.  Has a history of TIA about 15 years ago but no intervention.  Denies lower extremity claudication.  Has never seen a vascular surgeon before.  Takes aspirin and Plavix daily.    Medical history includes CAD status post PCI with stenting, CKD, type 2 diabetes, and COPD.    Medical History:     Past Medical History:   Diagnosis Date    Anemia     Anesthesia     woke up eary during surgery x1 , heard saw & felt the pressure.    Arthritis 07/15/2013    ASHD (arteriosclerotic heart disease) 07/15/2013    Asthma     CAD (coronary artery disease)     has cardiac stent x 4.    Cerebral artery occlusion with cerebral infarction (HCC)     Chronic kidney disease     stage 3    Chronic obstructive pulmonary disease, unspecified (HCC) 09/27/2021    COPD (chronic obstructive pulmonary disease) ([facility])     COVID-19 vaccine series completed     Moderna 1/26/2021, 2/23/2021    Degenerative joint disease 07/15/2013    Diabetes mellitus (HCC)     ED (erectile dysfunction) 07/15/2013    Full dentures     Gout     History of carpal tunnel

## 2024-11-21 NOTE — PROGRESS NOTES
Occupational Therapy Initial Evaluation  Facility/Department: [address] ONC/MED SURG   Patient Name: Ger Mccray        MRN: [id number]    : 1940    Date of Service: 2024    Discharge Recommendations  Discharge Recommendations: Patient would benefit from continued therapy after discharge  OT Equipment Recommendations  Equipment Needed: Yes  Mobility Devices: ADL Assistive Devices  ADL Assistive Devices: Sock-Aid Soft, Grab Bars - toilet, Grab Bars - shower    No chief complaint on file.    Past Medical History:  has a past medical history of Anemia, Anesthesia, Arthritis, ASHD (arteriosclerotic heart disease), Asthma, CAD (coronary artery disease), Cerebral artery occlusion with cerebral infarction ([facility]), Chronic kidney disease, Chronic obstructive pulmonary disease, unspecified ([facility]), COPD (chronic obstructive pulmonary disease) ([facility]), COVID-19 vaccine series completed, Degenerative joint disease, Diabetes mellitus ([facility]), ED (erectile dysfunction), Full dentures, Gout, History of carpal tunnel syndrome, History of colon polyps, History of heart attack, History of hemorrhoids, History of TIA (transient ischemic attack), Hypercholesteremia, Hypertension, Hyperuricemia, Leukocytosis, Renal cyst, Sciatic nerve pain, Sleep apnea, and Wears glasses.  Past Surgical History:  has a past surgical history that includes Coronary angioplasty with stent (1994 x1 stent,1998 x2 stents,2009 x1,); Colonoscopy (2008); Shoulder arthroscopy (Right, 2002 repair); Carpal tunnel release (Right, 2002); shoulder surgery (Left, 2002); Cataract removal with implant (Left, 2012); Cataract removal with implant (Right, 2012); eye surgery; Blepharoplasty (Bilateral, 2012); Total knee arthroplasty (Left, 2017); pr arthrp acetblr/prox fem prostc agrft/algrft (Left, 2018); joint replacement (Right, 2011); joint replacement (Right, ); joint replacement (Left, 2018); lumbar  Education  Education Given To: Patient  Education Provided: Role of Therapy;Plan of Care;Transfer Training;ADL Adaptive Strategies;Family Education;Fall Prevention Strategies;Mobility Training  Education Method: Verbal;Demonstration  Barriers to Learning: None  Education Outcome: Verbalized understanding;Continued education needed    Goals  Short Term Goals  Time Frame for Short Term Goals: Patient will, by discharge  Short Term Goal 1: demo LB ADLs at Supervision using AE PRN  Short Term Goal 2: demo functional transfers/mobility at Supervision to participate in ADL tasks  Short Term Goal 3: demo 8+ min of dynamic standing tolerance at Supervision to participate in ADL tasks  Short Term Goal 4: demo toileting tasks at Supervision using DME PRN  Short Term Goal 5: demo 30+ min of functional activity tolerance to improve performance in ADL/IADL tasks    Plan  Occupational Therapy Plan  Times Per Week: 4-5x/wk  Current Treatment Recommendations: Strengthening, ROM, Balance training, Functional mobility training, Endurance training, Pain management, Safety education & training, Positioning, Self-Care / ADL, Home management training, Coordination training, Neuromuscular re-education, Equipment evaluation, education, & procurement, Patient/Caregiver education & training    AM-[facility] Daily Activities Inpatient  AM-[facility] Daily Activity - Inpatient   How much help is needed for putting on and taking off regular lower body clothing?: A Little  How much help is needed for bathing (which includes washing, rinsing, drying)?: A Little  How much help is needed for toileting (which includes using toilet, bedpan, or urinal)?: A Little  How much help is needed for putting on and taking off regular upper body clothing?: A Little  How much help is needed for taking care of personal grooming?: A Little  How much help for eating meals?: None  AM-[facility] Inpatient Daily Activity Raw Score: 19  AM-[facility] Inpatient ADL T-Scale Score : 40.22  ADL  Inpatient CMS 0-100% Score: 42.8  ADL Inpatient CMS G-Code Modifier : CK    Minutes  OT Individual Minutes  Time In: 1309  Time Out: 1352  Minutes: 43  Time Code Minutes   Timed Code Treatment Minutes: 36 Minutes    Electronically signed by Renetta Taylor OT on 11/21/24 at 2:48 PM EST

## 2024-11-21 NOTE — PROGRESS NOTES
Facility/Department: [address] ONC/MED SURG  Initial Speech/Language/Cognitive Assessment    NAME: Ger Mccray  : 1940   MRN: [id number]  ADMISSION DATE: 2024  ADMITTING DIAGNOSIS: has Chronic obstructive pulmonary disease, unspecified (HCC); Elevated white blood cell count, unspecified; Anemia in chronic kidney disease; Atherosclerotic heart disease of native coronary artery without angina pectoris; History of TIA (transient ischemic attack); History of hemorrhoids; Polyosteoarthritis, unspecified; History of carpal tunnel syndrome; Obstructive sleep apnea (adult) (pediatric); Male erectile dysfunction, unspecified; History of colon polyps; Hypertensive chronic kidney disease with stage 1 through stage 4 chronic kidney disease, or unspecified chronic kidney disease; Renal cyst; Essential hypertension; Allergic rhinitis; Idiopathic chronic gout, unspecified site, without tophus (tophi); Pure hypercholesterolemia, unspecified; Unilateral primary osteoarthritis, left hip; Dependence on other enabling machines and devices; Hypertensive retinopathy, unspecified eye; Type 2 diabetes mellitus with chronic kidney disease (HCC); Spinal stenosis, lumbar region with neurogenic claudication; Lumbar radicular pain; Back pain; Drug induced constipation; Difficulty in walking, not elsewhere classified; Acute kidney failure, unspecified (HCC); Lumbago with sciatica, unspecified side; Old myocardial infarction; Polyp of colon; Presence of coronary angioplasty implant and graft; Presence of intraocular lens; Presence of left artificial knee joint; Presence of right artificial shoulder joint; Weakness; Stage 3b chronic kidney disease (HCC); Syncope and collapse; Hypertriglyceridemia; Syncope, unspecified syncope type; Bilateral carotid artery stenosis; Acute cerebral infarction (HCC); Cerebral infarction (HCC); and Ischemic stroke (HCC) on their problem list.    Date of Eval: 2024   Evaluating Therapist: Grace

## 2024-11-22 ENCOUNTER — ANESTHESIA (OUTPATIENT)
Dept: OPERATING ROOM | Age: 84
DRG: 039 | End: 2024-11-22
Payer: MEDICARE

## 2024-11-22 ENCOUNTER — ANESTHESIA EVENT (OUTPATIENT)
Dept: OPERATING ROOM | Age: 84
DRG: 039 | End: 2024-11-22
Payer: MEDICARE

## 2024-11-22 PROBLEM — I63.231: Status: ACTIVE | Noted: 2024-11-22

## 2024-11-22 PROBLEM — I73.9 PERIPHERAL VASCULAR DISEASE (HCC): Status: ACTIVE | Noted: 2024-11-22

## 2024-11-22 PROBLEM — I65.21 MORE THAN 50 PERCENT STENOSIS OF RIGHT INTERNAL CAROTID ARTERY: Status: ACTIVE | Noted: 2024-11-22

## 2024-11-22 PROBLEM — I25.10 CAD IN NATIVE ARTERY: Status: ACTIVE | Noted: 2024-11-22

## 2024-11-22 PROBLEM — I65.21 CAROTID STENOSIS, RIGHT: Status: ACTIVE | Noted: 2024-11-22

## 2024-11-22 PROBLEM — I10 PRIMARY HYPERTENSION: Status: ACTIVE | Noted: 2024-11-22

## 2024-11-22 PROBLEM — E78.2 MIXED HYPERLIPIDEMIA: Status: ACTIVE | Noted: 2024-11-22

## 2024-11-22 PROBLEM — I67.9 CEREBROVASCULAR DISEASE: Status: ACTIVE | Noted: 2024-11-22

## 2024-11-22 PROBLEM — I63.59 ISCHEMIC CEREBROVASCULAR ACCIDENT (CVA) DUE TO ATHEROSCLEROSIS OF LARGE INTRACRANIAL ARTERY (HCC): Status: ACTIVE | Noted: 2024-11-22

## 2024-11-22 LAB
ANION GAP SERPL CALCULATED.3IONS-SCNC: 13 MMOL/L (ref 9–16)
ANION GAP SERPL CALCULATED.3IONS-SCNC: 7 MMOL/L (ref 9–16)
BASOPHILS # BLD: 0.03 K/UL (ref 0–0.2)
BASOPHILS # BLD: 0.07 K/UL (ref 0–0.2)
BASOPHILS NFR BLD: 0 % (ref 0–2)
BASOPHILS NFR BLD: 1 % (ref 0–2)
BUN SERPL-MCNC: 22 MG/DL (ref 8–23)
BUN SERPL-MCNC: 22 MG/DL (ref 8–23)
CALCIUM SERPL-MCNC: 10 MG/DL (ref 8.6–10.4)
CALCIUM SERPL-MCNC: 8.2 MG/DL (ref 8.6–10.4)
CHLORIDE SERPL-SCNC: 106 MMOL/L (ref 98–107)
CHLORIDE SERPL-SCNC: 108 MMOL/L (ref 98–107)
CO2 SERPL-SCNC: 21 MMOL/L (ref 20–31)
CO2 SERPL-SCNC: 22 MMOL/L (ref 20–31)
CREAT SERPL-MCNC: 1.4 MG/DL (ref 0.7–1.2)
CREAT SERPL-MCNC: 1.5 MG/DL (ref 0.7–1.2)
EOSINOPHIL # BLD: 0.16 K/UL (ref 0–0.44)
EOSINOPHIL # BLD: 0.67 K/UL (ref 0–0.44)
EOSINOPHILS RELATIVE PERCENT: 1 % (ref 1–4)
EOSINOPHILS RELATIVE PERCENT: 5 % (ref 1–4)
ERYTHROCYTE [DISTWIDTH] IN BLOOD BY AUTOMATED COUNT: 14 % (ref 11.8–14.4)
ERYTHROCYTE [DISTWIDTH] IN BLOOD BY AUTOMATED COUNT: 14.1 % (ref 11.8–14.4)
GFR, ESTIMATED: 46 ML/MIN/1.73M2
GFR, ESTIMATED: 50 ML/MIN/1.73M2
GLUCOSE SERPL-MCNC: 109 MG/DL (ref 74–99)
GLUCOSE SERPL-MCNC: 109 MG/DL (ref 74–99)
HCT VFR BLD AUTO: 32.7 % (ref 40.7–50.3)
HCT VFR BLD AUTO: 46.3 % (ref 40.7–50.3)
HGB BLD-MCNC: 10.5 G/DL (ref 13–17)
HGB BLD-MCNC: 14.8 G/DL (ref 13–17)
IMM GRANULOCYTES # BLD AUTO: 0.06 K/UL (ref 0–0.3)
IMM GRANULOCYTES # BLD AUTO: 0.07 K/UL (ref 0–0.3)
IMM GRANULOCYTES NFR BLD: 1 %
IMM GRANULOCYTES NFR BLD: 1 %
LYMPHOCYTES NFR BLD: 1.41 K/UL (ref 1.1–3.7)
LYMPHOCYTES NFR BLD: 2.69 K/UL (ref 1.1–3.7)
LYMPHOCYTES RELATIVE PERCENT: 11 % (ref 24–43)
LYMPHOCYTES RELATIVE PERCENT: 22 % (ref 24–43)
MCH RBC QN AUTO: 30.2 PG (ref 25.2–33.5)
MCH RBC QN AUTO: 30.6 PG (ref 25.2–33.5)
MCHC RBC AUTO-ENTMCNC: 32 G/DL (ref 28.4–34.8)
MCHC RBC AUTO-ENTMCNC: 32.1 G/DL (ref 28.4–34.8)
MCV RBC AUTO: 94.5 FL (ref 82.6–102.9)
MCV RBC AUTO: 95.3 FL (ref 82.6–102.9)
MONOCYTES NFR BLD: 0.94 K/UL (ref 0.1–1.2)
MONOCYTES NFR BLD: 0.96 K/UL (ref 0.1–1.2)
MONOCYTES NFR BLD: 7 % (ref 3–12)
MONOCYTES NFR BLD: 8 % (ref 3–12)
NEUTROPHILS NFR BLD: 63 % (ref 36–65)
NEUTROPHILS NFR BLD: 80 % (ref 36–65)
NEUTS SEG NFR BLD: 10.57 K/UL (ref 1.5–8.1)
NEUTS SEG NFR BLD: 8.05 K/UL (ref 1.5–8.1)
NRBC BLD-RTO: 0 PER 100 WBC
NRBC BLD-RTO: 0 PER 100 WBC
PLATELET # BLD AUTO: 171 K/UL (ref 138–453)
PLATELET # BLD AUTO: 230 K/UL (ref 138–453)
PMV BLD AUTO: 12.8 FL (ref 8.1–13.5)
PMV BLD AUTO: 12.9 FL (ref 8.1–13.5)
POTASSIUM SERPL-SCNC: 4.4 MMOL/L (ref 3.7–5.3)
POTASSIUM SERPL-SCNC: 5.1 MMOL/L (ref 3.7–5.3)
RBC # BLD AUTO: 3.43 M/UL (ref 4.21–5.77)
RBC # BLD AUTO: 4.9 M/UL (ref 4.21–5.77)
SODIUM SERPL-SCNC: 137 MMOL/L (ref 136–145)
SODIUM SERPL-SCNC: 140 MMOL/L (ref 136–145)
TROPONIN I SERPL HS-MCNC: 23 NG/L (ref 0–22)
WBC OTHER # BLD: 12.5 K/UL (ref 3.5–11.3)
WBC OTHER # BLD: 13.2 K/UL (ref 3.5–11.3)

## 2024-11-22 PROCEDURE — 3700000000 HC ANESTHESIA ATTENDED CARE: Performed by: SURGERY

## 2024-11-22 PROCEDURE — 3600000004 HC SURGERY LEVEL 4 BASE: Performed by: SURGERY

## 2024-11-22 PROCEDURE — 94640 AIRWAY INHALATION TREATMENT: CPT

## 2024-11-22 PROCEDURE — 2500000003 HC RX 250 WO HCPCS: Performed by: NURSE ANESTHETIST, CERTIFIED REGISTERED

## 2024-11-22 PROCEDURE — 2000000000 HC ICU R&B

## 2024-11-22 PROCEDURE — 36620 INSERTION CATHETER ARTERY: CPT

## 2024-11-22 PROCEDURE — 6370000000 HC RX 637 (ALT 250 FOR IP)

## 2024-11-22 PROCEDURE — 99232 SBSQ HOSP IP/OBS MODERATE 35: CPT | Performed by: STUDENT IN AN ORGANIZED HEALTH CARE EDUCATION/TRAINING PROGRAM

## 2024-11-22 PROCEDURE — 85025 COMPLETE CBC W/AUTO DIFF WBC: CPT

## 2024-11-22 PROCEDURE — 3600000014 HC SURGERY LEVEL 4 ADDTL 15MIN: Performed by: SURGERY

## 2024-11-22 PROCEDURE — 03CH0ZZ EXTIRPATION OF MATTER FROM RIGHT COMMON CAROTID ARTERY, OPEN APPROACH: ICD-10-PCS | Performed by: SURGERY

## 2024-11-22 PROCEDURE — 6360000002 HC RX W HCPCS: Performed by: NURSE ANESTHETIST, CERTIFIED REGISTERED

## 2024-11-22 PROCEDURE — 2580000003 HC RX 258

## 2024-11-22 PROCEDURE — 2720000010 HC SURG SUPPLY STERILE: Performed by: SURGERY

## 2024-11-22 PROCEDURE — 36415 COLL VENOUS BLD VENIPUNCTURE: CPT

## 2024-11-22 PROCEDURE — 2580000003 HC RX 258: Performed by: NURSE ANESTHETIST, CERTIFIED REGISTERED

## 2024-11-22 PROCEDURE — 88304 TISSUE EXAM BY PATHOLOGIST: CPT

## 2024-11-22 PROCEDURE — 2580000003 HC RX 258: Performed by: SURGERY

## 2024-11-22 PROCEDURE — 6360000002 HC RX W HCPCS: Performed by: SURGERY

## 2024-11-22 PROCEDURE — 80048 BASIC METABOLIC PNL TOTAL CA: CPT

## 2024-11-22 PROCEDURE — 2709999900 HC NON-CHARGEABLE SUPPLY: Performed by: SURGERY

## 2024-11-22 PROCEDURE — 3700000001 HC ADD 15 MINUTES (ANESTHESIA): Performed by: SURGERY

## 2024-11-22 PROCEDURE — 84484 ASSAY OF TROPONIN QUANT: CPT

## 2024-11-22 PROCEDURE — 94761 N-INVAS EAR/PLS OXIMETRY MLT: CPT

## 2024-11-22 PROCEDURE — C1889 IMPLANT/INSERT DEVICE, NOC: HCPCS | Performed by: SURGERY

## 2024-11-22 PROCEDURE — 93005 ELECTROCARDIOGRAM TRACING: CPT

## 2024-11-22 PROCEDURE — A4217 STERILE WATER/SALINE, 500 ML: HCPCS | Performed by: SURGERY

## 2024-11-22 PROCEDURE — 6370000000 HC RX 637 (ALT 250 FOR IP): Performed by: STUDENT IN AN ORGANIZED HEALTH CARE EDUCATION/TRAINING PROGRAM

## 2024-11-22 PROCEDURE — C1768 GRAFT, VASCULAR: HCPCS | Performed by: SURGERY

## 2024-11-22 PROCEDURE — 88311 DECALCIFY TISSUE: CPT

## 2024-11-22 PROCEDURE — 35301 RECHANNELING OF ARTERY: CPT | Performed by: SURGERY

## 2024-11-22 DEVICE — XENOSURE BIOLOGIC PATCH, 0.8CM X 8CM, EIFU
Type: IMPLANTABLE DEVICE | Site: CAROTID | Status: FUNCTIONAL
Brand: XENOSURE BIOLOGIC PATCH

## 2024-11-22 RX ORDER — CEFAZOLIN SODIUM 1 G/3ML
INJECTION, POWDER, FOR SOLUTION INTRAMUSCULAR; INTRAVENOUS
Status: DISCONTINUED | OUTPATIENT
Start: 2024-11-22 | End: 2024-11-22 | Stop reason: SDUPTHER

## 2024-11-22 RX ORDER — SODIUM CHLORIDE, SODIUM LACTATE, POTASSIUM CHLORIDE, CALCIUM CHLORIDE 600; 310; 30; 20 MG/100ML; MG/100ML; MG/100ML; MG/100ML
INJECTION, SOLUTION INTRAVENOUS
Status: DISCONTINUED | OUTPATIENT
Start: 2024-11-22 | End: 2024-11-22 | Stop reason: SDUPTHER

## 2024-11-22 RX ORDER — MAGNESIUM HYDROXIDE 1200 MG/15ML
LIQUID ORAL CONTINUOUS PRN
Status: COMPLETED | OUTPATIENT
Start: 2024-11-22 | End: 2024-11-22

## 2024-11-22 RX ORDER — PHENYLEPHRINE HCL IN 0.9% NACL 1 MG/10 ML
SYRINGE (ML) INTRAVENOUS
Status: DISCONTINUED | OUTPATIENT
Start: 2024-11-22 | End: 2024-11-22 | Stop reason: SDUPTHER

## 2024-11-22 RX ORDER — SODIUM CHLORIDE, SODIUM LACTATE, POTASSIUM CHLORIDE, AND CALCIUM CHLORIDE .6; .31; .03; .02 G/100ML; G/100ML; G/100ML; G/100ML
1000 INJECTION, SOLUTION INTRAVENOUS ONCE
Status: COMPLETED | OUTPATIENT
Start: 2024-11-22 | End: 2024-11-22

## 2024-11-22 RX ORDER — NICARDIPINE HYDROCHLORIDE 0.1 MG/ML
2.5-15 INJECTION INTRAVENOUS CONTINUOUS
Status: DISCONTINUED | OUTPATIENT
Start: 2024-11-22 | End: 2024-11-23

## 2024-11-22 RX ORDER — HYDRALAZINE HYDROCHLORIDE 20 MG/ML
5 INJECTION INTRAMUSCULAR; INTRAVENOUS EVERY 6 HOURS PRN
Status: DISCONTINUED | OUTPATIENT
Start: 2024-11-22 | End: 2024-11-24 | Stop reason: HOSPADM

## 2024-11-22 RX ORDER — FENTANYL CITRATE 50 UG/ML
INJECTION, SOLUTION INTRAMUSCULAR; INTRAVENOUS
Status: DISCONTINUED | OUTPATIENT
Start: 2024-11-22 | End: 2024-11-22 | Stop reason: SDUPTHER

## 2024-11-22 RX ORDER — ONDANSETRON 2 MG/ML
INJECTION INTRAMUSCULAR; INTRAVENOUS
Status: DISCONTINUED | OUTPATIENT
Start: 2024-11-22 | End: 2024-11-22 | Stop reason: SDUPTHER

## 2024-11-22 RX ORDER — METOPROLOL SUCCINATE 25 MG/1
25 TABLET, EXTENDED RELEASE ORAL DAILY
Status: DISCONTINUED | OUTPATIENT
Start: 2024-11-22 | End: 2024-11-24 | Stop reason: HOSPADM

## 2024-11-22 RX ORDER — PROPOFOL 10 MG/ML
INJECTION, EMULSION INTRAVENOUS
Status: DISCONTINUED | OUTPATIENT
Start: 2024-11-22 | End: 2024-11-22 | Stop reason: SDUPTHER

## 2024-11-22 RX ORDER — ACETAMINOPHEN 500 MG
1000 TABLET ORAL EVERY 8 HOURS SCHEDULED
Status: DISCONTINUED | OUTPATIENT
Start: 2024-11-22 | End: 2024-11-24 | Stop reason: HOSPADM

## 2024-11-22 RX ORDER — ROCURONIUM BROMIDE 10 MG/ML
INJECTION, SOLUTION INTRAVENOUS
Status: DISCONTINUED | OUTPATIENT
Start: 2024-11-22 | End: 2024-11-22 | Stop reason: SDUPTHER

## 2024-11-22 RX ORDER — PROTAMINE SULFATE 10 MG/ML
INJECTION, SOLUTION INTRAVENOUS
Status: DISCONTINUED | OUTPATIENT
Start: 2024-11-22 | End: 2024-11-22 | Stop reason: SDUPTHER

## 2024-11-22 RX ORDER — SODIUM CHLORIDE 9 MG/ML
INJECTION, SOLUTION INTRAVENOUS
Status: DISCONTINUED | OUTPATIENT
Start: 2024-11-22 | End: 2024-11-22 | Stop reason: SDUPTHER

## 2024-11-22 RX ORDER — DIPHENHYDRAMINE HYDROCHLORIDE 50 MG/ML
INJECTION INTRAMUSCULAR; INTRAVENOUS
Status: DISCONTINUED | OUTPATIENT
Start: 2024-11-22 | End: 2024-11-22 | Stop reason: SDUPTHER

## 2024-11-22 RX ORDER — LIDOCAINE HYDROCHLORIDE 10 MG/ML
INJECTION, SOLUTION EPIDURAL; INFILTRATION; INTRACAUDAL; PERINEURAL
Status: DISCONTINUED | OUTPATIENT
Start: 2024-11-22 | End: 2024-11-22 | Stop reason: SDUPTHER

## 2024-11-22 RX ORDER — OXYCODONE HYDROCHLORIDE 5 MG/1
2.5 TABLET ORAL EVERY 6 HOURS PRN
Status: DISCONTINUED | OUTPATIENT
Start: 2024-11-22 | End: 2024-11-24 | Stop reason: HOSPADM

## 2024-11-22 RX ORDER — SODIUM CHLORIDE, SODIUM LACTATE, POTASSIUM CHLORIDE, CALCIUM CHLORIDE 600; 310; 30; 20 MG/100ML; MG/100ML; MG/100ML; MG/100ML
INJECTION, SOLUTION INTRAVENOUS CONTINUOUS
Status: DISCONTINUED | OUTPATIENT
Start: 2024-11-22 | End: 2024-11-23

## 2024-11-22 RX ORDER — LIDOCAINE HYDROCHLORIDE 10 MG/ML
INJECTION, SOLUTION EPIDURAL; INFILTRATION; INTRACAUDAL; PERINEURAL PRN
Status: DISCONTINUED | OUTPATIENT
Start: 2024-11-22 | End: 2024-11-22 | Stop reason: HOSPADM

## 2024-11-22 RX ORDER — HEPARIN SODIUM 1000 [USP'U]/ML
INJECTION, SOLUTION INTRAVENOUS; SUBCUTANEOUS
Status: DISCONTINUED | OUTPATIENT
Start: 2024-11-22 | End: 2024-11-22 | Stop reason: SDUPTHER

## 2024-11-22 RX ORDER — GLYCOPYRROLATE 1 MG/5 ML
SYRINGE (ML) INTRAVENOUS
Status: DISCONTINUED | OUTPATIENT
Start: 2024-11-22 | End: 2024-11-22 | Stop reason: SDUPTHER

## 2024-11-22 RX ADMIN — Medication 100 MCG: at 15:27

## 2024-11-22 RX ADMIN — LIDOCAINE HYDROCHLORIDE 50 MG: 10 INJECTION, SOLUTION EPIDURAL; INFILTRATION; INTRACAUDAL at 13:43

## 2024-11-22 RX ADMIN — SUGAMMADEX 200 MG: 100 INJECTION, SOLUTION INTRAVENOUS at 16:29

## 2024-11-22 RX ADMIN — SODIUM CHLORIDE, POTASSIUM CHLORIDE, SODIUM LACTATE AND CALCIUM CHLORIDE: 600; 310; 30; 20 INJECTION, SOLUTION INTRAVENOUS at 22:20

## 2024-11-22 RX ADMIN — BUDESONIDE AND FORMOTEROL FUMARATE DIHYDRATE 2 PUFF: 80; 4.5 AEROSOL RESPIRATORY (INHALATION) at 09:57

## 2024-11-22 RX ADMIN — Medication 100 MCG: at 14:38

## 2024-11-22 RX ADMIN — ROCURONIUM BROMIDE 20 MG: 10 INJECTION, SOLUTION INTRAVENOUS at 15:28

## 2024-11-22 RX ADMIN — SODIUM CHLORIDE, POTASSIUM CHLORIDE, SODIUM LACTATE AND CALCIUM CHLORIDE 1000 ML: 600; 310; 30; 20 INJECTION, SOLUTION INTRAVENOUS at 21:19

## 2024-11-22 RX ADMIN — Medication 100 MCG: at 16:03

## 2024-11-22 RX ADMIN — PROPOFOL 100 MG: 10 INJECTION, EMULSION INTRAVENOUS at 13:43

## 2024-11-22 RX ADMIN — METOPROLOL SUCCINATE 25 MG: 25 TABLET, EXTENDED RELEASE ORAL at 11:39

## 2024-11-22 RX ADMIN — ROCURONIUM BROMIDE 50 MG: 10 INJECTION, SOLUTION INTRAVENOUS at 13:43

## 2024-11-22 RX ADMIN — Medication 0.2 MG: at 15:33

## 2024-11-22 RX ADMIN — SODIUM CHLORIDE: 9 INJECTION, SOLUTION INTRAVENOUS at 13:27

## 2024-11-22 RX ADMIN — SODIUM CHLORIDE, PRESERVATIVE FREE 10 ML: 5 INJECTION INTRAVENOUS at 20:44

## 2024-11-22 RX ADMIN — ROSUVASTATIN CALCIUM 10 MG: 20 TABLET, FILM COATED ORAL at 20:43

## 2024-11-22 RX ADMIN — CEFAZOLIN 2 G: 1 INJECTION, POWDER, FOR SOLUTION INTRAMUSCULAR; INTRAVENOUS at 14:07

## 2024-11-22 RX ADMIN — ROCURONIUM BROMIDE 20 MG: 10 INJECTION, SOLUTION INTRAVENOUS at 14:09

## 2024-11-22 RX ADMIN — Medication 100 MCG: at 14:40

## 2024-11-22 RX ADMIN — CLOPIDOGREL BISULFATE 75 MG: 75 TABLET ORAL at 09:14

## 2024-11-22 RX ADMIN — ACETAMINOPHEN 1000 MG: 500 TABLET ORAL at 20:44

## 2024-11-22 RX ADMIN — Medication 100 MCG: at 15:26

## 2024-11-22 RX ADMIN — FENTANYL CITRATE 100 MCG: 0.05 INJECTION, SOLUTION INTRAMUSCULAR; INTRAVENOUS at 13:43

## 2024-11-22 RX ADMIN — HEPARIN SODIUM 9000 UNITS: 1000 INJECTION INTRAVENOUS; SUBCUTANEOUS at 14:38

## 2024-11-22 RX ADMIN — BUDESONIDE AND FORMOTEROL FUMARATE DIHYDRATE 2 PUFF: 80; 4.5 AEROSOL RESPIRATORY (INHALATION) at 21:16

## 2024-11-22 RX ADMIN — PROTAMINE SULFATE 50 MG: 10 INJECTION, SOLUTION INTRAVENOUS at 15:37

## 2024-11-22 RX ADMIN — FENTANYL CITRATE 50 MCG: 0.05 INJECTION, SOLUTION INTRAMUSCULAR; INTRAVENOUS at 14:02

## 2024-11-22 RX ADMIN — SODIUM CHLORIDE, POTASSIUM CHLORIDE, SODIUM LACTATE AND CALCIUM CHLORIDE 1000 ML: 600; 310; 30; 20 INJECTION, SOLUTION INTRAVENOUS at 17:55

## 2024-11-22 RX ADMIN — Medication 3 MG: at 20:43

## 2024-11-22 RX ADMIN — DIPHENHYDRAMINE HYDROCHLORIDE 12.5 MG: 50 INJECTION INTRAMUSCULAR; INTRAVENOUS at 14:11

## 2024-11-22 RX ADMIN — SODIUM CHLORIDE, POTASSIUM CHLORIDE, SODIUM LACTATE AND CALCIUM CHLORIDE: 600; 310; 30; 20 INJECTION, SOLUTION INTRAVENOUS at 13:56

## 2024-11-22 RX ADMIN — ONDANSETRON 4 MG: 2 INJECTION INTRAMUSCULAR; INTRAVENOUS at 16:01

## 2024-11-22 RX ADMIN — Medication 100 MCG: at 16:01

## 2024-11-22 RX ADMIN — ASPIRIN 81 MG: 81 TABLET, CHEWABLE ORAL at 09:14

## 2024-11-22 RX ADMIN — PHENYLEPHRINE HYDROCHLORIDE 24 MCG/MIN: 10 INJECTION INTRAVENOUS at 14:24

## 2024-11-22 RX ADMIN — Medication 100 MCG: at 16:06

## 2024-11-22 RX ADMIN — OXYCODONE HYDROCHLORIDE 2.5 MG: 5 TABLET ORAL at 18:24

## 2024-11-22 RX ADMIN — SODIUM CHLORIDE, PRESERVATIVE FREE 10 ML: 5 INJECTION INTRAVENOUS at 09:15

## 2024-11-22 RX ADMIN — Medication 100 MCG: at 16:09

## 2024-11-22 RX ADMIN — SODIUM CHLORIDE, POTASSIUM CHLORIDE, SODIUM LACTATE AND CALCIUM CHLORIDE: 600; 310; 30; 20 INJECTION, SOLUTION INTRAVENOUS at 19:26

## 2024-11-22 ASSESSMENT — PAIN DESCRIPTION - ORIENTATION: ORIENTATION: RIGHT

## 2024-11-22 ASSESSMENT — PAIN SCALES - GENERAL
PAINLEVEL_OUTOF10: 8
PAINLEVEL_OUTOF10: 0

## 2024-11-22 ASSESSMENT — PAIN - FUNCTIONAL ASSESSMENT: PAIN_FUNCTIONAL_ASSESSMENT: ACTIVITIES ARE NOT PREVENTED

## 2024-11-22 ASSESSMENT — PAIN DESCRIPTION - DESCRIPTORS: DESCRIPTORS: ACHING

## 2024-11-22 ASSESSMENT — PAIN DESCRIPTION - LOCATION: LOCATION: NECK

## 2024-11-22 NOTE — ANESTHESIA PROCEDURE NOTES
Arterial Line:    An arterial line was placed using ultrasound guidance, in the OR for the following indication(s): continuous blood pressure monitoring and blood sampling needed.    A 20 gauge (size), 1 and 3/4 inch (length), Arrow (type) catheter was placed, Seldinger technique used, into the left radial artery, secured by Tegaderm.  Anesthesia type: General    Events:  patient tolerated procedure well with no complications.11/22/2024 1:46 PM11/22/2024 1:49 PM  Anesthesiologist: Milton Godinez MD  Performed: Anesthesiologist   Preanesthetic Checklist  Completed: patient identified, IV checked, site marked, risks and benefits discussed, surgical/procedural consents, equipment checked, anesthesia consent given and monitors applied/VS acknowledged

## 2024-11-22 NOTE — PROGRESS NOTES
Patient admitted, consent signed and questions answered.  Call light to reach with side rails up 2 of 2.  RN at bedside with patient.  History and physical to be updated. VS & Pulses obtained and documented. Will continue to monitor. Anesthesia at bedside to do consent with patient. Also, made anesthesia aware patients Cr is 1.5. An additional 20g  IV placed.

## 2024-11-22 NOTE — OP NOTE
Operative Note      Patient: Ger Mccray  YOB: 1940  MRN: [id number]    Date of Procedure: 11/22/2024    Pre-Op Diagnosis Codes:      * Carotid stenosis, right [I65.21]    Post-Op Diagnosis: Same       Procedure:  Right carotid endarterectomy.    Surgeon(s):  Amauri Sy MD    Assistant:   * No surgical staff found *    Anesthesia: General    Estimated Blood Loss (mL): 200     Complications: None    Specimens:   ID Type Source Tests Collected by Time Destination   A : RIGHT CAROTID PLAQUE Tissue Carotid Arteries SURGICAL PATHOLOGY Amauri Sy MD 11/22/2024 1434        Implants:  Implant Name Type Inv. Item Serial No.  Lot No. LRB No. Used Action   GRAFT VASC W0.8XL8CM THK0.35-0.75MM CAR PERICARD PROC BOV - [id number] Vascular grafts GRAFT VASC W0.8XL8CM THK0.35-0.75MM CAR PERICARD PROC BOV  [employer] [id number] 1 Implanted         Drains: * No LDAs found *    Findings:  Infection Present At Time Of Surgery (PATOS) (choose all levels that have infection present):  No infection present  Other Findings: The carotid bifurcation was slightly high.  The plaque did produce an 80% stenosis at least.  It was granular in nature with heterogeneous mix of both soft and granular cauliflower type plaque.  There was a minimal amount of thrombus/platelet aggregate at the distal end of the plaque.  Duplex ultrasonography done after patch angioplasty revealed a peak systolic velocity at the distal internal carotid artery above the level of the repair measuring approximately 40 cm/s with a monophasic waveform.  There were no intimal flaps.  There were no clamp injuries.  There was no back wall debris.  There was [id number] color filling.  The external carotid was patent.  The patient woke moving all 4 extremities without evidence of neurologic injury.    Detailed Description of Procedure:   The patient was brought to the operating room and placed  allowed to backbleed and de-aired.  It was then used to irrigate the common carotid stump to allow any debris or air evacuated from that location.  It was fastened in place with a Jeremie shunt clamp in the common carotid artery.  A Michelle tourniquet was then used to fasten the proximal portion of the shunt in place by advancing the shunt in a retrograde fashion down the common carotid artery and pulling the Michelle tourniquet taut around the shunt.  The proximal Jeremie shunt clamp was then removed.  The shunt was inspected for flow with continuous-wave Doppler and was normal.  A dental freer was used with forceps to remove the plaque in a standard endarterectomy technique.  Endpoints were established by gentle traction and with Fong scissors.  Excellent endpoints were achieved both proximally and distally.  Forced heparin saline was then used to inspect the back wall and any debris was then removed with ring forceps.    The patch was then fastened in place at the distal apex and a 6-0 Prolene with a C1 needle was used for the anastomosis of the patch to the arterial wall on both sides of the arteriotomy.  It was closed down to the level of the shoulder of the shunt.  The patch was then cut to length and fastened proximally with another 6 oh in the medial wall was closed in its entirety.  The lateral wall was closed up to the level of the shunt.  Both ends of the suture were placed to rubber-shod clamps on the lateral aspect.  The shunt was then clamped with a hemostat and removed from the distal internal carotid artery.  Gentle backbleeding was allowed before clamping the distal internal carotid artery with a Jerrell bulldog clamp once again.  It was then removed from the proximal common carotid artery and a short blast of blood was allowed before clamping the common carotid artery with a Jerrell bulldog clamp.  The shunt was delivered from the wound.  Copious heparin saline irrigation was used to irrigate the  entirety of the repair and there was no debris or air.  The remainder of the patch was then fastened up to about the last stitch at which point the distal internal carotid clamp was flashed to allow backbleeding and any air to be removed.  The arteriotomy was closed in its entirety and tied.  The distal internal carotid was flashed once again and reclamped to pressurize the arteriotomy.  The common carotid clamp and external carotid clamp were removed.  10 heartbeats were allowed to elapse before removing the distal internal carotid artery clamp.  Duplex ultrasonography was then performed and the findings were as described above.  The patient was given a reversal dose of protamine and FloSeal was then established around the repair.  5 minutes were allowed to elapse to inspect for any bleeding of which there was none.  The wound was then closed in 2 layers using 4-0 Vicryl in an interrupted simple style at the level of the platysma and 4-0 nylon in an interrupted vertical mattress style at the level of the skin.  This was done over a 7 flat Donavon-Quinteros drain.  The drain had minimal drainage.  The patient was awakened extubated and returned to the recovery room in stable condition moving all 4 extremities with no apparent neurologic deficit.  The patient tolerated this procedure well and there were no apparent complications.  The patient will remain in the unit until at least tomorrow morning and likely be discharged thereafter.    Electronically signed by Amauri Sy MD on 11/22/2024 at 4:12 PM

## 2024-11-22 NOTE — PLAN OF CARE
Problem: Chronic Conditions and Co-morbidities  Goal: Patient's chronic conditions and co-morbidity symptoms are monitored and maintained or improved  11/22/2024 0403 by Kiana Gould RN  Outcome: Progressing  11/21/2024 1822 by Lakesha Méndez RN  Outcome: Progressing     Problem: Safety - Adult  Goal: Free from fall injury  11/22/2024 0403 by Kiana Gould RN  Outcome: Progressing  11/21/2024 1822 by Lakesha Méndez RN  Outcome: Progressing     Problem: Pain  Goal: Verbalizes/displays adequate comfort level or baseline comfort level  11/22/2024 0403 by Kiana Gould RN  Outcome: Progressing  11/21/2024 1822 by Lakesha Méndez RN  Outcome: Progressing     Problem: Skin/Tissue Integrity  Goal: Absence of new skin breakdown  Description: 1.  Monitor for areas of redness and/or skin breakdown  2.  Assess vascular access sites hourly  3.  Every 4-6 hours minimum:  Change oxygen saturation probe site  4.  Every 4-6 hours:  If on nasal continuous positive airway pressure, respiratory therapy assess nares and determine need for appliance change or resting period.  11/22/2024 0403 by Kiana Gould RN  Outcome: Progressing  11/21/2024 1822 by Lakesha Méndez RN  Outcome: Progressing     Problem: ABCDS Injury Assessment  Goal: Absence of physical injury  Outcome: Progressing

## 2024-11-22 NOTE — PROGRESS NOTES
[facility] Neurology   IN-PATIENT SERVICE   [facility]    Progress Note             Date:   11/22/2024  Patient name:  Ger Mccray  Date of admission:  11/20/2024  4:42 PM  MRN:   [id number]  Account:  [id number]  YOB: 1940  PCP:    Alin Hayes MD  Room:   [address] [place] [address]  Code Status:    Full Code    Chief Complaint:     Transient left-sided weakness and numbness    Interval hx:     The patient was seen and examined at bedside. Is vitally stable, alert and oriented x 3. No acute events overnight.    Denies any weakness or numbness this morning    Vital signs were stable, permissive hypertension per vascular    Patient is n.p.o.  Plan for CEA today at 10:30 AM  Patient will be transferred to ICU afterwards    EEG yesterday was unremarkable      Brief History of Present Illness:     Per notes   The patient is a 84 y.o.  Non- / non  male who was transferred from [facility] after MRI showed acute to subacute right frontal parietal occipital infarcts.  Started on aspirin, Plavix for 21 days. Seen by telestroke 11/18 Dr Gtz for headache, syncope with LOC.  No focal neurodeficits, NIH of 0.  CT head unremarkable, CTA limited by motion artifact and poor contrast bolusing.  Was continued on aspirin and started on Plavix for 21 days.  MRI 1119 showed scattered small acute to subacute infarctions in the right frontal parietooccipital lobes and the right insula cortex, mild chronic periventricular small vessel disease.  11/18 carotid Doppler shows 70% stenosis of right ICA, mild less than 50% stenosis in the left ICA.  Vascular surgery was consulted at [facility] and plan to schedule patient for carotid endarterectomy.  Patient states his left upper extremity went numb for three days is now resoloved as of last night. Symptoms associated with decreased left hand dexterity, reports the numbness up to the level of the elbow. 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      Follow-up further recommendations after discussing case with the attending.  The plan was discussed with the patient, patient's family and the medical staff.   Consultations:   IP CONSULT TO VASCULAR ACCESS TEAM  IP CONSULT TO CASE MANAGEMENT  IP CONSULT TO STROKE TEAM  IP CONSULT TO SOCIAL WORK  IP CONSULT TO VASCULAR SURGERY    Patient is admitted as inpatient status because of co-morbidities listed above, severity of signs and symptoms as outlined, requirement for current medical therapies and most importantly because of direct risk to patient if care not provided in a hospital setting.    Kimberly Banuelos MD  Neurology Resident PGY-4   11/22/2024  9:25 AM    Copy sent to Dr. Hayes, Alin Mujica MD

## 2024-11-22 NOTE — ANESTHESIA PRE PROCEDURE
Department of Anesthesiology  Preprocedure Note       Name:  Ger Mccray   Age:  84 y.o.  :  1940                                          MRN:  [id number]         Date:  2024      Surgeon: Surgeon(s):  Amauri Sy MD    Procedure: Procedure(s):  CAROTID ENDARTERECTOMY    Medications prior to admission:   Prior to Admission medications    Medication Sig Start Date End Date Taking? Authorizing Provider   JANUVIA 50 MG tablet TAKE 1 TABLET BY MOUTH DAILY 24   Alin Hayes MD   rosuvastatin (CRESTOR) 10 MG tablet TAKE 1 TABLET BY MOUTH DAILY 24   Alin Hayes MD   lisinopril (PRINIVIL;ZESTRIL) 2.5 MG tablet TAKE 1 TABLET BY MOUTH DAILY 24   Aye Tapia APRN - NP   fluticasone (FLONASE) 50 MCG/ACT nasal spray 1 spray by Each Nostril route daily  Patient not taking: Reported on 2024 10/17/23   Socrates Costa APRN - NP   metoprolol succinate (TOPROL XL) 25 MG extended release tablet Take 1 tablet by mouth daily    Kirstie Chu MD   nitroGLYCERIN (NITROSTAT) 0.4 MG SL tablet Place 1 tablet under the tongue every 5 minutes as needed for Chest pain up to max of 3 total doses. If no relief after 1 dose, call 911.  Patient not taking: Reported on 2024    Kirstie Chu MD   Fluticasone-Salmeterol 232-14 MCG/ACT AEPB Inhale 1 puff into the lungs 2 times daily  20   Kirstie Chu MD   aspirin 81 MG chewable tablet Take 1 tablet by mouth daily    Kirstie Chu MD   albuterol sulfate HFA (PROVENTIL;VENTOLIN;PROAIR) 108 (90 Base) MCG/ACT inhaler Inhale 2 puffs into the lungs every 6 hours as needed for Wheezing    Kirstie Chu MD   therapeutic multivitamin-minerals (THERAGRAN-M) tablet Take 1 tablet by mouth daily OTC    Alin Hayes MD       Current medications:    Current Facility-Administered Medications   Medication Dose Route Frequency Provider Last Rate Last Admin   • metoprolol succinate (TOPROL XL)

## 2024-11-22 NOTE — PROGRESS NOTES
Division of Vascular Surgery        Progress Note    Name: Ger Mccray  MRN: [id number]       Overnight Events:     None    Subjective:     Ger Mccray is a 84 y.o. gentleman who initially presented to [facility] ED with left sided numbness and paresthesias. He underwent stroke workup including MRI brain showing acute to subacute right frontal, parietal, and occipital infarcts.  Patient seen by Dr. Sy at [facility] as carotid duplex on 11/18 showed greater than 80% stenosis of the right internal carotid artery.  He was transferred from [facility] to [facility] for vascular surgery intervention.  Since he was seen patient's numbness and paresthesias have resolved.  Denies any dysphagia, amaurosis fugax, fatigue, facial droop, or slurred speech.  Has a history of TIA about 15 years ago but no intervention.  Denies lower extremity claudication.  Has never seen a vascular surgeon before.  Takes aspirin and Plavix daily.     Medical history includes CAD status post PCI with stenting, CKD, type 2 diabetes, and COPD.    Patient was seen and examined at bedside this morning. He is doing well with no changes overnight. Patient is aware of surgery today with vascular. He is NPO. No acute motor or sensation changes.    Physical Exam:     Vitals:  /89   Pulse 65   Temp 97.8 °F (36.6 °C) (Oral)   Resp 15   SpO2 96%     General appearance - alert, well appearing and in no acute distress  Mental status - oriented to person, place and time with normal affect  Head - normocephalic and atraumatic  Neck - supple, no carotid bruits, thyroid not palpable, no JVD  Chest - clear to auscultation, normal effort  Heart - normal rate, regular rhythm, no murmurs  Abdomen - soft, non-tender, non-distended, bowel sounds present all four quadrants, no masses  Neurological - normal speech, no focal findings or movement disorder noted, cranial nerves II through XII grossly intact  Extremities - peripheral pulses  [place]

## 2024-11-22 NOTE — PROGRESS NOTES
[facility]  Speech Language Pathology    Date: 11/22/2024  Patient Name: Ger Mccray  YOB: 1940   AGE: 84 y.o.  MRN: [id number]        Patient Not Available for Speech Therapy     Due to:  [] Testing  [] Hemodialysis  [] Cancelled by RN  [x] Surgery   [] Intubation/Sedation/Pain Medication  [] Medical instability  [x] Other: Attempted pt. In PM. Pt. Is off the floor at this time for a Carotid Endarterectomy.    Next scheduled treatment: 11/25    Completed by Grace Zuniga  Clinician    Co-signed by Mildred Avalos M.A.CCC/SLP

## 2024-11-23 LAB
ANION GAP SERPL CALCULATED.3IONS-SCNC: 7 MMOL/L (ref 9–16)
BASOPHILS # BLD: 0.03 K/UL (ref 0–0.2)
BASOPHILS NFR BLD: 0 % (ref 0–2)
BUN SERPL-MCNC: 22 MG/DL (ref 8–23)
CALCIUM SERPL-MCNC: 8.4 MG/DL (ref 8.6–10.4)
CHLORIDE SERPL-SCNC: 109 MMOL/L (ref 98–107)
CO2 SERPL-SCNC: 23 MMOL/L (ref 20–31)
CREAT SERPL-MCNC: 1.4 MG/DL (ref 0.7–1.2)
EKG ATRIAL RATE: 58 BPM
EKG P AXIS: 46 DEGREES
EKG P-R INTERVAL: 212 MS
EKG Q-T INTERVAL: 422 MS
EKG QRS DURATION: 102 MS
EKG QTC CALCULATION (BAZETT): 414 MS
EKG R AXIS: -23 DEGREES
EKG T AXIS: 45 DEGREES
EKG VENTRICULAR RATE: 58 BPM
EOSINOPHIL # BLD: 0.28 K/UL (ref 0–0.44)
EOSINOPHILS RELATIVE PERCENT: 2 % (ref 1–4)
ERYTHROCYTE [DISTWIDTH] IN BLOOD BY AUTOMATED COUNT: 13.9 % (ref 11.8–14.4)
GFR, ESTIMATED: 50 ML/MIN/1.73M2
GLUCOSE SERPL-MCNC: 120 MG/DL (ref 74–99)
HCT VFR BLD AUTO: 34.3 % (ref 40.7–50.3)
HGB BLD-MCNC: 11.1 G/DL (ref 13–17)
IMM GRANULOCYTES # BLD AUTO: 0.07 K/UL (ref 0–0.3)
IMM GRANULOCYTES NFR BLD: 1 %
LYMPHOCYTES NFR BLD: 1.85 K/UL (ref 1.1–3.7)
LYMPHOCYTES RELATIVE PERCENT: 16 % (ref 24–43)
MCH RBC QN AUTO: 30.5 PG (ref 25.2–33.5)
MCHC RBC AUTO-ENTMCNC: 32.4 G/DL (ref 28.4–34.8)
MCV RBC AUTO: 94.2 FL (ref 82.6–102.9)
MONOCYTES NFR BLD: 1.04 K/UL (ref 0.1–1.2)
MONOCYTES NFR BLD: 9 % (ref 3–12)
NEUTROPHILS NFR BLD: 72 % (ref 36–65)
NEUTS SEG NFR BLD: 8.56 K/UL (ref 1.5–8.1)
NRBC BLD-RTO: 0 PER 100 WBC
PLATELET # BLD AUTO: 176 K/UL (ref 138–453)
PMV BLD AUTO: 12.6 FL (ref 8.1–13.5)
POTASSIUM SERPL-SCNC: 4.6 MMOL/L (ref 3.7–5.3)
RBC # BLD AUTO: 3.64 M/UL (ref 4.21–5.77)
SODIUM SERPL-SCNC: 139 MMOL/L (ref 136–145)
WBC OTHER # BLD: 11.8 K/UL (ref 3.5–11.3)

## 2024-11-23 PROCEDURE — 3E043XZ INTRODUCTION OF VASOPRESSOR INTO CENTRAL VEIN, PERCUTANEOUS APPROACH: ICD-10-PCS | Performed by: SURGERY

## 2024-11-23 PROCEDURE — 93010 ELECTROCARDIOGRAM REPORT: CPT | Performed by: INTERNAL MEDICINE

## 2024-11-23 PROCEDURE — 97530 THERAPEUTIC ACTIVITIES: CPT

## 2024-11-23 PROCEDURE — 94761 N-INVAS EAR/PLS OXIMETRY MLT: CPT

## 2024-11-23 PROCEDURE — 6360000002 HC RX W HCPCS

## 2024-11-23 PROCEDURE — 97162 PT EVAL MOD COMPLEX 30 MIN: CPT

## 2024-11-23 PROCEDURE — 2580000003 HC RX 258

## 2024-11-23 PROCEDURE — 99232 SBSQ HOSP IP/OBS MODERATE 35: CPT | Performed by: STUDENT IN AN ORGANIZED HEALTH CARE EDUCATION/TRAINING PROGRAM

## 2024-11-23 PROCEDURE — 6370000000 HC RX 637 (ALT 250 FOR IP)

## 2024-11-23 PROCEDURE — 1200000000 HC SEMI PRIVATE

## 2024-11-23 PROCEDURE — 97110 THERAPEUTIC EXERCISES: CPT

## 2024-11-23 PROCEDURE — 85025 COMPLETE CBC W/AUTO DIFF WBC: CPT

## 2024-11-23 PROCEDURE — 99232 SBSQ HOSP IP/OBS MODERATE 35: CPT | Performed by: RADIOLOGY

## 2024-11-23 PROCEDURE — 80048 BASIC METABOLIC PNL TOTAL CA: CPT

## 2024-11-23 PROCEDURE — 94640 AIRWAY INHALATION TREATMENT: CPT

## 2024-11-23 PROCEDURE — 2700000000 HC OXYGEN THERAPY PER DAY

## 2024-11-23 PROCEDURE — 97535 SELF CARE MNGMENT TRAINING: CPT

## 2024-11-23 RX ORDER — DOPAMINE HYDROCHLORIDE 160 MG/100ML
1-20 INJECTION, SOLUTION INTRAVENOUS CONTINUOUS
Status: DISCONTINUED | OUTPATIENT
Start: 2024-11-23 | End: 2024-11-23

## 2024-11-23 RX ADMIN — SODIUM CHLORIDE, PRESERVATIVE FREE 10 ML: 5 INJECTION INTRAVENOUS at 20:40

## 2024-11-23 RX ADMIN — BUDESONIDE AND FORMOTEROL FUMARATE DIHYDRATE 2 PUFF: 80; 4.5 AEROSOL RESPIRATORY (INHALATION) at 08:22

## 2024-11-23 RX ADMIN — SODIUM CHLORIDE, POTASSIUM CHLORIDE, SODIUM LACTATE AND CALCIUM CHLORIDE: 600; 310; 30; 20 INJECTION, SOLUTION INTRAVENOUS at 04:57

## 2024-11-23 RX ADMIN — SODIUM CHLORIDE, PRESERVATIVE FREE 10 ML: 5 INJECTION INTRAVENOUS at 08:39

## 2024-11-23 RX ADMIN — ENOXAPARIN SODIUM 40 MG: 100 INJECTION SUBCUTANEOUS at 08:39

## 2024-11-23 RX ADMIN — ASPIRIN 81 MG: 81 TABLET, CHEWABLE ORAL at 08:39

## 2024-11-23 RX ADMIN — DOPAMINE HYDROCHLORIDE 2.5 MCG/KG/MIN: 160 INJECTION, SOLUTION INTRAVENOUS at 00:33

## 2024-11-23 RX ADMIN — BUDESONIDE AND FORMOTEROL FUMARATE DIHYDRATE 2 PUFF: 80; 4.5 AEROSOL RESPIRATORY (INHALATION) at 20:23

## 2024-11-23 RX ADMIN — ROSUVASTATIN CALCIUM 10 MG: 20 TABLET, FILM COATED ORAL at 20:37

## 2024-11-23 RX ADMIN — Medication 3 MG: at 20:37

## 2024-11-23 RX ADMIN — ACETAMINOPHEN 1000 MG: 500 TABLET ORAL at 06:05

## 2024-11-23 RX ADMIN — ACETAMINOPHEN 1000 MG: 500 TABLET ORAL at 14:19

## 2024-11-23 RX ADMIN — ACETAMINOPHEN 1000 MG: 500 TABLET ORAL at 20:37

## 2024-11-23 ASSESSMENT — PAIN SCALES - GENERAL: PAINLEVEL_OUTOF10: 2

## 2024-11-23 NOTE — PROGRESS NOTES
Physical Therapy  Facility/Department: [facility] CAR 1- SICU  Physical Therapy Initial Assessment    Name: Ger Mccray  : 1940  MRN: [id number]  Date of Service: 2024    Expand All Collapse All    [facility] Neurology   IN-PATIENT SERVICE   [facility]     HISTORY AND PHYSICAL EXAMINATION            Date:                            2024  Patient name:              Ger Mccray  Date of admission:      2024  4:42 PM  MRN:                           [id number]  Account:                      [id number]  YOB: 1940  PCP:                            Alin Hayes MD  Room:                          [facility]0440-  Code Status:               Full Code        History of Present Illness:      The patient is a 84 y.o.  Non- / non  male who was transferred from [facility] after MRI showed acute to subacute right frontal parietal occipital infarcts.  Started on aspirin, Plavix for 21 days. Seen by telestroke  Dr Gtz for headache, syncope with LOC.  No focal neurodeficits, NIH of 0.  CT head unremarkable, CTA limited by motion artifact and poor contrast bolusing.  Was continued on aspirin and started on Plavix for 21 days.  MRI 1119 showed scattered small acute to subacute infarctions in the right frontal parietooccipital lobes and the right insula cortex, mild chronic periventricular small vessel disease.   carotid Doppler shows 70% stenosis of right ICA, mild less than 50% stenosis in the left ICA.  Vascular surgery was consulted at [facility] and plan to schedule patient for carotid endarterectomy.  Patient states his left upper extremity went numb for three days is now resoloved as of last night. Symptoms associated with decreased left hand dexterity, reports the numbness up to the level of the elbow. He stated he felt \"funny\" this past week and [facility] notes document dizziness  however upon further  clarification, patient states he had multiple episodes of intense fear/impending doom over the past week. Denies LOC, headache, transient weakness of any facial droop to his knowledge.  Denies history of migraines, seizures. Reports history of TIA over 15 years ago, was driving to work and felt funny and no recollection of the event. Does remember an aura of intense fear/impending doom that is similar to episodes this past week.    Pt underwent R CEA 11/22/24    Discharge Recommendations:  Further therapy recommended at discharge.        PT Equipment Recommendations  Equipment Needed: Yes (pt has rwalker at home)      Patient Diagnosis(es): The encounter diagnosis was Carotid stenosis, right.  Past Medical History:  has a past medical history of Anemia, Anesthesia, Arthritis, ASHD (arteriosclerotic heart disease), Asthma, CAD (coronary artery disease), Cerebral artery occlusion with cerebral infarction (HCC), Chronic kidney disease, Chronic obstructive pulmonary disease, unspecified ([facility]), COPD (chronic obstructive pulmonary disease) ([facility]), COVID-19 vaccine series completed, Degenerative joint disease, Diabetes mellitus ([facility]), ED (erectile dysfunction), Full dentures, Gout, History of carpal tunnel syndrome, History of colon polyps, History of heart attack, History of hemorrhoids, History of TIA (transient ischemic attack), Hypercholesteremia, Hypertension, Hyperuricemia, Leukocytosis, Renal cyst, Sciatic nerve pain, Sleep apnea, and Wears glasses.  Past Surgical History:  has a past surgical history that includes Coronary angioplasty with stent (1994 x1 stent,1998 x2 stents,2009 x1,); Colonoscopy (05/21/2008); Shoulder arthroscopy (Right, 05/2002 repair); Carpal tunnel release (Right, 11/2002); shoulder surgery (Left, 02/24/2002); Cataract removal with implant (Left, 03/02/2012); Cataract removal with implant (Right, 03/05/2012); eye surgery; Blepharoplasty (Bilateral, 04/02/2012); Total knee arthroplasty (Left,  15  SpO2: 95 %  O2 Device: None (Room air)  BP: 112/61  MAP (Calculated): 78  BP Location: Arterial  BP Method: Automatic  Patient Position: Left side     Observation/Palpation  Posture: Fair  Observation: antalgic gait; per spouse, \"this is fairly normal for him\"; pt states he feels he's 70% of his normal       AROM RLE (degrees)  RLE AROM: WFL  AROM LLE (degrees)  LLE AROM : WFL  AROM RUE (degrees)  RUE AROM : WFL  AROM LUE (degrees)  LUE AROM : WFL  Strength RLE  Strength RLE: WFL  Strength LLE  Strength LLE: WFL  Strength RUE  Strength RUE: WFL  Strength LUE  Strength LUE: WFL        Balance  Sitting: Without support (Pt tolerated approx 8-10 min static/dynamic sitting unsupported SUP)  Standing: With support (CGA static standing and dynamic standing for marching in place utilizing RW demo 0 LOB. Pt denied c/o dizziness with BP 91/52 and Maps decreasing to 65)  Bed mobility  Bed Mobility Comments: pt up in chair upon PT arrival and retired to chair at end of PT session  Transfers  Sit to Stand: Stand by assistance  Stand to Sit: Stand by assistance  Stand Pivot Transfers: Contact guard assistance  Comment: Pt transfers with and without RW. Cues for hands placement using RW  Ambulation  Surface: Level tile  Device: No Device  Assistance: Minimal assistance  Quality of Gait: takes small, choppy steps, antalgic gait  Gait Deviations: Slow Bertha;Decreased step length;Decreased step height;Decreased arm swing;Decreased head and trunk rotation;Shuffles  Distance: 120'x1  More Ambulation?: Yes  Ambulation 2  Surface - 2: level tile  Device 2: Rolling Walker  Assistance 2: Supervision  Quality of Gait 2: improved step length and speed with rwalker  Gait Deviations: Decreased arm swing;Decreased head and trunk rotation  Distance: 120'x1  Stairs/Curb  Stairs?: No     Balance  Posture: Fair  Sitting - Static: Good  Sitting - Dynamic: Good  Standing - Static: Fair  Standing - Dynamic: Fair;-  Comments: standing balance  w/o device  A/AROM Exercises: AROM x 4  Breathing Techniques: use of IS--pt consistently able to achieve 2000 cc's     AM-PAC - Mobility    AM-PAC Basic Mobility - Inpatient   How much help is needed turning from your back to your side while in a flat bed without using bedrails?: None  How much help is needed moving from lying on your back to sitting on the side of a flat bed without using bedrails?: A Little  How much help is needed moving to and from a bed to a chair?: A Little  How much help is needed standing up from a chair using your arms?: A Little  How much help is needed walking in hospital room?: A Little  How much help is needed climbing 3-5 steps with a railing?: A Little  AM-[facility] Inpatient Mobility Raw Score : 19  AM-PAC Inpatient T-Scale Score : 45.44  Mobility Inpatient CMS 0-100% Score: 41.77  Mobility Inpatient CMS G-Code Modifier : CK         Goals  Short Term Goals  Time Frame for Short Term Goals: 6 visits  Short Term Goal 1: independent bed mobility  Short Term Goal 2: independent transfers  Short Term Goal 3: independent gait with rw x 100'  Short Term Goal 4: Pt to navigate 4-5 stairs with bilat rails and SBA  Patient Goals   Patient Goals : walk better       Education  Patient Education  Education Given To: Patient;Family  Education Provided: Role of Therapy;Plan of Care;Transfer Training;Fall Prevention Strategies;Equipment;Home Exercise Program;Family Education  Education Method: Demonstration;Verbal;Teach Back  Barriers to Learning: None  Education Outcome: Demonstrated understanding;Verbalized understanding;Continued education needed      Therapy Time   Individual Concurrent Group Co-treatment   Time In 1428         Time Out 1508         Minutes 40         Timed Code Treatment Minutes: 24 Minutes       Geovani Matthews, PT

## 2024-11-23 NOTE — PROGRESS NOTES
Division of Vascular Surgery             Progress Note      Name: Ger Mccray  MRN: [id number]         Overnight Events:     Patient became hypotensive in the 80s systolic last night with bradycardia. He was started on dopamine drip with good response into the 110s systolic. No focal weakness or signs of ischemia noted      Subjective:     Patient was examined sitting in chair. Had persistent hypotension last night while sleeping and was placed on dopamine drip with appropriate response in BP and HR. He remains normotensive with normal heart rate this AM, dopamine drip was stopped. Patient is POD#1 from CEA. Notes some tenderness to his R neck but no drainage or soaking of his bandage. Drain was removed today. He is tolerating food and drink appropriately. Patient denies CP, SOB, weakness, numbness/tingling, N/V, and fever/chills.    Physical Exam:     Vitals:  BP (!) 105/54   Pulse 61   Temp 98.6 °F (37 °C) (Oral)   Resp 18   SpO2 97%     General appearance - alert, well appearing and in no acute distress  Mental status - oriented to person, place and time with normal affect  Head - normocephalic and atraumatic  Neck - supple, no carotid bruits, right neck incision, well-approximated, no evidence of bleeding or hematoma.  Chest -no respiratory effort, no wheezing or stridor.  Heart - normal rate, regular rhythm, no murmurs  Abdomen - soft, non-tender, non-distended  Neurological - normal speech, no focal findings or movement disorder noted  Extremities - peripheral pulses palpable, no pedal edema or calf pain with palpation  Skin - no gross lesions, rashes, or induration noted  Vascular Exam - bilateral palpable DP/PT pulses      Imaging:   No new imaging      Assessment:     83 y/o male POD#1 s/p CEA      Plan:     Patient examined at bedside. History, physical exam, laboratory, and radiological findings reviewed and discussed with attending.  Monitor vitals, ensure BP <140  Continue to hold HTN

## 2024-11-23 NOTE — PROGRESS NOTES
Endovascular Neurosurgery Progress Note    Reason for evaluation: bL CCA, bL ICA and bL vert stenosis, with R hemis infarcts  Referring Provider: River De MD     SUBJECTIVE:     S/p CEA with vascular surgery yesterday. Doing well this AM. Drain in place. Requiring dopamine. No new complaints or focal deficits.     History of Chief Complaint:    Ger Mccray is a 84 y.o. male with pmh of CAD , asthma , CKD , COPD , HTN  sleep apnea, on aspirin 81 daily. He presented to an outside hospital with dizziness and unresponsiveness. Also complained of LUE numbness. Seen as a stroke code by Dr. Gtz and Neurology with Dr. Willis. CTA found bilateral common, bilateral internal and bilateral vertebral artery stenoses. MRI of Head with acute small right occipital , parietal frontal and insular infarctions with mild chronic periventricular small vessel ischemia. Pt was transferred to [place] under Neurology. EVN consulted. Pt now states that the dizziness and LUE numbness has entirely resolved.     Allergies  is allergic to pcn [penicillins], ceclor [cefaclor], and morphine.  Medications  Prior to Admission medications    Medication Sig Start Date End Date Taking? Authorizing Provider   JANUVIA 50 MG tablet TAKE 1 TABLET BY MOUTH DAILY 8/21/24   Alin Hayes MD   rosuvastatin (CRESTOR) 10 MG tablet TAKE 1 TABLET BY MOUTH DAILY 8/21/24   Alin Hayes MD   lisinopril (PRINIVIL;ZESTRIL) 2.5 MG tablet TAKE 1 TABLET BY MOUTH DAILY 7/8/24   Aye Tapia APRN - NP   fluticasone (FLONASE) 50 MCG/ACT nasal spray 1 spray by Each Nostril route daily  Patient not taking: Reported on 11/18/2024 10/17/23   Socrates Costa APRN - NP   metoprolol succinate (TOPROL XL) 25 MG extended release tablet Take 1 tablet by mouth daily    Provider, MD Kirstie   nitroGLYCERIN (NITROSTAT) 0.4 MG SL tablet Place 1 tablet under the tongue every 5 minutes as needed for Chest pain up to max of 3 total doses. If no relief  History of hemorrhoids, History of TIA (transient ischemic attack), Hypercholesteremia, Hypertension, Hyperuricemia, Leukocytosis, Renal cyst, Sciatic nerve pain, Sleep apnea, and Wears glasses.  Past Surgical History   has a past surgical history that includes Coronary angioplasty with stent (1994 x1 stent,1998 x2 stents,2009 x1,); Colonoscopy (05/21/2008); Shoulder arthroscopy (Right, 05/2002 repair); Carpal tunnel release (Right, 11/2002); shoulder surgery (Left, 02/24/2002); Cataract removal with implant (Left, 03/02/2012); Cataract removal with implant (Right, 03/05/2012); eye surgery; Blepharoplasty (Bilateral, 04/02/2012); Total knee arthroplasty (Left, 04/04/2017); pr arthrp acetblr/prox fem prostc agrft/algrft (Left, 01/23/2018); joint replacement (Right, 07/12/2011); joint replacement (Right, 1995); joint replacement (Left, 01/23/2018); lumbar fusion (N/A, 09/20/2021); back surgery; Endoscopy, colon, diagnostic; and Cardiac surgery.  Social History   reports that he quit smoking about 22 years ago. His smoking use included cigarettes. He started smoking about 57 years ago. He has a 70 pack-year smoking history. He has been exposed to tobacco smoke. He has quit using smokeless tobacco.  His smokeless tobacco use included chew.   reports no history of alcohol use.   reports no history of drug use.  Family History  family history includes Alzheimer's Disease in his mother; Emphysema in his father; Heart Disease in his mother.    Review of Systems:  CONSTITUTIONAL:  negative for fevers, chills, fatigue and malaise    EYES:  negative for double vision, blurred vision and photophobia     HEENT:  negative for tinnitus, epistaxis and sore throat    RESPIRATORY:  negative for cough, shortness of breath, wheezing    CARDIOVASCULAR:  negative for chest pain, palpitations, syncope, edema    GASTROINTESTINAL:  negative for nausea, vomiting    GENITOURINARY:  negative for incontinence    MUSCULOSKELETAL:  negative for

## 2024-11-23 NOTE — PLAN OF CARE
Problem: Chronic Conditions and Co-morbidities  Goal: Patient's chronic conditions and co-morbidity symptoms are monitored and maintained or improved  Outcome: Progressing  Flowsheets  Taken 11/22/2024 2000 by Pamella Parkinson  Care Plan - Patient's Chronic Conditions and Co-Morbidity Symptoms are Monitored and Maintained or Improved: Monitor and assess patient's chronic conditions and comorbid symptoms for stability, deterioration, or improvement  Taken 11/22/2024 1800 by Apryl Rodriguez RN  Care Plan - Patient's Chronic Conditions and Co-Morbidity Symptoms are Monitored and Maintained or Improved: Monitor and assess patient's chronic conditions and comorbid symptoms for stability, deterioration, or improvement     Problem: Safety - Adult  Goal: Free from fall injury  Outcome: Progressing     Problem: Pain  Goal: Verbalizes/displays adequate comfort level or baseline comfort level  Outcome: Progressing  Flowsheets (Taken 11/22/2024 1715 by Apryl Rodriguez, RN)  Verbalizes/displays adequate comfort level or baseline comfort level:   Encourage patient to monitor pain and request assistance   Assess pain using appropriate pain scale     Problem: Skin/Tissue Integrity  Goal: Absence of new skin breakdown  Description: 1.  Monitor for areas of redness and/or skin breakdown  2.  Assess vascular access sites hourly  3.  Every 4-6 hours minimum:  Change oxygen saturation probe site  4.  Every 4-6 hours:  If on nasal continuous positive airway pressure, respiratory therapy assess nares and determine need for appliance change or resting period.  Outcome: Progressing     Problem: ABCDS Injury Assessment  Goal: Absence of physical injury  Outcome: Progressing     Problem: Discharge Planning  Goal: Discharge to home or other facility with appropriate resources  Outcome: Progressing  Flowsheets  Taken 11/22/2024 2000 by Pamella Parkinson  Discharge to home or other facility with appropriate resources:

## 2024-11-23 NOTE — PROGRESS NOTES
[facility] Neurology   IN-PATIENT SERVICE   [facility]    Progress Note             Date:   11/23/2024  Patient name:  Ger Mccray  Date of admission:  11/20/2024  4:42 PM  MRN:   [id number]  Account:  [id number]  YOB: 1940  PCP:    Alin Hayes MD  Room:   [address] [place] [address]  Code Status:    Full Code    Chief Complaint:     Transient left-sided weakness and numbness    Interval hx:     The patient was seen and examined at bedside. Is vitally stable, alert and oriented x 3.  S/p right carotid endarterectomy on 9/22  Hypotensive and bradycardic overnight, required dopamine infusion, was stopped around 7 a.m.  A-line in place   Remains to be on lactate infusion          Brief History of Present Illness:     Per notes   The patient is a 84 y.o.  Non- / non  male who was transferred from [facility] after MRI showed acute to subacute right frontal parietal occipital infarcts.  Started on aspirin, Plavix for 21 days. Seen by telestroke 11/18 Dr Gtz for headache, syncope with LOC.  No focal neurodeficits, NIH of 0.  CT head unremarkable, CTA limited by motion artifact and poor contrast bolusing.  Was continued on aspirin and started on Plavix for 21 days.  MRI 1119 showed scattered small acute to subacute infarctions in the right frontal parietooccipital lobes and the right insula cortex, mild chronic periventricular small vessel disease.  11/18 carotid Doppler shows 70% stenosis of right ICA, mild less than 50% stenosis in the left ICA.  Vascular surgery was consulted at [facility] and plan to schedule patient for carotid endarterectomy.  Patient states his left upper extremity went numb for three days is now resoloved as of last night. Symptoms associated with decreased left hand dexterity, reports the numbness up to the level of the elbow. He stated he felt \"funny\" this past week and [facility] notes document dizziness  however upon further clarification,  30.5 25.2 - 33.5 pg    MCHC 32.4 28.4 - 34.8 g/dL    RDW 13.9 11.8 - 14.4 %    Platelets 176 138 - 453 k/uL    MPV 12.6 8.1 - 13.5 fL    NRBC Automated 0.0 0.0 per 100 WBC    Neutrophils % 72 (H) 36 - 65 %    Lymphocytes % 16 (L) 24 - 43 %    Monocytes % 9 3 - 12 %    Eosinophils % 2 1 - 4 %    Basophils % 0 0 - 2 %    Immature Granulocytes % 1 (H) 0 %    Neutrophils Absolute 8.56 (H) 1.50 - 8.10 k/uL    Lymphocytes Absolute 1.85 1.10 - 3.70 k/uL    Monocytes Absolute 1.04 0.10 - 1.20 k/uL    Eosinophils Absolute 0.28 0.00 - 0.44 k/uL    Basophils Absolute 0.03 0.00 - 0.20 k/uL    Immature Granulocytes Absolute 0.07 0.00 - 0.30 k/uL     Recent Labs     11/23/24  0507   WBC 11.8*   RBC 3.64*   HGB 11.1*   HCT 34.3*   MCV 94.2   MCH 30.5   MCHC 32.4   RDW 13.9      MPV 12.6     Recent Labs     11/23/24  0507      K 4.6   *   CO2 23   BUN 22   CREATININE 1.4*   GLUCOSE 120*   CALCIUM 8.4*     Hemoglobin A1C   Date Value Ref Range Status   11/21/2024 6.0 4.0 - 6.0 % Final          MRI:         Assessment :      Primary Problem  Cerebral infarction (HCC)    Active Hospital Problems    Diagnosis Date Noted    Mixed hyperlipidemia [E78.2] 11/22/2024    Primary hypertension [I10] 11/22/2024    More than 50 percent stenosis of right internal carotid artery [I65.21] 11/22/2024    Ischemic cerebrovascular accident (CVA) due to atherosclerosis of large intracranial artery (HCC) [I63.59] 11/22/2024    Acute right arterial ischemic stroke, internal carotid artery (ICA) (HCC) [I63.231] 11/22/2024    Peripheral vascular disease (HCC) [I73.9] 11/22/2024    Cerebrovascular disease [I67.9] 11/22/2024    CAD in native artery [I25.10] 11/22/2024    Carotid stenosis, right [I65.21] 11/22/2024    Carotid artery stenosis with cerebral infarction (HCC) [I63.239] 11/21/2024    Cerebral infarction (HCC) [I63.9] 11/20/2024    Ischemic stroke (HCC) [I63.9] 11/20/2024       Patient is a 84 y.o. male with past medical history  of CAD s/p PCI with stenting , HTN, DM, ALEX, COPD, asthma, CKD, who presented as a transfer from [facility] for syncope and loss of consciousness.  Was evaluated by telestroke.  His NIH was 0.  CT head was negative.  CTA head and neck was limited due to motion artifact.  MRI brain without contrast showed scattered small acute to subacute infarction of the right frontal and parieto-occipital lobes and the right insular cortex.  A carotid Doppler showed a 70% stenosis of right ICA, less than 50% stenosis of the left ICA.  Was transferred for vascular evaluation and CEA.  Tentatively patient is to have CEA done today on 11/22/2024    Plan:     R hemispheric watershed infarcts in the setting of moderate (50-79%) stenosis in the proximal ICA   S/p right carotid endarterectomy on 9/22  Hypotension and bradycardia 2/2 above    - Continue aspirin 81 mg daily only  - BP goal: 100-140 Home hypertension meds held  - Crestor 10 mg daily  - EEG routine was unremarkable  - Neuroendovascular following  - Neuro checks per protocol     Hypertension - hypotensive post procedure  - Hold home med lisinopril 2.5 mg daily  - Hold home med Toprol 25 mg daily     COPD  - Quit smoking over 15 years ago  - Has a home CPAP machine, prefers to not have it ordered here  - Keep O2 sats 88-92%     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      Follow-up further recommendations after discussing case with the attending.  The plan was discussed with the  patient, patient's family and the medical staff.   Consultations:   IP CONSULT TO VASCULAR ACCESS TEAM  IP CONSULT TO CASE MANAGEMENT  IP CONSULT TO STROKE TEAM  IP CONSULT TO SOCIAL WORK  IP CONSULT TO VASCULAR SURGERY    Patient is admitted as inpatient status because of co-morbidities listed above, severity of signs and symptoms as outlined, requirement for current medical therapies and most importantly because of direct risk to patient if care not provided in a hospital setting.    Jolanta Muñoz MD  Neurology Resident PGY-4   11/23/2024  8:28 AM    Copy sent to Dr. Hayes, Alin Mujica MD

## 2024-11-23 NOTE — PLAN OF CARE
Problem: Chronic Conditions and Co-morbidities  Goal: Patient's chronic conditions and co-morbidity symptoms are monitored and maintained or improved  11/23/2024 1750 by Jeanette Leo RN  Outcome: Progressing  11/23/2024 0525 by Pamella Parkinson  Outcome: Progressing  Flowsheets  Taken 11/22/2024 2000 by Pamella Parkinson  Care Plan - Patient's Chronic Conditions and Co-Morbidity Symptoms are Monitored and Maintained or Improved: Monitor and assess patient's chronic conditions and comorbid symptoms for stability, deterioration, or improvement  Taken 11/22/2024 1800 by Apryl Rodriguez RN  Care Plan - Patient's Chronic Conditions and Co-Morbidity Symptoms are Monitored and Maintained or Improved: Monitor and assess patient's chronic conditions and comorbid symptoms for stability, deterioration, or improvement     Problem: Safety - Adult  Goal: Free from fall injury  11/23/2024 1750 by Jeanette Leo RN  Outcome: Progressing  11/23/2024 0525 by Pamella Parkinson  Outcome: Progressing     Problem: Pain  Goal: Verbalizes/displays adequate comfort level or baseline comfort level  11/23/2024 1750 by Jeanette Leo RN  Outcome: Progressing  11/23/2024 0525 by Pamella Parkinson  Outcome: Progressing  Flowsheets (Taken 11/22/2024 1715 by Jennifer, Apryl, RN)  Verbalizes/displays adequate comfort level or baseline comfort level:   Encourage patient to monitor pain and request assistance   Assess pain using appropriate pain scale     Problem: Skin/Tissue Integrity  Goal: Absence of new skin breakdown  Description: 1.  Monitor for areas of redness and/or skin breakdown  2.  Assess vascular access sites hourly  3.  Every 4-6 hours minimum:  Change oxygen saturation probe site  4.  Every 4-6 hours:  If on nasal continuous positive airway pressure, respiratory therapy assess nares and determine need for appliance change or resting period.  11/23/2024 1750 by Jeanette Loe RN  Outcome:  Progressing  11/23/2024 0525 by Pamella Parkinson  Outcome: Progressing     Problem: ABCDS Injury Assessment  Goal: Absence of physical injury  11/23/2024 1750 by Jeanette Leo RN  Outcome: Progressing  11/23/2024 0525 by Pamella Parkinson  Outcome: Progressing     Problem: Discharge Planning  Goal: Discharge to home or other facility with appropriate resources  11/23/2024 1750 by Jeanette Leo RN  Outcome: Progressing  11/23/2024 0525 by Pamella Parkinson  Outcome: Progressing  Flowsheets  Taken 11/22/2024 2000 by Pamella Parkinson  Discharge to home or other facility with appropriate resources: Identify barriers to discharge with patient and caregiver  Taken 11/22/2024 1800 by Apryl Rodriguez, RN  Discharge to home or other facility with appropriate resources: Identify barriers to discharge with patient and caregiver

## 2024-11-23 NOTE — PROGRESS NOTES
Occupational Therapy  Occupational Therapy Daily Treatment Note  Facility/Department: [facility]   Patient Name: Ger Mccray        MRN: [id number]    : 1940    Date of Service: 2024    Discharge Recommendations  Discharge Recommendations: Patient would benefit from continued therapy after discharge in order to increase pt balance, strength and independence with ADL tasks and functional transfers       No chief complaint on file.    Past Medical History:  has a past medical history of Anemia, Anesthesia, Arthritis, ASHD (arteriosclerotic heart disease), Asthma, CAD (coronary artery disease), Cerebral artery occlusion with cerebral infarction (HCC), Chronic kidney disease, Chronic obstructive pulmonary disease, unspecified ([facility]), COPD (chronic obstructive pulmonary disease) ([facility]), COVID-19 vaccine series completed, Degenerative joint disease, Diabetes mellitus ([facility]), ED (erectile dysfunction), Full dentures, Gout, History of carpal tunnel syndrome, History of colon polyps, History of heart attack, History of hemorrhoids, History of TIA (transient ischemic attack), Hypercholesteremia, Hypertension, Hyperuricemia, Leukocytosis, Renal cyst, Sciatic nerve pain, Sleep apnea, and Wears glasses.  Past Surgical History:  has a past surgical history that includes Coronary angioplasty with stent (1994 x1 stent,1998 x2 stents,2009 x1,); Colonoscopy (2008); Shoulder arthroscopy (Right, 2002 repair); Carpal tunnel release (Right, 2002); shoulder surgery (Left, 2002); Cataract removal with implant (Left, 2012); Cataract removal with implant (Right, 2012); eye surgery; Blepharoplasty (Bilateral, 2012); Total knee arthroplasty (Left, 2017); pr arthrp acetblr/prox fem prostc agrft/algrft (Left, 2018); joint replacement (Right, 2011); joint replacement (Right, ); joint replacement (Left, 2018); lumbar fusion (N/A, 2021); back surgery;

## 2024-11-23 NOTE — PROGRESS NOTES
Pt maps low 60s since shift, slightly improved after 2nd 1L bolus of LR. Maintenance fluids were started at 100ml. Pt straight cath x1.     2213: RN notified vascular of pt tremors not present before surgery.     2225: RN notified again of low sbps and maps. Stat labs and EKG ordered. Neuro intact, a/ox4, hr 58.     2320: Pt maps still remain high 50s, low 60s.     0030: Dopamine gtt started.      Electronically signed by Jeanette Victor RN on 11/23/2024 at 2:06 AM

## 2024-11-24 VITALS
HEART RATE: 54 BPM | OXYGEN SATURATION: 100 % | SYSTOLIC BLOOD PRESSURE: 117 MMHG | DIASTOLIC BLOOD PRESSURE: 74 MMHG | RESPIRATION RATE: 19 BRPM | TEMPERATURE: 97.6 F

## 2024-11-24 LAB
ANION GAP SERPL CALCULATED.3IONS-SCNC: 8 MMOL/L (ref 9–16)
BASOPHILS # BLD: 0.03 K/UL (ref 0–0.2)
BASOPHILS NFR BLD: 0 % (ref 0–2)
BUN SERPL-MCNC: 22 MG/DL (ref 8–23)
CALCIUM SERPL-MCNC: 8.4 MG/DL (ref 8.6–10.4)
CHLORIDE SERPL-SCNC: 111 MMOL/L (ref 98–107)
CO2 SERPL-SCNC: 22 MMOL/L (ref 20–31)
CREAT SERPL-MCNC: 1.4 MG/DL (ref 0.7–1.2)
EOSINOPHIL # BLD: 0.36 K/UL (ref 0–0.44)
EOSINOPHILS RELATIVE PERCENT: 4 % (ref 1–4)
ERYTHROCYTE [DISTWIDTH] IN BLOOD BY AUTOMATED COUNT: 14 % (ref 11.8–14.4)
GFR, ESTIMATED: 50 ML/MIN/1.73M2
GLUCOSE SERPL-MCNC: 138 MG/DL (ref 74–99)
HCT VFR BLD AUTO: 36.5 % (ref 40.7–50.3)
HGB BLD-MCNC: 11.1 G/DL (ref 13–17)
IMM GRANULOCYTES # BLD AUTO: 0.04 K/UL (ref 0–0.3)
IMM GRANULOCYTES NFR BLD: 0 %
LYMPHOCYTES NFR BLD: 2.18 K/UL (ref 1.1–3.7)
LYMPHOCYTES RELATIVE PERCENT: 24 % (ref 24–43)
MCH RBC QN AUTO: 30.2 PG (ref 25.2–33.5)
MCHC RBC AUTO-ENTMCNC: 30.4 G/DL (ref 28.4–34.8)
MCV RBC AUTO: 99.2 FL (ref 82.6–102.9)
MONOCYTES NFR BLD: 0.95 K/UL (ref 0.1–1.2)
MONOCYTES NFR BLD: 10 % (ref 3–12)
NEUTROPHILS NFR BLD: 62 % (ref 36–65)
NEUTS SEG NFR BLD: 5.57 K/UL (ref 1.5–8.1)
NRBC BLD-RTO: 0 PER 100 WBC
PLATELET # BLD AUTO: 148 K/UL (ref 138–453)
PMV BLD AUTO: 13 FL (ref 8.1–13.5)
POTASSIUM SERPL-SCNC: 3.9 MMOL/L (ref 3.7–5.3)
RBC # BLD AUTO: 3.68 M/UL (ref 4.21–5.77)
SODIUM SERPL-SCNC: 141 MMOL/L (ref 136–145)
WBC OTHER # BLD: 9.1 K/UL (ref 3.5–11.3)

## 2024-11-24 PROCEDURE — 6370000000 HC RX 637 (ALT 250 FOR IP)

## 2024-11-24 PROCEDURE — 6360000002 HC RX W HCPCS

## 2024-11-24 PROCEDURE — 2580000003 HC RX 258

## 2024-11-24 PROCEDURE — 80048 BASIC METABOLIC PNL TOTAL CA: CPT

## 2024-11-24 PROCEDURE — 36415 COLL VENOUS BLD VENIPUNCTURE: CPT

## 2024-11-24 PROCEDURE — 94640 AIRWAY INHALATION TREATMENT: CPT

## 2024-11-24 PROCEDURE — 97530 THERAPEUTIC ACTIVITIES: CPT

## 2024-11-24 PROCEDURE — 85025 COMPLETE CBC W/AUTO DIFF WBC: CPT

## 2024-11-24 PROCEDURE — 97110 THERAPEUTIC EXERCISES: CPT

## 2024-11-24 PROCEDURE — 99239 HOSP IP/OBS DSCHRG MGMT >30: CPT | Performed by: STUDENT IN AN ORGANIZED HEALTH CARE EDUCATION/TRAINING PROGRAM

## 2024-11-24 PROCEDURE — 94761 N-INVAS EAR/PLS OXIMETRY MLT: CPT

## 2024-11-24 RX ORDER — SENNA AND DOCUSATE SODIUM 50; 8.6 MG/1; MG/1
2 TABLET, FILM COATED ORAL DAILY
Status: DISCONTINUED | OUTPATIENT
Start: 2024-11-24 | End: 2024-11-24 | Stop reason: HOSPADM

## 2024-11-24 RX ORDER — SENNA AND DOCUSATE SODIUM 50; 8.6 MG/1; MG/1
2 TABLET, FILM COATED ORAL DAILY
Qty: 60 TABLET | Refills: 2 | Status: SHIPPED | OUTPATIENT
Start: 2024-11-25

## 2024-11-24 RX ADMIN — ACETAMINOPHEN 1000 MG: 500 TABLET ORAL at 14:08

## 2024-11-24 RX ADMIN — ENOXAPARIN SODIUM 40 MG: 100 INJECTION SUBCUTANEOUS at 08:30

## 2024-11-24 RX ADMIN — SODIUM CHLORIDE, PRESERVATIVE FREE 10 ML: 5 INJECTION INTRAVENOUS at 08:31

## 2024-11-24 RX ADMIN — POLYETHYLENE GLYCOL 3350 17 G: 17 POWDER, FOR SOLUTION ORAL at 11:09

## 2024-11-24 RX ADMIN — ACETAMINOPHEN 1000 MG: 500 TABLET ORAL at 06:13

## 2024-11-24 RX ADMIN — ASPIRIN 81 MG: 81 TABLET, CHEWABLE ORAL at 08:30

## 2024-11-24 RX ADMIN — BUDESONIDE AND FORMOTEROL FUMARATE DIHYDRATE 2 PUFF: 80; 4.5 AEROSOL RESPIRATORY (INHALATION) at 09:09

## 2024-11-24 RX ADMIN — SENNOSIDES AND DOCUSATE SODIUM 2 TABLET: 50; 8.6 TABLET ORAL at 11:08

## 2024-11-24 NOTE — PROGRESS NOTES
[facility] Neurology   IN-PATIENT SERVICE   [facility]    Progress Note             Date:   11/24/2024  Patient name:  Ger Mccray  Date of admission:  11/20/2024  4:42 PM  MRN:   [id number]  Account:  [id number]  YOB: 1940  PCP:    Alin Hayes MD  Room:   [address] [place] [address]  Code Status:    Full Code    Chief Complaint:     Transient left-sided weakness and numbness    Interval hx:     The patient was seen and examined at bedside. Is vitally stable, alert and oriented x 3.  S/p right carotid endarterectomy on 9/22.     No acute events overnight. Stable for DC home. Outpatient PT/OT.    Brief History of Present Illness:     Per notes   The patient is a 84 y.o.  Non- / non  male who was transferred from [facility] after MRI showed acute to subacute right frontal parietal occipital infarcts.  Started on aspirin, Plavix for 21 days. Seen by telestroke 11/18 Dr Gtz for headache, syncope with LOC.  No focal neurodeficits, NIH of 0.  CT head unremarkable, CTA limited by motion artifact and poor contrast bolusing.  Was continued on aspirin and started on Plavix for 21 days.  MRI 1119 showed scattered small acute to subacute infarctions in the right frontal parietooccipital lobes and the right insula cortex, mild chronic periventricular small vessel disease.  11/18 carotid Doppler shows 70% stenosis of right ICA, mild less than 50% stenosis in the left ICA.  Vascular surgery was consulted at [facility] and plan to schedule patient for carotid endarterectomy.  Patient states his left upper extremity went numb for three days is now resoloved as of last night. Symptoms associated with decreased left hand dexterity, reports the numbness up to the level of the elbow. He stated he felt \"funny\" this past week and [facility] notes document dizziness  however upon further clarification, patient states he had multiple episodes of intense fear/impending doom over the past  07/15/2013    on cpap at .    Wears glasses     for reading        Past Surgical History:     Past Surgical History:   Procedure Laterality Date    BACK SURGERY      BLEPHAROPLASTY Bilateral 04/02/2012    CARDIAC SURGERY      CARPAL TUNNEL RELEASE Right 11/2002    CATARACT REMOVAL WITH IMPLANT Left 03/02/2012    CATARACT REMOVAL WITH IMPLANT Right 03/05/2012    COLONOSCOPY  05/21/2008    POLYPECTOMY    CORONARY ANGIOPLASTY WITH STENT PLACEMENT  1994 x1 stent,1998 x2 stents,2009 x1,    ENDOSCOPY, COLON, DIAGNOSTIC      EYE SURGERY      cataracts, eyelids.    JOINT REPLACEMENT Right 07/12/2011    RT TOTAL SHOULDER REPLACEMENT    JOINT REPLACEMENT Right 1995    rt. hip replacement    JOINT REPLACEMENT Left 01/23/2018    ELLE    LUMBAR FUSION N/A 09/20/2021    L3-L5 POSTERIOR LUMBAR DECOMPRESSSION & INSTRUMENTED FUSION performed by Amauri Rangel MD at [facility] OR    CO ARTHRP ACETBLR/PROX FEM PROSTC AGRFT/ALGRFT Left 01/23/2018    LEFT TOTAL HIP ARTHROPLASTY MINIMALLY INVASIVE ASI WITH BIOMET SYSTEM & GPS SPRAY APPLICATION performed by Warner Duke MD at [facility] OR    SHOULDER ARTHROSCOPY Right 05/2002 repair    7-12-11 right shoulder reverse replacement.    SHOULDER SURGERY Left 02/24/2002    shoulder resurfacing    TOTAL KNEE ARTHROPLASTY Left 04/04/2017    KNEE TOTAL ARTHROPLASTY performed by Warner Duke MD at [facility] OR        Medications Prior to Admission:     Prior to Admission medications    Medication Sig Start Date End Date Taking? 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      Follow-up further recommendations after discussing case with the attending.  The plan was discussed with the patient, patient's family and the medical staff.   Consultations:   IP CONSULT TO VASCULAR ACCESS TEAM  IP CONSULT TO CASE MANAGEMENT  IP CONSULT TO STROKE TEAM  IP CONSULT TO SOCIAL WORK  IP CONSULT TO VASCULAR SURGERY    Patient is admitted as inpatient status because of co-morbidities listed above, severity of signs and symptoms as outlined, requirement for current medical therapies and most importantly because of direct risk to patient if care not provided in a hospital setting.    Van Sharpe MD  Neurology Resident PGY-4   11/24/2024  9:20 AM    Copy sent to Dr. Hayes, Alin Mujica MD

## 2024-11-24 NOTE — PROGRESS NOTES
Division of Vascular Surgery             Progress Note      Name: Ger Mccray  MRN: [id number]         Overnight Events:     No acute overnight events.    Subjective:     Patient was examined sitting in chair.  No acute overnight events.  Afebrile, vital signs stable and within normal limits.  Dopamine drip has been off since yesterday morning.  Patient complains of appropriate incisional pain.  No other complaints at this time.    Physical Exam:     Vitals:  /70   Pulse 54   Temp 97.8 °F (36.6 °C) (Oral)   Resp 16   SpO2 98%     General appearance - alert, well appearing and in no acute distress  Mental status - oriented to person, place and time with normal affect  Head - normocephalic and atraumatic  Neck - supple, no carotid bruits, right neck incision, well-approximated, no evidence of bleeding or hematoma.  Chest -no respiratory effort, no wheezing or stridor.  Heart - normal rate, regular rhythm, no murmurs  Abdomen - soft, non-tender, non-distended  Neurological - normal speech, no focal findings or movement disorder noted  Extremities - peripheral pulses palpable, no pedal edema or calf pain with palpation  Skin - no gross lesions, rashes, or induration noted  Vascular Exam - bilateral palpable DP/PT pulses      Imaging:   No new imaging      Assessment:     85 y/o male POD#2 s/p CEA      Plan:     Patient examined at bedside. History, physical exam, laboratory, and radiological findings reviewed and discussed with attending.  Stable for discharge from vascular surgery standpoint  Diet: regular  DVT ppx: Lovenox  Continue ASA  Encourage IS, pulmonary hygiene, and ambulation  Monitor I/O's.    Van Willis D.O.  General Surgery Resident, PGY-3  11/24/24      [facility] [place]  O: [phone]

## 2024-11-24 NOTE — CARE COORDINATION
TRANSITIONAL CARE/DISCHARGE PLANNING ONGOING EVALUATION      Reason for Admission: Cerebral infarction (HCC) [I63.9]  Ischemic stroke (HCC) [I63.9]     Hospital day:4    Patient goals/Transitional Plan:    Spoke with patient about transition and discharge planning, plan remains home with [facility].  Has transportation.          Readmission Risk              Risk of Unplanned Readmission:  21           Discharge Report    [facility]  Clinical Case Management Department  Written by: Melisa Jane RN    Patient Name: Ger Mccray  Attending Provider: River De MD  Admit Date: 2024  4:42 PM  MRN: [id number]  Account: [id number]                     : 1940  Discharge Date:       Disposition: home    Melisa Jane RN  Spoke with Sarai from [facility], notified of Discharge

## 2024-11-24 NOTE — DISCHARGE SUMMARY
[facility]     Department of Neurology    INPATIENT DISCHARGE SUMMARY        Patient Identification:  Ger Mccray is a 84 y.o. male.  :  1940  MRN: [id number]     Acct: [id number]   Admit Date:  2024  Discharge date and time: 2024  2:35 PM   Attending Provider: Ida att. providers found                                     Admission Diagnoses:   Cerebral infarction (HCC) [I63.9]  Ischemic stroke (HCC) [I63.9]    Discharge Diagnoses:   Principal Problem:    Cerebral infarction (HCC)  Active Problems:    Ischemic stroke (HCC)    Carotid artery stenosis with cerebral infarction (HCC)    Mixed hyperlipidemia    Primary hypertension    More than 50 percent stenosis of right internal carotid artery    Ischemic cerebrovascular accident (CVA) due to atherosclerosis of large intracranial artery (HCC)    Acute right arterial ischemic stroke, internal carotid artery (ICA) (HCC)    Peripheral vascular disease (HCC)    Cerebrovascular disease    CAD in native artery    Carotid stenosis, right  Resolved Problems:    * No resolved hospital problems. *       Consults:   none    Brief Inpatient course:    Ger Mccray is an 84 y.o. M who was transferred from [facility] after MRI showed acute to subacute right frontal, parietal, and occipital infarcts.  Seen initially by telestroke on  where he was noted to have no neurodeficits.  Carotid Doppler showed 70% stenosis of right ICA.  Vascular surgery was consulted who took the patient for carotid endarterectomy.  Patient's neurologic deficits which included left upper extremity numbness which lasted 3 days had resolved.  Patient was stable postprocedure without any residual deficits.  Has baseline right lower extremity weakness 2/2 chronic hip pain.  Left lower extremity 5/5.  Right and left upper extremity 4+/5.  Patient was discharged home on aspirin monotherapy with plans for outpatient follow-up with neurology and vascular  surgery.      Patient is stable from neurological standpoint to be discharged.    Procedures:  CEA    Any Hospital Acquired Infections: none    Discharge Functional Status:  stable    Disposition: home    Patient Instructions:   Follow up with vascular surgery and neurology as an outpatient.      Discharge Medications:  Discharge Medication List as of 11/24/2024  2:07 PM        START taking these medications    Details   sennosides-docusate sodium (SENOKOT-S) 8.6-50 MG tablet Take 2 tablets by mouth daily, Disp-60 tablet, R-2Normal           CONTINUE these medications which have NOT CHANGED    Details   JANUVIA 50 MG tablet TAKE 1 TABLET BY MOUTH DAILY, Disp-90 tablet, R-1Please send a replace/new response with 90-Day Supply if appropriate to maximize member benefit. Requesting 1 year supply.Normal      rosuvastatin (CRESTOR) 10 MG tablet TAKE 1 TABLET BY MOUTH DAILY, Disp-90 tablet, R-1Please send a replace/new response with 90-Day Supply if appropriate to maximize member benefit. Requesting 1 year supply.Normal      fluticasone (FLONASE) 50 MCG/ACT nasal spray 1 spray by Each Nostril route daily, Disp-32 g, R-1Normal      Fluticasone-Salmeterol 232-14 MCG/ACT AEPB Inhale 1 puff into the lungs 2 times daily Historical Med      aspirin 81 MG chewable tablet Take 1 tablet by mouth dailyHistorical Med      albuterol sulfate HFA (PROVENTIL;VENTOLIN;PROAIR) 108 (90 Base) MCG/ACT inhaler Inhale 2 puffs into the lungs every 6 hours as needed for WheezingHistorical Med      therapeutic multivitamin-minerals (THERAGRAN-M) tablet Take 1 tablet by mouth daily OTCHistorical Med           STOP taking these medications       allopurinol (ZYLOPRIM) 100 MG tablet Comments:   Reason for Stopping:         lisinopril (PRINIVIL;ZESTRIL) 2.5 MG tablet Comments:   Reason for Stopping:         metoprolol succinate (TOPROL XL) 25 MG extended release tablet Comments:   Reason for Stopping:         nitroGLYCERIN (NITROSTAT) 0.4 MG SL tablet  Comments:   Reason for Stopping:               Activity: activity as tolerated    Restrictions: No heavy lifting.    Diet: regular diet    Follow-up:    Boris Arroyo DO  [address] [address] [address]  [place] [id number] [phone]    Schedule an appointment as soon as possible for a visit in 2 month(s)  hospital follow up for secondary stroke prevention    Amauri Sy MD  [address] [address] [address]  [facility] [id number] [phone]    Schedule an appointment as soon as possible for a visit in 2 week(s)  hospital follow up for right CEA 11/22/24.      Follow up labs: n/a    Follow up imaging: n/a    Note that over 30 minutes was spent in preparing discharge papers, discussing discharge with patient, medication review, etc.      Van Sharpe MD,  Neurology Resident PGY-2  Department of Neurology  [place], [place]  11/24/2024, 4:55 PM    This note is created with the assistance of a speech-recognition program. While intending to generate a document that actually reflects the content of the visit, the document can still have some errors including those of syntax and sound a- like substitutions which may escape proofreading. In such instances, actual meaning can be extrapolated by contextual derivation.

## 2024-11-24 NOTE — PROGRESS NOTES
Physical Therapy  Facility/Department: [facility]  Physical Therapy Treatment Note    Name: Ger Mccray  : 1940  MRN: [id number]  Date of Service: 2024    Discharge Recommendations:  Outpatient PT          Patient Diagnosis(es): The encounter diagnosis was Carotid stenosis, right.  Past Medical History:  has a past medical history of Anemia, Anesthesia, Arthritis, ASHD (arteriosclerotic heart disease), Asthma, CAD (coronary artery disease), Cerebral artery occlusion with cerebral infarction (HCC), Chronic kidney disease, Chronic obstructive pulmonary disease, unspecified (HCC), COPD (chronic obstructive pulmonary disease) (HCC), COVID-19 vaccine series completed, Degenerative joint disease, Diabetes mellitus (HCC), ED (erectile dysfunction), Full dentures, Gout, History of carpal tunnel syndrome, History of colon polyps, History of heart attack, History of hemorrhoids, History of TIA (transient ischemic attack), Hypercholesteremia, Hypertension, Hyperuricemia, Leukocytosis, Renal cyst, Sciatic nerve pain, Sleep apnea, and Wears glasses.  Past Surgical History:  has a past surgical history that includes Coronary angioplasty with stent (1994 x1 stent,1998 x2 stents,2009 x1,); Colonoscopy (2008); Shoulder arthroscopy (Right, 2002 repair); Carpal tunnel release (Right, 2002); shoulder surgery (Left, 2002); Cataract removal with implant (Left, 2012); Cataract removal with implant (Right, 2012); eye surgery; Blepharoplasty (Bilateral, 2012); Total knee arthroplasty (Left, 2017); pr arthrp acetblr/prox fem prostc agrft/algrft (Left, 2018); joint replacement (Right, 2011); joint replacement (Right, ); joint replacement (Left, 2018); lumbar fusion (N/A, 2021); back surgery; Endoscopy, colon, diagnostic; and Cardiac surgery.    Assessment  Body Structures, Functions, Activity Limitations Requiring Skilled Therapeutic Intervention:  pain at rest, pt pleasant and cooperative t/o session.    Cognition   Orientation  Overall Orientation Status: Within Functional Limits  Orientation Level: Oriented X4  Cognition  Overall Cognitive Status: WFL    Objective  Bed mobility  Supine to Sit: Unable to assess  Sit to Supine: Unable to assess  Scooting: Stand by assistance  Bed Mobility Comments: Pt began and concluded session in recliner, scooting assessed in recliner.  Transfers  Sit to Stand: Stand by assistance  Stand to Sit: Stand by assistance  Comment: Assessed without AD and with RW, Pt required verbal cues for hand placement when utilizing RW with poor carryover.  Ambulation  Surface: Level tile  Device: No Device  Assistance: Minimal assistance  Quality of Gait: takes small, choppy steps, antalgic gait  Gait Deviations: Slow Bertha;Decreased step length;Decreased step height;Decreased arm swing;Decreased head and trunk rotation;Shuffles  Distance: 280ft  Comments: Pt demonstrated RLE decreased step length and foot clearance causing possible trip hazard. Pt reports that an episode as described has happened. Verbal cues to increased step height with moderate return unable to maintain.  More Ambulation?: No  Stairs/Curb  Stairs?: Yes  Stairs  # Steps : 3  Stairs Height: 6\"  Rails: Right ascending  Device: No Device  Assistance: Contact guard assistance  Comment: Pt demonstrated minmal unsteadiness without any true LOB.     Balance  Posture: Fair  Sitting - Static: Good  Sitting - Dynamic: Good;-  Standing - Static: Fair  Standing - Dynamic: Fair;-  Comments: Assessed sitting edge of recliner and standing without AD.  Exercise Treatment: Standing B LE exercises: hip flexion, hip abduction, HS curls, heel raises, toe raises x 10 reps  Static Standing Balance Exercises: Narrow JUNG x 1 x 30 sec. Narrow JUNG with eyes closed x 1 x 30 sec. modified tandem stand x 1 x 30 sec ea.    AM-PAC - Mobility    AM-PAC Basic Mobility - Inpatient   How much help is

## 2024-11-24 NOTE — PLAN OF CARE
Problem: Chronic Conditions and Co-morbidities  Goal: Patient's chronic conditions and co-morbidity symptoms are monitored and maintained or improved  11/24/2024 0413 by Pamella Parkinson  Outcome: Progressing  Flowsheets (Taken 11/23/2024 2000)  Care Plan - Patient's Chronic Conditions and Co-Morbidity Symptoms are Monitored and Maintained or Improved: Monitor and assess patient's chronic conditions and comorbid symptoms for stability, deterioration, or improvement  11/23/2024 1750 by Jeanette Leo RN  Outcome: Progressing     Problem: Safety - Adult  Goal: Free from fall injury  11/24/2024 0413 by Pamella Parkinson  Outcome: Progressing  11/23/2024 1750 by Jeanette Leo RN  Outcome: Progressing     Problem: Pain  Goal: Verbalizes/displays adequate comfort level or baseline comfort level  11/24/2024 0413 by Pamella Parkinson  Outcome: Progressing  11/23/2024 1750 by Jeanette Leo RN  Outcome: Progressing     Problem: Skin/Tissue Integrity  Goal: Absence of new skin breakdown  Description: 1.  Monitor for areas of redness and/or skin breakdown  2.  Assess vascular access sites hourly  3.  Every 4-6 hours minimum:  Change oxygen saturation probe site  4.  Every 4-6 hours:  If on nasal continuous positive airway pressure, respiratory therapy assess nares and determine need for appliance change or resting period.  11/24/2024 0413 by Pamella Parkinson  Outcome: Progressing  11/23/2024 1750 by Jeanette Leo RN  Outcome: Progressing     Problem: ABCDS Injury Assessment  Goal: Absence of physical injury  11/24/2024 0413 by Pamella Parkinson  Outcome: Progressing  11/23/2024 1750 by Jeanette Leo RN  Outcome: Progressing     Problem: Discharge Planning  Goal: Discharge to home or other facility with appropriate resources  11/24/2024 0413 by Pamella Parkinson  Outcome: Progressing  Flowsheets (Taken 11/23/2024 2000)  Discharge to home or other facility with appropriate resources: Identify

## 2024-11-24 NOTE — DISCHARGE INSTR - COC
Continuity of Care Form    Patient Name: Ger Mccray   :  1940  MRN:  [id number]    Admit date:  2024  Discharge date:  24    Code Status Order: Full Code   Advance Directives:   Advance Care Flowsheet Documentation             Admitting Physician:  River De MD  PCP: Alin Hayes MD    Discharging Nurse: Lilian Gonsales [facility] Unit/Room#: 1020/1020-01  Discharging Unit Phone Number: [phone]    Emergency Contact:   Extended Emergency Contact Information  Primary Emergency Contact: Wendie Mccray  Address: [address]  Home Phone: [phone]  Work Phone: [phone]  Mobile Phone: [phone]  Relation: Spouse  Hearing or visual needs: None  Other needs: None  Preferred language: English   needed? No  Secondary Emergency Contact: Jessie Quevedo  Home Phone: [phone]  Work Phone: [phone]  Mobile Phone: [phone]  Relation: Child   needed? No    Past Surgical History:  Past Surgical History:   Procedure Laterality Date    BACK SURGERY      BLEPHAROPLASTY Bilateral 2012    CARDIAC SURGERY      CARPAL TUNNEL RELEASE Right 2002    CATARACT REMOVAL WITH IMPLANT Left 2012    CATARACT REMOVAL WITH IMPLANT Right 2012    COLONOSCOPY  2008    POLYPECTOMY    CORONARY ANGIOPLASTY WITH STENT PLACEMENT  1994 x1 stent,1998 x2 stents,2009 x1,    ENDOSCOPY, COLON, DIAGNOSTIC      EYE SURGERY      cataracts, eyelids.    JOINT REPLACEMENT Right 2011    RT TOTAL SHOULDER REPLACEMENT    JOINT REPLACEMENT Right     rt. hip replacement    JOINT REPLACEMENT Left 2018    ELLE    LUMBAR FUSION N/A 2021    L3-L5 POSTERIOR LUMBAR DECOMPRESSSION & INSTRUMENTED FUSION performed by [facility] at [facility]    ID ARTHRP ACETBLR/PROX FEM PROSTC AGRFT/ALGRFT Left 2018    LEFT TOTAL HIP ARTHROPLASTY MINIMALLY INVASIVE ASI WITH [employer] SYSTEM & GPS SPRAY APPLICATION

## 2024-11-24 NOTE — PLAN OF CARE
Problem: Chronic Conditions and Co-morbidities  Goal: Patient's chronic conditions and co-morbidity symptoms are monitored and maintained or improved  11/24/2024 1431 by Terri Goldberg RN  Outcome: Adequate for Discharge  11/24/2024 0413 by Pamella Parkinson  Outcome: Progressing  Flowsheets (Taken 11/23/2024 2000)  Care Plan - Patient's Chronic Conditions and Co-Morbidity Symptoms are Monitored and Maintained or Improved: Monitor and assess patient's chronic conditions and comorbid symptoms for stability, deterioration, or improvement     Problem: Safety - Adult  Goal: Free from fall injury  11/24/2024 1431 by Terri Goldberg RN  Outcome: Adequate for Discharge  11/24/2024 0413 by Pamella Parkinson  Outcome: Progressing     Problem: Pain  Goal: Verbalizes/displays adequate comfort level or baseline comfort level  11/24/2024 1431 by Terri Goldberg RN  Outcome: Adequate for Discharge  11/24/2024 0413 by Pamella Parkinson  Outcome: Progressing     Problem: Skin/Tissue Integrity  Goal: Absence of new skin breakdown  Description: 1.  Monitor for areas of redness and/or skin breakdown  2.  Assess vascular access sites hourly  3.  Every 4-6 hours minimum:  Change oxygen saturation probe site  4.  Every 4-6 hours:  If on nasal continuous positive airway pressure, respiratory therapy assess nares and determine need for appliance change or resting period.  11/24/2024 1431 by Terri Goldberg RN  Outcome: Adequate for Discharge  11/24/2024 0413 by Pamella Parkinson  Outcome: Progressing     Problem: ABCDS Injury Assessment  Goal: Absence of physical injury  11/24/2024 1431 by Terri Goldberg RN  Outcome: Adequate for Discharge  11/24/2024 0413 by Pamella Parkinson  Outcome: Progressing     Problem: Discharge Planning  Goal: Discharge to home or other facility with appropriate resources  11/24/2024 1431 by Terri Goldberg RN  Outcome: Adequate for Discharge  11/24/2024 0413 by Tesha

## 2024-11-25 ENCOUNTER — TELEPHONE (OUTPATIENT)
Dept: FAMILY MEDICINE CLINIC | Age: 84
End: 2024-11-25

## 2024-11-25 ENCOUNTER — COMMUNITY CARE MANAGEMENT (OUTPATIENT)
Facility: CLINIC | Age: 84
End: 2024-11-25

## 2024-11-25 NOTE — DISCHARGE SUMMARY
[facility]     Department of Neurology    INPATIENT DISCHARGE SUMMARY        Patient Identification:  Ger Mccray is a 84 y.o. male.  :  1940  MRN: [id number]     Acct: [id number]   Admit Date:  2024  Discharge date and time: 2024  2:35 PM   Attending Provider: River De MD                                   Admission Diagnoses:   Cerebral infarction (HCC) [I63.9]  Ischemic stroke (HCC) [I63.9]    Discharge Diagnoses:   Principal Problem:    Cerebral infarction (HCC)  Active Problems:    Ischemic stroke (HCC)    Carotid artery stenosis with cerebral infarction (HCC)    Mixed hyperlipidemia    Primary hypertension    More than 50 percent stenosis of right internal carotid artery    Ischemic cerebrovascular accident (CVA) due to atherosclerosis of large intracranial artery (HCC)    Acute right arterial ischemic stroke, internal carotid artery (ICA) (HCC)    Peripheral vascular disease (HCC)    Cerebrovascular disease    CAD in native artery    Carotid stenosis, right  Resolved Problems:    * No resolved hospital problems. *       Consults:   Vascular surgery, Neuro-endovascular surgery, PT/OT/ST, PM&R and case management     Brief Inpatient course:    The patient is a 84 y.o.  Right handed  male who was transferred from [facility] after MRI showed acute to subacute right frontal parietal occipital infarcts.  Started on aspirin, Plavix for 21 days. Seen by telestroke  Dr Gtz for headache, syncope with LOC.  No focal neurodeficits, NIH of 0.  CT head unremarkable, CTA limited by motion artifact and poor contrast bolusing.  Was continued on aspirin and started on Plavix for 21 days.  MRI 1119 showed scattered small acute to subacute infarctions in the right frontal parietooccipital lobes and the right insula cortex, mild chronic periventricular small vessel disease.   carotid Doppler shows 70% stenosis of right ICA, mild less than 50% stenosis in  tablet TAKE 1 TABLET BY MOUTH DAILY, Disp-90 tablet, R-1Please send a replace/new response with 90-Day Supply if appropriate to maximize member benefit. Requesting 1 year supply.Normal      fluticasone (FLONASE) 50 MCG/ACT nasal spray 1 spray by Each Nostril route daily, Disp-32 g, R-1Normal      Fluticasone-Salmeterol 232-14 MCG/ACT AEPB Inhale 1 puff into the lungs 2 times daily Historical Med      aspirin 81 MG chewable tablet Take 1 tablet by mouth dailyHistorical Med      albuterol sulfate HFA (PROVENTIL;VENTOLIN;PROAIR) 108 (90 Base) MCG/ACT inhaler Inhale 2 puffs into the lungs every 6 hours as needed for WheezingHistorical Med      therapeutic multivitamin-minerals (THERAGRAN-M) tablet Take 1 tablet by mouth daily OTCHistorical Med           STOP taking these medications       allopurinol (ZYLOPRIM) 100 MG tablet Comments:   Reason for Stopping:         lisinopril (PRINIVIL;ZESTRIL) 2.5 MG tablet Comments:   Reason for Stopping:         metoprolol succinate (TOPROL XL) 25 MG extended release tablet Comments:   Reason for Stopping:         nitroGLYCERIN (NITROSTAT) 0.4 MG SL tablet Comments:   Reason for Stopping:               Activity: activity as tolerated    Diet: regular diet and cardiac diet    Follow-up:    Boris Arroyo DO  [address] [address] [address]  [place] [address]  [phone]    Schedule an appointment as soon as possible for a visit in 2 month(s)  hospital follow up for secondary stroke prevention    Amauri Sy MD  [address] [address] [address]  [place] [address]  [phone]    Schedule an appointment as soon as possible for a visit in 2 week(s)  hospital follow up for right CEA 11/22/24.      Follow up labs: none at time of DC    Follow up imaging: none at time of DC     Note that 35 minutes was spent in preparing discharge papers, discussing discharge with patient, medication review, etc.      River De MD,  PGY 2 Neurology Resident  Department of Neurology  [facility]

## 2024-11-25 NOTE — PROGRESS NOTES
educational materials - stop light tool - pt agreed.  · Educated pt the stop light tool goes over s/s and when to call nurse line/provider or seek immediate medical attention.  · Offered NP visit  - pt declined     EDUCATIONAL MATERIALS/TASKS:  · CC tasked to send stop light tool for kidneys and HTN        Patient confirms receipt and review of discharge instructions from hospital stay. Denies any further questions or concerns at this time, agreeable to TCM follow up.   Next TCM appt: week of 12/3/24     Plan:  TCM 2  · Continue to assess and monitor for concerns  · DME needs?  · Review educational materials  · Chest pain or shortness of breath  · s/s of fluid overload  · s/s of stroke  · Vital signs  · f/up appt outcome  · Interested in Doximity visit     Assessment completed by Swetha Wall TCM RN, MSN  [phone]

## 2024-11-25 NOTE — TELEPHONE ENCOUNTER
PATIENT'S DAUGHTER, CHAS CALLED TO LET YOU KNOW THAT THE PATIENT WAS RECENTLY DISCHARGED FROM [facility] FOR CEREBRAL INFARCTION.

## 2024-11-27 ENCOUNTER — TELEPHONE (OUTPATIENT)
Dept: VASCULAR SURGERY | Age: 84
End: 2024-11-27

## 2024-11-27 LAB — SURGICAL PATHOLOGY REPORT: NORMAL

## 2024-11-27 NOTE — ANESTHESIA POSTPROCEDURE EVALUATION
Department of Anesthesiology  Postprocedure Note    Patient: Ger Mccray  MRN: [id number]  YOB: 1940  Date of evaluation: 11/27/2024    Procedure Summary       Date: 11/22/24 Room / Location: [place] [address] / [facility]    Anesthesia Start: 1330 Anesthesia Stop: 1659    Procedure: CAROTID ENDARTERECTOMY (Right: Neck) Diagnosis:       Carotid stenosis, right      (Carotid stenosis, right [I65.21])    Surgeons: Amauri Sy MD Responsible Provider: Bertha Osorio MD    Anesthesia Type: general ASA Status: 4            Anesthesia Type: No value filed.    Mayur Phase I: Mayur Score: 10    Mayur Phase II:      Anesthesia Post Evaluation    Patient location during evaluation: floor  Patient participation: complete - patient participated  Level of consciousness: awake and alert  Airway patency: patent  Nausea & Vomiting: no nausea and no vomiting  Cardiovascular status: blood pressure returned to baseline  Respiratory status: acceptable  Hydration status: euvolemic  Comments: No known anesthesia related complication  Multimodal analgesia pain management approach  Pain management: adequate        No notable events documented.

## 2024-11-27 NOTE — TELEPHONE ENCOUNTER
[facility]  Meera- [phone]    Meera conducted PT evaluation.    Meera calling to confirm if pt need to restart Plavix.   Nurse states the Plavix was started then held for Neuro and then no updates on needing to restart or d/c.    Nurse questioning if Plavix need to be restart or confirm with Neuro?    PSC- message forwarded to clinical team and physician

## 2024-12-03 ENCOUNTER — OFFICE VISIT (OUTPATIENT)
Dept: FAMILY MEDICINE CLINIC | Age: 84
End: 2024-12-03

## 2024-12-03 VITALS
OXYGEN SATURATION: 98 % | DIASTOLIC BLOOD PRESSURE: 82 MMHG | RESPIRATION RATE: 14 BRPM | HEART RATE: 87 BPM | BODY MASS INDEX: 25.68 KG/M2 | WEIGHT: 179 LBS | SYSTOLIC BLOOD PRESSURE: 122 MMHG

## 2024-12-03 DIAGNOSIS — I10 ESSENTIAL HYPERTENSION: ICD-10-CM

## 2024-12-03 DIAGNOSIS — N18.30 TYPE 2 DIABETES MELLITUS WITH STAGE 3 CHRONIC KIDNEY DISEASE, WITHOUT LONG-TERM CURRENT USE OF INSULIN, UNSPECIFIED WHETHER STAGE 3A OR 3B CKD (HCC): Primary | ICD-10-CM

## 2024-12-03 DIAGNOSIS — M1A.00X0 IDIOPATHIC CHRONIC GOUT WITHOUT TOPHUS, UNSPECIFIED SITE: ICD-10-CM

## 2024-12-03 DIAGNOSIS — Z86.73 HISTORY OF STROKE: ICD-10-CM

## 2024-12-03 DIAGNOSIS — Z09 HOSPITAL DISCHARGE FOLLOW-UP: ICD-10-CM

## 2024-12-03 DIAGNOSIS — E78.2 MIXED HYPERLIPIDEMIA: ICD-10-CM

## 2024-12-03 DIAGNOSIS — E11.22 TYPE 2 DIABETES MELLITUS WITH STAGE 3 CHRONIC KIDNEY DISEASE, WITHOUT LONG-TERM CURRENT USE OF INSULIN, UNSPECIFIED WHETHER STAGE 3A OR 3B CKD (HCC): Primary | ICD-10-CM

## 2024-12-03 DIAGNOSIS — Z98.890 HISTORY OF RIGHT-SIDED CAROTID ENDARTERECTOMY: ICD-10-CM

## 2024-12-03 PROBLEM — E78.00 PURE HYPERCHOLESTEROLEMIA, UNSPECIFIED: Status: RESOLVED | Noted: 2017-02-24 | Resolved: 2024-12-03

## 2024-12-03 SDOH — ECONOMIC STABILITY: INCOME INSECURITY: HOW HARD IS IT FOR YOU TO PAY FOR THE VERY BASICS LIKE FOOD, HOUSING, MEDICAL CARE, AND HEATING?: NOT HARD AT ALL

## 2024-12-03 SDOH — ECONOMIC STABILITY: FOOD INSECURITY: WITHIN THE PAST 12 MONTHS, THE FOOD YOU BOUGHT JUST DIDN'T LAST AND YOU DIDN'T HAVE MONEY TO GET MORE.: NEVER TRUE

## 2024-12-03 SDOH — ECONOMIC STABILITY: FOOD INSECURITY: WITHIN THE PAST 12 MONTHS, YOU WORRIED THAT YOUR FOOD WOULD RUN OUT BEFORE YOU GOT MONEY TO BUY MORE.: NEVER TRUE

## 2024-12-03 ASSESSMENT — PATIENT HEALTH QUESTIONNAIRE - PHQ9
SUM OF ALL RESPONSES TO PHQ QUESTIONS 1-9: 0
SUM OF ALL RESPONSES TO PHQ QUESTIONS 1-9: 0
2. FEELING DOWN, DEPRESSED OR HOPELESS: NOT AT ALL
SUM OF ALL RESPONSES TO PHQ QUESTIONS 1-9: 0
SUM OF ALL RESPONSES TO PHQ9 QUESTIONS 1 & 2: 0
SUM OF ALL RESPONSES TO PHQ QUESTIONS 1-9: 0
1. LITTLE INTEREST OR PLEASURE IN DOING THINGS: NOT AT ALL

## 2024-12-03 ASSESSMENT — ENCOUNTER SYMPTOMS
BLOOD IN STOOL: 0
SHORTNESS OF BREATH: 0
CHEST TIGHTNESS: 0
ABDOMINAL PAIN: 0

## 2024-12-03 NOTE — PROGRESS NOTES
collapse    Hypertriglyceridemia    Syncope, unspecified syncope type    Bilateral carotid artery stenosis    Acute cerebral infarction (HCC)    Cerebral infarction (HCC)    Ischemic stroke (HCC)    Carotid artery stenosis with cerebral infarction (HCC)    Mixed hyperlipidemia    Primary hypertension    More than 50 percent stenosis of right internal carotid artery    Ischemic cerebrovascular accident (CVA) due to atherosclerosis of large intracranial artery (HCC)    Acute right arterial ischemic stroke, internal carotid artery (ICA) (HCC)    Peripheral vascular disease (HCC)    Cerebrovascular disease    CAD in native artery    Carotid stenosis, right    History of right-sided carotid endarterectomy       Medications listed as ordered at the time of discharge from hospital     Medication List            Accurate as of December 3, 2024  2:50 PM. If you have any questions, ask your nurse or doctor.                CONTINUE taking these medications      albuterol sulfate  (90 Base) MCG/ACT inhaler  Commonly known as: PROVENTIL;VENTOLIN;PROAIR     aspirin 81 MG chewable tablet     fluticasone 50 MCG/ACT nasal spray  Commonly known as: FLONASE  1 spray by Each Nostril route daily     Fluticasone-Salmeterol 232-14 MCG/ACT Aepb     Januvia 50 MG tablet  Generic drug: SITagliptin  TAKE 1 TABLET BY MOUTH DAILY     rosuvastatin 10 MG tablet  Commonly known as: CRESTOR  TAKE 1 TABLET BY MOUTH DAILY     sennosides-docusate sodium 8.6-50 MG tablet  Commonly known as: SENOKOT-S  Take 2 tablets by mouth daily     therapeutic multivitamin-minerals tablet               Medications marked \"taking\" at this time  Outpatient Medications Marked as Taking for the 12/3/24 encounter (Office Visit) with Alin Hayes MD   Medication Sig Dispense Refill    sennosides-docusate sodium (SENOKOT-S) 8.6-50 MG tablet Take 2 tablets by mouth daily 60 tablet 2    JANUVIA 50 MG tablet TAKE 1 TABLET BY MOUTH DAILY 90 tablet 1

## 2024-12-04 NOTE — PROGRESS NOTES
Physician Progress Note      PATIENT:               BUD HARRELL  CSN #:                  [id number]  :                       1940  ADMIT DATE:       2024 4:42 PM  DISCH DATE:        2024 2:35 PM  RESPONDING  PROVIDER #:        COLE GATES          QUERY TEXT:    Pt admitted with CVA. Noted documentation of acute onset left-sided weakness   and numbness on 2024 symptom onset around noon, in progress notes.  The   discharge summary states: Acute onset left sided weakness and numbness with   symptomatic proximal right ICA stenosis and thrombotic vessel to vessel   showering of right MCA/CASA territories.  Neuro documents in H and P    that: Left extremity numbness has resolved.  Right lower extremity weakness   3/5, states it is chronic 2/2 hip replacement and pain.  Left lower extremity   5/5.  Right and left upper extremity 4+/5 bilatera    The medical record reflects the following:  Risk Factors: hypercholesterolemia  Clinical Indicators: Noted documentation of acute onset left-sided weakness   and numbness on 2024 symptom onset around noon, in progress notes.  The   discharge summary states: Acute onset left sided weakness and numbness with   symptomatic proximal right ICA stenosis and thrombotic vessel to vessel   showering of right MCA/CASA territories.  Neuro documents in H and P    that: Left extremity numbness has resolved.  Right lower extremity weakness   3/5, states it is chronic 2/2 hip replacement and pain.  Left lower extremity   5/5.  Treatment: Frequent Neuro assessments, Carotid endarctectomy, Monitor vital   signs    Thank you for your time  Options provided:  -- LUE weakness is not clinically significant  -- LUE weakness is clinically significant  -- Other - I will add my own diagnosis  -- Disagree - Not applicable / Not valid  -- Disagree - Clinically unable to determine / Unknown  -- Refer to Clinical Documentation Reviewer    PROVIDER RESPONSE

## 2024-12-04 NOTE — PROGRESS NOTES
Physician Progress Note      PATIENT:               BUD HARRELL  CSN #:                  [id number]  :                       1940  ADMIT DATE:       2024 4:42 PM  DISCH DATE:        2024 2:35 PM  RESPONDING  PROVIDER #:        COLE GATES          QUERY TEXT:    Patient was admitted with CVA. Patient was noted to have troponin HS levels of   32, 23, 23. Patient denies chest pain and shortness of breath. Given the   clinical context, can the elevated troponins be more specifically defined as:    The medical record reflects the following:  Risk Factors: Hx HTN, DM  Clinical Indicators: noted to have troponin HS levels of 32, 23, 23. Patient   denies chest pain and shortness of breath.  Treatment: Heparin gtt, Monitor telemetry, Troponin HS, and vital signs    Thank you for your time  Options provided:  -- NSTEMI  -- Type 2 MI  -- Demand Ischemia only, no MI  -- Other - I will add my own diagnosis  -- Disagree - Not applicable / Not valid  -- Disagree - Clinically unable to determine / Unknown  -- Refer to Clinical Documentation Reviewer    PROVIDER RESPONSE TEXT:    This patient has demand ischemia only, no MI.    Query created by: Nahed Rebolledo on 2024 10:43 AM      Electronically signed by:  COLE GATES 2024 10:50 AM

## 2024-12-09 ENCOUNTER — OFFICE VISIT (OUTPATIENT)
Dept: VASCULAR SURGERY | Age: 84
End: 2024-12-09

## 2024-12-09 VITALS
TEMPERATURE: 97 F | SYSTOLIC BLOOD PRESSURE: 145 MMHG | BODY MASS INDEX: 27.26 KG/M2 | DIASTOLIC BLOOD PRESSURE: 89 MMHG | HEART RATE: 74 BPM | OXYGEN SATURATION: 97 % | RESPIRATION RATE: 20 BRPM | WEIGHT: 190 LBS

## 2024-12-09 DIAGNOSIS — I65.23 BILATERAL CAROTID ARTERY STENOSIS: Primary | ICD-10-CM

## 2024-12-09 PROCEDURE — 99024 POSTOP FOLLOW-UP VISIT: CPT | Performed by: SURGERY

## 2024-12-09 NOTE — PROGRESS NOTES
[facility] HEART AND VASCULAR  [address] [facility] [address]  Dept: [phone]     Patient: Ger Mccray  : 1940  MRN: [id number]  DOS: 2024            HPI:  Ger Mccray is a 84 y.o. male who returns to the office regarding his carotid stenosis.  Recall that we had just completed carotid endarterectomy on his right side several weeks ago.  He is back for his first postoperative visit.  He has no complaints of stroke, TIA or amaurosis fugax.  He is eating well and has no problems with his speech or swallowing or mentation.    Review of Systems    Vitals:    24 1420   BP: (!) 145/89   Pulse: 74   Resp: 20   Temp: 97 °F (36.1 °C)   SpO2: 97%   Weight: 86.2 kg (190 lb)          Physical Exam  On examination his neck is soft without seroma or hematoma.  The sutures are still intact.  He has healed well.  He has no upper or lower extremity strength or sensation problems.  He has no deviation of his tongue.  He is not hoarse.    Assessment:  1. Bilateral carotid artery stenosis          Plan:  At this time we removed the sutures and we will have him back to the office in 3 months with carotid duplex to evaluate the repair and the contralateral side.  He understands and agrees.    Electronically signed by:  Amauri Sy MD

## 2024-12-11 ENCOUNTER — TELEPHONE (OUTPATIENT)
Dept: FAMILY MEDICINE CLINIC | Age: 84
End: 2024-12-11

## 2024-12-11 ENCOUNTER — COMMUNITY CARE MANAGEMENT (OUTPATIENT)
Facility: CLINIC | Age: 84
End: 2024-12-11

## 2024-12-11 NOTE — PROGRESS NOTES
TCM 2 f/up completed by phone. Disclaimer provided/recorded line. 3 identifiers confirmed with patient.      Spoke with pt who states he is doing well.     Pt reports he had one medication change by cardiologist.   Pt was put back on Metoprolol 25mg qd.     Pt continues to manage BP/HR/wt daily  BP: 125-131/83  Wt: 177lbs      Pt reports no swelling. He states he has sob upon exertion; however, this does not last long once he sits down to rest. Pt states he rarely needs to use his inhaler.     EDUCATION:  · Nurse line number given to pt for any questions or needs - [phone]  · Educated pt on the s/s of fluid overload  · Educated patient on monitoring salt intake  · Educated patient to continue monitoring daily weight.  · Educated pt to call provider right away if 2-3 pound weight gain in 24 hours or 5 pounds in one week.  · Educated patient on monitoring for swelling  · Educated the patient to continue monitoring BP/HR  · Educated pt on s/s of stroke - difficulty speaking/slurred speech, numbness/weakness/tingling on one side of the body, facial drooping, loss of coordination/balance  · Educated pt on s/s of hypertension - severe headaches, chest pain, dizziness, blurred vision/vision changes  · Educated pt the stop light tool goes over s/s to watch for and when to call nurse line/provider or seek immediate medical attention.  · Educated patient on when to seek medical attention -  sudden or severe  SOB, chest pain, weakness on one side of the body.     Pt is agreeable to TCM f/up.   Next TCM appt: week of 12/16/24     Plan/Tasks:  TCM 3  · Review stop light materials upon receipt  · Chest pain or shortness of breath  · s/s of fluid overload  · Vital signs  · Review s/s of stroke     Assessment completed by Swetha Wall TCM RN, MSN  [phone]

## 2024-12-11 NOTE — TELEPHONE ENCOUNTER
Patient stopped in.  FYI: patient states he saw Dr. West and he put him back on the Metoprolol 25 mg, 1 daily.     Family

## 2024-12-23 ENCOUNTER — COMMUNITY CARE MANAGEMENT (OUTPATIENT)
Facility: CLINIC | Age: 84
End: 2024-12-23

## 2024-12-23 NOTE — PROGRESS NOTES
TCM 3 f/up completed by phone. Disclaimer provided/recorded line. 3 identifiers confirmed with patient.      Spoke with pt who states he is doing pretty good. Pt has not had any recent appts.      No medication changes noted. Reviewed medications he had stopped upon discharge. Pt is back to taking Metoprolol. He is not taking Lisinopril or Allopurinol.      BP: 125/83  HR: 63  Wt: 177 lbs     Pt denies swelling and chest pain. Pt has sob upon exertion; however, this resolves once he is resting and/or when he uses his inhalers as prescribed.      Pt reports no new needs or concerns at this time.      EDUCATION:  · Educated patient to continue monitoring daily weight.  · Educated pt to call provider right away if 2-3 pound weight gain in 24 hours or 5 pounds in one week.  · Educated patient on monitoring for swelling  · Educated the patient to continue monitoring BP/HR  · Educated pt on s/s of stroke - difficulty speaking/slurred speech, numbness/weakness/tingling on one side of the body, facial drooping, loss of coordination/balance  · Educated pt the stop light tool goes over s/s to watch for and when to call nurse line/provider or seek immediate medical attention.  · Educated patient on when to seek medical attention -  sudden or severe  SOB, chest pain, weakness on one side of the body.     TCM graduate.      Assessment completed by Swetha VAZQUEZ RN, MSN  [phone]

## 2025-01-07 ENCOUNTER — HOSPITAL ENCOUNTER (OUTPATIENT)
Age: 85
Discharge: HOME | End: 2025-01-07
Payer: MEDICARE

## 2025-01-07 DIAGNOSIS — R82.998 LEUKOCYTES IN URINE: ICD-10-CM

## 2025-01-07 DIAGNOSIS — I12.9 BENIGN HYPERTENSION WITH CKD (CHRONIC KIDNEY DISEASE) STAGE III (HCC): ICD-10-CM

## 2025-01-07 DIAGNOSIS — N18.30 SECONDARY DIABETES MELLITUS WITH STAGE 3 CHRONIC KIDNEY DISEASE (HCC): ICD-10-CM

## 2025-01-07 DIAGNOSIS — N18.30 BENIGN HYPERTENSION WITH CKD (CHRONIC KIDNEY DISEASE) STAGE III (HCC): ICD-10-CM

## 2025-01-07 DIAGNOSIS — E13.22 SECONDARY DIABETES MELLITUS WITH STAGE 3 CHRONIC KIDNEY DISEASE (HCC): ICD-10-CM

## 2025-01-07 DIAGNOSIS — N18.31 STAGE 3A CHRONIC KIDNEY DISEASE (HCC): ICD-10-CM

## 2025-01-07 DIAGNOSIS — N28.1 RENAL CYST: ICD-10-CM

## 2025-01-07 DIAGNOSIS — R80.9 PROTEINURIA, UNSPECIFIED TYPE: ICD-10-CM

## 2025-01-07 LAB
ANION GAP SERPL CALCULATED.3IONS-SCNC: 12 MMOL/L (ref 9–16)
BACTERIA URNS QL MICRO: ABNORMAL
BASOPHILS # BLD: 0 K/UL (ref 0–0.2)
BASOPHILS NFR BLD: 0 % (ref 0–2)
BILIRUB UR QL STRIP: NEGATIVE
BUN SERPL-MCNC: 21 MG/DL (ref 8–23)
CALCIUM SERPL-MCNC: 9.7 MG/DL (ref 8.6–10.4)
CASTS #/AREA URNS LPF: ABNORMAL /LPF
CHLORIDE SERPL-SCNC: 107 MMOL/L (ref 98–107)
CLARITY UR: CLEAR
CO2 SERPL-SCNC: 23 MMOL/L (ref 20–31)
COLOR UR: YELLOW
CREAT SERPL-MCNC: 1.6 MG/DL (ref 0.7–1.2)
EOSINOPHIL # BLD: 0.4 K/UL (ref 0–0.4)
EOSINOPHILS RELATIVE PERCENT: 3 % (ref 0–4)
EPI CELLS #/AREA URNS HPF: ABNORMAL /HPF
ERYTHROCYTE [DISTWIDTH] IN BLOOD BY AUTOMATED COUNT: 12.8 % (ref 11.5–14.9)
GFR, ESTIMATED: 42 ML/MIN/1.73M2
GLUCOSE SERPL-MCNC: 147 MG/DL (ref 74–99)
GLUCOSE UR STRIP-MCNC: NEGATIVE MG/DL
HCT VFR BLD AUTO: 43.5 % (ref 41–53)
HGB BLD-MCNC: 14.5 G/DL (ref 13.5–17.5)
HGB UR QL STRIP.AUTO: NEGATIVE
KETONES UR STRIP-MCNC: ABNORMAL MG/DL
LEUKOCYTE ESTERASE UR QL STRIP: NEGATIVE
LYMPHOCYTES NFR BLD: 2.3 K/UL (ref 1–4.8)
LYMPHOCYTES RELATIVE PERCENT: 18 % (ref 24–44)
MAGNESIUM SERPL-MCNC: 2.3 MG/DL (ref 1.6–2.4)
MCH RBC QN AUTO: 31.4 PG (ref 26–34)
MCHC RBC AUTO-ENTMCNC: 33.4 G/DL (ref 31–37)
MCV RBC AUTO: 93.9 FL (ref 80–100)
MONOCYTES NFR BLD: 0.8 K/UL (ref 0.1–1.3)
MONOCYTES NFR BLD: 6 % (ref 1–7)
NEUTROPHILS NFR BLD: 73 % (ref 36–66)
NEUTS SEG NFR BLD: 8.8 K/UL (ref 1.3–9.1)
NITRITE UR QL STRIP: NEGATIVE
PH UR STRIP: 5.5 [PH] (ref 5–8)
PHOSPHATE SERPL-MCNC: 3.1 MG/DL (ref 2.5–4.5)
PLATELET # BLD AUTO: 216 K/UL (ref 150–450)
PMV BLD AUTO: 11.6 FL (ref 6–12)
POTASSIUM SERPL-SCNC: 4.5 MMOL/L (ref 3.7–5.3)
PROT UR STRIP-MCNC: NEGATIVE MG/DL
RBC # BLD AUTO: 4.63 M/UL (ref 4.5–5.9)
RBC #/AREA URNS HPF: ABNORMAL /HPF
SODIUM SERPL-SCNC: 142 MMOL/L (ref 136–145)
SP GR UR STRIP: 1.02 (ref 1–1.03)
UROBILINOGEN UR STRIP-ACNC: NORMAL EU/DL (ref 0–1)
WBC #/AREA URNS HPF: ABNORMAL /HPF
WBC OTHER # BLD: 12.4 K/UL (ref 3.5–11)

## 2025-01-07 PROCEDURE — 85025 COMPLETE CBC W/AUTO DIFF WBC: CPT

## 2025-01-07 PROCEDURE — 81001 URINALYSIS AUTO W/SCOPE: CPT

## 2025-01-07 PROCEDURE — 36415 COLL VENOUS BLD VENIPUNCTURE: CPT

## 2025-01-07 PROCEDURE — 83735 ASSAY OF MAGNESIUM: CPT

## 2025-01-07 PROCEDURE — 84100 ASSAY OF PHOSPHORUS: CPT

## 2025-01-07 PROCEDURE — 80048 BASIC METABOLIC PNL TOTAL CA: CPT

## 2025-01-15 DIAGNOSIS — N18.30 TYPE 2 DIABETES MELLITUS WITH STAGE 3 CHRONIC KIDNEY DISEASE, WITHOUT LONG-TERM CURRENT USE OF INSULIN, UNSPECIFIED WHETHER STAGE 3A OR 3B CKD (HCC): ICD-10-CM

## 2025-01-15 DIAGNOSIS — E11.22 TYPE 2 DIABETES MELLITUS WITH STAGE 3 CHRONIC KIDNEY DISEASE, WITHOUT LONG-TERM CURRENT USE OF INSULIN, UNSPECIFIED WHETHER STAGE 3A OR 3B CKD (HCC): ICD-10-CM

## 2025-01-15 DIAGNOSIS — E78.2 MIXED HYPERLIPIDEMIA: ICD-10-CM

## 2025-01-16 RX ORDER — ROSUVASTATIN CALCIUM 10 MG/1
TABLET, COATED ORAL
Qty: 90 TABLET | Refills: 1 | Status: SHIPPED | OUTPATIENT
Start: 2025-01-16

## 2025-01-16 RX ORDER — SITAGLIPTIN 50 MG/1
TABLET, FILM COATED ORAL
Qty: 90 TABLET | Refills: 1 | Status: SHIPPED | OUTPATIENT
Start: 2025-01-16

## 2025-01-24 ENCOUNTER — OFFICE VISIT (OUTPATIENT)
Dept: NEUROLOGY | Age: 85
End: 2025-01-24

## 2025-01-24 VITALS
HEIGHT: 70 IN | BODY MASS INDEX: 27.2 KG/M2 | DIASTOLIC BLOOD PRESSURE: 82 MMHG | SYSTOLIC BLOOD PRESSURE: 144 MMHG | HEART RATE: 52 BPM | WEIGHT: 190 LBS

## 2025-01-24 DIAGNOSIS — Z98.890 HISTORY OF RIGHT-SIDED CAROTID ENDARTERECTOMY: Primary | ICD-10-CM

## 2025-01-24 DIAGNOSIS — I69.30 SEQUELA, POST-STROKE: ICD-10-CM

## 2025-01-24 NOTE — PROGRESS NOTES
Endovascular Neurosurgery Progress Note    Reason for evaluation: bL CCA, bL ICA and bL vert stenosis, with R hemis infarcts  Referring Provider: Dr. Hayes    SUBJECTIVE:     Presents to clinic today. No new complaints, no new focal deficits. CEA incision is healing well. Has not had any recurrence of ischemic stroke-like symptoms. Follows with Vascular Surgery Dr. Martinez and with Neurology.      History of Chief Complaint:    Ger Mccray is a 84 y.o. male with pmh of CAD , asthma , CKD , COPD , HTN  sleep apnea, on aspirin 81 daily. He presented to an outside hospital with dizziness and unresponsiveness. Also complained of LUE numbness. Seen as a stroke code by Dr. Gtz and Neurology with Dr. Willis. CTA found bilateral common, bilateral internal and bilateral vertebral artery stenoses. MRI of Head with acute small right occipital , parietal frontal and insular infarctions with mild chronic periventricular small vessel ischemia. Pt was transferred to [place] under Neurology. EVN consulted. Pt now states that the dizziness and LUE numbness has entirely resolved.     Allergies  is allergic to pcn [penicillins], ceclor [cefaclor], and morphine.  Medications  Prior to Admission medications    Medication Sig Start Date End Date Taking? Authorizing Provider   rosuvastatin (CRESTOR) 10 MG tablet TAKE 1 TABLET BY MOUTH DAILY 1/16/25  Yes Alin Hayes MD   JANUVIA 50 MG tablet TAKE 1 TABLET BY MOUTH DAILY 1/16/25  Yes Alin Hayes MD   metoprolol succinate (TOPROL XL) 25 MG extended release tablet Take 1 tablet by mouth daily 12/10/24  Yes ProviderKirstie MD   sennosides-docusate sodium (SENOKOT-S) 8.6-50 MG tablet Take 2 tablets by mouth daily 11/25/24  Yes Van Sharpe MD   fluticasone (FLONASE) 50 MCG/ACT nasal spray 1 spray by Each Nostril route daily 10/17/23  Yes Socrates Costa APRN - NP   Fluticasone-Salmeterol 232-14 MCG/ACT AEPB Inhale 1 puff into the lungs 2 times daily

## 2025-01-27 ENCOUNTER — HOSPITAL ENCOUNTER (OUTPATIENT)
Age: 85
Discharge: HOME | End: 2025-01-27
Payer: MEDICARE

## 2025-01-27 DIAGNOSIS — M1A.00X0 IDIOPATHIC CHRONIC GOUT WITHOUT TOPHUS, UNSPECIFIED SITE: ICD-10-CM

## 2025-01-27 DIAGNOSIS — E78.2 MIXED HYPERLIPIDEMIA: ICD-10-CM

## 2025-01-27 DIAGNOSIS — N18.30 TYPE 2 DIABETES MELLITUS WITH STAGE 3 CHRONIC KIDNEY DISEASE, WITHOUT LONG-TERM CURRENT USE OF INSULIN, UNSPECIFIED WHETHER STAGE 3A OR 3B CKD (HCC): ICD-10-CM

## 2025-01-27 DIAGNOSIS — E11.22 TYPE 2 DIABETES MELLITUS WITH STAGE 3 CHRONIC KIDNEY DISEASE, WITHOUT LONG-TERM CURRENT USE OF INSULIN, UNSPECIFIED WHETHER STAGE 3A OR 3B CKD (HCC): ICD-10-CM

## 2025-01-27 DIAGNOSIS — I10 ESSENTIAL HYPERTENSION: ICD-10-CM

## 2025-01-27 LAB
ALBUMIN SERPL-MCNC: 4.3 G/DL (ref 3.5–5.2)
ALP SERPL-CCNC: 71 U/L (ref 40–129)
ALT SERPL-CCNC: 21 U/L (ref 10–50)
ANION GAP SERPL CALCULATED.3IONS-SCNC: 11 MMOL/L (ref 9–16)
AST SERPL-CCNC: 34 U/L (ref 10–50)
BILIRUB SERPL-MCNC: 1.2 MG/DL (ref 0–1.2)
BUN SERPL-MCNC: 21 MG/DL (ref 8–23)
CALCIUM SERPL-MCNC: 9.8 MG/DL (ref 8.6–10.4)
CHLORIDE SERPL-SCNC: 108 MMOL/L (ref 98–107)
CO2 SERPL-SCNC: 25 MMOL/L (ref 20–31)
CREAT SERPL-MCNC: 1.7 MG/DL (ref 0.7–1.2)
CREAT UR-MCNC: 243 MG/DL (ref 39–259)
GFR, ESTIMATED: 40 ML/MIN/1.73M2
GLUCOSE SERPL-MCNC: 118 MG/DL (ref 74–99)
MICROALBUMIN UR-MCNC: 25 MG/L (ref 0–20)
MICROALBUMIN/CREAT UR-RTO: 10 MCG/MG CREAT (ref 0–17)
POTASSIUM SERPL-SCNC: 4.5 MMOL/L (ref 3.7–5.3)
PROT SERPL-MCNC: 7.1 G/DL (ref 6.6–8.7)
SODIUM SERPL-SCNC: 144 MMOL/L (ref 136–145)
URATE SERPL-MCNC: 7.6 MG/DL (ref 3.4–7)

## 2025-01-27 PROCEDURE — 82570 ASSAY OF URINE CREATININE: CPT

## 2025-01-27 PROCEDURE — 84550 ASSAY OF BLOOD/URIC ACID: CPT

## 2025-01-27 PROCEDURE — 82043 UR ALBUMIN QUANTITATIVE: CPT

## 2025-01-27 PROCEDURE — 36415 COLL VENOUS BLD VENIPUNCTURE: CPT

## 2025-01-27 PROCEDURE — 80053 COMPREHEN METABOLIC PANEL: CPT

## 2025-03-10 ENCOUNTER — HOSPITAL ENCOUNTER (OUTPATIENT)
Dept: VASCULAR LAB | Age: 85
Discharge: HOME | End: 2025-03-12
Attending: SURGERY
Payer: MEDICARE

## 2025-03-10 DIAGNOSIS — I65.23 BILATERAL CAROTID ARTERY STENOSIS: ICD-10-CM

## 2025-03-10 LAB
VAS LEFT CCA DIST EDV: 16.4 CM/S
VAS LEFT CCA DIST PSV: 50.1 CM/S
VAS LEFT CCA MID EDV: 17.2 CM/S
VAS LEFT CCA MID PSV: 51.9 CM/S
VAS LEFT CCA PROX EDV: 13.8 CM/S
VAS LEFT CCA PROX PSV: 48.4 CM/S
VAS LEFT ECA EDV: 8.4 CM/S
VAS LEFT ECA PSV: 50.7 CM/S
VAS LEFT ICA DIST EDV: 22.3 CM/S
VAS LEFT ICA DIST PSV: 53.3 CM/S
VAS LEFT ICA MID EDV: 17.4 CM/S
VAS LEFT ICA MID PSV: 45.6 CM/S
VAS LEFT ICA PROX EDV: 14 CM/S
VAS LEFT ICA PROX PSV: 39.5 CM/S
VAS LEFT ICA/CCA PSV: 1.03
VAS LEFT VERTEBRAL EDV: 16.2 CM/S
VAS LEFT VERTEBRAL PSV: 43 CM/S
VAS RIGHT CCA DIST EDV: 14.5 CM/S
VAS RIGHT CCA DIST PSV: 50.7 CM/S
VAS RIGHT CCA MID EDV: 15.6 CM/S
VAS RIGHT CCA MID PSV: 51.8 CM/S
VAS RIGHT CCA PROX EDV: 16.4 CM/S
VAS RIGHT CCA PROX PSV: 51.5 CM/S
VAS RIGHT ECA EDV: 9 CM/S
VAS RIGHT ECA PSV: 51.8 CM/S
VAS RIGHT ICA DIST EDV: 36 CM/S
VAS RIGHT ICA DIST PSV: 83.1 CM/S
VAS RIGHT ICA MID EDV: 22.1 CM/S
VAS RIGHT ICA MID PSV: 59.4 CM/S
VAS RIGHT ICA PROX EDV: 13.4 CM/S
VAS RIGHT ICA PROX PSV: 57.3 CM/S
VAS RIGHT ICA/CCA PSV: 1.6
VAS RIGHT VERTEBRAL EDV: 8 CM/S
VAS RIGHT VERTEBRAL PSV: 26.2 CM/S

## 2025-03-10 PROCEDURE — 93880 EXTRACRANIAL BILAT STUDY: CPT

## 2025-03-10 PROCEDURE — 93880 EXTRACRANIAL BILAT STUDY: CPT | Performed by: SURGERY

## 2025-03-31 ENCOUNTER — OFFICE VISIT (OUTPATIENT)
Dept: VASCULAR SURGERY | Age: 85
End: 2025-03-31
Payer: MEDICARE

## 2025-03-31 VITALS
OXYGEN SATURATION: 96 % | WEIGHT: 178 LBS | HEIGHT: 70 IN | DIASTOLIC BLOOD PRESSURE: 81 MMHG | BODY MASS INDEX: 25.48 KG/M2 | HEART RATE: 63 BPM | SYSTOLIC BLOOD PRESSURE: 131 MMHG | RESPIRATION RATE: 16 BRPM

## 2025-03-31 DIAGNOSIS — I65.23 BILATERAL CAROTID ARTERY STENOSIS: Primary | ICD-10-CM

## 2025-03-31 PROCEDURE — 3079F DIAST BP 80-89 MM HG: CPT | Performed by: SURGERY

## 2025-03-31 PROCEDURE — 99213 OFFICE O/P EST LOW 20 MIN: CPT | Performed by: SURGERY

## 2025-03-31 PROCEDURE — 1126F AMNT PAIN NOTED NONE PRSNT: CPT | Performed by: SURGERY

## 2025-03-31 PROCEDURE — G8427 DOCREV CUR MEDS BY ELIG CLIN: HCPCS | Performed by: SURGERY

## 2025-03-31 PROCEDURE — 1159F MED LIST DOCD IN RCRD: CPT | Performed by: SURGERY

## 2025-03-31 PROCEDURE — 1036F TOBACCO NON-USER: CPT | Performed by: SURGERY

## 2025-03-31 PROCEDURE — 3075F SYST BP GE 130 - 139MM HG: CPT | Performed by: SURGERY

## 2025-03-31 PROCEDURE — G8417 CALC BMI ABV UP PARAM F/U: HCPCS | Performed by: SURGERY

## 2025-03-31 PROCEDURE — 1123F ACP DISCUSS/DSCN MKR DOCD: CPT | Performed by: SURGERY

## 2025-03-31 NOTE — PROGRESS NOTES
[facility], [facility] HEART AND VASCULAR  [address] [address]  Dept: [phone]     Patient: Ger Mccray  : 1940  MRN: [id number]  DOS: 3/31/2025            HPI:  Ger Mccray is a 85 y.o. male who returns to the office regarding his recent right carotid endarterectomy.  The endarterectomy was done for symptomatic carotid stenosis.  He is doing well and has not had recurrent symptoms.  His endarterectomy was done roughly 4 months ago.  Duplex examination reveals no evidence of stenosis on the right and plaque without significant stenosis on the left.    Review of Systems    Vitals:    25 1036   BP: 131/81   BP Site: Left Upper Arm   Patient Position: Sitting   BP Cuff Size: Medium Adult   Pulse: 63   Resp: 16   SpO2: 96%   Weight: 80.7 kg (178 lb)   Height: 1.778 m (5' 10\")          Physical Exam  On exam the incision has healed well.  He has no carotid bruit on either side.  Neurologically he is intact without focal deficit.    Assessment:  1. Bilateral carotid artery stenosis          Plan:  We will be going ahead with yearly carotid duplex examinations for surveillance.  If after approximately 3 years these remain to be normal then we can stop examining his carotid arteries.  He understands and agrees and we will see him in a year.    Electronically signed by:  Amauri Sy MD

## 2025-04-16 ENCOUNTER — OFFICE VISIT (OUTPATIENT)
Dept: NEUROLOGY | Age: 85
End: 2025-04-16
Payer: MEDICARE

## 2025-04-16 VITALS
BODY MASS INDEX: 25.71 KG/M2 | SYSTOLIC BLOOD PRESSURE: 148 MMHG | HEIGHT: 70 IN | HEART RATE: 57 BPM | WEIGHT: 179.6 LBS | DIASTOLIC BLOOD PRESSURE: 91 MMHG

## 2025-04-16 DIAGNOSIS — I69.30 SEQUELA, POST-STROKE: Primary | ICD-10-CM

## 2025-04-16 PROCEDURE — 1123F ACP DISCUSS/DSCN MKR DOCD: CPT | Performed by: STUDENT IN AN ORGANIZED HEALTH CARE EDUCATION/TRAINING PROGRAM

## 2025-04-16 PROCEDURE — 99205 OFFICE O/P NEW HI 60 MIN: CPT | Performed by: STUDENT IN AN ORGANIZED HEALTH CARE EDUCATION/TRAINING PROGRAM

## 2025-04-16 PROCEDURE — G8427 DOCREV CUR MEDS BY ELIG CLIN: HCPCS | Performed by: STUDENT IN AN ORGANIZED HEALTH CARE EDUCATION/TRAINING PROGRAM

## 2025-04-16 PROCEDURE — G8417 CALC BMI ABV UP PARAM F/U: HCPCS | Performed by: STUDENT IN AN ORGANIZED HEALTH CARE EDUCATION/TRAINING PROGRAM

## 2025-04-16 PROCEDURE — 1036F TOBACCO NON-USER: CPT | Performed by: STUDENT IN AN ORGANIZED HEALTH CARE EDUCATION/TRAINING PROGRAM

## 2025-04-16 PROCEDURE — 3077F SYST BP >= 140 MM HG: CPT | Performed by: STUDENT IN AN ORGANIZED HEALTH CARE EDUCATION/TRAINING PROGRAM

## 2025-04-16 PROCEDURE — 3080F DIAST BP >= 90 MM HG: CPT | Performed by: STUDENT IN AN ORGANIZED HEALTH CARE EDUCATION/TRAINING PROGRAM

## 2025-04-16 PROCEDURE — 1159F MED LIST DOCD IN RCRD: CPT | Performed by: STUDENT IN AN ORGANIZED HEALTH CARE EDUCATION/TRAINING PROGRAM

## 2025-04-16 NOTE — PROGRESS NOTES
Initial Neurology Clinic Note    [facility]  Department of Neurology  Date of Service: 4/16/2025 at 10:41 AM      CLINIC NOTE - INITIAL VISIT    Ger Mccray is a 85 y.o. right handed male who came as a new patient to establish care for stroke.    He was accompanied by his wife.    PRESENTING ILLNESS:      Mr. Cyr is a right-handed male with a history of two prior ischemic strokes, the most recent occurring in November 2024, presenting with acute onset of left upper extremity weakness and numbness. Imaging at the time (MRI brain on 11/19/2024) revealed scattered small acute to subacute infarctions in the right frontal, parietal, occipital lobes, and right insular cortex, consistent with thromboembolic strokes. Chronic imaging also showed mild periventricular small vessel disease.  Carotid Doppler on 11/18 revealed 70% stenosis of the right internal carotid artery (ICA) and <50% stenosis of the left ICA. Stroke mechanism was suspected to be thrombotic vessel-to-vessel embolization originating from the symptomatic proximal right ICA, leading to embolic showering in the right MCA and CASA territories. He was initially treated with aspirin and Plavix (21-day course) and subsequently underwent right carotid endarterectomy (CEA).  He was already on aspirin prior to the event. He continues aspirin and statin therapy, and maintains good control of blood pressure and diabetes. He reports no recurrent symptoms and has returned to baseline functional status. He completed home-based physical therapy post-stroke and remains fully independent in ADLs and ambulation. No significant deficits on review of systems today.    PREVIOUS EVALUATION:      Labs:    LDL cholesterol 62  No results found for: \"LDLDIRECT\"   No components found for: \"CHLPL\"   Lab Results   Component Value Date    TRIG 113 11/21/2024    TRIG 211 (H) 07/19/2024    TRIG 164 (H) 01/22/2024      Lab Results   Component Value Date    HDL 38 (L)

## 2025-06-02 ENCOUNTER — OFFICE VISIT (OUTPATIENT)
Dept: FAMILY MEDICINE CLINIC | Age: 85
End: 2025-06-02

## 2025-06-02 VITALS
DIASTOLIC BLOOD PRESSURE: 68 MMHG | OXYGEN SATURATION: 99 % | RESPIRATION RATE: 14 BRPM | HEART RATE: 51 BPM | WEIGHT: 179.2 LBS | SYSTOLIC BLOOD PRESSURE: 122 MMHG | BODY MASS INDEX: 25.71 KG/M2

## 2025-06-02 DIAGNOSIS — M1A.00X0 IDIOPATHIC CHRONIC GOUT WITHOUT TOPHUS, UNSPECIFIED SITE: ICD-10-CM

## 2025-06-02 DIAGNOSIS — H61.23 BILATERAL IMPACTED CERUMEN: ICD-10-CM

## 2025-06-02 DIAGNOSIS — J44.9 CHRONIC OBSTRUCTIVE PULMONARY DISEASE, UNSPECIFIED COPD TYPE (HCC): ICD-10-CM

## 2025-06-02 DIAGNOSIS — E11.22 TYPE 2 DIABETES MELLITUS WITH STAGE 3 CHRONIC KIDNEY DISEASE, WITHOUT LONG-TERM CURRENT USE OF INSULIN, UNSPECIFIED WHETHER STAGE 3A OR 3B CKD (HCC): Primary | ICD-10-CM

## 2025-06-02 DIAGNOSIS — E78.2 MIXED HYPERLIPIDEMIA: ICD-10-CM

## 2025-06-02 DIAGNOSIS — N18.30 TYPE 2 DIABETES MELLITUS WITH STAGE 3 CHRONIC KIDNEY DISEASE, WITHOUT LONG-TERM CURRENT USE OF INSULIN, UNSPECIFIED WHETHER STAGE 3A OR 3B CKD (HCC): Primary | ICD-10-CM

## 2025-06-02 DIAGNOSIS — R39.12 WEAK URINARY STREAM: ICD-10-CM

## 2025-06-02 DIAGNOSIS — R39.12 WEAK URINARY STREAM: Primary | ICD-10-CM

## 2025-06-02 DIAGNOSIS — I10 PRIMARY HYPERTENSION: ICD-10-CM

## 2025-06-02 DIAGNOSIS — N39.43 URINARY DRIBBLING: ICD-10-CM

## 2025-06-02 PROBLEM — E78.1 HYPERTRIGLYCERIDEMIA: Status: RESOLVED | Noted: 2024-07-18 | Resolved: 2025-06-02

## 2025-06-02 LAB — HBA1C MFR BLD: 6.5 %

## 2025-06-02 RX ORDER — ALLOPURINOL 100 MG/1
200 TABLET ORAL DAILY
Qty: 180 TABLET | Refills: 0 | Status: SHIPPED | OUTPATIENT
Start: 2025-06-02

## 2025-06-02 SDOH — ECONOMIC STABILITY: FOOD INSECURITY: WITHIN THE PAST 12 MONTHS, THE FOOD YOU BOUGHT JUST DIDN'T LAST AND YOU DIDN'T HAVE MONEY TO GET MORE.: NEVER TRUE

## 2025-06-02 SDOH — ECONOMIC STABILITY: FOOD INSECURITY: WITHIN THE PAST 12 MONTHS, YOU WORRIED THAT YOUR FOOD WOULD RUN OUT BEFORE YOU GOT MONEY TO BUY MORE.: NEVER TRUE

## 2025-06-02 ASSESSMENT — ENCOUNTER SYMPTOMS
SHORTNESS OF BREATH: 0
BLOOD IN STOOL: 0
CHEST TIGHTNESS: 0
ABDOMINAL PAIN: 0

## 2025-06-02 ASSESSMENT — PATIENT HEALTH QUESTIONNAIRE - PHQ9
SUM OF ALL RESPONSES TO PHQ QUESTIONS 1-9: 0
1. LITTLE INTEREST OR PLEASURE IN DOING THINGS: NOT AT ALL
SUM OF ALL RESPONSES TO PHQ QUESTIONS 1-9: 0
2. FEELING DOWN, DEPRESSED OR HOPELESS: NOT AT ALL

## 2025-06-02 NOTE — PROGRESS NOTES
Encounter   Procedures    REMOVE IMPACTED EAR WAX     Bilateral impacted cerumen removed with ear curette and irrigation.  Patient stated that he could hear much better bilaterally after procedure.    CBC with Auto Differential     Standing Status:   Future     Expected Date:   6/2/2025     Expiration Date:   6/2/2026    Comprehensive Metabolic Panel     Fasting     Standing Status:   Future     Expected Date:   6/2/2025     Expiration Date:   6/2/2026    Lipid Panel     Standing Status:   Future     Expected Date:   6/2/2025     Expiration Date:   6/2/2026    Magnesium     Standing Status:   Future     Expected Date:   6/2/2025     Expiration Date:   6/2/2026    Uric Acid     Standing Status:   Future     Expected Date:   6/2/2025     Expiration Date:   6/2/2026    Urinalysis with Reflex to Culture     Standing Status:   Future     Expected Date:   6/2/2025     Expiration Date:   6/2/2026     SPECIFY(EX-CATH,MIDSTREAM,CYSTO,ETC)?:   Midstream    POCT glycosylated hemoglobin (Hb A1C)        Orders Placed This Encounter   Medications    allopurinol (ZYLOPRIM) 100 MG tablet     Sig: Take 2 tablets by mouth daily     Dispense:  180 tablet     Refill:  0        Return in about 6 months (around 12/2/2025).    Electronically signed by Alin Hayes MD on 6/2/2025 at 12:10 PM

## 2025-06-04 ENCOUNTER — CARE COORDINATION (OUTPATIENT)
Dept: CARE COORDINATION | Age: 85
End: 2025-06-04

## 2025-06-04 NOTE — CARE COORDINATION
Ambulatory Care Coordination Note     2025 2:26 PM     Patient Current Location:  Home: [address]  [place] [address]     This patient was received as a referral from Population health report .    ACM contacted the patient by telephone. Verified name and  with patient as identifiers. Provided introduction to self, and explanation of the ACM role.   Patient declined care management services at this time.          ACM: Lyudmila Stahl RN     Challenges to be reviewed by the provider   Additional needs identified to be addressed with provider No                  Method of communication with provider: none.    Utilization: Initial Call - N/A    Care Summary Note: Spoke to Ger. Introduced self and reason for call. He stated that he is doing ok. Getting around without any devices, although his right hip is bothering him and he may need to use something eventually. He said he had the hip replaced about 30 years ago. He has an appt with ortho next month.   Had follow up with PCP this past Monday. Denies any concerns or questions.   He still drives. He has not concerns with medication costs. Follows up regularly with his providers.   Denies any needs for ACM program and declines at this time.     Offered patient enrollment in the Remote Patient Monitoring (RPM) program for in-home monitoring: Patient is not eligible for RPM program because: declined ACM enrollment  .     Assessments Completed:   General Assessment    Do you have any symptoms that are causing concern?: No          Medications Reviewed:   Not completed during this call: declined enrollment     Advance Care Planning:   Not reviewed during this call     Care Planning:   Not completed during this call    PCP/Specialist follow up:   Future Appointments         Provider Specialty Dept Phone    2025 9:30 AM Jose Escamilla MD Urology [phone]    2025 11:00 AM [facility]ED[facility] [phone]    2025

## 2025-06-12 DIAGNOSIS — E78.2 MIXED HYPERLIPIDEMIA: ICD-10-CM

## 2025-06-12 DIAGNOSIS — N18.30 TYPE 2 DIABETES MELLITUS WITH STAGE 3 CHRONIC KIDNEY DISEASE, WITHOUT LONG-TERM CURRENT USE OF INSULIN, UNSPECIFIED WHETHER STAGE 3A OR 3B CKD (HCC): ICD-10-CM

## 2025-06-12 DIAGNOSIS — E11.22 TYPE 2 DIABETES MELLITUS WITH STAGE 3 CHRONIC KIDNEY DISEASE, WITHOUT LONG-TERM CURRENT USE OF INSULIN, UNSPECIFIED WHETHER STAGE 3A OR 3B CKD (HCC): ICD-10-CM

## 2025-06-13 ENCOUNTER — HOSPITAL ENCOUNTER (OUTPATIENT)
Age: 85
Setting detail: SPECIMEN
Discharge: HOME | End: 2025-06-13
Payer: MEDICARE

## 2025-06-13 DIAGNOSIS — R39.12 WEAK URINARY STREAM: ICD-10-CM

## 2025-06-13 DIAGNOSIS — N39.43 URINARY DRIBBLING: ICD-10-CM

## 2025-06-13 LAB
BACTERIA URNS QL MICRO: ABNORMAL
BILIRUB UR QL STRIP: NEGATIVE
CASTS #/AREA URNS LPF: ABNORMAL /LPF
CLARITY UR: CLEAR
COLOR UR: YELLOW
EPI CELLS #/AREA URNS HPF: ABNORMAL /HPF
GLUCOSE UR STRIP-MCNC: NEGATIVE MG/DL
HGB UR QL STRIP.AUTO: NEGATIVE
KETONES UR STRIP-MCNC: NEGATIVE MG/DL
LEUKOCYTE ESTERASE UR QL STRIP: NEGATIVE
NITRITE UR QL STRIP: NEGATIVE
PH UR STRIP: 6 [PH] (ref 5–8)
PROT UR STRIP-MCNC: ABNORMAL MG/DL
RBC #/AREA URNS HPF: ABNORMAL /HPF
SP GR UR STRIP: 1.02 (ref 1–1.03)
UROBILINOGEN UR STRIP-ACNC: NORMAL EU/DL (ref 0–1)
WBC #/AREA URNS HPF: ABNORMAL /HPF

## 2025-06-13 PROCEDURE — 81001 URINALYSIS AUTO W/SCOPE: CPT

## 2025-06-13 RX ORDER — ROSUVASTATIN CALCIUM 10 MG/1
10 TABLET, COATED ORAL DAILY
Qty: 90 TABLET | Refills: 1 | Status: SHIPPED | OUTPATIENT
Start: 2025-06-13

## 2025-06-13 RX ORDER — SITAGLIPTIN 50 MG/1
50 TABLET, FILM COATED ORAL DAILY
Qty: 90 TABLET | Refills: 1 | Status: SHIPPED | OUTPATIENT
Start: 2025-06-13

## 2025-06-22 SDOH — HEALTH STABILITY: PHYSICAL HEALTH: ON AVERAGE, HOW MANY MINUTES DO YOU ENGAGE IN EXERCISE AT THIS LEVEL?: 20 MIN

## 2025-06-22 SDOH — HEALTH STABILITY: PHYSICAL HEALTH: ON AVERAGE, HOW MANY DAYS PER WEEK DO YOU ENGAGE IN MODERATE TO STRENUOUS EXERCISE (LIKE A BRISK WALK)?: 2 DAYS

## 2025-06-22 ASSESSMENT — LIFESTYLE VARIABLES
HOW OFTEN DO YOU HAVE A DRINK CONTAINING ALCOHOL: 2
HOW OFTEN DO YOU HAVE SIX OR MORE DRINKS ON ONE OCCASION: 1
HOW OFTEN DO YOU HAVE A DRINK CONTAINING ALCOHOL: MONTHLY OR LESS
HOW MANY STANDARD DRINKS CONTAINING ALCOHOL DO YOU HAVE ON A TYPICAL DAY: 1 OR 2
HOW MANY STANDARD DRINKS CONTAINING ALCOHOL DO YOU HAVE ON A TYPICAL DAY: 1

## 2025-06-22 ASSESSMENT — PATIENT HEALTH QUESTIONNAIRE - PHQ9
2. FEELING DOWN, DEPRESSED OR HOPELESS: NOT AT ALL
SUM OF ALL RESPONSES TO PHQ QUESTIONS 1-9: 0
1. LITTLE INTEREST OR PLEASURE IN DOING THINGS: NOT AT ALL

## 2025-06-23 ENCOUNTER — OFFICE VISIT (OUTPATIENT)
Dept: UROLOGY | Age: 85
End: 2025-06-23
Payer: MEDICARE

## 2025-06-23 VITALS — SYSTOLIC BLOOD PRESSURE: 126 MMHG | DIASTOLIC BLOOD PRESSURE: 74 MMHG | TEMPERATURE: 97.3 F | HEART RATE: 61 BPM

## 2025-06-23 DIAGNOSIS — R39.15 URGENCY OF URINATION: Primary | ICD-10-CM

## 2025-06-23 DIAGNOSIS — N40.1 BPH WITH OBSTRUCTION/LOWER URINARY TRACT SYMPTOMS: ICD-10-CM

## 2025-06-23 DIAGNOSIS — N13.8 BPH WITH OBSTRUCTION/LOWER URINARY TRACT SYMPTOMS: ICD-10-CM

## 2025-06-23 DIAGNOSIS — R39.12 WEAK URINARY STREAM: ICD-10-CM

## 2025-06-23 PROCEDURE — 3074F SYST BP LT 130 MM HG: CPT | Performed by: UROLOGY

## 2025-06-23 PROCEDURE — G8427 DOCREV CUR MEDS BY ELIG CLIN: HCPCS | Performed by: UROLOGY

## 2025-06-23 PROCEDURE — 1159F MED LIST DOCD IN RCRD: CPT | Performed by: UROLOGY

## 2025-06-23 PROCEDURE — 99214 OFFICE O/P EST MOD 30 MIN: CPT | Performed by: UROLOGY

## 2025-06-23 PROCEDURE — G8417 CALC BMI ABV UP PARAM F/U: HCPCS | Performed by: UROLOGY

## 2025-06-23 PROCEDURE — 3078F DIAST BP <80 MM HG: CPT | Performed by: UROLOGY

## 2025-06-23 PROCEDURE — 1123F ACP DISCUSS/DSCN MKR DOCD: CPT | Performed by: UROLOGY

## 2025-06-23 PROCEDURE — 1036F TOBACCO NON-USER: CPT | Performed by: UROLOGY

## 2025-06-23 RX ORDER — TAMSULOSIN HYDROCHLORIDE 0.4 MG/1
0.4 CAPSULE ORAL DAILY
Qty: 30 CAPSULE | Refills: 5 | Status: SHIPPED | OUTPATIENT
Start: 2025-06-23

## 2025-06-23 ASSESSMENT — ENCOUNTER SYMPTOMS
ABDOMINAL PAIN: 0
BACK PAIN: 0
SHORTNESS OF BREATH: 0

## 2025-06-23 NOTE — PROGRESS NOTES
Review of Systems   Constitutional:  Positive for fatigue. Negative for fever.   Respiratory:  Negative for shortness of breath.    Cardiovascular:  Negative for chest pain.   Gastrointestinal:  Negative for abdominal pain.   Genitourinary:  Positive for difficulty urinating and urgency. Negative for dysuria, flank pain, frequency, hematuria, penile pain, penile swelling, scrotal swelling and testicular pain.   Musculoskeletal:  Negative for back pain.   Neurological:  Negative for weakness.     
exposure: Past   Smokeless Tobacco Former    Types: Chew   Tobacco Comments    3/30/21: NICOTINE LOZENGES - current use      (If patient a smoker, smoking cessation counseling offered)   Social History     Substance and Sexual Activity   Alcohol Use No    Alcohol/week: 0.0 standard drinks of alcohol    Comment: rarely       REVIEW OF SYSTEMS:  Review of Systems    Physical Exam:    This a 85 y.o. male   Vitals:    06/23/25 0945   BP: 126/74   Pulse: 61   Temp: 97.3 °F (36.3 °C)     There is no height or weight on file to calculate BMI.    Physical Exam  Constitutional: Patient in no acute distress.  Neuro: Alert and oriented to person, place and time.  Psych: Mood normal, affect normal  Skin: No rash noted  HEENT: Head: Normocephalic and atraumatic  Conjunctivae and EOM are normal. Pupils are equal, round  Nose: Normal  Right External Ear: Normal; Left External Ear: Normal  Mouth: Mucosa Moist  Neck: Supple  Lungs:Respiratory effort is normal  Cardiovascular: Warm & Pink  Abdomen: Soft, non-tender, non-distendedwith no CVA,  No flank tenderness,  Orhepatosplenomegaly   Lymphatics: No palpable lymphadenopathy.  Bladder non-tender and not distended.  Musculoskeletal: Normal gait and station  Penis normal and circumcised  Urethral meatus normal      Assessment and Plan      1. Urgency of urination    2. BPH with obstruction/lower urinary tract symptoms    3. Weak urinary stream           Plan:  Flomax  F/u 2 mo bladder scan  Assessment & Plan     Prescriptions Ordered:  Orders Placed This Encounter   Medications    tamsulosin (FLOMAX) 0.4 MG capsule     Sig: Take 1 capsule by mouth daily     Dispense:  30 capsule     Refill:  5      Orders Placed:  No orders of the defined types were placed in this encounter.            Jose Escamilla MD    Agree with the ROS entered by the MA.

## 2025-06-24 ENCOUNTER — OFFICE VISIT (OUTPATIENT)
Dept: FAMILY MEDICINE CLINIC | Age: 85
End: 2025-06-24

## 2025-06-24 VITALS
TEMPERATURE: 97.2 F | SYSTOLIC BLOOD PRESSURE: 126 MMHG | HEART RATE: 60 BPM | BODY MASS INDEX: 26.03 KG/M2 | RESPIRATION RATE: 16 BRPM | WEIGHT: 181.8 LBS | DIASTOLIC BLOOD PRESSURE: 76 MMHG | HEIGHT: 70 IN

## 2025-06-24 DIAGNOSIS — Z00.00 MEDICARE ANNUAL WELLNESS VISIT, SUBSEQUENT: Primary | ICD-10-CM

## 2025-06-24 NOTE — PROGRESS NOTES
Medicare Annual Wellness Visit    Ger Mccray is here for Medicare AWV    Assessment & Plan   VIS given for TDAP vaccine and patient also reminded that the Covid 19 vaccine boosters are available.     No follow-ups on file.     Subjective       Patient's complete Health Risk Assessment and screening values have been reviewed and are found in Flowsheets. The following problems were reviewed today and where indicated follow up appointments were made and/or referrals ordered.    Positive Risk Factor Screenings with Interventions:             General HRA Questions:  Select all that apply: (!) (Patient-Rptd) New or Increased Pain, New or Increased Fatigue    Interventions - Pain:  See AVS for additional education material  Interventions Fatigue:  See AVS for additional education material      Inactivity:  On average, how many days per week do you engage in moderate to strenuous exercise (like a brisk walk)?: (Patient-Rptd) 2 days (!) Abnormal  On average, how many minutes do you engage in exercise at this level?: (Patient-Rptd) 20 min    Interventions:  See AVS for additional education material    Poor Eating Habits/Diet:  Do you eat balanced/healthy meals regularly?: (!) (Patient-Rptd) No    Interventions:  See AVS for additional education material  Heart healthy diet and exercise information reviewed and provided in AVS.       Vision Screen:  Do you have difficulty driving, watching TV, or doing any of your daily activities because of your eyesight?: (Patient-Rptd) No  Have you had an eye exam within the past year?: (!) (Patient-Rptd) No    Interventions:   See AVS for additional education material    Safety:  Do you have any tripping hazards - loose or unsecured carpets or rugs?: (!) (Patient-Rptd) Yes    Interventions:  See AVS for additional education material                   Objective   Vitals:    06/24/25 0957   BP: 126/76   Pulse: 60   Resp: 16   Temp: 97.2 °F (36.2 °C)   Weight: 82.5 kg (181 lb 12.8 oz)

## 2025-06-24 NOTE — PATIENT INSTRUCTIONS
stairways.  Wear nonskid footwear, even inside. Don't walk barefoot or in socks without shoes.        Be safe outside.   Use handrails, curb cuts, and ramps whenever possible.  Keep your hands free by using a shoulder bag or backpack.  Try to walk in well-lit areas. Watch out for uneven ground, changes in pavement, and debris.  Be careful in the winter. Walk on the grass or gravel when sidewalks are slippery. Use de-icer on steps and walkways. Add non-slip devices to shoes.    Put grab bars and nonskid mats in your shower or tub and near the toilet. Try to use a shower chair or bath bench when bathing.   Get into a tub or shower by putting in your weaker leg first. Get out with your strong side first. Have a phone or medical alert device in the bathroom with you.   Where can you learn more?  Go to https://www.[employer].net/patientEd and enter G117 to learn more about \"Preventing Falls: Care Instructions.\"  Current as of: July 31, 2024  Content Version: 14.5  © 2024-2025 [employer].   Care instructions adapted under license by [employer]. If you have questions about a medical condition or this instruction, always ask your healthcare professional. [employer] disclaims any warranty or liability for your use of this information.         Eating Healthy Foods: Care Instructions  With every meal, you can make healthy food choices. Try to eat a variety of fruits, vegetables, whole grains, lean proteins, and low-fat dairy products. This can help you get the right balance of nutrients, including vitamins and minerals. Small changes add up over time. You can start by adding one healthy food to your meals each day.    Try to make half your plate fruits and vegetables, one-fourth whole grains, and one-fourth lean proteins. Try including dairy with your meals.   Eat more fruits and vegetables. Try to have them with most meals and snacks.   Foods for healthy eating        Fruits   These can be fresh,

## 2025-07-02 ENCOUNTER — HOSPITAL ENCOUNTER (OUTPATIENT)
Age: 85
Discharge: HOME | End: 2025-07-02
Payer: MEDICARE

## 2025-07-02 ENCOUNTER — RESULTS FOLLOW-UP (OUTPATIENT)
Dept: FAMILY MEDICINE CLINIC | Age: 85
End: 2025-07-02

## 2025-07-02 DIAGNOSIS — I10 PRIMARY HYPERTENSION: ICD-10-CM

## 2025-07-02 DIAGNOSIS — E78.2 MIXED HYPERLIPIDEMIA: ICD-10-CM

## 2025-07-02 DIAGNOSIS — M1A.00X0 IDIOPATHIC CHRONIC GOUT WITHOUT TOPHUS, UNSPECIFIED SITE: ICD-10-CM

## 2025-07-02 DIAGNOSIS — E11.22 TYPE 2 DIABETES MELLITUS WITH STAGE 3 CHRONIC KIDNEY DISEASE, WITHOUT LONG-TERM CURRENT USE OF INSULIN, UNSPECIFIED WHETHER STAGE 3A OR 3B CKD (HCC): ICD-10-CM

## 2025-07-02 DIAGNOSIS — N18.30 TYPE 2 DIABETES MELLITUS WITH STAGE 3 CHRONIC KIDNEY DISEASE, WITHOUT LONG-TERM CURRENT USE OF INSULIN, UNSPECIFIED WHETHER STAGE 3A OR 3B CKD (HCC): ICD-10-CM

## 2025-07-02 LAB
ALBUMIN SERPL-MCNC: 4.2 G/DL (ref 3.5–5.2)
ALP SERPL-CCNC: 77 U/L (ref 40–129)
ALT SERPL-CCNC: 17 U/L (ref 10–50)
ANION GAP SERPL CALCULATED.3IONS-SCNC: 12 MMOL/L (ref 9–16)
AST SERPL-CCNC: 33 U/L (ref 10–50)
BASOPHILS # BLD: 0.06 K/UL (ref 0–0.2)
BASOPHILS NFR BLD: 1 % (ref 0–2)
BILIRUB SERPL-MCNC: 1 MG/DL (ref 0–1.2)
BUN SERPL-MCNC: 20 MG/DL (ref 8–23)
CALCIUM SERPL-MCNC: 9.9 MG/DL (ref 8.6–10.4)
CHLORIDE SERPL-SCNC: 110 MMOL/L (ref 98–107)
CHOLEST SERPL-MCNC: 140 MG/DL (ref 0–199)
CHOLESTEROL/HDL RATIO: 3.5
CO2 SERPL-SCNC: 23 MMOL/L (ref 20–31)
CREAT SERPL-MCNC: 1.7 MG/DL (ref 0.7–1.2)
EOSINOPHIL # BLD: 0.61 K/UL (ref 0–0.44)
EOSINOPHILS RELATIVE PERCENT: 7 % (ref 0–4)
ERYTHROCYTE [DISTWIDTH] IN BLOOD BY AUTOMATED COUNT: 14.1 % (ref 11.5–14.9)
GFR, ESTIMATED: 39 ML/MIN/1.73M2
GLUCOSE SERPL-MCNC: 117 MG/DL (ref 74–99)
HCT VFR BLD AUTO: 42.4 % (ref 41–53)
HDLC SERPL-MCNC: 40 MG/DL
HGB BLD-MCNC: 14 G/DL (ref 13.5–17.5)
IMM GRANULOCYTES # BLD AUTO: 0.05 K/UL (ref 0–0.3)
IMM GRANULOCYTES NFR BLD: 1 %
LDLC SERPL CALC-MCNC: 70 MG/DL (ref 0–100)
LYMPHOCYTES NFR BLD: 2.07 K/UL (ref 1.1–3.7)
LYMPHOCYTES RELATIVE PERCENT: 23 % (ref 24–44)
MAGNESIUM SERPL-MCNC: 2.5 MG/DL (ref 1.6–2.4)
MCH RBC QN AUTO: 31.2 PG (ref 26–34)
MCHC RBC AUTO-ENTMCNC: 33 G/DL (ref 31–37)
MCV RBC AUTO: 94.4 FL (ref 80–100)
MONOCYTES NFR BLD: 0.81 K/UL (ref 0.1–1.2)
MONOCYTES NFR BLD: 9 % (ref 3–12)
NEUTROPHILS NFR BLD: 59 % (ref 36–66)
NEUTS SEG NFR BLD: 5.44 K/UL (ref 1.5–8.1)
NRBC BLD-RTO: 0 PER 100 WBC
PLATELET # BLD AUTO: ABNORMAL K/UL (ref 150–450)
PLATELET, FLUORESCENCE: 118 K/UL (ref 150–450)
PLATELETS.RETICULATED NFR BLD AUTO: 20.9 % (ref 1.1–10.3)
PMV BLD AUTO: 13 FL (ref 8–13.5)
POTASSIUM SERPL-SCNC: 5.3 MMOL/L (ref 3.7–5.3)
PROT SERPL-MCNC: 7 G/DL (ref 6.6–8.7)
RBC # BLD AUTO: 4.49 M/UL (ref 4.21–5.77)
SODIUM SERPL-SCNC: 145 MMOL/L (ref 136–145)
TRIGL SERPL-MCNC: 149 MG/DL (ref 0–149)
URATE SERPL-MCNC: 4.3 MG/DL (ref 3.4–7)
WBC OTHER # BLD: 9 K/UL (ref 3.5–11)

## 2025-07-02 PROCEDURE — 80061 LIPID PANEL: CPT

## 2025-07-02 PROCEDURE — 83735 ASSAY OF MAGNESIUM: CPT

## 2025-07-02 PROCEDURE — 84550 ASSAY OF BLOOD/URIC ACID: CPT

## 2025-07-02 PROCEDURE — 85025 COMPLETE CBC W/AUTO DIFF WBC: CPT

## 2025-07-02 PROCEDURE — 80053 COMPREHEN METABOLIC PANEL: CPT

## 2025-07-02 PROCEDURE — 36415 COLL VENOUS BLD VENIPUNCTURE: CPT

## 2025-07-09 ENCOUNTER — TELEPHONE (OUTPATIENT)
Dept: NEUROLOGY | Age: 85
End: 2025-07-09

## 2025-07-09 ENCOUNTER — HOSPITAL ENCOUNTER (OUTPATIENT)
Age: 85
Discharge: HOME | End: 2025-07-09
Payer: MEDICARE

## 2025-07-09 DIAGNOSIS — R80.9 PROTEINURIA, UNSPECIFIED TYPE: ICD-10-CM

## 2025-07-09 DIAGNOSIS — N18.30 SECONDARY DIABETES MELLITUS WITH STAGE 3 CHRONIC KIDNEY DISEASE (HCC): ICD-10-CM

## 2025-07-09 DIAGNOSIS — E13.22 SECONDARY DIABETES MELLITUS WITH STAGE 3 CHRONIC KIDNEY DISEASE (HCC): ICD-10-CM

## 2025-07-09 DIAGNOSIS — I12.9 BENIGN HYPERTENSION WITH CKD (CHRONIC KIDNEY DISEASE) STAGE III (HCC): ICD-10-CM

## 2025-07-09 DIAGNOSIS — N18.30 BENIGN HYPERTENSION WITH CKD (CHRONIC KIDNEY DISEASE) STAGE III (HCC): ICD-10-CM

## 2025-07-09 DIAGNOSIS — N18.32 STAGE 3B CHRONIC KIDNEY DISEASE (HCC): ICD-10-CM

## 2025-07-09 LAB
ANION GAP SERPL CALCULATED.3IONS-SCNC: 14 MMOL/L (ref 9–16)
BACTERIA URNS QL MICRO: ABNORMAL
BASOPHILS # BLD: 0.03 K/UL (ref 0–0.2)
BASOPHILS NFR BLD: 0 % (ref 0–2)
BILIRUB UR QL STRIP: NEGATIVE
BUN SERPL-MCNC: 21 MG/DL (ref 8–23)
CALCIUM SERPL-MCNC: 9.7 MG/DL (ref 8.6–10.4)
CASTS #/AREA URNS LPF: ABNORMAL /LPF
CHLORIDE SERPL-SCNC: 107 MMOL/L (ref 98–107)
CLARITY UR: CLEAR
CO2 SERPL-SCNC: 23 MMOL/L (ref 20–31)
COLOR UR: YELLOW
CREAT SERPL-MCNC: 1.7 MG/DL (ref 0.7–1.2)
EOSINOPHIL # BLD: 0.6 K/UL (ref 0–0.44)
EOSINOPHILS RELATIVE PERCENT: 6 % (ref 0–4)
EPI CELLS #/AREA URNS HPF: ABNORMAL /HPF
ERYTHROCYTE [DISTWIDTH] IN BLOOD BY AUTOMATED COUNT: 14.1 % (ref 11.5–14.9)
GFR, ESTIMATED: 39 ML/MIN/1.73M2
GLUCOSE SERPL-MCNC: 144 MG/DL (ref 74–99)
GLUCOSE UR STRIP-MCNC: NEGATIVE MG/DL
HCT VFR BLD AUTO: 44.7 % (ref 41–53)
HGB BLD-MCNC: 14.1 G/DL (ref 13.5–17.5)
HGB UR QL STRIP.AUTO: NEGATIVE
IMM GRANULOCYTES # BLD AUTO: 0.06 K/UL (ref 0–0.3)
IMM GRANULOCYTES NFR BLD: 1 %
KETONES UR STRIP-MCNC: NEGATIVE MG/DL
LEUKOCYTE ESTERASE UR QL STRIP: NEGATIVE
LYMPHOCYTES NFR BLD: 2.21 K/UL (ref 1.1–3.7)
LYMPHOCYTES RELATIVE PERCENT: 23 % (ref 24–44)
MAGNESIUM SERPL-MCNC: 2.3 MG/DL (ref 1.6–2.4)
MCH RBC QN AUTO: 29.8 PG (ref 26–34)
MCHC RBC AUTO-ENTMCNC: 31.5 G/DL (ref 31–37)
MCV RBC AUTO: 94.5 FL (ref 80–100)
MONOCYTES NFR BLD: 0.66 K/UL (ref 0.1–1.2)
MONOCYTES NFR BLD: 7 % (ref 3–12)
NEUTROPHILS NFR BLD: 63 % (ref 36–66)
NEUTS SEG NFR BLD: 6.11 K/UL (ref 1.5–8.1)
NITRITE UR QL STRIP: NEGATIVE
NRBC BLD-RTO: 0 PER 100 WBC
PH UR STRIP: 5.5 [PH] (ref 5–8)
PHOSPHATE SERPL-MCNC: 3.7 MG/DL (ref 2.5–4.5)
PLATELET # BLD AUTO: 204 K/UL (ref 150–450)
PMV BLD AUTO: 12.6 FL (ref 8–13.5)
POTASSIUM SERPL-SCNC: 4.4 MMOL/L (ref 3.7–5.3)
PROT UR STRIP-MCNC: NEGATIVE MG/DL
RBC # BLD AUTO: 4.73 M/UL (ref 4.21–5.77)
RBC #/AREA URNS HPF: ABNORMAL /HPF
SODIUM SERPL-SCNC: 144 MMOL/L (ref 136–145)
SP GR UR STRIP: 1.02 (ref 1–1.03)
UROBILINOGEN UR STRIP-ACNC: NORMAL EU/DL (ref 0–1)
WBC #/AREA URNS HPF: ABNORMAL /HPF
WBC OTHER # BLD: 9.7 K/UL (ref 3.5–11)

## 2025-07-09 PROCEDURE — 81001 URINALYSIS AUTO W/SCOPE: CPT

## 2025-07-09 PROCEDURE — 83735 ASSAY OF MAGNESIUM: CPT

## 2025-07-09 PROCEDURE — 80048 BASIC METABOLIC PNL TOTAL CA: CPT

## 2025-07-09 PROCEDURE — 36415 COLL VENOUS BLD VENIPUNCTURE: CPT

## 2025-07-09 PROCEDURE — 84100 ASSAY OF PHOSPHORUS: CPT

## 2025-07-09 PROCEDURE — 85025 COMPLETE CBC W/AUTO DIFF WBC: CPT

## 2025-07-09 NOTE — TELEPHONE ENCOUNTER
07/09 Spoke with Pt's wife to schedule 6 month follow up with Dr Polanco. Pt's wife stated that his other doctor told him that he no longer needs to see Dr Polanco. She didn't want to schedule.  Thank you.

## 2025-07-16 ENCOUNTER — OFFICE VISIT (OUTPATIENT)
Dept: ORTHOPEDIC SURGERY | Age: 85
End: 2025-07-16
Payer: MEDICARE

## 2025-07-16 VITALS — BODY MASS INDEX: 25.77 KG/M2 | HEIGHT: 70 IN | RESPIRATION RATE: 18 BRPM | WEIGHT: 180 LBS

## 2025-07-16 DIAGNOSIS — M25.551 PAIN OF RIGHT HIP: Primary | ICD-10-CM

## 2025-07-16 PROCEDURE — 99213 OFFICE O/P EST LOW 20 MIN: CPT | Performed by: ORTHOPAEDIC SURGERY

## 2025-07-16 PROCEDURE — 1123F ACP DISCUSS/DSCN MKR DOCD: CPT | Performed by: ORTHOPAEDIC SURGERY

## 2025-07-16 PROCEDURE — G8428 CUR MEDS NOT DOCUMENT: HCPCS | Performed by: ORTHOPAEDIC SURGERY

## 2025-07-16 PROCEDURE — 1036F TOBACCO NON-USER: CPT | Performed by: ORTHOPAEDIC SURGERY

## 2025-07-16 PROCEDURE — 1125F AMNT PAIN NOTED PAIN PRSNT: CPT | Performed by: ORTHOPAEDIC SURGERY

## 2025-07-16 PROCEDURE — G8417 CALC BMI ABV UP PARAM F/U: HCPCS | Performed by: ORTHOPAEDIC SURGERY

## 2025-07-16 NOTE — PROGRESS NOTES
[facility]                   Warner Duke M.D.            [address], [address]               [place], [place] [address]           Dept Phone: [phone]           Dept Fax:  [phone] [address] [facility]                       [address]           [place], [place] [address]          Dept Phone: [phone]           Dept Fax:  [phone]      Chief Compliant:  Chief Complaint   Patient presents with    Pain     Rt hip        History of Present Illness:  This is a 85 y.o. 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  He did have a CT scan done a while ago which did show some lucency but no evidence for true loosening.  Patient says occasionally gets a little ache in his hip but otherwise he is get around very well.  He is actually have a little more pain in the posterior aspect of his right knee he has no complaints with his left hip..       Review of Systems   Constitutional: Negative for fever, chills, sweats.   Eyes: Negative for changes in vision, or pain.   HENT: Negative for ear ache, epistaxis, or sore throat.  Respiratory/Cardio: Negative for Chest pain, palpitations, SOB, or cough.  Gastrointestinal: Negative for abdominal pain, N/V/D.   Genitourinary: Negative for dysuria, frequency, urgency, or hematuria.   Neurological: Negative for headache, numbness, or weakness.   Integumentary: Negative for rash, itching, laceration, or abrasion.   Musculoskeletal: Positive for Pain (Rt hip)       Physical Exam:  Constitutional: Patient is oriented to person, place, and time. Patient appears well-developed and well nourished.   HENT: Negative otherwise noted  Head: Normocephalic and

## 2025-08-25 ENCOUNTER — OFFICE VISIT (OUTPATIENT)
Dept: UROLOGY | Age: 85
End: 2025-08-25
Payer: MEDICARE

## 2025-08-25 VITALS
BODY MASS INDEX: 25.77 KG/M2 | DIASTOLIC BLOOD PRESSURE: 74 MMHG | TEMPERATURE: 97.6 F | SYSTOLIC BLOOD PRESSURE: 116 MMHG | HEART RATE: 68 BPM | WEIGHT: 180 LBS | OXYGEN SATURATION: 95 % | HEIGHT: 70 IN

## 2025-08-25 DIAGNOSIS — R39.15 URGENCY OF URINATION: ICD-10-CM

## 2025-08-25 DIAGNOSIS — N13.8 BPH WITH OBSTRUCTION/LOWER URINARY TRACT SYMPTOMS: ICD-10-CM

## 2025-08-25 DIAGNOSIS — R33.9 INCOMPLETE BLADDER EMPTYING: Primary | ICD-10-CM

## 2025-08-25 DIAGNOSIS — N40.1 BPH WITH OBSTRUCTION/LOWER URINARY TRACT SYMPTOMS: ICD-10-CM

## 2025-08-25 PROCEDURE — G8427 DOCREV CUR MEDS BY ELIG CLIN: HCPCS | Performed by: UROLOGY

## 2025-08-25 PROCEDURE — 3078F DIAST BP <80 MM HG: CPT | Performed by: UROLOGY

## 2025-08-25 PROCEDURE — G8417 CALC BMI ABV UP PARAM F/U: HCPCS | Performed by: UROLOGY

## 2025-08-25 PROCEDURE — 51798 US URINE CAPACITY MEASURE: CPT | Performed by: UROLOGY

## 2025-08-25 PROCEDURE — 99213 OFFICE O/P EST LOW 20 MIN: CPT | Performed by: UROLOGY

## 2025-08-25 PROCEDURE — 1036F TOBACCO NON-USER: CPT | Performed by: UROLOGY

## 2025-08-25 PROCEDURE — 1159F MED LIST DOCD IN RCRD: CPT | Performed by: UROLOGY

## 2025-08-25 PROCEDURE — 1123F ACP DISCUSS/DSCN MKR DOCD: CPT | Performed by: UROLOGY

## 2025-08-25 PROCEDURE — 3074F SYST BP LT 130 MM HG: CPT | Performed by: UROLOGY

## 2025-08-25 ASSESSMENT — ENCOUNTER SYMPTOMS
WHEEZING: 0
COLOR CHANGE: 0
SHORTNESS OF BREATH: 0
GASTROINTESTINAL NEGATIVE: 1
EYES NEGATIVE: 1
ALLERGIC/IMMUNOLOGIC NEGATIVE: 1
NAUSEA: 0
BACK PAIN: 0
EYE PAIN: 0
RESPIRATORY NEGATIVE: 1
VOMITING: 0
EYE REDNESS: 0
ABDOMINAL PAIN: 0
COUGH: 0

## 2025-08-28 DIAGNOSIS — M1A.00X0 IDIOPATHIC CHRONIC GOUT WITHOUT TOPHUS, UNSPECIFIED SITE: ICD-10-CM

## 2025-08-28 RX ORDER — ALLOPURINOL 100 MG/1
200 TABLET ORAL DAILY
Qty: 180 TABLET | Refills: 0 | Status: SHIPPED | OUTPATIENT
Start: 2025-08-28

## (undated) DEVICE — C-ARMOR C-ARM EQUIPMENT COVERS CLEAR STERILE UNIVERSAL FIT 12 PER CASE: Brand: C-ARMOR

## (undated) DEVICE — 4-PORT MANIFOLD: Brand: NEPTUNE 2

## (undated) DEVICE — Z DISCONTINUED PER MEDLINE USE 2741943 DRESSING AQUACEL 10 IN ALG W9XL25CM SIL CVR WTRPRF VIR BACT BARR ANTIMIC

## (undated) DEVICE — SUTURE NONABSORBABLE MONOFILAMENT 3-0 PS-1 18 IN BLK ETHILON 1663H

## (undated) DEVICE — SOLUTION IV IRRIG WATER 1000ML POUR BRL 2F7114

## (undated) DEVICE — RECIPROCATING BLADE, DOUBLE SIDED, OFFSET  (70.0 X 0.64 X 12.6MM)

## (undated) DEVICE — STAPLER SKIN SQ 30 ABSRB STPL DISP INSORB

## (undated) DEVICE — AGENT HEMSTAT SZ 50 W8XL6.25CM THK10MM PORCINE GEL ABSRB

## (undated) DEVICE — SOLUTION IV IRRIG POUR BRL 0.9% SODIUM CHL 2F7124

## (undated) DEVICE — DRAPE,REIN 53X77,STERILE: Brand: MEDLINE

## (undated) DEVICE — SHUNT CV SIL EXT LOOP L30CM DIA3X4MM FULL SPR REINF SUNDT

## (undated) DEVICE — SUTURE VCRL + SZ 2 L27IN ABSRB UD L40MM CP 1/2 CIR REV CUT VCP195H

## (undated) DEVICE — GLOVE SURG SZ 65 L12IN FNGR THK79MIL GRN LTX FREE

## (undated) DEVICE — SHEET BD X PROTECT-A-BED

## (undated) DEVICE — SKIN AFFIX SURG ADHESIVE 72/CS 0.55ML: Brand: MEDLINE

## (undated) DEVICE — Z INACTIVE USE 2660664 SOLUTION IRRIG 3000ML 0.9% SOD CHL USP UROMATIC PLAS CONT

## (undated) DEVICE — MEDI-VAC NON-CONDUCTIVE SUCTION TUBING: Brand: CARDINAL HEALTH

## (undated) DEVICE — SYRINGE MED 20ML STD CLR PLAS LUERLOCK TIP N CTRL DISP

## (undated) DEVICE — 3M™ COBAN™ STERILE SELF-ADHERENT WRAP, 1584S, 4 IN X 5 YD (10 CM X 4,5 M), 18 ROLLS/CASE: Brand: 3M™ COBAN™

## (undated) DEVICE — Device

## (undated) DEVICE — KIT DRN FLAT W/ 100CC EVAC 7MM FULL PERF

## (undated) DEVICE — POSITIONER,HEAD,MULTIRING,36CS: Brand: MEDLINE

## (undated) DEVICE — TUBING SUCT 12FR MAL ALUM SHFT FN CAP VENT UNIV CONN W/ OBT

## (undated) DEVICE — COVER,LIGHT HANDLE,FLX,2/PK: Brand: MEDLINE INDUSTRIES, INC.

## (undated) DEVICE — FLEXIBLE YANKAUER,LARGE TIP, NO VACUUM CONTROL: Brand: ARGYLE

## (undated) DEVICE — KIT SEP W/ BLD DRAW TB SYR NDL TRNQT PD

## (undated) DEVICE — GLOVE SURG SZ 85 STD WHT LTX SYN POLYMER BEAD REINF ANTI RL

## (undated) DEVICE — GOWN,SIRUS,NONRNF,SETINSLV,XL,20/CS: Brand: MEDLINE

## (undated) DEVICE — COVER,MAYO STAND,XL,STERILE: Brand: MEDLINE

## (undated) DEVICE — 3M™ WARMING BLANKET, UPPER BODY, 10 PER CASE, 42268: Brand: BAIR HUGGER™

## (undated) DEVICE — DRAPE EQUIP STAND XL MAYO CVR

## (undated) DEVICE — SUTURE N ABSRB L 18 IN SZ 4-0 NDL L 19 MM NYL MONOFILAMENT

## (undated) DEVICE — SUTURE PERMAHAND SZ 4-0 L18IN NONABSORBABLE BLK SILK BRAID A183H

## (undated) DEVICE — SUTURE VCRL + SZ 2-0 L27IN ABSRB UD CP-1 1/2 CIR REV CUT VCP266H

## (undated) DEVICE — LABEL MED DRUG DESCRIPTION MP STL

## (undated) DEVICE — 3M™ IOBAN™ 2 ANTIMICROBIAL INCISE DRAPE 6650EZ: Brand: IOBAN™ 2

## (undated) DEVICE — DUAL CUT SAGITTAL BLADE

## (undated) DEVICE — HANDPIECE SET WITH COAXIAL HIGH FLOW TIP AND SUCTION TUBE: Brand: INTERPULSE

## (undated) DEVICE — KIT BONE CEM 80GM DBL MX VAC MXING SYS CART 2 NOZ FLTR TBNG

## (undated) DEVICE — SUTURE NONABSORBABLE MONOFILAMENT 7-0 BV-1 1X24 IN PROLENE 8702H

## (undated) DEVICE — GLOVE SURG SZ 8 L12IN FNGR THK13MIL BRN LTX SYN POLYMER W

## (undated) DEVICE — KNIFE OPHTH W2.5MM 55DEG CRESC BVL UP ANG XSTAR

## (undated) DEVICE — TRAY SPNL 25GA L35IN SPNL NDL BIPIVCAIN HCL W D LIDO HCL

## (undated) DEVICE — SUTURE VICRYL + SZ 4-0 L27IN ABSRB UD L26MM SH 1/2 CIR VCP415H

## (undated) DEVICE — GLOVE SURG SZ 75 CRM LTX FREE POLYISOPRENE POLYMER BEAD ANTI

## (undated) DEVICE — BUR RND DIA COARSE 5.0MM

## (undated) DEVICE — PAD,NON-ADHERENT,3X8,STERILE,LF,1/PK: Brand: MEDLINE

## (undated) DEVICE — KIT AUTOTRNS APPL AERO 2 SET SYR 2 TIP FOR PLT SEP SYS GPS

## (undated) DEVICE — SUTURE VCRL SZ 0 L36IN ABSRB UD CT-1 L36MM 1/2 CIR TAPR PNT VCP946H

## (undated) DEVICE — SURGICAL PROCEDURE PACK PLAS C

## (undated) DEVICE — GLOVE SURG SZ 7.5 L11.73IN FNGR THK9.8MIL STRW LTX POLYMER

## (undated) DEVICE — SUTURE STRATAFIX SYMMETRIC PDS + SZ 1 L18IN ABSRB VLT L48MM SXPP1A400

## (undated) DEVICE — SEALANT HEMOSTATIC FLOSEAL W/RECOTHROM 5 ML 75X13.25 IN 10CC

## (undated) DEVICE — GOWN,AURORA,NONREINFORCED,LARGE: Brand: MEDLINE

## (undated) DEVICE — CONTAINER,SPECIMEN,4OZ,OR STRL: Brand: MEDLINE

## (undated) DEVICE — DUP USE 310781 BLADE SAW SAG 29MMX83.7MMX1.32MM

## (undated) DEVICE — PROTECTOR ULN NRV PUR FOAM HK LOOP STRP ANATOMICALLY

## (undated) DEVICE — STERILE PATIENT PROTECTIVE PAD FOR IMP® KNEE POSITIONERS & COHESIVE WRAP (10 / CASE): Brand: DE MAYO KNEE POSITIONER®

## (undated) DEVICE — STOCKINETTE ORTH W6XL48IN OFF WHT SGL PLY UNBLEACHED COT RIB

## (undated) DEVICE — FLOSEAL HEMOSTATIC MATRIX, 10ML: Brand: FLOSEAL HEMOSTATIC MATRIX

## (undated) DEVICE — GAUZE,SPONGE,FLUFF,6"X6.75",STRL,5/TRAY: Brand: MEDLINE

## (undated) DEVICE — MONITORING NEURO WO INTERPRETATION SYS PRICING

## (undated) DEVICE — PROVE COVER: Brand: UNBRANDED

## (undated) DEVICE — SYRINGE MED 10ML LUERLOCK TIP W/O SFTY DISP

## (undated) DEVICE — SINGLE PORT MANIFOLD: Brand: NEPTUNE 2

## (undated) DEVICE — 3M™ STERI-STRIP™ ANTIMICROBIAL SKIN CLOSURES 1 IN X 5 IN, 25/CAR, 4 CAR/CASE A1848: Brand: 3M™ STERI-STRIP™

## (undated) DEVICE — 3M™ IOBAN™ 2 ANTIMICROBIAL INCISE DRAPE 6651EZ: Brand: IOBAN™ 2

## (undated) DEVICE — BLANKET WRM W40.2XL55.9IN IORT LO BODY + MISTRAL AIR

## (undated) DEVICE — GLOVE SURG SZ 6 L12IN FNGR THK79MIL GRN LTX FREE

## (undated) DEVICE — NO USE 18 MONTHS GOWN TOGA FLYTE XL

## (undated) DEVICE — Device: Brand: LEVEL 1

## (undated) DEVICE — SUTURE NONABSORBABLE MONOFILAMENT 6-0 C-1 1X30 IN PROLENE 8706H

## (undated) DEVICE — SUTURE PERMA-HAND SZ 2-0 L30IN NONABSORBABLE BLK L26MM SH K833H

## (undated) DEVICE — GLOVE ORTHO 8   MSG9480

## (undated) DEVICE — SOLUTION IRRIG 1000ML 0.9% SOD CHL USP POUR PLAS BTL

## (undated) DEVICE — STRAP,POSITIONING,KNEE/BODY,FOAM,4X60": Brand: MEDLINE

## (undated) DEVICE — AGENT HEMOSTATIC SURGIFLOW MATRIX KIT W/THROMBIN

## (undated) DEVICE — 1200CC GUARDIAN II: Brand: GUARDIAN

## (undated) DEVICE — DRAPE C ARM W104XL188CM FLD MOB XR

## (undated) DEVICE — COVER,TABLE,60X90,STERILE: Brand: MEDLINE

## (undated) DEVICE — SUTURE N ABSRB L 36 IN SZ 5-0 NDL L 13 MM POLYPRO OR PPL BLU

## (undated) DEVICE — COOLER THER 20-31IN L CRYO COMB W/ PD KNEE TB GRAV FLO

## (undated) DEVICE — STRAP ARMBRD W1.5XL32IN FOAM STR YET SFT W/ HK AND LOOP

## (undated) DEVICE — SOLUTION IV 100ML 0.9% SOD CHL PLAS CONT USP VIAFLX 1 PER

## (undated) DEVICE — GLOVE SURG SZ 8 L12IN FNGR THK79MIL GRN LTX FREE

## (undated) DEVICE — CATHETER,URETHRAL,REDRUBBER,STERILE,20FR: Brand: MEDLINE

## (undated) DEVICE — SOLUTION IV 1000ML 0.9% SOD CHL PH 5 INJ USP VIAFLX PLAS

## (undated) DEVICE — MEDI-VAC YANKAUER SUCTION HANDLE W/BULBOUS TIP: Brand: CARDINAL HEALTH

## (undated) DEVICE — DRESSING HYDROCOLLOID BORDER 35X10 IN ALUM PRIMASEAL

## (undated) DEVICE — SUTURE ETHILON SZ 3-0 L18IN NONABSORBABLE BLK L24MM PS-1 3/8 1663G

## (undated) DEVICE — SOLUTION IV 1000ML LAC RINGERS PH 6.5 INJ USP VIAFLX PLAS

## (undated) DEVICE — DRESSING TRNSPAR W5XL4.5IN FLM SHT SEMIPERMEABLE WIND

## (undated) DEVICE — TAPE UMB 72X7/32 IN

## (undated) DEVICE — 1LYRTR 16FR10ML100%SIL UMS SNP: Brand: MEDLINE INDUSTRIES, INC.

## (undated) DEVICE — HANDPIECE SET WITH SUCTION TUBING: Brand: INTERPULSE